# Patient Record
Sex: MALE | Race: WHITE | NOT HISPANIC OR LATINO | URBAN - METROPOLITAN AREA
[De-identification: names, ages, dates, MRNs, and addresses within clinical notes are randomized per-mention and may not be internally consistent; named-entity substitution may affect disease eponyms.]

---

## 2017-03-13 ENCOUNTER — INPATIENT (INPATIENT)
Facility: HOSPITAL | Age: 57
LOS: 1 days | Discharge: HOME | DRG: 92 | End: 2017-03-15
Attending: HOSPITALIST | Admitting: HOSPITALIST
Payer: MEDICAID

## 2017-03-13 VITALS — HEIGHT: 67 IN | WEIGHT: 199.96 LBS

## 2017-03-13 DIAGNOSIS — I10 ESSENTIAL (PRIMARY) HYPERTENSION: ICD-10-CM

## 2017-03-13 DIAGNOSIS — R42 DIZZINESS AND GIDDINESS: ICD-10-CM

## 2017-03-13 DIAGNOSIS — F32.9 MAJOR DEPRESSIVE DISORDER, SINGLE EPISODE, UNSPECIFIED: ICD-10-CM

## 2017-03-13 DIAGNOSIS — I63.9 CEREBRAL INFARCTION, UNSPECIFIED: ICD-10-CM

## 2017-03-13 DIAGNOSIS — M19.90 UNSPECIFIED OSTEOARTHRITIS, UNSPECIFIED SITE: ICD-10-CM

## 2017-03-13 DIAGNOSIS — F41.9 ANXIETY DISORDER, UNSPECIFIED: ICD-10-CM

## 2017-03-13 DIAGNOSIS — J45.909 UNSPECIFIED ASTHMA, UNCOMPLICATED: ICD-10-CM

## 2017-03-13 DIAGNOSIS — Z41.8 ENCOUNTER FOR OTHER PROCEDURES FOR PURPOSES OTHER THAN REMEDYING HEALTH STATE: ICD-10-CM

## 2017-03-13 LAB
ALBUMIN SERPL ELPH-MCNC: 3.4 G/DL — LOW (ref 3.5–5)
ALP SERPL-CCNC: 73 U/L — SIGNIFICANT CHANGE UP (ref 40–120)
ALT FLD-CCNC: 39 U/L DA — SIGNIFICANT CHANGE UP (ref 10–60)
ANION GAP SERPL CALC-SCNC: 12 MMOL/L — SIGNIFICANT CHANGE UP (ref 5–17)
AST SERPL-CCNC: 23 U/L — SIGNIFICANT CHANGE UP (ref 10–40)
BASOPHILS # BLD AUTO: 0.1 K/UL — SIGNIFICANT CHANGE UP (ref 0–0.2)
BASOPHILS NFR BLD AUTO: 1.1 % — SIGNIFICANT CHANGE UP (ref 0–2)
BILIRUB SERPL-MCNC: 0.2 MG/DL — SIGNIFICANT CHANGE UP (ref 0.2–1.2)
BUN SERPL-MCNC: 17 MG/DL — SIGNIFICANT CHANGE UP (ref 7–18)
CALCIUM SERPL-MCNC: 8.6 MG/DL — SIGNIFICANT CHANGE UP (ref 8.4–10.5)
CHLORIDE SERPL-SCNC: 106 MMOL/L — SIGNIFICANT CHANGE UP (ref 96–108)
CK MB BLD-MCNC: 1.4 % — SIGNIFICANT CHANGE UP (ref 0–3.5)
CK MB BLD-MCNC: <1.2 % — SIGNIFICANT CHANGE UP (ref 0–3.5)
CK MB CFR SERPL CALC: 1.2 NG/ML — SIGNIFICANT CHANGE UP (ref 0–3.6)
CK MB CFR SERPL CALC: <1 NG/ML — SIGNIFICANT CHANGE UP (ref 0–3.6)
CK SERPL-CCNC: 81 U/L — SIGNIFICANT CHANGE UP (ref 35–232)
CK SERPL-CCNC: 86 U/L — SIGNIFICANT CHANGE UP (ref 35–232)
CO2 SERPL-SCNC: 21 MMOL/L — LOW (ref 22–31)
CREAT SERPL-MCNC: 1.28 MG/DL — SIGNIFICANT CHANGE UP (ref 0.5–1.3)
EOSINOPHIL # BLD AUTO: 0.4 K/UL — SIGNIFICANT CHANGE UP (ref 0–0.5)
EOSINOPHIL NFR BLD AUTO: 3.9 % — SIGNIFICANT CHANGE UP (ref 0–6)
GLUCOSE SERPL-MCNC: 124 MG/DL — HIGH (ref 70–99)
HCT VFR BLD CALC: 40.6 % — SIGNIFICANT CHANGE UP (ref 39–50)
HGB BLD-MCNC: 13.6 G/DL — SIGNIFICANT CHANGE UP (ref 13–17)
LACTATE SERPL-SCNC: 1.6 MMOL/L — SIGNIFICANT CHANGE UP (ref 0.7–2)
LYMPHOCYTES # BLD AUTO: 27.8 % — SIGNIFICANT CHANGE UP (ref 13–44)
LYMPHOCYTES # BLD AUTO: 3 K/UL — SIGNIFICANT CHANGE UP (ref 1–3.3)
MCHC RBC-ENTMCNC: 28.7 PG — SIGNIFICANT CHANGE UP (ref 27–34)
MCHC RBC-ENTMCNC: 33.5 GM/DL — SIGNIFICANT CHANGE UP (ref 32–36)
MCV RBC AUTO: 85.6 FL — SIGNIFICANT CHANGE UP (ref 80–100)
MONOCYTES # BLD AUTO: 0.7 K/UL — SIGNIFICANT CHANGE UP (ref 0–0.9)
MONOCYTES NFR BLD AUTO: 6.1 % — SIGNIFICANT CHANGE UP (ref 2–14)
NEUTROPHILS # BLD AUTO: 6.5 K/UL — SIGNIFICANT CHANGE UP (ref 1.8–7.4)
NEUTROPHILS NFR BLD AUTO: 61.1 % — SIGNIFICANT CHANGE UP (ref 43–77)
PLATELET # BLD AUTO: 192 K/UL — SIGNIFICANT CHANGE UP (ref 150–400)
POTASSIUM SERPL-MCNC: 4 MMOL/L — SIGNIFICANT CHANGE UP (ref 3.5–5.3)
POTASSIUM SERPL-SCNC: 4 MMOL/L — SIGNIFICANT CHANGE UP (ref 3.5–5.3)
PROT SERPL-MCNC: 6.7 G/DL — SIGNIFICANT CHANGE UP (ref 6–8.3)
RBC # BLD: 4.73 M/UL — SIGNIFICANT CHANGE UP (ref 4.2–5.8)
RBC # FLD: 13.1 % — SIGNIFICANT CHANGE UP (ref 10.3–14.5)
SODIUM SERPL-SCNC: 139 MMOL/L — SIGNIFICANT CHANGE UP (ref 135–145)
TROPONIN I SERPL-MCNC: <0.015 NG/ML — SIGNIFICANT CHANGE UP (ref 0–0.04)
TROPONIN I SERPL-MCNC: <0.015 NG/ML — SIGNIFICANT CHANGE UP (ref 0–0.04)
WBC # BLD: 10.6 K/UL — HIGH (ref 3.8–10.5)
WBC # FLD AUTO: 10.6 K/UL — HIGH (ref 3.8–10.5)

## 2017-03-13 PROCEDURE — 99223 1ST HOSP IP/OBS HIGH 75: CPT

## 2017-03-13 PROCEDURE — 99223 1ST HOSP IP/OBS HIGH 75: CPT | Mod: GC

## 2017-03-13 PROCEDURE — 70450 CT HEAD/BRAIN W/O DYE: CPT | Mod: 26

## 2017-03-13 PROCEDURE — 71010: CPT | Mod: 26

## 2017-03-13 PROCEDURE — 99285 EMERGENCY DEPT VISIT HI MDM: CPT

## 2017-03-13 RX ORDER — FLUPHENAZINE HYDROCHLORIDE 1 MG/1
10 TABLET, FILM COATED ORAL EVERY 12 HOURS
Qty: 0 | Refills: 0 | Status: DISCONTINUED | OUTPATIENT
Start: 2017-03-13 | End: 2017-03-15

## 2017-03-13 RX ORDER — ACETAMINOPHEN 500 MG
650 TABLET ORAL EVERY 8 HOURS
Qty: 0 | Refills: 0 | Status: DISCONTINUED | OUTPATIENT
Start: 2017-03-13 | End: 2017-03-15

## 2017-03-13 RX ORDER — VENLAFAXINE HCL 75 MG
150 CAPSULE, EXT RELEASE 24 HR ORAL DAILY
Qty: 0 | Refills: 0 | Status: DISCONTINUED | OUTPATIENT
Start: 2017-03-13 | End: 2017-03-15

## 2017-03-13 RX ORDER — ASPIRIN/CALCIUM CARB/MAGNESIUM 324 MG
325 TABLET ORAL ONCE
Qty: 0 | Refills: 0 | Status: COMPLETED | OUTPATIENT
Start: 2017-03-13 | End: 2017-03-13

## 2017-03-13 RX ORDER — IPRATROPIUM/ALBUTEROL SULFATE 18-103MCG
3 AEROSOL WITH ADAPTER (GRAM) INHALATION EVERY 6 HOURS
Qty: 0 | Refills: 0 | Status: DISCONTINUED | OUTPATIENT
Start: 2017-03-13 | End: 2017-03-15

## 2017-03-13 RX ORDER — CELECOXIB 200 MG/1
200 CAPSULE ORAL DAILY
Qty: 0 | Refills: 0 | Status: DISCONTINUED | OUTPATIENT
Start: 2017-03-13 | End: 2017-03-13

## 2017-03-13 RX ORDER — QUETIAPINE FUMARATE 200 MG/1
200 TABLET, FILM COATED ORAL AT BEDTIME
Qty: 0 | Refills: 0 | Status: DISCONTINUED | OUTPATIENT
Start: 2017-03-13 | End: 2017-03-15

## 2017-03-13 RX ORDER — MECLIZINE HCL 12.5 MG
12.5 TABLET ORAL EVERY 8 HOURS
Qty: 0 | Refills: 0 | Status: DISCONTINUED | OUTPATIENT
Start: 2017-03-13 | End: 2017-03-15

## 2017-03-13 RX ORDER — QUETIAPINE FUMARATE 200 MG/1
100 TABLET, FILM COATED ORAL AT BEDTIME
Qty: 0 | Refills: 0 | Status: DISCONTINUED | OUTPATIENT
Start: 2017-03-13 | End: 2017-03-13

## 2017-03-13 RX ORDER — HEPARIN SODIUM 5000 [USP'U]/ML
5000 INJECTION INTRAVENOUS; SUBCUTANEOUS EVERY 8 HOURS
Qty: 0 | Refills: 0 | Status: DISCONTINUED | OUTPATIENT
Start: 2017-03-13 | End: 2017-03-15

## 2017-03-13 RX ORDER — MELOXICAM 15 MG/1
1 TABLET ORAL
Qty: 0 | Refills: 0 | COMMUNITY

## 2017-03-13 RX ORDER — ESOMEPRAZOLE MAGNESIUM 40 MG/1
1 CAPSULE, DELAYED RELEASE ORAL
Qty: 0 | Refills: 0 | COMMUNITY

## 2017-03-13 RX ORDER — ATORVASTATIN CALCIUM 80 MG/1
20 TABLET, FILM COATED ORAL AT BEDTIME
Qty: 0 | Refills: 0 | Status: DISCONTINUED | OUTPATIENT
Start: 2017-03-13 | End: 2017-03-15

## 2017-03-13 RX ORDER — PANTOPRAZOLE SODIUM 20 MG/1
40 TABLET, DELAYED RELEASE ORAL
Qty: 0 | Refills: 0 | Status: DISCONTINUED | OUTPATIENT
Start: 2017-03-13 | End: 2017-03-15

## 2017-03-13 RX ORDER — CLONAZEPAM 1 MG
1 TABLET ORAL AT BEDTIME
Qty: 0 | Refills: 0 | Status: DISCONTINUED | OUTPATIENT
Start: 2017-03-13 | End: 2017-03-15

## 2017-03-13 RX ORDER — ASPIRIN/CALCIUM CARB/MAGNESIUM 324 MG
81 TABLET ORAL DAILY
Qty: 0 | Refills: 0 | Status: DISCONTINUED | OUTPATIENT
Start: 2017-03-13 | End: 2017-03-15

## 2017-03-13 RX ORDER — BENZTROPINE MESYLATE 1 MG
1 TABLET ORAL
Qty: 0 | Refills: 0 | Status: DISCONTINUED | OUTPATIENT
Start: 2017-03-13 | End: 2017-03-15

## 2017-03-13 RX ORDER — INSULIN LISPRO 100/ML
VIAL (ML) SUBCUTANEOUS
Qty: 0 | Refills: 0 | Status: DISCONTINUED | OUTPATIENT
Start: 2017-03-13 | End: 2017-03-15

## 2017-03-13 RX ADMIN — FLUPHENAZINE HYDROCHLORIDE 10 MILLIGRAM(S): 1 TABLET, FILM COATED ORAL at 18:12

## 2017-03-13 RX ADMIN — Medication 325 MILLIGRAM(S): at 13:48

## 2017-03-13 RX ADMIN — HEPARIN SODIUM 5000 UNIT(S): 5000 INJECTION INTRAVENOUS; SUBCUTANEOUS at 22:12

## 2017-03-13 RX ADMIN — Medication 3 MILLILITER(S): at 21:05

## 2017-03-13 RX ADMIN — ATORVASTATIN CALCIUM 20 MILLIGRAM(S): 80 TABLET, FILM COATED ORAL at 22:11

## 2017-03-13 RX ADMIN — Medication 1 MILLIGRAM(S): at 22:11

## 2017-03-13 RX ADMIN — Medication 1 MILLIGRAM(S): at 18:12

## 2017-03-13 RX ADMIN — QUETIAPINE FUMARATE 200 MILLIGRAM(S): 200 TABLET, FILM COATED ORAL at 22:13

## 2017-03-13 NOTE — H&P ADULT. - GASTROINTESTINAL DETAILS
no bruit/nontender/bowel sounds normal/normal/soft/no organomegaly/no rigidity/no distention/no guarding/no masses palpable/no rebound tenderness

## 2017-03-13 NOTE — ED ADULT NURSE NOTE - OBJECTIVE STATEMENT
BIBA  by notification with c/o slurred speech  on arrival alert breathing unlabored Rt arm weakness slurred speech  noted stroke code uninitiated CT head completed

## 2017-03-13 NOTE — H&P ADULT. - ATTENDING COMMENTS
Patient was seen and examined by myself with team on day of admission 3/13/17 at about 4:35 pm . Case was discussed with house staff in details.  Plan discussed with patient and family in details at bedside and all their questions / concerns were addressed.

## 2017-03-13 NOTE — H&P ADULT. - HISTORY OF PRESENT ILLNESS
56 M from home lives with brother, PMHx of depression, DM, HTN, HLD, psychosis came in with dizziness for 1 days. Patient explains his dizziness as unsteady gait and spinning of the room. Dizziness is associated with weakness and multiple falls. Dizziness is constant and worsens with movements. He also complaints of dysarthria for last 2-3 months, he has problem in finding and pronouncing words. Patient is a poor historian. He has difficulty in ambulation for last few months. He also complaints of dyspnea on exertion for last 3 months. Patient is on multiple medications for depression, anxiety and mood stabilization. He is current everyday smoker, he never had colonoscopy.    Patient denies fever, chest pain, nausea, vomiting, trauma, rash, orthopnea, PND, LE edema, alcohol abuse, illicit drug use.    Ivorian  # [id number] 56 M from home lives with brother, PMHx of depression, DM, HTN, HLD, psychosis came in with dizziness for 1 days. Patient explains his dizziness as unsteady gait and spinning of the room. Dizziness is associated with weakness and multiple falls. Dizziness is constant and worsens with movements. He also complaints of dysarthria, he has problem in finding and pronouncing words. As per nephew dysarthria started in morning. Patient is a poor historian. He has difficulty in ambulation for last few months. He also complaints of dyspnea on exertion for last 3 months. Patient is on multiple medications for depression, anxiety and mood stabilization. He is current everyday smoker, he never had colonoscopy.    Patient denies fever, chest pain, nausea, vomiting, trauma, rash, orthopnea, PND, LE edema, alcohol abuse, illicit drug use.    [place]  # [id number]

## 2017-03-13 NOTE — H&P ADULT. - NEUROLOGICAL DETAILS
sensation intact/no spontaneous movement/superficial reflexes intact/alert and oriented x 3/deep reflexes intact/cranial nerves intact/normal strength

## 2017-03-13 NOTE — ED PROVIDER NOTE - OBJECTIVE STATEMENT
patient was brought in by EMS after was found to have dysarthria at center where he goes pt with significant psych history   hands are clumsy / weak   but no pronator   time on onset unknown  woke up   Nih 4

## 2017-03-13 NOTE — H&P ADULT. - PROBLEM SELECTOR PLAN 1
Likely due to polypharmacy  Vestibulitis could be contributing factor  CT head negative for infarct or hemorrhage but shows bilateral mucosal sinus thickening  Afebrile, leukocytosis 10.6  F/u BCx  C/w meclizine prn  Admitted to tele to r/o stroke  Given  MG  C/w ASA and statin  Holding BDZ  f/u PT consult  fall precaution Presumed due to vestibulitis  Other contributing factor could be due to psychiatric meds  CT head negative for infarct or hemorrhage but shows bilateral mucosal sinus thickening  Afebrile, leukocytosis 10.6  F/u BCx  C/w meclizine prn  Admitted to tele to r/o stroke  Given  MG  C/w ASA and statin  Holding BDZ  f/u PT consult  fall precaution Dizziness with dysarthria   Presumed due to vestibulitis  Concerns for stroke  Other contributing factor could be due to psychiatric meds  CT head negative for infarct or hemorrhage but shows bilateral mucosal sinus thickening  Afebrile, leukocytosis 10.6  C/w meclizine prn  Admitted to tele to r/o stroke  Given  MG  C/w ASA and statin  Holding temazepam and decreasing doze od seroquel  f/u PT consult  fall precaution  f/u echo and MRI head  Neuro consult Dr. Mckee

## 2017-03-13 NOTE — H&P ADULT. - ASSESSMENT
56 M from home lives with brother, PMHx of depression, arthritis, DM, HTN, HLD, psychosis came in with dizziness for 1 days. 56 M from home lives with brother, PMHx of depression, arthritis, DM, HTN, HLD, psychosis came in with dizziness with dysarthria for 1 days.

## 2017-03-14 LAB
24R-OH-CALCIDIOL SERPL-MCNC: 41.6 NG/ML — SIGNIFICANT CHANGE UP (ref 30–100)
ALBUMIN SERPL ELPH-MCNC: 3.6 G/DL — SIGNIFICANT CHANGE UP (ref 3.5–5)
ALP SERPL-CCNC: 88 U/L — SIGNIFICANT CHANGE UP (ref 40–120)
ALT FLD-CCNC: 35 U/L DA — SIGNIFICANT CHANGE UP (ref 10–60)
AMPHET UR-MCNC: NEGATIVE — SIGNIFICANT CHANGE UP
ANION GAP SERPL CALC-SCNC: 10 MMOL/L — SIGNIFICANT CHANGE UP (ref 5–17)
APPEARANCE UR: CLEAR — SIGNIFICANT CHANGE UP
AST SERPL-CCNC: 17 U/L — SIGNIFICANT CHANGE UP (ref 10–40)
BARBITURATES UR SCN-MCNC: NEGATIVE — SIGNIFICANT CHANGE UP
BENZODIAZ UR-MCNC: NEGATIVE — SIGNIFICANT CHANGE UP
BILIRUB DIRECT SERPL-MCNC: 0.1 MG/DL — SIGNIFICANT CHANGE UP (ref 0–0.2)
BILIRUB INDIRECT FLD-MCNC: 0.2 MG/DL — SIGNIFICANT CHANGE UP (ref 0.2–1)
BILIRUB SERPL-MCNC: 0.3 MG/DL — SIGNIFICANT CHANGE UP (ref 0.2–1.2)
BILIRUB UR-MCNC: NEGATIVE — SIGNIFICANT CHANGE UP
BUN SERPL-MCNC: 22 MG/DL — HIGH (ref 7–18)
CALCIUM SERPL-MCNC: 9.3 MG/DL — SIGNIFICANT CHANGE UP (ref 8.4–10.5)
CHLORIDE SERPL-SCNC: 106 MMOL/L — SIGNIFICANT CHANGE UP (ref 96–108)
CHOLEST SERPL-MCNC: 123 MG/DL — SIGNIFICANT CHANGE UP (ref 10–199)
CK MB BLD-MCNC: <1.5 % — SIGNIFICANT CHANGE UP (ref 0–3.5)
CK MB CFR SERPL CALC: <1 NG/ML — SIGNIFICANT CHANGE UP (ref 0–3.6)
CK SERPL-CCNC: 66 U/L — SIGNIFICANT CHANGE UP (ref 35–232)
CO2 SERPL-SCNC: 24 MMOL/L — SIGNIFICANT CHANGE UP (ref 22–31)
COCAINE METAB.OTHER UR-MCNC: NEGATIVE — SIGNIFICANT CHANGE UP
COLOR SPEC: YELLOW — SIGNIFICANT CHANGE UP
CREAT SERPL-MCNC: 1.69 MG/DL — HIGH (ref 0.5–1.3)
DIFF PNL FLD: NEGATIVE — SIGNIFICANT CHANGE UP
GLUCOSE SERPL-MCNC: 147 MG/DL — HIGH (ref 70–99)
GLUCOSE UR QL: NEGATIVE — SIGNIFICANT CHANGE UP
HBA1C BLD-MCNC: 6.5 % — HIGH (ref 4–5.6)
HCT VFR BLD CALC: 41.1 % — SIGNIFICANT CHANGE UP (ref 39–50)
HCV AB S/CO SERPL IA: 0.11 S/CO — SIGNIFICANT CHANGE UP
HCV AB SERPL-IMP: SIGNIFICANT CHANGE UP
HDLC SERPL-MCNC: 34 MG/DL — LOW (ref 40–125)
HGB BLD-MCNC: 13.5 G/DL — SIGNIFICANT CHANGE UP (ref 13–17)
KETONES UR-MCNC: NEGATIVE — SIGNIFICANT CHANGE UP
LEUKOCYTE ESTERASE UR-ACNC: NEGATIVE — SIGNIFICANT CHANGE UP
LIPID PNL WITH DIRECT LDL SERPL: 49 MG/DL — SIGNIFICANT CHANGE UP
MAGNESIUM SERPL-MCNC: 2.4 MG/DL — SIGNIFICANT CHANGE UP (ref 1.8–2.4)
MCHC RBC-ENTMCNC: 27.9 PG — SIGNIFICANT CHANGE UP (ref 27–34)
MCHC RBC-ENTMCNC: 32.8 GM/DL — SIGNIFICANT CHANGE UP (ref 32–36)
MCV RBC AUTO: 85.2 FL — SIGNIFICANT CHANGE UP (ref 80–100)
METHADONE UR-MCNC: NEGATIVE — SIGNIFICANT CHANGE UP
NITRITE UR-MCNC: NEGATIVE — SIGNIFICANT CHANGE UP
OB PNL STL: NEGATIVE — SIGNIFICANT CHANGE UP
OPIATES UR-MCNC: NEGATIVE — SIGNIFICANT CHANGE UP
PCP SPEC-MCNC: SIGNIFICANT CHANGE UP
PCP UR-MCNC: NEGATIVE — SIGNIFICANT CHANGE UP
PH UR: 6 — SIGNIFICANT CHANGE UP (ref 4.8–8)
PHOSPHATE SERPL-MCNC: 4.7 MG/DL — HIGH (ref 2.5–4.5)
PLATELET # BLD AUTO: 195 K/UL — SIGNIFICANT CHANGE UP (ref 150–400)
POTASSIUM SERPL-MCNC: 3.7 MMOL/L — SIGNIFICANT CHANGE UP (ref 3.5–5.3)
POTASSIUM SERPL-SCNC: 3.7 MMOL/L — SIGNIFICANT CHANGE UP (ref 3.5–5.3)
PROT SERPL-MCNC: 6.9 G/DL — SIGNIFICANT CHANGE UP (ref 6–8.3)
PROT UR-MCNC: NEGATIVE — SIGNIFICANT CHANGE UP
RBC # BLD: 4.83 M/UL — SIGNIFICANT CHANGE UP (ref 4.2–5.8)
RBC # FLD: 12.7 % — SIGNIFICANT CHANGE UP (ref 10.3–14.5)
SODIUM SERPL-SCNC: 140 MMOL/L — SIGNIFICANT CHANGE UP (ref 135–145)
SP GR SPEC: 1.01 — SIGNIFICANT CHANGE UP (ref 1.01–1.02)
THC UR QL: NEGATIVE — SIGNIFICANT CHANGE UP
TOTAL CHOLESTEROL/HDL RATIO MEASUREMENT: 3.6 RATIO — SIGNIFICANT CHANGE UP (ref 3.4–9.6)
TRIGL SERPL-MCNC: 199 MG/DL — HIGH (ref 10–149)
TROPONIN I SERPL-MCNC: <0.015 NG/ML — SIGNIFICANT CHANGE UP (ref 0–0.04)
TSH SERPL-MCNC: 0.71 UU/ML — SIGNIFICANT CHANGE UP (ref 0.34–4.82)
UROBILINOGEN FLD QL: NEGATIVE — SIGNIFICANT CHANGE UP
VIT B12 SERPL-MCNC: 490 PG/ML — SIGNIFICANT CHANGE UP (ref 243–894)
WBC # BLD: 9.8 K/UL — SIGNIFICANT CHANGE UP (ref 3.8–10.5)
WBC # FLD AUTO: 9.8 K/UL — SIGNIFICANT CHANGE UP (ref 3.8–10.5)

## 2017-03-14 PROCEDURE — 70551 MRI BRAIN STEM W/O DYE: CPT | Mod: 26

## 2017-03-14 PROCEDURE — 99232 SBSQ HOSP IP/OBS MODERATE 35: CPT | Mod: GC

## 2017-03-14 RX ORDER — CLONAZEPAM 1 MG
1 TABLET ORAL
Qty: 0 | Refills: 0 | COMMUNITY
Start: 2017-03-14

## 2017-03-14 RX ORDER — MECLIZINE HCL 12.5 MG
1 TABLET ORAL
Qty: 0 | Refills: 0 | COMMUNITY
Start: 2017-03-14

## 2017-03-14 RX ORDER — LANOLIN ALCOHOL/MO/W.PET/CERES
3 CREAM (GRAM) TOPICAL AT BEDTIME
Qty: 0 | Refills: 0 | Status: DISCONTINUED | OUTPATIENT
Start: 2017-03-14 | End: 2017-03-15

## 2017-03-14 RX ORDER — SENNA PLUS 8.6 MG/1
1 TABLET ORAL AT BEDTIME
Qty: 0 | Refills: 0 | Status: DISCONTINUED | OUTPATIENT
Start: 2017-03-14 | End: 2017-03-15

## 2017-03-14 RX ORDER — BENZTROPINE MESYLATE 1 MG
1 TABLET ORAL
Qty: 0 | Refills: 0 | COMMUNITY

## 2017-03-14 RX ORDER — QUETIAPINE FUMARATE 200 MG/1
1 TABLET, FILM COATED ORAL
Qty: 0 | Refills: 0 | COMMUNITY

## 2017-03-14 RX ORDER — ATORVASTATIN CALCIUM 80 MG/1
1 TABLET, FILM COATED ORAL
Qty: 0 | Refills: 0 | COMMUNITY
Start: 2017-03-14

## 2017-03-14 RX ORDER — CLONAZEPAM 1 MG
1 TABLET ORAL
Qty: 0 | Refills: 0 | COMMUNITY

## 2017-03-14 RX ORDER — SODIUM CHLORIDE 9 MG/ML
250 INJECTION INTRAMUSCULAR; INTRAVENOUS; SUBCUTANEOUS ONCE
Qty: 0 | Refills: 0 | Status: COMPLETED | OUTPATIENT
Start: 2017-03-14 | End: 2017-03-14

## 2017-03-14 RX ORDER — FLUPHENAZINE HYDROCHLORIDE 1 MG/1
1 TABLET, FILM COATED ORAL
Qty: 0 | Refills: 0 | COMMUNITY
Start: 2017-03-14

## 2017-03-14 RX ORDER — VENLAFAXINE HCL 75 MG
1 CAPSULE, EXT RELEASE 24 HR ORAL
Qty: 0 | Refills: 0 | COMMUNITY

## 2017-03-14 RX ORDER — VENLAFAXINE HCL 75 MG
1 CAPSULE, EXT RELEASE 24 HR ORAL
Qty: 0 | Refills: 0 | COMMUNITY
Start: 2017-03-14

## 2017-03-14 RX ORDER — BENZTROPINE MESYLATE 1 MG
1 TABLET ORAL
Qty: 0 | Refills: 0 | COMMUNITY
Start: 2017-03-14

## 2017-03-14 RX ORDER — QUETIAPINE FUMARATE 200 MG/1
1 TABLET, FILM COATED ORAL
Qty: 0 | Refills: 0 | COMMUNITY
Start: 2017-03-14

## 2017-03-14 RX ORDER — ASPIRIN/CALCIUM CARB/MAGNESIUM 324 MG
1 TABLET ORAL
Qty: 0 | Refills: 0 | COMMUNITY
Start: 2017-03-14

## 2017-03-14 RX ADMIN — Medication 3 MILLILITER(S): at 02:17

## 2017-03-14 RX ADMIN — SENNA PLUS 1 TABLET(S): 8.6 TABLET ORAL at 21:51

## 2017-03-14 RX ADMIN — HEPARIN SODIUM 5000 UNIT(S): 5000 INJECTION INTRAVENOUS; SUBCUTANEOUS at 05:55

## 2017-03-14 RX ADMIN — QUETIAPINE FUMARATE 200 MILLIGRAM(S): 200 TABLET, FILM COATED ORAL at 21:52

## 2017-03-14 RX ADMIN — ATORVASTATIN CALCIUM 20 MILLIGRAM(S): 80 TABLET, FILM COATED ORAL at 21:52

## 2017-03-14 RX ADMIN — Medication 3 MILLILITER(S): at 21:16

## 2017-03-14 RX ADMIN — Medication 1 MILLIGRAM(S): at 17:34

## 2017-03-14 RX ADMIN — Medication 1 MILLIGRAM(S): at 21:51

## 2017-03-14 RX ADMIN — HEPARIN SODIUM 5000 UNIT(S): 5000 INJECTION INTRAVENOUS; SUBCUTANEOUS at 13:12

## 2017-03-14 RX ADMIN — Medication 1 TABLET(S): at 17:32

## 2017-03-14 RX ADMIN — Medication 3 MILLILITER(S): at 10:08

## 2017-03-14 RX ADMIN — Medication 1: at 12:19

## 2017-03-14 RX ADMIN — Medication 1 MILLIGRAM(S): at 05:55

## 2017-03-14 RX ADMIN — FLUPHENAZINE HYDROCHLORIDE 10 MILLIGRAM(S): 1 TABLET, FILM COATED ORAL at 05:55

## 2017-03-14 RX ADMIN — FLUPHENAZINE HYDROCHLORIDE 10 MILLIGRAM(S): 1 TABLET, FILM COATED ORAL at 17:33

## 2017-03-14 RX ADMIN — HEPARIN SODIUM 5000 UNIT(S): 5000 INJECTION INTRAVENOUS; SUBCUTANEOUS at 21:52

## 2017-03-14 RX ADMIN — Medication 650 MILLIGRAM(S): at 12:19

## 2017-03-14 RX ADMIN — Medication 3 MILLILITER(S): at 14:33

## 2017-03-14 RX ADMIN — Medication 150 MILLIGRAM(S): at 12:19

## 2017-03-14 RX ADMIN — Medication 3 MILLIGRAM(S): at 02:57

## 2017-03-14 RX ADMIN — Medication 81 MILLIGRAM(S): at 12:19

## 2017-03-14 RX ADMIN — PANTOPRAZOLE SODIUM 40 MILLIGRAM(S): 20 TABLET, DELAYED RELEASE ORAL at 05:55

## 2017-03-14 RX ADMIN — Medication 650 MILLIGRAM(S): at 13:30

## 2017-03-14 RX ADMIN — SODIUM CHLORIDE 83.33 MILLILITER(S): 9 INJECTION INTRAMUSCULAR; INTRAVENOUS; SUBCUTANEOUS at 12:20

## 2017-03-14 NOTE — DISCHARGE NOTE ADULT - PATIENT PORTAL LINK FT
“You can access the [facility] Patient Portal, offered by [facility], by registering with the following website: [email or web address]”

## 2017-03-14 NOTE — PHYSICAL THERAPY INITIAL EVALUATION ADULT - GENERAL OBSERVATIONS, REHAB EVAL
Consult received, chart reviewed. Patient received supine in bed, NAD, states feels better , [place] speaking as a PT.  Patient agreed to EVALUATION from Physical Therapist.

## 2017-03-14 NOTE — DISCHARGE NOTE ADULT - MEDICATION SUMMARY - MEDICATIONS TO TAKE
I will START or STAY ON the medications listed below when I get home from the hospital:    Tylenol 500 mg oral tablet  -- 2 tab(s) by mouth 2 times a day, As Needed  -- Indication: For pain    aspirin 81 mg oral tablet, chewable  -- 1 tab(s) by mouth once a day  -- Indication: For Need for prophylactic measure    clonazePAM 1 mg oral tablet  -- 1 tab(s) by mouth once a day (at bedtime)  -- Indication: For Anxiety    venlafaxine 150 mg oral capsule, extended release  -- 1 cap(s) by mouth once a day  -- Indication: For Depression    metFORMIN 500 mg oral tablet, extended release  -- 1 tab(s) by mouth once a day  -- Indication: For DM    meclizine 12.5 mg oral tablet  -- 1 tab(s) by mouth every 8 hours, As needed, Dizziness  -- Indication: For Dizziness    atorvastatin 20 mg oral tablet  -- 1 tab(s) by mouth once a day (at bedtime)  -- Indication: For HLD (hyperlipidemia)    benztropine 1 mg oral tablet  -- 1 tab(s) by mouth 2 times a day  -- Indication: For Antiparkinson    fluPHENAZine 10 mg oral tablet  -- 1 tab(s) by mouth every 12 hours  -- Indication: For Antipsychotic    QUEtiapine 200 mg oral tablet, extended release  -- 1 tab(s) by mouth once a day (at bedtime)  -- Indication: For Antipsychotic    senna 8.6 mg oral tablet  -- 1 tab(s) by mouth once a day (at bedtime)  -- Indication: For Constipation    amoxicillin-clavulanate 875 mg-125 mg oral tablet  -- 1 tab(s) by mouth 2 times a day  -- Indication: For Sinusitis    omeprazole 40 mg oral delayed release capsule  -- 1 cap(s) by mouth once a day  -- Indication: For GI ppx I will START or STAY ON the medications listed below when I get home from the hospital:    Tylenol 500 mg oral tablet  -- 2 tab(s) by mouth 2 times a day, As Needed  -- Indication: For pain    aspirin 81 mg oral tablet, chewable  -- 1 tab(s) by mouth once a day  -- Indication: For Need for prophylactic measure    clonazePAM 1 mg oral tablet  -- 1 tab(s) by mouth once a day (at bedtime)  -- Indication: For Anxiety    venlafaxine 150 mg oral capsule, extended release  -- 1 cap(s) by mouth once a day  -- Indication: For Depression    metFORMIN 500 mg oral tablet, extended release  -- 1 tab(s) by mouth once a day  -- Indication: For DM    meclizine 12.5 mg oral tablet  -- 1 tab(s) by mouth every 8 hours, As needed, Dizziness  -- Indication: For Dizziness    atorvastatin 20 mg oral tablet  -- 1 tab(s) by mouth once a day (at bedtime)  -- Indication: For HLD (hyperlipidemia)    benztropine 1 mg oral tablet  -- 1 tab(s) by mouth 2 times a day  -- Indication: For Antiparkinson    fluPHENAZine 10 mg oral tablet  -- 1 tab(s) by mouth every 12 hours  -- Indication: For Antipsychotic    QUEtiapine 200 mg oral tablet, extended release  -- 1 tab(s) by mouth once a day (at bedtime)  -- Indication: For Depression    senna 8.6 mg oral tablet  -- 1 tab(s) by mouth once a day (at bedtime)  -- Indication: For Constipation    Nasal Mist 0.05% nasal spray  -- 1 spray(s) into nose 3 times a day, As Needed  -- For the nose.    -- Indication: For Nasal congestion    amoxicillin-clavulanate 875 mg-125 mg oral tablet  -- 1 tab(s) by mouth 2 times a day  -- Indication: For Sinusitis    omeprazole 40 mg oral delayed release capsule  -- 1 cap(s) by mouth once a day  -- Indication: For GI ppx    ergocalciferol 50,000 intl units (1.25 mg) oral capsule  -- 1 cap(s) by mouth once a week  -- Indication: For Vit D defiency I will START or STAY ON the medications listed below when I get home from the hospital:    Tylenol 500 mg oral tablet  -- 2 tab(s) by mouth 2 times a day, As Needed  -- Indication: For pain    aspirin 81 mg oral tablet, chewable  -- 1 tab(s) by mouth once a day  -- Indication: For Cardioprotective    clonazePAM 1 mg oral tablet  -- 1 tab(s) by mouth once a day (at bedtime)  -- Indication: For Anxiety    venlafaxine 150 mg oral capsule, extended release  -- 1 cap(s) by mouth once a day  -- Indication: For Anxiety    metFORMIN 500 mg oral tablet, extended release  -- 1 tab(s) by mouth once a day  -- Indication: For DM    meclizine 12.5 mg oral tablet  -- 1 tab(s) by mouth every 8 hours, As needed, Dizziness  -- Indication: For vertigo/ dizziness    atorvastatin 20 mg oral tablet  -- 1 tab(s) by mouth once a day (at bedtime)  -- Indication: For HLD (hyperlipidemia)    benztropine 1 mg oral tablet  -- 1 tab(s) by mouth 2 times a day  -- Indication: For Antiparkinson    fluPHENAZine 10 mg oral tablet  -- 1 tab(s) by mouth every 12 hours  -- Indication: For Antipsychotic    QUEtiapine 200 mg oral tablet, extended release  -- 1 tab(s) by mouth once a day (at bedtime)  -- Indication: For Anxiety    senna 8.6 mg oral tablet  -- 1 tab(s) by mouth once a day (at bedtime)  -- Indication: For Constipation    Nasal Mist 0.05% nasal spray  -- 1 spray(s) into nose 3 times a day, As Needed  -- For the nose.    -- Indication: For Nasal spray    amoxicillin-clavulanate 875 mg-125 mg oral tablet  -- 1 tab(s) by mouth 2 times a day  -- Indication: For infection    omeprazole 40 mg oral delayed release capsule  -- 1 cap(s) by mouth once a day  -- Indication: For GI ppx    ergocalciferol 50,000 intl units (1.25 mg) oral capsule  -- 1 cap(s) by mouth once a week  -- Indication: For Vit D defiency

## 2017-03-14 NOTE — DISCHARGE NOTE ADULT - MEDICATION SUMMARY - MEDICATIONS TO CHANGE
I will SWITCH the dose or number of times a day I take the medications listed below when I get home from the hospital:    venlafaxine 75 mg oral capsule, extended release  -- 1 cap(s) by mouth once a day    QUEtiapine 100 mg oral tablet  -- 1 tab(s) by mouth once a day (at bedtime)    clonazePAM 0.5 mg oral tablet  -- 1 tab(s) by mouth once a day (at bedtime) MDD:0.5mg    SEROquel  mg oral tablet, extended release  -- 1 tab(s) by mouth once a day (in the evening)

## 2017-03-14 NOTE — DISCHARGE NOTE ADULT - CARE PLAN
Principal Discharge DX:	Cerebrovascular accident (CVA), unspecified mechanism  Secondary Diagnosis:	Dizziness  Secondary Diagnosis:	Reactive airway disease  Secondary Diagnosis:	HTN (hypertension)  Secondary Diagnosis:	HLD (hyperlipidemia)  Secondary Diagnosis:	Anxiety  Secondary Diagnosis:	Depression Principal Discharge DX:	Cerebrovascular accident (CVA), unspecified mechanism  Goal:	to be free of symptoms of CVA such as dysarthria and gait instability; likely 2/2 psychiatric medications  Instructions for follow-up, activity and diet:	Patient did not have diagnosed CVA; recommended to continue aspirin and statin; dysarthria improved, no weakness on exam; As per physical therapy should get home PT; f/u with PCP within 1 week  Secondary Diagnosis:	Dizziness  Goal:	to be free of dizziness  Instructions for follow-up, activity and diet:	c/w meclizine as directed as needed; changes made to home psychiatric medications; take as directed  Secondary Diagnosis:	Reactive airway disease  Goal:	to be free of shortness of breath  Instructions for follow-up, activity and diet:	PFTs as outpatient; was given bronchodilators as inpatient; improved shortness of breath  Secondary Diagnosis:	HTN (hypertension)  Goal:	BP <140/90  Instructions for follow-up, activity and diet:	as inpatient, patient did not have hypertension; had borderline BP; advised to stop BP meds and have re-evaluation by PCP within 3 days to monitor and restart as indicated  Secondary Diagnosis:	HLD (hyperlipidemia)  Goal:	total cholesterol <200  Instructions for follow-up, activity and diet:	c/w statin as directed  Secondary Diagnosis:	Anxiety  Goal:	to be free of symptoms of anxiety  Instructions for follow-up, activity and diet:	c/w psychiatric medications as directed; f/u with PCP within 1 week  Secondary Diagnosis:	Depression  Goal:	to be free of symptoms of depression  Instructions for follow-up, activity and diet:	c/w psychiatric medications as directed, f/u with PCP within 1 week Principal Discharge DX:	Cerebrovascular accident (CVA), unspecified mechanism  Goal:	to be free of symptoms of CVA such as dysarthria and gait instability; likely 2/2 psychiatric medications  Instructions for follow-up, activity and diet:	Patient did not have diagnosed CVA; recommended to continue aspirin and statin; dysarthria improved, no weakness on exam; As per physical therapy should get home PT; f/u with PCP within 1 week; f/u with psychiatrist within 1 week to monitor medications and adjust if needed  Secondary Diagnosis:	Dizziness  Goal:	to be free of dizziness  Instructions for follow-up, activity and diet:	c/w meclizine as directed as needed; changes made to home psychiatric medications; take as directed  Secondary Diagnosis:	Reactive airway disease  Goal:	to be free of shortness of breath  Instructions for follow-up, activity and diet:	PFTs as outpatient; was given bronchodilators as inpatient; improved shortness of breath  Secondary Diagnosis:	HTN (hypertension)  Goal:	BP <140/90  Instructions for follow-up, activity and diet:	as inpatient, patient did not have hypertension; had borderline BP; advised to stop BP meds and have re-evaluation by PCP within 3 days to monitor and restart as indicated  Secondary Diagnosis:	HLD (hyperlipidemia)  Goal:	total cholesterol <200  Instructions for follow-up, activity and diet:	c/w statin as directed  Secondary Diagnosis:	Anxiety  Goal:	to be free of symptoms of anxiety  Instructions for follow-up, activity and diet:	c/w psychiatric medications as directed; f/u with PCP within 1 week  Secondary Diagnosis:	Depression  Goal:	to be free of symptoms of depression  Instructions for follow-up, activity and diet:	c/w psychiatric medications as directed, f/u with PCP within 1 week Principal Discharge DX:	Dysarthria  Goal:	to be free of symptoms such as dysarthria and gait instability; likely 2/2 psychiatric medications; CVA ruled out  Instructions for follow-up, activity and diet:	Patient did not have diagnosed CVA; recommended to continue aspirin and statin; dysarthria improved, no weakness on exam; As per physical therapy should get home PT; f/u with PCP within 1 week; f/u with psychiatrist within 1 week to monitor medications and adjust if needed  Secondary Diagnosis:	Dizziness  Goal:	to be free of dizziness  Instructions for follow-up, activity and diet:	c/w meclizine as directed as needed; changes made to home psychiatric medications; take as directed  Secondary Diagnosis:	Reactive airway disease  Goal:	to be free of shortness of breath  Instructions for follow-up, activity and diet:	PFTs as outpatient; was given bronchodilators as inpatient; improved shortness of breath  Secondary Diagnosis:	HTN (hypertension)  Goal:	BP <140/90  Instructions for follow-up, activity and diet:	as inpatient, patient did not have hypertension; had borderline BP; advised to stop BP meds and have re-evaluation by PCP within 3 days to monitor and restart as indicated  Secondary Diagnosis:	HLD (hyperlipidemia)  Goal:	total cholesterol <200  Instructions for follow-up, activity and diet:	c/w statin as directed  Secondary Diagnosis:	Anxiety  Goal:	to be free of symptoms of anxiety  Instructions for follow-up, activity and diet:	c/w psychiatric medications as directed; f/u with PCP within 1 week  Secondary Diagnosis:	Depression  Goal:	to be free of symptoms of depression  Instructions for follow-up, activity and diet:	c/w psychiatric medications as directed, f/u with PCP within 1 week

## 2017-03-14 NOTE — DISCHARGE NOTE ADULT - HOSPITAL COURSE
56 M from home lives with brother, PMHx of depression, DM, HTN, HLD, psychosis came in with dizziness for 1 days. Patient explains his dizziness as unsteady gait and spinning of the room. Dizziness is associated with weakness and multiple falls. Dizziness is constant and worsens with movements. He also complaints of dysarthria, he has problem in finding and pronouncing words. As per nephew dysarthria started in morning. Patient is a poor historian. He has difficulty in ambulation for last few months. He also complaints of dyspnea on exertion for last 3 months. Patient is on multiple medications for depression, anxiety and mood stabilization. He is current everyday smoker, he never had colonoscopy.    Patient denies fever, chest pain, nausea, vomiting, trauma, rash, orthopnea, PND, LE edema, alcohol abuse, illicit drug use.    Admitted for:    1) r/o CVA  -NIHSS 3 on admission  -patient had dizziness, dysarthria and reports gait disturbance  -positive orthostatics 2/2 psych meds  -head CT neg for acute findings except B/L mucosal thickening  -continue with meclizine for dizziness  -Dr. Mckee was consulted for neurology  -f/u MRI head  -as per PT eval: recommends home with home PT  -echo was ordered, pending    2) reactive airway disease  -gave duoneb as inpatient  -advised to get PFTs as outpatient  -CXR negative for acute pathology    3) anxiety and depression  -held temazepam due to dizziness  -continue with home psych meds, cogentin, klonipin, prolixin, seroquel and effexor    4) HTN  -holding BP meds until 3/15 to allow for permissive HTN upto 200/110    5)arthritis  -tylenol for pain management 56 M from home lives with brother, PMHx of depression, DM, HTN, HLD, psychosis came in with dizziness for 1 days. Patient explains his dizziness as unsteady gait and spinning of the room. Dizziness is associated with weakness and multiple falls. Dizziness is constant and worsens with movements. He also complaints of dysarthria, he has problem in finding and pronouncing words. As per nephew dysarthria started in morning. Patient is a poor historian. He has difficulty in ambulation for last few months. He also complaints of dyspnea on exertion for last 3 months. Patient is on multiple medications for depression, anxiety and mood stabilization. He is current everyday smoker, he never had colonoscopy.    Patient denies fever, chest pain, nausea, vomiting, trauma, rash, orthopnea, PND, LE edema, alcohol abuse, illicit drug use.    Admitted for:    1) r/o CVA  -NIHSS 3 on admission  -patient had dizziness, dysarthria and reports gait disturbance  -positive orthostatics 2/2 psych meds  -head CT neg for acute findings except B/L mucosal thickening  -continue with meclizine for dizziness  -Dr. Mckee was consulted for neurology  -MRI head neg   -as per PT eval: recommends home with home PT  -echo was ordered, normal Ef, mild concentric LVH    2) reactive airway disease   -gave duoneb as inpatient  -advised to get PFTs as outpatient  -CXR negative for acute pathology    3) anxiety and depression  -held temazepam due to dizziness  -continue with home psych meds, cogentin, klonipin, prolixin, seroquel and effexor    4) HTN  -holding BP meds until 3/15 to allow for permissive HTN upto 200/110    5)arthritis  -tylenol for pain management    6) sinusitis- c/w meds as directed  -take nasal saline spray as directed for relief of congestion    Patient medically stable for discharge. 56 M from home lives with brother, PMHx of depression, DM, HTN, HLD, psychosis came in with dizziness for 1 days. Patient explains his dizziness as unsteady gait and spinning of the room. Dizziness is associated with weakness and multiple falls. Dizziness is constant and worsens with movements. He also complaints of dysarthria, he has problem in finding and pronouncing words. As per nephew dysarthria started in morning. Patient is a poor historian. He has difficulty in ambulation for last few months. He also complaints of dyspnea on exertion for last 3 months. Patient is on multiple medications for depression, anxiety and mood stabilization. He is current everyday smoker, he never had colonoscopy.    Patient denies fever, chest pain, nausea, vomiting, trauma, rash, orthopnea, PND, LE edema, alcohol abuse, illicit drug use.    Admitted for:    1) Dysarthria - No evidence of acute CVA although presumed NIHSS 3 on admission  -patient had dizziness, dysarthria and reports gait disturbance- symptoms likely related to recently started new antipsychotic medication   -positive orthostatics 2/2 psych meds  -head CT neg for acute findings except B/L mucosal thickening  -continue with meclizine for dizziness  -Dr. Mckee was consulted for neurology  -MRI head neg   -as per PT eval: recommends home with home PT  -echo was ordered, normal Ef, mild concentric LVH    2) reactive airway disease   -gave duoneb as inpatient  -advised to get PFTs as outpatient  -CXR negative for acute pathology    3) anxiety and depression  -held temazepam due to dizziness  -continue with home psych meds, cogentin, klonipin, prolixin, seroquel and effexor    4) HTN  -holding BP meds until 3/15 to allow for permissive HTN upto 200/110    5)arthritis  -tylenol for pain management    6) sinusitis- c/w meds as directed  -take nasal saline spray as directed for relief of congestion    Patient medically stable for discharge.

## 2017-03-14 NOTE — DISCHARGE NOTE ADULT - PLAN OF CARE
to be free of symptoms of CVA such as dysarthria and gait instability; likely 2/2 psychiatric medications Patient did not have diagnosed CVA; recommended to continue aspirin and statin; dysarthria improved, no weakness on exam; As per physical therapy should get home PT; f/u with PCP within 1 week to be free of dizziness c/w meclizine as directed as needed; changes made to home psychiatric medications; take as directed to be free of shortness of breath PFTs as outpatient; was given bronchodilators as inpatient; improved shortness of breath as inpatient, patient did not have hypertension; had borderline BP; advised to stop BP meds and have re-evaluation by PCP within 3 days to monitor and restart as indicated c/w statin as directed c/w psychiatric medications as directed; f/u with PCP within 1 week c/w psychiatric medications as directed, f/u with PCP within 1 week BP <140/90 total cholesterol <200 to be free of symptoms of anxiety to be free of symptoms of depression Patient did not have diagnosed CVA; recommended to continue aspirin and statin; dysarthria improved, no weakness on exam; As per physical therapy should get home PT; f/u with PCP within 1 week; f/u with psychiatrist within 1 week to monitor medications and adjust if needed to be free of symptoms such as dysarthria and gait instability; likely 2/2 psychiatric medications; CVA ruled out

## 2017-03-14 NOTE — DISCHARGE NOTE ADULT - NS AS DC STROKE ED MATERIALS
Stroke Warning Signs and Symptoms/Need for Followup After Discharge/Stroke Education Booklet/Call 911 for Stroke/Risk Factors for Stroke/Prescribed Medications

## 2017-03-14 NOTE — PHYSICAL THERAPY INITIAL EVALUATION ADULT - ACTIVE RANGE OF MOTION EXAMINATION, REHAB EVAL
bilateral upper extremity Active ROM was WFL (within functional limits)/bilateral  lower extremity Active ROM was WFL (within functional limits)/grossly assessed WFL AROM

## 2017-03-14 NOTE — DISCHARGE NOTE ADULT - MEDICATION SUMMARY - MEDICATIONS TO STOP TAKING
I will STOP taking the medications listed below when I get home from the hospital:    losartan 50 mg oral tablet  -- 1 tab(s) by mouth once a day    losartan 100 mg oral tablet  -- 1 tab(s) by mouth once a day    Toprol- mg oral tablet, extended release  -- 1 tab(s) by mouth once a day    temazepam 30 mg oral capsule  -- 1 cap(s) by mouth once a day (at bedtime)    iloperidone 10 mg oral tablet  -- 1 tab(s) by mouth 2 times a day

## 2017-03-15 VITALS — DIASTOLIC BLOOD PRESSURE: 92 MMHG | SYSTOLIC BLOOD PRESSURE: 165 MMHG

## 2017-03-15 LAB
ANION GAP SERPL CALC-SCNC: 10 MMOL/L — SIGNIFICANT CHANGE UP (ref 5–17)
BUN SERPL-MCNC: 19 MG/DL — HIGH (ref 7–18)
CALCIUM SERPL-MCNC: 9.4 MG/DL — SIGNIFICANT CHANGE UP (ref 8.4–10.5)
CHLORIDE SERPL-SCNC: 106 MMOL/L — SIGNIFICANT CHANGE UP (ref 96–108)
CO2 SERPL-SCNC: 27 MMOL/L — SIGNIFICANT CHANGE UP (ref 22–31)
CREAT SERPL-MCNC: 1.4 MG/DL — HIGH (ref 0.5–1.3)
GLUCOSE SERPL-MCNC: 190 MG/DL — HIGH (ref 70–99)
HCT VFR BLD CALC: 39.6 % — SIGNIFICANT CHANGE UP (ref 39–50)
HGB BLD-MCNC: 12.6 G/DL — LOW (ref 13–17)
MAGNESIUM SERPL-MCNC: 2.1 MG/DL — SIGNIFICANT CHANGE UP (ref 1.8–2.4)
MCHC RBC-ENTMCNC: 28.3 PG — SIGNIFICANT CHANGE UP (ref 27–34)
MCHC RBC-ENTMCNC: 31.7 GM/DL — LOW (ref 32–36)
MCV RBC AUTO: 89.2 FL — SIGNIFICANT CHANGE UP (ref 80–100)
PHOSPHATE SERPL-MCNC: 4.1 MG/DL — SIGNIFICANT CHANGE UP (ref 2.5–4.5)
PLATELET # BLD AUTO: 193 K/UL — SIGNIFICANT CHANGE UP (ref 150–400)
POTASSIUM SERPL-MCNC: 3.5 MMOL/L — SIGNIFICANT CHANGE UP (ref 3.5–5.3)
POTASSIUM SERPL-SCNC: 3.5 MMOL/L — SIGNIFICANT CHANGE UP (ref 3.5–5.3)
RBC # BLD: 4.44 M/UL — SIGNIFICANT CHANGE UP (ref 4.2–5.8)
RBC # FLD: 13.2 % — SIGNIFICANT CHANGE UP (ref 10.3–14.5)
SODIUM SERPL-SCNC: 143 MMOL/L — SIGNIFICANT CHANGE UP (ref 135–145)
WBC # BLD: 8.8 K/UL — SIGNIFICANT CHANGE UP (ref 3.8–10.5)
WBC # FLD AUTO: 8.8 K/UL — SIGNIFICANT CHANGE UP (ref 3.8–10.5)

## 2017-03-15 PROCEDURE — 99232 SBSQ HOSP IP/OBS MODERATE 35: CPT

## 2017-03-15 PROCEDURE — 99239 HOSP IP/OBS DSCHRG MGMT >30: CPT

## 2017-03-15 RX ORDER — MECLIZINE HCL 12.5 MG
1 TABLET ORAL
Qty: 21 | Refills: 0 | OUTPATIENT
Start: 2017-03-15 | End: 2017-03-22

## 2017-03-15 RX ORDER — ERGOCALCIFEROL 1.25 MG/1
1 CAPSULE ORAL
Qty: 0 | Refills: 0 | COMMUNITY

## 2017-03-15 RX ORDER — FLUPHENAZINE HYDROCHLORIDE 1 MG/1
1 TABLET, FILM COATED ORAL
Qty: 0 | Refills: 0 | COMMUNITY
Start: 2017-03-15

## 2017-03-15 RX ORDER — QUETIAPINE FUMARATE 200 MG/1
1 TABLET, FILM COATED ORAL
Qty: 30 | Refills: 0 | OUTPATIENT
Start: 2017-03-15 | End: 2017-04-14

## 2017-03-15 RX ORDER — ASPIRIN/CALCIUM CARB/MAGNESIUM 324 MG
1 TABLET ORAL
Qty: 30 | Refills: 0 | OUTPATIENT
Start: 2017-03-15 | End: 2017-04-14

## 2017-03-15 RX ORDER — LOSARTAN POTASSIUM 100 MG/1
1 TABLET, FILM COATED ORAL
Qty: 0 | Refills: 0 | COMMUNITY

## 2017-03-15 RX ORDER — OXYMETAZOLINE HYDROCHLORIDE 0.5 MG/ML
1 SPRAY NASAL
Qty: 1 | Refills: 0 | OUTPATIENT
Start: 2017-03-15

## 2017-03-15 RX ORDER — QUETIAPINE FUMARATE 200 MG/1
1 TABLET, FILM COATED ORAL
Qty: 0 | Refills: 0 | COMMUNITY
Start: 2017-03-15

## 2017-03-15 RX ORDER — ERGOCALCIFEROL 1.25 MG/1
1 CAPSULE ORAL
Qty: 2 | Refills: 0
Start: 2017-03-15

## 2017-03-15 RX ORDER — METOPROLOL TARTRATE 50 MG
1 TABLET ORAL
Qty: 0 | Refills: 0 | COMMUNITY

## 2017-03-15 RX ORDER — CLONAZEPAM 1 MG
1 TABLET ORAL
Qty: 0 | Refills: 0 | COMMUNITY
Start: 2017-03-15

## 2017-03-15 RX ORDER — ILOPERIDONE 12 MG/1
1 TABLET ORAL
Qty: 0 | Refills: 0 | COMMUNITY

## 2017-03-15 RX ORDER — TEMAZEPAM 15 MG/1
1 CAPSULE ORAL
Qty: 0 | Refills: 0 | COMMUNITY

## 2017-03-15 RX ORDER — VENLAFAXINE HCL 75 MG
1 CAPSULE, EXT RELEASE 24 HR ORAL
Qty: 30 | Refills: 0 | OUTPATIENT
Start: 2017-03-15 | End: 2017-04-14

## 2017-03-15 RX ORDER — VENLAFAXINE HCL 75 MG
1 CAPSULE, EXT RELEASE 24 HR ORAL
Qty: 0 | Refills: 0 | COMMUNITY
Start: 2017-03-15

## 2017-03-15 RX ADMIN — Medication 150 MILLIGRAM(S): at 12:42

## 2017-03-15 RX ADMIN — Medication 3 MILLILITER(S): at 09:32

## 2017-03-15 RX ADMIN — HEPARIN SODIUM 5000 UNIT(S): 5000 INJECTION INTRAVENOUS; SUBCUTANEOUS at 13:26

## 2017-03-15 RX ADMIN — Medication 1 TABLET(S): at 05:08

## 2017-03-15 RX ADMIN — FLUPHENAZINE HYDROCHLORIDE 10 MILLIGRAM(S): 1 TABLET, FILM COATED ORAL at 05:08

## 2017-03-15 RX ADMIN — Medication 1: at 08:13

## 2017-03-15 RX ADMIN — Medication 1: at 12:42

## 2017-03-15 RX ADMIN — Medication 81 MILLIGRAM(S): at 12:42

## 2017-03-15 RX ADMIN — Medication 3 MILLILITER(S): at 14:57

## 2017-03-15 RX ADMIN — Medication 1 MILLIGRAM(S): at 05:08

## 2017-03-15 RX ADMIN — PANTOPRAZOLE SODIUM 40 MILLIGRAM(S): 20 TABLET, DELAYED RELEASE ORAL at 05:08

## 2017-03-19 LAB
CULTURE RESULTS: SIGNIFICANT CHANGE UP
CULTURE RESULTS: SIGNIFICANT CHANGE UP
SPECIMEN SOURCE: SIGNIFICANT CHANGE UP
SPECIMEN SOURCE: SIGNIFICANT CHANGE UP

## 2017-03-20 DIAGNOSIS — E78.5 HYPERLIPIDEMIA, UNSPECIFIED: ICD-10-CM

## 2017-03-20 DIAGNOSIS — J32.0 CHRONIC MAXILLARY SINUSITIS: ICD-10-CM
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
## 2017-03-20 DIAGNOSIS — G47.00 INSOMNIA, UNSPECIFIED: ICD-10-CM

## 2017-03-20 DIAGNOSIS — I10 ESSENTIAL (PRIMARY) HYPERTENSION: ICD-10-CM

## 2017-03-20 DIAGNOSIS — F33.9 MAJOR DEPRESSIVE DISORDER, RECURRENT, UNSPECIFIED: ICD-10-CM

## 2017-03-20 DIAGNOSIS — T43.505A ADVERSE EFFECT OF UNSPECIFIED ANTIPSYCHOTICS AND NEUROLEPTICS, INITIAL ENCOUNTER: ICD-10-CM

## 2017-03-20 DIAGNOSIS — F29 UNSPECIFIED PSYCHOSIS NOT DUE TO A SUBSTANCE OR KNOWN PHYSIOLOGICAL CONDITION: ICD-10-CM

## 2017-03-20 DIAGNOSIS — J45.909 UNSPECIFIED ASTHMA, UNCOMPLICATED: ICD-10-CM

## 2017-03-20 DIAGNOSIS — F41.9 ANXIETY DISORDER, UNSPECIFIED: ICD-10-CM

## 2017-03-20 DIAGNOSIS — E11.9 TYPE 2 DIABETES MELLITUS WITHOUT COMPLICATIONS: ICD-10-CM

## 2017-03-20 DIAGNOSIS — R47.1 DYSARTHRIA AND ANARTHRIA: ICD-10-CM

## 2017-03-20 DIAGNOSIS — Z79.84 LONG TERM (CURRENT) USE OF ORAL HYPOGLYCEMIC DRUGS: ICD-10-CM

## 2017-03-20 DIAGNOSIS — M19.90 UNSPECIFIED OSTEOARTHRITIS, UNSPECIFIED SITE: ICD-10-CM

## 2017-03-20 DIAGNOSIS — F17.210 NICOTINE DEPENDENCE, CIGARETTES, UNCOMPLICATED: ICD-10-CM

## 2017-03-22 DIAGNOSIS — F41.8 OTHER SPECIFIED ANXIETY DISORDERS: ICD-10-CM

## 2017-03-22 DIAGNOSIS — J32.9 CHRONIC SINUSITIS, UNSPECIFIED: ICD-10-CM

## 2017-04-22 ENCOUNTER — APPOINTMENT (OUTPATIENT)
Dept: OPHTHALMOLOGY | Facility: CLINIC | Age: 57
End: 2017-04-22

## 2017-08-09 ENCOUNTER — EMERGENCY (EMERGENCY)
Facility: HOSPITAL | Age: 57
LOS: 1 days | Discharge: HOME | End: 2017-08-09
Attending: EMERGENCY MEDICINE | Admitting: EMERGENCY MEDICINE
Payer: MEDICAID

## 2017-08-09 VITALS
TEMPERATURE: 98 F | DIASTOLIC BLOOD PRESSURE: 99 MMHG | RESPIRATION RATE: 18 BRPM | HEART RATE: 73 BPM | SYSTOLIC BLOOD PRESSURE: 173 MMHG | OXYGEN SATURATION: 99 %

## 2017-08-09 VITALS
HEART RATE: 65 BPM | OXYGEN SATURATION: 96 % | RESPIRATION RATE: 16 BRPM | SYSTOLIC BLOOD PRESSURE: 146 MMHG | DIASTOLIC BLOOD PRESSURE: 79 MMHG | TEMPERATURE: 98 F

## 2017-08-09 PROCEDURE — 99285 EMERGENCY DEPT VISIT HI MDM: CPT

## 2017-08-09 PROCEDURE — 70450 CT HEAD/BRAIN W/O DYE: CPT | Mod: 26

## 2017-08-09 NOTE — ED PROVIDER NOTE - ENMT, MLM
Airway patent, Nose No congestion, throat- membranes moist No swelling or exudate. Neck supple. No JVD, No lymphadenopathy  Tenderness of right maxillary and ethmoidal sinus to percussion

## 2017-08-09 NOTE — ED PROVIDER NOTE - ATTENDING CONTRIBUTION TO CARE
CHARBEL Attending Note - Dr. Curiel  55 y/o male with hx of migraine and now c/c of gradually worsening headache for 1 week, without diplopia, tinnitus, vertigo, paresthesia, nausea or vomiting photophobia or sonophobia  PE: pt is alert and oriented, perrl, ent pain left maxillary sinus to percussion and ethmoidal sinus. membranes are moist, neck supple. no lymphadenopathy or thyroid enlargement, No JVD.  Chest clear to P&A, Heart- reg rhythm without murmur, rubs or gallops, radial pulses equal bilaterally.  Abd is soft, non-tender, Bowel sounds are active. no mass or organomegaly. : No CVA tenderness. Neuro:  Pt alert and oriented x 3. EOM normal, Cranial nerves intact. Cognitive function normal.  Distal neurosensory is intact. Motor function is 5/5 strength bilaterally.  Negative pronator drift.  Gait is normal.   No focal deficits. Extremities:  No edema.  Skin: warm and dry.  Imp: Sinus headache.

## 2017-08-09 NOTE — ED PROVIDER NOTE - CONSTITUTIONAL, MLM
normal... Well appearing, well nourished, awake, alert, oriented to person, place, time/situation and in no apparent distress with out fever.

## 2017-08-09 NOTE — ED PROVIDER NOTE - PROGRESS NOTE DETAILS
Morad; improvement in headache with meds but still slight HA. CT head neg, CT sinus with bilat ethmoid opacificaton. Will tx for sinusitis and neuro F/u

## 2017-08-09 NOTE — ED PROVIDER NOTE - CHPI ED SYMPTOMS NEG
no vomiting/no loss of consciousness/no dizziness/no numbness/no blurred vision/no change in level of consciousness/no nausea/No diplopia, tinnitus, vertigo paresthesia/no fever/no confusion/no weakness

## 2017-08-10 NOTE — ED ADULT NURSE REASSESSMENT NOTE - NS ED NURSE REASSESS COMMENT FT1
Pt reassessed by evening RN shift - VS as noted, BP improved. HA still present 7/10, denies nausea/aura or vision changes at this time. Pt family @ bedside to translate per pt preference.  Appearing calm and in no distress, pleasant affect. IVF NS Bolus still infusing via L AC 20g IV. Resps even/unlabored on RA. Awaiting CTH and results. Pt & family updated to plan of care. Radiology contacted to confirm pt is in cue for CT. Will CTM.
Pt in no distress, IV access dc'd by MD Monzon, results and dc instructions reviewed by MD w/ pt & family.  Pt in no distress and states readiness for DC to home at this time. Will CTM

## 2017-09-05 ENCOUNTER — APPOINTMENT (OUTPATIENT)
Dept: NEUROLOGY | Facility: CLINIC | Age: 57
End: 2017-09-05

## 2017-09-09 ENCOUNTER — EMERGENCY (EMERGENCY)
Facility: HOSPITAL | Age: 57
LOS: 1 days | Discharge: HOME | End: 2017-09-09
Attending: EMERGENCY MEDICINE | Admitting: EMERGENCY MEDICINE
Payer: MEDICAID

## 2017-09-09 VITALS
RESPIRATION RATE: 17 BRPM | OXYGEN SATURATION: 96 % | TEMPERATURE: 98 F | SYSTOLIC BLOOD PRESSURE: 158 MMHG | DIASTOLIC BLOOD PRESSURE: 90 MMHG | HEART RATE: 83 BPM

## 2017-09-09 VITALS
SYSTOLIC BLOOD PRESSURE: 171 MMHG | HEART RATE: 86 BPM | DIASTOLIC BLOOD PRESSURE: 90 MMHG | RESPIRATION RATE: 16 BRPM | OXYGEN SATURATION: 98 % | TEMPERATURE: 99 F

## 2017-09-09 LAB
BASOPHILS # BLD AUTO: 0.07 K/UL — SIGNIFICANT CHANGE UP (ref 0–0.2)
BASOPHILS NFR BLD AUTO: 0.6 % — SIGNIFICANT CHANGE UP (ref 0–2)
EOSINOPHIL # BLD AUTO: 0.37 K/UL — SIGNIFICANT CHANGE UP (ref 0–0.5)
EOSINOPHIL NFR BLD AUTO: 2.9 % — SIGNIFICANT CHANGE UP (ref 0–6)
HCT VFR BLD CALC: 44.7 % — SIGNIFICANT CHANGE UP (ref 39–50)
HGB BLD-MCNC: 14.5 G/DL — SIGNIFICANT CHANGE UP (ref 13–17)
IMM GRANULOCYTES # BLD AUTO: 0.04 # — SIGNIFICANT CHANGE UP
IMM GRANULOCYTES NFR BLD AUTO: 0.3 % — SIGNIFICANT CHANGE UP (ref 0–1.5)
LYMPHOCYTES # BLD AUTO: 3.89 K/UL — HIGH (ref 1–3.3)
LYMPHOCYTES # BLD AUTO: 30.6 % — SIGNIFICANT CHANGE UP (ref 13–44)
MCHC RBC-ENTMCNC: 28.2 PG — SIGNIFICANT CHANGE UP (ref 27–34)
MCHC RBC-ENTMCNC: 32.4 % — SIGNIFICANT CHANGE UP (ref 32–36)
MCV RBC AUTO: 87 FL — SIGNIFICANT CHANGE UP (ref 80–100)
MONOCYTES # BLD AUTO: 0.91 K/UL — HIGH (ref 0–0.9)
MONOCYTES NFR BLD AUTO: 7.2 % — SIGNIFICANT CHANGE UP (ref 2–14)
NEUTROPHILS # BLD AUTO: 7.42 K/UL — HIGH (ref 1.8–7.4)
NEUTROPHILS NFR BLD AUTO: 58.4 % — SIGNIFICANT CHANGE UP (ref 43–77)
NRBC # FLD: 0 — SIGNIFICANT CHANGE UP
PLATELET # BLD AUTO: 219 K/UL — SIGNIFICANT CHANGE UP (ref 150–400)
PMV BLD: 11.3 FL — SIGNIFICANT CHANGE UP (ref 7–13)
RBC # BLD: 5.14 M/UL — SIGNIFICANT CHANGE UP (ref 4.2–5.8)
RBC # FLD: 13.2 % — SIGNIFICANT CHANGE UP (ref 10.3–14.5)
WBC # BLD: 12.7 K/UL — HIGH (ref 3.8–10.5)
WBC # FLD AUTO: 12.7 K/UL — HIGH (ref 3.8–10.5)

## 2017-09-09 PROCEDURE — 99284 EMERGENCY DEPT VISIT MOD MDM: CPT

## 2017-09-09 NOTE — ED PROVIDER NOTE - MEDICAL DECISION MAKING DETAILS
+family concerned that patient holds hands in abnormal positions while walking.  In ED patient walking with no symptoms.  CT, labs pending

## 2017-09-09 NOTE — ED ADULT NURSE NOTE - OBJECTIVE STATEMENT
Pt received A&Ox3 to ED Rm 1 with c/o chronic back & hip pain worsened in past month. Also c/o neck pain, intermit SOB, & unsteady gait/balance, weakness x 1 wk. States last night he "almost fell, but braced himself onto bed." Denies hitting head, LOC. Pt family thinks he is non-compliant with RX - pt disagrees. RR equal & unlabored. Labs sent per MD orders. Family at bedside. Will monitor.

## 2017-09-09 NOTE — ED PROVIDER NOTE - ATTENDING CONTRIBUTION TO CARE
Attending note:   After face to face evaluation of this patient, I concur with above noted hx, pe, and care plan for this patient. +58 y/o M with long hx of schizophrenia was on major tranquilizers and antidepressants, off for 10 days and patient having abnormal movement according to family.  Patient walks and talks without difficulty.    Evaluation in progress

## 2017-09-09 NOTE — ED PROVIDER NOTE - PROGRESS NOTE DETAILS
Klepfish: Received s/o. WBC 12, other labs grossly wnl. Awaiting CT read, reassessment. Klepfish: Steady unassisted gait. ct grossly wnl. per family member pt also had outpt mri a few days ago. has f/u. Comfortable for dc, outpt pmd/neuro f/u.

## 2017-09-09 NOTE — ED ADULT TRIAGE NOTE - CHIEF COMPLAINT QUOTE
Pt walk in with relatives, reports  Weakness, Difficulty of Walking, trouble Balance started a week ago,Headache/Neck pain Low back pain shortness of breath. PMHx of Depression and Schizophrenia on Klonopin, Quetiapine Cogentin  Fanapt DM  Metformin   HTN on Losartan  claimed not compliant with his medication

## 2017-09-10 LAB
ALBUMIN SERPL ELPH-MCNC: 4.5 G/DL — SIGNIFICANT CHANGE UP (ref 3.3–5)
ALP SERPL-CCNC: 86 U/L — SIGNIFICANT CHANGE UP (ref 40–120)
ALT FLD-CCNC: 32 U/L — SIGNIFICANT CHANGE UP (ref 4–41)
AMPHET UR-MCNC: NEGATIVE — SIGNIFICANT CHANGE UP
APAP SERPL-MCNC: < 15 UG/ML — LOW (ref 15–25)
AST SERPL-CCNC: 19 U/L — SIGNIFICANT CHANGE UP (ref 4–40)
BARBITURATES MEASUREMENT: NEGATIVE — SIGNIFICANT CHANGE UP
BARBITURATES UR SCN-MCNC: NEGATIVE — SIGNIFICANT CHANGE UP
BENZODIAZ SERPL-MCNC: NEGATIVE — SIGNIFICANT CHANGE UP
BENZODIAZ UR-MCNC: NEGATIVE — SIGNIFICANT CHANGE UP
BILIRUB SERPL-MCNC: 0.3 MG/DL — SIGNIFICANT CHANGE UP (ref 0.2–1.2)
BUN SERPL-MCNC: 19 MG/DL — SIGNIFICANT CHANGE UP (ref 7–23)
CALCIUM SERPL-MCNC: 9.8 MG/DL — SIGNIFICANT CHANGE UP (ref 8.4–10.5)
CANNABINOIDS UR-MCNC: NEGATIVE — SIGNIFICANT CHANGE UP
CHLORIDE SERPL-SCNC: 103 MMOL/L — SIGNIFICANT CHANGE UP (ref 98–107)
CO2 SERPL-SCNC: 29 MMOL/L — SIGNIFICANT CHANGE UP (ref 22–31)
COCAINE METAB.OTHER UR-MCNC: NEGATIVE — SIGNIFICANT CHANGE UP
CREAT SERPL-MCNC: 1.15 MG/DL — SIGNIFICANT CHANGE UP (ref 0.5–1.3)
ETHANOL BLD-MCNC: < 10 MG/DL — SIGNIFICANT CHANGE UP
GLUCOSE SERPL-MCNC: 104 MG/DL — HIGH (ref 70–99)
METHADONE UR-MCNC: NEGATIVE — SIGNIFICANT CHANGE UP
OPIATES UR-MCNC: NEGATIVE — SIGNIFICANT CHANGE UP
OXYCODONE UR-MCNC: NEGATIVE — SIGNIFICANT CHANGE UP
PCP UR-MCNC: NEGATIVE — SIGNIFICANT CHANGE UP
POTASSIUM SERPL-MCNC: 4.3 MMOL/L — SIGNIFICANT CHANGE UP (ref 3.5–5.3)
POTASSIUM SERPL-SCNC: 4.3 MMOL/L — SIGNIFICANT CHANGE UP (ref 3.5–5.3)
PROT SERPL-MCNC: 7.7 G/DL — SIGNIFICANT CHANGE UP (ref 6–8.3)
SALICYLATES SERPL-MCNC: < 5 MG/DL — LOW (ref 15–30)
SODIUM SERPL-SCNC: 143 MMOL/L — SIGNIFICANT CHANGE UP (ref 135–145)

## 2017-09-10 PROCEDURE — 70450 CT HEAD/BRAIN W/O DYE: CPT | Mod: 26

## 2017-09-27 ENCOUNTER — INPATIENT (INPATIENT)
Facility: HOSPITAL | Age: 57
LOS: 21 days | Discharge: HOME | End: 2017-10-19
Attending: PSYCHIATRY & NEUROLOGY | Admitting: PSYCHIATRY & NEUROLOGY
Payer: MEDICAID

## 2017-09-27 VITALS
TEMPERATURE: 99 F | RESPIRATION RATE: 20 BRPM | SYSTOLIC BLOOD PRESSURE: 125 MMHG | OXYGEN SATURATION: 96 % | HEART RATE: 82 BPM | DIASTOLIC BLOOD PRESSURE: 95 MMHG

## 2017-09-27 DIAGNOSIS — F20.9 SCHIZOPHRENIA, UNSPECIFIED: ICD-10-CM

## 2017-09-27 DIAGNOSIS — R69 ILLNESS, UNSPECIFIED: ICD-10-CM

## 2017-09-27 LAB
ALBUMIN SERPL ELPH-MCNC: 4.4 G/DL — SIGNIFICANT CHANGE UP (ref 3.3–5)
ALP SERPL-CCNC: 90 U/L — SIGNIFICANT CHANGE UP (ref 40–120)
ALT FLD-CCNC: 27 U/L — SIGNIFICANT CHANGE UP (ref 4–41)
AMPHET UR-MCNC: NEGATIVE — SIGNIFICANT CHANGE UP
APAP SERPL-MCNC: < 15 UG/ML — LOW (ref 15–25)
APPEARANCE UR: CLEAR — SIGNIFICANT CHANGE UP
AST SERPL-CCNC: 17 U/L — SIGNIFICANT CHANGE UP (ref 4–40)
BACTERIA # UR AUTO: SIGNIFICANT CHANGE UP
BARBITURATES MEASUREMENT: NEGATIVE — SIGNIFICANT CHANGE UP
BARBITURATES UR SCN-MCNC: NEGATIVE — SIGNIFICANT CHANGE UP
BASOPHILS # BLD AUTO: 0.05 K/UL — SIGNIFICANT CHANGE UP (ref 0–0.2)
BASOPHILS NFR BLD AUTO: 0.5 % — SIGNIFICANT CHANGE UP (ref 0–2)
BENZODIAZ SERPL-MCNC: NEGATIVE — SIGNIFICANT CHANGE UP
BENZODIAZ UR-MCNC: NEGATIVE — SIGNIFICANT CHANGE UP
BILIRUB SERPL-MCNC: 0.4 MG/DL — SIGNIFICANT CHANGE UP (ref 0.2–1.2)
BILIRUB UR-MCNC: NEGATIVE — SIGNIFICANT CHANGE UP
BLOOD UR QL VISUAL: NEGATIVE — SIGNIFICANT CHANGE UP
BUN SERPL-MCNC: 16 MG/DL — SIGNIFICANT CHANGE UP (ref 7–23)
CALCIUM SERPL-MCNC: 9.6 MG/DL — SIGNIFICANT CHANGE UP (ref 8.4–10.5)
CANNABINOIDS UR-MCNC: NEGATIVE — SIGNIFICANT CHANGE UP
CHLORIDE SERPL-SCNC: 103 MMOL/L — SIGNIFICANT CHANGE UP (ref 98–107)
CO2 SERPL-SCNC: 21 MMOL/L — LOW (ref 22–31)
COCAINE METAB.OTHER UR-MCNC: NEGATIVE — SIGNIFICANT CHANGE UP
COLOR SPEC: YELLOW — SIGNIFICANT CHANGE UP
CREAT SERPL-MCNC: 1.17 MG/DL — SIGNIFICANT CHANGE UP (ref 0.5–1.3)
EOSINOPHIL # BLD AUTO: 0.35 K/UL — SIGNIFICANT CHANGE UP (ref 0–0.5)
EOSINOPHIL NFR BLD AUTO: 3.8 % — SIGNIFICANT CHANGE UP (ref 0–6)
ETHANOL BLD-MCNC: < 10 MG/DL — SIGNIFICANT CHANGE UP
GLUCOSE SERPL-MCNC: 104 MG/DL — HIGH (ref 70–99)
GLUCOSE UR-MCNC: NEGATIVE — SIGNIFICANT CHANGE UP
HCT VFR BLD CALC: 44 % — SIGNIFICANT CHANGE UP (ref 39–50)
HGB BLD-MCNC: 14.1 G/DL — SIGNIFICANT CHANGE UP (ref 13–17)
HYALINE CASTS # UR AUTO: SIGNIFICANT CHANGE UP (ref 0–?)
IMM GRANULOCYTES # BLD AUTO: 0.06 # — SIGNIFICANT CHANGE UP
IMM GRANULOCYTES NFR BLD AUTO: 0.7 % — SIGNIFICANT CHANGE UP (ref 0–1.5)
KETONES UR-MCNC: NEGATIVE — SIGNIFICANT CHANGE UP
LEUKOCYTE ESTERASE UR-ACNC: NEGATIVE — SIGNIFICANT CHANGE UP
LYMPHOCYTES # BLD AUTO: 3.49 K/UL — HIGH (ref 1–3.3)
LYMPHOCYTES # BLD AUTO: 37.9 % — SIGNIFICANT CHANGE UP (ref 13–44)
MCHC RBC-ENTMCNC: 28.1 PG — SIGNIFICANT CHANGE UP (ref 27–34)
MCHC RBC-ENTMCNC: 32 % — SIGNIFICANT CHANGE UP (ref 32–36)
MCV RBC AUTO: 87.6 FL — SIGNIFICANT CHANGE UP (ref 80–100)
METHADONE UR-MCNC: NEGATIVE — SIGNIFICANT CHANGE UP
MONOCYTES # BLD AUTO: 0.6 K/UL — SIGNIFICANT CHANGE UP (ref 0–0.9)
MONOCYTES NFR BLD AUTO: 6.5 % — SIGNIFICANT CHANGE UP (ref 2–14)
MUCOUS THREADS # UR AUTO: SIGNIFICANT CHANGE UP
NEUTROPHILS # BLD AUTO: 4.67 K/UL — SIGNIFICANT CHANGE UP (ref 1.8–7.4)
NEUTROPHILS NFR BLD AUTO: 50.6 % — SIGNIFICANT CHANGE UP (ref 43–77)
NITRITE UR-MCNC: NEGATIVE — SIGNIFICANT CHANGE UP
NON-SQ EPI CELLS # UR AUTO: <1 — SIGNIFICANT CHANGE UP
NRBC # FLD: 0 — SIGNIFICANT CHANGE UP
OPIATES UR-MCNC: NEGATIVE — SIGNIFICANT CHANGE UP
OXYCODONE UR-MCNC: NEGATIVE — SIGNIFICANT CHANGE UP
PCP UR-MCNC: NEGATIVE — SIGNIFICANT CHANGE UP
PH UR: 6 — SIGNIFICANT CHANGE UP (ref 4.6–8)
PLATELET # BLD AUTO: 194 K/UL — SIGNIFICANT CHANGE UP (ref 150–400)
PMV BLD: 11.7 FL — SIGNIFICANT CHANGE UP (ref 7–13)
POTASSIUM SERPL-MCNC: 3.9 MMOL/L — SIGNIFICANT CHANGE UP (ref 3.5–5.3)
POTASSIUM SERPL-SCNC: 3.9 MMOL/L — SIGNIFICANT CHANGE UP (ref 3.5–5.3)
PROT SERPL-MCNC: 7.5 G/DL — SIGNIFICANT CHANGE UP (ref 6–8.3)
PROT UR-MCNC: NEGATIVE — SIGNIFICANT CHANGE UP
RBC # BLD: 5.02 M/UL — SIGNIFICANT CHANGE UP (ref 4.2–5.8)
RBC # FLD: 13.5 % — SIGNIFICANT CHANGE UP (ref 10.3–14.5)
SALICYLATES SERPL-MCNC: < 5 MG/DL — LOW (ref 15–30)
SODIUM SERPL-SCNC: 140 MMOL/L — SIGNIFICANT CHANGE UP (ref 135–145)
SP GR SPEC: 1.01 — SIGNIFICANT CHANGE UP (ref 1–1.03)
SQUAMOUS # UR AUTO: SIGNIFICANT CHANGE UP
TSH SERPL-MCNC: 0.89 UIU/ML — SIGNIFICANT CHANGE UP (ref 0.27–4.2)
UROBILINOGEN FLD QL: NORMAL E.U. — SIGNIFICANT CHANGE UP (ref 0.1–0.2)
WBC # BLD: 9.22 K/UL — SIGNIFICANT CHANGE UP (ref 3.8–10.5)
WBC # FLD AUTO: 9.22 K/UL — SIGNIFICANT CHANGE UP (ref 3.8–10.5)
WBC UR QL: SIGNIFICANT CHANGE UP (ref 0–?)

## 2017-09-27 PROCEDURE — 99285 EMERGENCY DEPT VISIT HI MDM: CPT

## 2017-09-27 RX ORDER — SENNA PLUS 8.6 MG/1
2 TABLET ORAL AT BEDTIME
Qty: 0 | Refills: 0 | Status: DISCONTINUED | OUTPATIENT
Start: 2017-09-28 | End: 2017-10-19

## 2017-09-27 RX ORDER — CLONAZEPAM 1 MG
1 TABLET ORAL EVERY 12 HOURS
Qty: 0 | Refills: 0 | Status: DISCONTINUED | OUTPATIENT
Start: 2017-09-28 | End: 2017-09-28

## 2017-09-27 RX ORDER — OMEPRAZOLE 10 MG/1
1 CAPSULE, DELAYED RELEASE ORAL
Qty: 0 | Refills: 0 | COMMUNITY

## 2017-09-27 RX ORDER — ATORVASTATIN CALCIUM 80 MG/1
20 TABLET, FILM COATED ORAL AT BEDTIME
Qty: 0 | Refills: 0 | Status: DISCONTINUED | OUTPATIENT
Start: 2017-09-28 | End: 2017-10-19

## 2017-09-27 RX ORDER — ASPIRIN/CALCIUM CARB/MAGNESIUM 324 MG
81 TABLET ORAL DAILY
Qty: 0 | Refills: 0 | Status: DISCONTINUED | OUTPATIENT
Start: 2017-09-27 | End: 2017-09-27

## 2017-09-27 RX ORDER — METFORMIN HYDROCHLORIDE 850 MG/1
500 TABLET ORAL DAILY
Qty: 0 | Refills: 0 | Status: COMPLETED | OUTPATIENT
Start: 2017-09-28 | End: 2017-09-28

## 2017-09-27 RX ORDER — ACETAMINOPHEN 500 MG
2 TABLET ORAL
Qty: 0 | Refills: 0 | COMMUNITY

## 2017-09-27 RX ORDER — METOPROLOL TARTRATE 50 MG
100 TABLET ORAL DAILY
Qty: 0 | Refills: 0 | Status: DISCONTINUED | OUTPATIENT
Start: 2017-09-28 | End: 2017-09-28

## 2017-09-27 RX ADMIN — Medication 81 MILLIGRAM(S): at 22:30

## 2017-09-27 NOTE — ED PROVIDER NOTE - CHPI ED SYMPTOMS NEG
no hallucinations/no homicidal/no suicidal/no agitation/no paranoia/no confusion/no change in level of consciousness/no disorientation/no weight loss

## 2017-09-27 NOTE — ED BEHAVIORAL HEALTH ASSESSMENT NOTE - DESCRIPTION
calm but slightly confused  ICU Vital Signs Last 24 Hrs  T(C): 37.2 (27 Sep 2017 16:32), Max: 37.2 (27 Sep 2017 16:32)  T(F): 99 (27 Sep 2017 16:32), Max: 99 (27 Sep 2017 16:32)  HR: 82 (27 Sep 2017 16:32) (82 - 82)  BP: 125/95 (27 Sep 2017 16:32) (125/95 - 125/95)  BP(mean): --  ABP: --  ABP(mean): --  RR: 20 (27 Sep 2017 16:32) (20 - 20)  SpO2: 96% (27 Sep 2017 16:32) (96% - 96%) HLD, constipation, HTN per above

## 2017-09-27 NOTE — ED ADULT TRIAGE NOTE - CHIEF COMPLAINT QUOTE
From pt's Therapist Caitlin MARINELLIHNP, for Visual Hallucination, not compliant with med. as per EMS pt's brother pt is SI with a plan.  pt c/o HA.

## 2017-09-27 NOTE — ED ADULT TRIAGE NOTE - ESI TRIAGE ACUITY LEVEL, MLM
INFECTION - PEDIATRIC    • Absence of infection during hospitalization Progressing    • Absence of fever/infection during anticipated neutropenic period Progressing        PAIN - PEDIATRIC    • Verbalizes/displays adequate comfort level or patient's stated
INFECTION - PEDIATRIC    • Absence of infection during hospitalization Progressing    • Absence of fever/infection during anticipated neutropenic period Progressing        PAIN - PEDIATRIC    • Verbalizes/displays adequate comfort level or patient's stated
Pt able to get oob to shower. Eating and drinking well. Pain well controlled. Ready for d/c. Vss. Pt and mother v/u of d/c instructions.  Taken per w/c to mothers car in the [employer]
2

## 2017-09-27 NOTE — ED BEHAVIORAL HEALTH ASSESSMENT NOTE - OTHER PAST PSYCHIATRIC HISTORY (INCLUDE DETAILS REGARDING ONSET, COURSE OF ILLNESS, INPATIENT/OUTPATIENT TREATMENT)
multiple past medication trials, brother does not remember, from numerous psychiatrists and [facility]

## 2017-09-27 NOTE — ED BEHAVIORAL HEALTH ASSESSMENT NOTE - SUMMARY
58 yo M with chronic Schizophrenia, presenting with increased confusion and falls. Unable to care for self at home. Potential for iatrogenic cause given recent medication changes.  Patient has no capacity and cannot consent for admission so will admit 9.39.  Medical NP aware of this notewriter's concern for delirium but does not believe this is a medical issue and has cleared patient medically.

## 2017-09-27 NOTE — ED BEHAVIORAL HEALTH ASSESSMENT NOTE - HPI (INCLUDE ILLNESS QUALITY, SEVERITY, DURATION, TIMING, CONTEXT, MODIFYING FACTORS, ASSOCIATED SIGNS AND SYMPTOMS)
Patient is a 56 yo M domiciled with brother Deo, unemployed, single, past psychiatric history of Schizophrenia, numerous past hospitalizations throughout [place], presenting in an acutely disorganized state in the setting of recent medication changes.      Brother reports that patient has longstanding hx of schizophrenia but had been doing well and keeping to himself.  Over recent months, he was becoming a bit more disorganized and paranoid on: Quetiapine 400mg, Clonazepam 1mg qd, Fanapt 12mg, and Venlafaxine 150mg, Senna 17.2mg and Cogentin 1mg.  Brother notes that he stopped Cogentin 3 days ago and other meds (Meclizine, Atorvastatin, Metoprolol, Metformin) and has become more disorganized.  Brother feels that he cannot care for self.    Patient himself interviewed with a [place] .  He states that he is not suicidal or homicidal.  Notes that he is seeing things that don't exist but cannot elaborate.  Things people on the street are talking to him.  Has been falling down a lot.

## 2017-09-27 NOTE — ED PROVIDER NOTE - MEDICAL DECISION MAKING DETAILS
56y/o Male Northern Irish speaker w/ his brother Nis  at bedside hx of schizophrenia, chronic HA followed by neurologist s/p neg head CT and MRI, type II DM noncompliant w/ meds sent to ed for psych eval.  Pt is well appearing w/o visible signs of physical injuries on exam, alert and oriented, articulate speech, head NCAT, CN II- XII intact, strength 5/5 x 4 limbs, ambulating w/ steady gait, lungs are clear bilat, cor rrr, abd sft, nt, nd, nr, no guarding, ext no edema.  No narrative or findings on exam suggestive of infectious process or ICH or cholinergic condition, labs unrevealing.  Will clear pt medically for psych dispo-

## 2017-09-27 NOTE — ED PROVIDER NOTE - OBJECTIVE STATEMENT
The patient is a 57y Male Bulgarian speaker w/ his brother [facility]  at bedside hx of schizophrenia, chronic HA followed by neurologist s/p neg head CT and MRI, type II DM noncompliant w/ meds sent to ed for psych eval.  As per his brother, pt has had increasing weakness for past wks but mostly at night time after he takes his psychiatric meds.  Pt denies recent injuries, trauma or falls, no headache, back or neck pain, no abd/flank pain, no uti/urinary symptoms, nausea or vomiting, no fever or chills, no cp or sob, no palpitations or diaphoresis.  Denies etoh or illicit drugs, no SI/HI, no auditory hallucination, + VH.

## 2017-09-27 NOTE — ED BEHAVIORAL HEALTH ASSESSMENT NOTE - CURRENT MEDICATION
Quetiapine 400mg, Clonazepam 1mg qd, Fanapt 12mg, and Venlafaxine 150mg, Senna 17.2mg and Cogentin 1mg, Meclizine 25mg, Metoprolol 100mg, Atorvastatin 20mg, Metformin 500mg.

## 2017-09-28 PROCEDURE — 99222 1ST HOSP IP/OBS MODERATE 55: CPT

## 2017-09-28 PROCEDURE — 99223 1ST HOSP IP/OBS HIGH 75: CPT

## 2017-09-28 RX ORDER — LOSARTAN POTASSIUM 100 MG/1
50 TABLET, FILM COATED ORAL DAILY
Qty: 0 | Refills: 0 | Status: DISCONTINUED | OUTPATIENT
Start: 2017-09-28 | End: 2017-10-19

## 2017-09-28 RX ORDER — INSULIN LISPRO 100/ML
VIAL (ML) SUBCUTANEOUS
Qty: 0 | Refills: 0 | Status: DISCONTINUED | OUTPATIENT
Start: 2017-09-28 | End: 2017-10-10

## 2017-09-28 RX ORDER — GLUCAGON INJECTION, SOLUTION 0.5 MG/.1ML
1 INJECTION, SOLUTION SUBCUTANEOUS ONCE
Qty: 0 | Refills: 0 | Status: DISCONTINUED | OUTPATIENT
Start: 2017-09-28 | End: 2017-10-19

## 2017-09-28 RX ORDER — INSULIN LISPRO 100/ML
VIAL (ML) SUBCUTANEOUS AT BEDTIME
Qty: 0 | Refills: 0 | Status: DISCONTINUED | OUTPATIENT
Start: 2017-09-28 | End: 2017-10-19

## 2017-09-28 RX ORDER — NICOTINE POLACRILEX 2 MG
1 GUM BUCCAL EVERY 6 HOURS
Qty: 0 | Refills: 0 | Status: DISCONTINUED | OUTPATIENT
Start: 2017-09-28 | End: 2017-10-19

## 2017-09-28 RX ORDER — VENLAFAXINE HCL 75 MG
75 CAPSULE, EXT RELEASE 24 HR ORAL DAILY
Qty: 0 | Refills: 0 | Status: DISCONTINUED | OUTPATIENT
Start: 2017-09-28 | End: 2017-10-19

## 2017-09-28 RX ORDER — ACETAMINOPHEN 500 MG
650 TABLET ORAL EVERY 6 HOURS
Qty: 0 | Refills: 0 | Status: DISCONTINUED | OUTPATIENT
Start: 2017-09-28 | End: 2017-10-19

## 2017-09-28 RX ORDER — ASPIRIN/CALCIUM CARB/MAGNESIUM 324 MG
81 TABLET ORAL DAILY
Qty: 0 | Refills: 0 | Status: DISCONTINUED | OUTPATIENT
Start: 2017-09-28 | End: 2017-10-19

## 2017-09-28 RX ORDER — CLONAZEPAM 1 MG
1 TABLET ORAL
Qty: 0 | Refills: 0 | Status: DISCONTINUED | OUTPATIENT
Start: 2017-09-28 | End: 2017-10-05

## 2017-09-28 RX ORDER — METOPROLOL TARTRATE 50 MG
100 TABLET ORAL DAILY
Qty: 0 | Refills: 0 | Status: DISCONTINUED | OUTPATIENT
Start: 2017-09-29 | End: 2017-10-19

## 2017-09-28 RX ORDER — FLUPHENAZINE HYDROCHLORIDE 1 MG/1
5 TABLET, FILM COATED ORAL
Qty: 0 | Refills: 0 | Status: DISCONTINUED | OUTPATIENT
Start: 2017-09-28 | End: 2017-09-29

## 2017-09-28 RX ORDER — NICOTINE POLACRILEX 2 MG
2 GUM BUCCAL
Qty: 0 | Refills: 0 | Status: DISCONTINUED | OUTPATIENT
Start: 2017-09-28 | End: 2017-10-19

## 2017-09-28 RX ORDER — METFORMIN HYDROCHLORIDE 850 MG/1
500 TABLET ORAL DAILY
Qty: 0 | Refills: 0 | Status: DISCONTINUED | OUTPATIENT
Start: 2017-09-28 | End: 2017-10-19

## 2017-09-28 RX ADMIN — Medication 1 EACH: at 16:08

## 2017-09-28 RX ADMIN — FLUPHENAZINE HYDROCHLORIDE 5 MILLIGRAM(S): 1 TABLET, FILM COATED ORAL at 21:09

## 2017-09-28 RX ADMIN — Medication 650 MILLIGRAM(S): at 20:29

## 2017-09-28 RX ADMIN — Medication 1 MILLIGRAM(S): at 21:08

## 2017-09-28 RX ADMIN — Medication 100 MILLIGRAM(S): at 08:42

## 2017-09-28 RX ADMIN — Medication 1 MILLIGRAM(S): at 02:04

## 2017-09-28 RX ADMIN — Medication 650 MILLIGRAM(S): at 14:28

## 2017-09-28 RX ADMIN — Medication 650 MILLIGRAM(S): at 21:09

## 2017-09-28 RX ADMIN — ATORVASTATIN CALCIUM 20 MILLIGRAM(S): 80 TABLET, FILM COATED ORAL at 21:08

## 2017-09-28 RX ADMIN — Medication 650 MILLIGRAM(S): at 13:28

## 2017-09-28 RX ADMIN — METFORMIN HYDROCHLORIDE 500 MILLIGRAM(S): 850 TABLET ORAL at 08:43

## 2017-09-28 NOTE — CONSULT NOTE ADULT - ASSESSMENT
57M admitted for management of schizophrenia, with DM2, HTN    Plan:  DM2: resume metformin  daily, check HbA1c, lipids. Continue lipitor and ASA for ASCVD prevention.    HTN: Continue toprol 100, losartan 50 daily.    Schizophrenia: management per primary team.

## 2017-09-28 NOTE — CONSULT NOTE ADULT - SUBJECTIVE AND OBJECTIVE BOX
HPI: 57M admitted to [facility] for management of schizophrenia. Patient says he is currently taking medications for hypertension and diabetes and is able to name the medications except for the pill for diabetes. He denies physical complaints.    PAST MEDICAL & SURGICAL HISTORY:  HLD (hyperlipidemia)  HTN (hypertension)  Depression  No significant past surgical history      Review of Systems:   CONSTITUTIONAL: No fever, weight loss, or fatigue  EYES: No eye pain, visual disturbances, or discharge  ENMT:  No difficulty hearing, tinnitus, vertigo; No sinus or throat pain  NECK: No pain or stiffness  RESPIRATORY: No cough, wheezing, chills or hemoptysis; No shortness of breath  CARDIOVASCULAR: No chest pain, palpitations, dizziness, or leg swelling  GASTROINTESTINAL: No abdominal or epigastric pain. No nausea, vomiting, or hematemesis; No diarrhea or constipation. No melena or hematochezia.  SKIN: No itching, burning, rashes, or lesions   MUSCULOSKELETAL: No joint pain or swelling; No muscle, back, or extremity pain  HEME/LYMPH: No easy bruising, or bleeding gums  ALLERY AND IMMUNOLOGIC: No hives or eczema    Allergies    NKDA    Social History: no etoh/idu/tob    FAMILY HISTORY:  No pertinent family history in first degree relatives      MEDICATIONS  (STANDING):  clonazePAM Tablet 1 milliGRAM(s) Oral every 12 hours  atorvastatin 20 milliGRAM(s) Oral at bedtime  senna 2 Tablet(s) Oral at bedtime  insulin lispro (HumaLOG) corrective regimen sliding scale   SubCutaneous three times a day before meals  insulin lispro (HumaLOG) corrective regimen sliding scale   SubCutaneous at bedtime  metFORMIN  milliGRAM(s) Oral daily  aspirin  chewable 81 milliGRAM(s) Oral daily  losartan 50 milliGRAM(s) Oral daily    MEDICATIONS  (PRN):  nicotine  Polacrilex Gum 2 milliGRAM(s) Oral every 2 hours PRN tobacco cravings  glucagon  Injectable 1 milliGRAM(s) IntraMuscular once PRN Glucose LESS THAN 70 milligrams/deciliter  acetaminophen   Tablet. 650 milliGRAM(s) Oral every 6 hours PRN Mild Pain (1 - 3)  nicotine - Inhaler 1 Each Inhalation every 6 hours PRN craving      Vital Signs Last 24 Hrs  T(C): 35.9 (28 Sep 2017 01:00), Max: 37.2 (27 Sep 2017 16:32)  T(F): 96.7 (28 Sep 2017 01:00), Max: 99 (27 Sep 2017 16:32)  HR: 76 (27 Sep 2017 22:56) (76 - 82)  BP: 146/81 (27 Sep 2017 22:56) (125/95 - 146/81)  BP(mean): --  RR: 20 (28 Sep 2017 01:00) (16 - 20)  SpO2: 100% (27 Sep 2017 22:56) (96% - 100%)  CAPILLARY BLOOD GLUCOSE  92 (28 Sep 2017 11:53)            PHYSICAL EXAM:  GENERAL: NAD, well-developed  HEAD:  Atraumatic, Normocephalic  EYES: EOMI, PERRLA, conjunctiva and sclera clear  NECK: Supple, No JVD  CHEST/LUNG: Clear to auscultation bilaterally; No wheeze  HEART: Regular rate and rhythm; No murmurs, rubs, or gallops  ABDOMEN: Soft, Nontender, Nondistended; Bowel sounds present  EXTREMITIES:  2+ Peripheral Pulses, No clubbing, cyanosis, or edema  PSYCH: AAOx3  NEUROLOGY: non-focal  SKIN: No rashes or lesions    LABS:                        14.1   9.22  )-----------( 194      ( 27 Sep 2017 17:56 )             44.0     09-27    140  |  103  |  16  ----------------------------<  104<H>  3.9   |  21<L>  |  1.17    Ca    9.6      27 Sep 2017 17:56    TPro  7.5  /  Alb  4.4  /  TBili  0.4  /  DBili  x   /  AST  17  /  ALT  27  /  AlkPhos  90  09-          Urinalysis Basic - ( 27 Sep 2017 21:34 )    Color: YELLOW / Appearance: CLEAR / S.009 / pH: 6.0  Gluc: NEGATIVE / Ketone: NEGATIVE  / Bili: NEGATIVE / Urobili: NORMAL E.U.   Blood: NEGATIVE / Protein: NEGATIVE / Nitrite: NEGATIVE   Leuk Esterase: NEGATIVE / RBC: x / WBC 0-2   Sq Epi: OCC / Non Sq Epi: x / Bacteria: FEW        EKG(personally reviewed): NSR 79 QTc 451, normal EKG    Care Discussed with Consultants/Other Providers: Dr Marie

## 2017-09-29 LAB
CHOLEST SERPL-MCNC: 160 MG/DL — SIGNIFICANT CHANGE UP (ref 120–199)
HBA1C BLD-MCNC: 6.1 % — HIGH (ref 4–5.6)
HDLC SERPL-MCNC: 34 MG/DL — LOW (ref 35–55)
LIPID PNL WITH DIRECT LDL SERPL: 106 MG/DL — SIGNIFICANT CHANGE UP
TRIGL SERPL-MCNC: 152 MG/DL — HIGH (ref 10–149)

## 2017-09-29 PROCEDURE — 99232 SBSQ HOSP IP/OBS MODERATE 35: CPT

## 2017-09-29 RX ORDER — RISPERIDONE 4 MG/1
1 TABLET ORAL
Qty: 0 | Refills: 0 | Status: DISCONTINUED | OUTPATIENT
Start: 2017-09-29 | End: 2017-09-30

## 2017-09-29 RX ORDER — POLYETHYLENE GLYCOL 3350 17 G/17G
17 POWDER, FOR SOLUTION ORAL EVERY 24 HOURS
Qty: 0 | Refills: 0 | Status: DISCONTINUED | OUTPATIENT
Start: 2017-09-29 | End: 2017-10-19

## 2017-09-29 RX ORDER — IBUPROFEN 200 MG
600 TABLET ORAL EVERY 8 HOURS
Qty: 0 | Refills: 0 | Status: DISCONTINUED | OUTPATIENT
Start: 2017-09-29 | End: 2017-10-02

## 2017-09-29 RX ORDER — BENZTROPINE MESYLATE 1 MG
1 TABLET ORAL
Qty: 0 | Refills: 0 | Status: DISCONTINUED | OUTPATIENT
Start: 2017-09-29 | End: 2017-10-05

## 2017-09-29 RX ORDER — DOCUSATE SODIUM 100 MG
100 CAPSULE ORAL THREE TIMES A DAY
Qty: 0 | Refills: 0 | Status: DISCONTINUED | OUTPATIENT
Start: 2017-09-29 | End: 2017-10-19

## 2017-09-29 RX ADMIN — Medication 1 MILLIGRAM(S): at 22:27

## 2017-09-29 RX ADMIN — Medication 75 MILLIGRAM(S): at 08:56

## 2017-09-29 RX ADMIN — Medication 650 MILLIGRAM(S): at 12:31

## 2017-09-29 RX ADMIN — Medication 1 MILLIGRAM(S): at 08:56

## 2017-09-29 RX ADMIN — Medication 100 MILLIGRAM(S): at 08:56

## 2017-09-29 RX ADMIN — METFORMIN HYDROCHLORIDE 500 MILLIGRAM(S): 850 TABLET ORAL at 08:56

## 2017-09-29 RX ADMIN — LOSARTAN POTASSIUM 50 MILLIGRAM(S): 100 TABLET, FILM COATED ORAL at 08:56

## 2017-09-29 RX ADMIN — SENNA PLUS 2 TABLET(S): 8.6 TABLET ORAL at 23:12

## 2017-09-29 RX ADMIN — FLUPHENAZINE HYDROCHLORIDE 5 MILLIGRAM(S): 1 TABLET, FILM COATED ORAL at 08:56

## 2017-09-29 RX ADMIN — Medication 600 MILLIGRAM(S): at 14:41

## 2017-09-29 RX ADMIN — Medication 650 MILLIGRAM(S): at 11:29

## 2017-09-29 RX ADMIN — Medication 650 MILLIGRAM(S): at 17:31

## 2017-09-29 RX ADMIN — Medication 100 MILLIGRAM(S): at 22:27

## 2017-09-29 RX ADMIN — Medication 81 MILLIGRAM(S): at 08:56

## 2017-09-29 RX ADMIN — ATORVASTATIN CALCIUM 20 MILLIGRAM(S): 80 TABLET, FILM COATED ORAL at 22:26

## 2017-09-29 RX ADMIN — Medication 600 MILLIGRAM(S): at 20:19

## 2017-09-29 RX ADMIN — Medication 600 MILLIGRAM(S): at 13:33

## 2017-09-29 RX ADMIN — RISPERIDONE 1 MILLIGRAM(S): 4 TABLET ORAL at 23:12

## 2017-09-30 PROCEDURE — 99232 SBSQ HOSP IP/OBS MODERATE 35: CPT

## 2017-09-30 RX ORDER — RISPERIDONE 4 MG/1
2 TABLET ORAL
Qty: 0 | Refills: 0 | Status: DISCONTINUED | OUTPATIENT
Start: 2017-09-30 | End: 2017-10-02

## 2017-09-30 RX ORDER — LANOLIN ALCOHOL/MO/W.PET/CERES
3 CREAM (GRAM) TOPICAL AT BEDTIME
Qty: 0 | Refills: 0 | Status: DISCONTINUED | OUTPATIENT
Start: 2017-09-30 | End: 2017-10-01

## 2017-09-30 RX ORDER — IBUPROFEN 200 MG
600 TABLET ORAL ONCE
Qty: 0 | Refills: 0 | Status: COMPLETED | OUTPATIENT
Start: 2017-09-30 | End: 2017-09-30

## 2017-09-30 RX ADMIN — Medication 75 MILLIGRAM(S): at 09:40

## 2017-09-30 RX ADMIN — Medication 100 MILLIGRAM(S): at 13:14

## 2017-09-30 RX ADMIN — RISPERIDONE 1 MILLIGRAM(S): 4 TABLET ORAL at 09:40

## 2017-09-30 RX ADMIN — Medication 1 MILLIGRAM(S): at 09:40

## 2017-09-30 RX ADMIN — LOSARTAN POTASSIUM 50 MILLIGRAM(S): 100 TABLET, FILM COATED ORAL at 09:40

## 2017-09-30 RX ADMIN — Medication 1 MILLIGRAM(S): at 21:18

## 2017-09-30 RX ADMIN — Medication 600 MILLIGRAM(S): at 09:40

## 2017-09-30 RX ADMIN — Medication 100 MILLIGRAM(S): at 09:40

## 2017-09-30 RX ADMIN — ATORVASTATIN CALCIUM 20 MILLIGRAM(S): 80 TABLET, FILM COATED ORAL at 21:18

## 2017-09-30 RX ADMIN — Medication 3 MILLIGRAM(S): at 23:06

## 2017-09-30 RX ADMIN — METFORMIN HYDROCHLORIDE 500 MILLIGRAM(S): 850 TABLET ORAL at 09:40

## 2017-09-30 RX ADMIN — Medication 81 MILLIGRAM(S): at 09:40

## 2017-09-30 RX ADMIN — Medication 600 MILLIGRAM(S): at 16:20

## 2017-09-30 RX ADMIN — RISPERIDONE 2 MILLIGRAM(S): 4 TABLET ORAL at 21:19

## 2017-09-30 RX ADMIN — Medication 600 MILLIGRAM(S): at 21:19

## 2017-09-30 RX ADMIN — Medication 100 MILLIGRAM(S): at 21:18

## 2017-09-30 RX ADMIN — SENNA PLUS 2 TABLET(S): 8.6 TABLET ORAL at 21:18

## 2017-10-01 PROCEDURE — 99232 SBSQ HOSP IP/OBS MODERATE 35: CPT

## 2017-10-01 RX ORDER — TRAZODONE HCL 50 MG
50 TABLET ORAL AT BEDTIME
Qty: 0 | Refills: 0 | Status: DISCONTINUED | OUTPATIENT
Start: 2017-10-01 | End: 2017-10-18

## 2017-10-01 RX ADMIN — Medication 600 MILLIGRAM(S): at 10:18

## 2017-10-01 RX ADMIN — Medication 100 MILLIGRAM(S): at 09:17

## 2017-10-01 RX ADMIN — Medication 81 MILLIGRAM(S): at 09:17

## 2017-10-01 RX ADMIN — Medication 650 MILLIGRAM(S): at 17:56

## 2017-10-01 RX ADMIN — RISPERIDONE 2 MILLIGRAM(S): 4 TABLET ORAL at 09:17

## 2017-10-01 RX ADMIN — LOSARTAN POTASSIUM 50 MILLIGRAM(S): 100 TABLET, FILM COATED ORAL at 09:17

## 2017-10-01 RX ADMIN — Medication 650 MILLIGRAM(S): at 11:57

## 2017-10-01 RX ADMIN — Medication 50 MILLIGRAM(S): at 20:42

## 2017-10-01 RX ADMIN — Medication 650 MILLIGRAM(S): at 16:56

## 2017-10-01 RX ADMIN — Medication 100 MILLIGRAM(S): at 20:42

## 2017-10-01 RX ADMIN — ATORVASTATIN CALCIUM 20 MILLIGRAM(S): 80 TABLET, FILM COATED ORAL at 20:42

## 2017-10-01 RX ADMIN — METFORMIN HYDROCHLORIDE 500 MILLIGRAM(S): 850 TABLET ORAL at 09:17

## 2017-10-01 RX ADMIN — RISPERIDONE 2 MILLIGRAM(S): 4 TABLET ORAL at 20:42

## 2017-10-01 RX ADMIN — Medication 650 MILLIGRAM(S): at 10:56

## 2017-10-01 RX ADMIN — Medication 600 MILLIGRAM(S): at 09:18

## 2017-10-01 RX ADMIN — Medication 1 MILLIGRAM(S): at 20:42

## 2017-10-01 RX ADMIN — Medication 600 MILLIGRAM(S): at 17:47

## 2017-10-01 RX ADMIN — Medication 100 MILLIGRAM(S): at 12:44

## 2017-10-01 RX ADMIN — Medication 1 MILLIGRAM(S): at 09:17

## 2017-10-01 RX ADMIN — Medication 75 MILLIGRAM(S): at 09:17

## 2017-10-01 RX ADMIN — SENNA PLUS 2 TABLET(S): 8.6 TABLET ORAL at 20:42

## 2017-10-01 RX ADMIN — Medication 600 MILLIGRAM(S): at 18:49

## 2017-10-02 PROCEDURE — 99232 SBSQ HOSP IP/OBS MODERATE 35: CPT

## 2017-10-02 RX ORDER — RISPERIDONE 4 MG/1
3 TABLET ORAL
Qty: 0 | Refills: 0 | Status: DISCONTINUED | OUTPATIENT
Start: 2017-10-02 | End: 2017-10-06

## 2017-10-02 RX ORDER — TRAZODONE HCL 50 MG
50 TABLET ORAL ONCE
Qty: 0 | Refills: 0 | Status: COMPLETED | OUTPATIENT
Start: 2017-10-02 | End: 2017-10-02

## 2017-10-02 RX ORDER — IBUPROFEN 200 MG
400 TABLET ORAL EVERY 4 HOURS
Qty: 0 | Refills: 0 | Status: DISCONTINUED | OUTPATIENT
Start: 2017-10-02 | End: 2017-10-19

## 2017-10-02 RX ADMIN — Medication 1 MILLIGRAM(S): at 08:16

## 2017-10-02 RX ADMIN — Medication 100 MILLIGRAM(S): at 20:44

## 2017-10-02 RX ADMIN — POLYETHYLENE GLYCOL 3350 17 GRAM(S): 17 POWDER, FOR SOLUTION ORAL at 11:14

## 2017-10-02 RX ADMIN — Medication 650 MILLIGRAM(S): at 06:15

## 2017-10-02 RX ADMIN — RISPERIDONE 2 MILLIGRAM(S): 4 TABLET ORAL at 08:16

## 2017-10-02 RX ADMIN — Medication 650 MILLIGRAM(S): at 05:00

## 2017-10-02 RX ADMIN — Medication 400 MILLIGRAM(S): at 17:11

## 2017-10-02 RX ADMIN — Medication 400 MILLIGRAM(S): at 00:00

## 2017-10-02 RX ADMIN — Medication 400 MILLIGRAM(S): at 13:33

## 2017-10-02 RX ADMIN — Medication 600 MILLIGRAM(S): at 06:19

## 2017-10-02 RX ADMIN — Medication 100 MILLIGRAM(S): at 08:16

## 2017-10-02 RX ADMIN — Medication 400 MILLIGRAM(S): at 14:33

## 2017-10-02 RX ADMIN — Medication 50 MILLIGRAM(S): at 20:47

## 2017-10-02 RX ADMIN — Medication 81 MILLIGRAM(S): at 08:16

## 2017-10-02 RX ADMIN — RISPERIDONE 3 MILLIGRAM(S): 4 TABLET ORAL at 20:47

## 2017-10-02 RX ADMIN — Medication 1 MILLIGRAM(S): at 20:44

## 2017-10-02 RX ADMIN — Medication 75 MILLIGRAM(S): at 08:16

## 2017-10-02 RX ADMIN — ATORVASTATIN CALCIUM 20 MILLIGRAM(S): 80 TABLET, FILM COATED ORAL at 20:44

## 2017-10-02 RX ADMIN — SENNA PLUS 2 TABLET(S): 8.6 TABLET ORAL at 20:47

## 2017-10-02 RX ADMIN — LOSARTAN POTASSIUM 50 MILLIGRAM(S): 100 TABLET, FILM COATED ORAL at 08:16

## 2017-10-02 RX ADMIN — Medication 50 MILLIGRAM(S): at 02:44

## 2017-10-02 RX ADMIN — METFORMIN HYDROCHLORIDE 500 MILLIGRAM(S): 850 TABLET ORAL at 08:16

## 2017-10-02 RX ADMIN — Medication 600 MILLIGRAM(S): at 07:19

## 2017-10-02 RX ADMIN — Medication 650 MILLIGRAM(S): at 12:35

## 2017-10-02 RX ADMIN — Medication 650 MILLIGRAM(S): at 11:35

## 2017-10-02 NOTE — ED ADULT NURSE NOTE - CHIEF COMPLAINT QUOTE
Patient brought to ER from [facility] for headache for 1 week.
I will SWITCH the dose or number of times a day I take the medications listed below when I get home from the hospital:  None

## 2017-10-03 PROCEDURE — 99232 SBSQ HOSP IP/OBS MODERATE 35: CPT

## 2017-10-03 RX ORDER — CLONAZEPAM 1 MG
1 TABLET ORAL
Qty: 0 | Refills: 0 | Status: DISCONTINUED | OUTPATIENT
Start: 2017-10-06 | End: 2017-10-12

## 2017-10-03 RX ADMIN — Medication 50 MILLIGRAM(S): at 21:13

## 2017-10-03 RX ADMIN — Medication 400 MILLIGRAM(S): at 22:59

## 2017-10-03 RX ADMIN — Medication 650 MILLIGRAM(S): at 11:00

## 2017-10-03 RX ADMIN — Medication 75 MILLIGRAM(S): at 08:00

## 2017-10-03 RX ADMIN — Medication 400 MILLIGRAM(S): at 14:15

## 2017-10-03 RX ADMIN — Medication 650 MILLIGRAM(S): at 10:59

## 2017-10-03 RX ADMIN — POLYETHYLENE GLYCOL 3350 17 GRAM(S): 17 POWDER, FOR SOLUTION ORAL at 11:21

## 2017-10-03 RX ADMIN — ATORVASTATIN CALCIUM 20 MILLIGRAM(S): 80 TABLET, FILM COATED ORAL at 21:13

## 2017-10-03 RX ADMIN — RISPERIDONE 3 MILLIGRAM(S): 4 TABLET ORAL at 08:00

## 2017-10-03 RX ADMIN — Medication 1 MILLIGRAM(S): at 08:00

## 2017-10-03 RX ADMIN — LOSARTAN POTASSIUM 50 MILLIGRAM(S): 100 TABLET, FILM COATED ORAL at 08:00

## 2017-10-03 RX ADMIN — Medication 400 MILLIGRAM(S): at 15:15

## 2017-10-03 RX ADMIN — Medication 100 MILLIGRAM(S): at 08:00

## 2017-10-03 RX ADMIN — Medication 400 MILLIGRAM(S): at 18:30

## 2017-10-03 RX ADMIN — Medication 400 MILLIGRAM(S): at 08:59

## 2017-10-03 RX ADMIN — Medication 81 MILLIGRAM(S): at 08:00

## 2017-10-03 RX ADMIN — Medication 1 MILLIGRAM(S): at 21:13

## 2017-10-03 RX ADMIN — RISPERIDONE 3 MILLIGRAM(S): 4 TABLET ORAL at 21:13

## 2017-10-03 RX ADMIN — SENNA PLUS 2 TABLET(S): 8.6 TABLET ORAL at 21:13

## 2017-10-03 RX ADMIN — Medication 1 EACH: at 11:50

## 2017-10-03 RX ADMIN — Medication 100 MILLIGRAM(S): at 12:04

## 2017-10-03 RX ADMIN — METFORMIN HYDROCHLORIDE 500 MILLIGRAM(S): 850 TABLET ORAL at 08:00

## 2017-10-03 RX ADMIN — Medication 100 MILLIGRAM(S): at 21:13

## 2017-10-03 RX ADMIN — Medication 400 MILLIGRAM(S): at 07:59

## 2017-10-03 RX ADMIN — Medication 400 MILLIGRAM(S): at 19:30

## 2017-10-04 PROCEDURE — 99232 SBSQ HOSP IP/OBS MODERATE 35: CPT

## 2017-10-04 RX ORDER — MAGNESIUM OXIDE 400 MG ORAL TABLET 241.3 MG
400 TABLET ORAL
Qty: 0 | Refills: 0 | Status: DISCONTINUED | OUTPATIENT
Start: 2017-10-04 | End: 2017-10-19

## 2017-10-04 RX ORDER — SODIUM CHLORIDE 0.65 %
1 AEROSOL, SPRAY (ML) NASAL THREE TIMES A DAY
Qty: 0 | Refills: 0 | Status: DISCONTINUED | OUTPATIENT
Start: 2017-10-04 | End: 2017-10-19

## 2017-10-04 RX ADMIN — RISPERIDONE 3 MILLIGRAM(S): 4 TABLET ORAL at 20:59

## 2017-10-04 RX ADMIN — Medication 1 SPRAY(S): at 22:01

## 2017-10-04 RX ADMIN — Medication 50 MILLIGRAM(S): at 20:59

## 2017-10-04 RX ADMIN — Medication 1 MILLIGRAM(S): at 20:58

## 2017-10-04 RX ADMIN — Medication 100 MILLIGRAM(S): at 13:21

## 2017-10-04 RX ADMIN — ATORVASTATIN CALCIUM 20 MILLIGRAM(S): 80 TABLET, FILM COATED ORAL at 20:58

## 2017-10-04 RX ADMIN — RISPERIDONE 3 MILLIGRAM(S): 4 TABLET ORAL at 08:45

## 2017-10-04 RX ADMIN — Medication 1 MILLIGRAM(S): at 08:45

## 2017-10-04 RX ADMIN — LOSARTAN POTASSIUM 50 MILLIGRAM(S): 100 TABLET, FILM COATED ORAL at 08:45

## 2017-10-04 RX ADMIN — Medication 75 MILLIGRAM(S): at 08:45

## 2017-10-04 RX ADMIN — Medication 400 MILLIGRAM(S): at 16:15

## 2017-10-04 RX ADMIN — Medication 100 MILLIGRAM(S): at 08:45

## 2017-10-04 RX ADMIN — METFORMIN HYDROCHLORIDE 500 MILLIGRAM(S): 850 TABLET ORAL at 08:45

## 2017-10-04 RX ADMIN — Medication 400 MILLIGRAM(S): at 22:01

## 2017-10-04 RX ADMIN — Medication 400 MILLIGRAM(S): at 11:58

## 2017-10-04 RX ADMIN — MAGNESIUM OXIDE 400 MG ORAL TABLET 400 MILLIGRAM(S): 241.3 TABLET ORAL at 17:06

## 2017-10-04 RX ADMIN — Medication 100 MILLIGRAM(S): at 20:59

## 2017-10-04 RX ADMIN — Medication 81 MILLIGRAM(S): at 08:45

## 2017-10-04 RX ADMIN — Medication 400 MILLIGRAM(S): at 07:48

## 2017-10-04 RX ADMIN — Medication 400 MILLIGRAM(S): at 17:15

## 2017-10-04 RX ADMIN — SENNA PLUS 2 TABLET(S): 8.6 TABLET ORAL at 20:59

## 2017-10-05 PROCEDURE — 99232 SBSQ HOSP IP/OBS MODERATE 35: CPT

## 2017-10-05 RX ORDER — BENZTROPINE MESYLATE 1 MG
2 TABLET ORAL
Qty: 0 | Refills: 0 | Status: DISCONTINUED | OUTPATIENT
Start: 2017-10-05 | End: 2017-10-10

## 2017-10-05 RX ADMIN — Medication 100 MILLIGRAM(S): at 09:57

## 2017-10-05 RX ADMIN — RISPERIDONE 3 MILLIGRAM(S): 4 TABLET ORAL at 21:30

## 2017-10-05 RX ADMIN — Medication 100 MILLIGRAM(S): at 12:57

## 2017-10-05 RX ADMIN — Medication 75 MILLIGRAM(S): at 09:57

## 2017-10-05 RX ADMIN — Medication 1 SPRAY(S): at 18:00

## 2017-10-05 RX ADMIN — Medication 1 MILLIGRAM(S): at 09:57

## 2017-10-05 RX ADMIN — Medication 400 MILLIGRAM(S): at 17:13

## 2017-10-05 RX ADMIN — POLYETHYLENE GLYCOL 3350 17 GRAM(S): 17 POWDER, FOR SOLUTION ORAL at 11:09

## 2017-10-05 RX ADMIN — METFORMIN HYDROCHLORIDE 500 MILLIGRAM(S): 850 TABLET ORAL at 09:57

## 2017-10-05 RX ADMIN — Medication 400 MILLIGRAM(S): at 21:30

## 2017-10-05 RX ADMIN — Medication 650 MILLIGRAM(S): at 12:57

## 2017-10-05 RX ADMIN — LOSARTAN POTASSIUM 50 MILLIGRAM(S): 100 TABLET, FILM COATED ORAL at 09:57

## 2017-10-05 RX ADMIN — SENNA PLUS 2 TABLET(S): 8.6 TABLET ORAL at 21:30

## 2017-10-05 RX ADMIN — Medication 2 MILLIGRAM(S): at 21:30

## 2017-10-05 RX ADMIN — Medication 400 MILLIGRAM(S): at 09:53

## 2017-10-05 RX ADMIN — RISPERIDONE 3 MILLIGRAM(S): 4 TABLET ORAL at 09:57

## 2017-10-05 RX ADMIN — Medication 100 MILLIGRAM(S): at 21:30

## 2017-10-05 RX ADMIN — Medication 1 MILLIGRAM(S): at 21:30

## 2017-10-05 RX ADMIN — ATORVASTATIN CALCIUM 20 MILLIGRAM(S): 80 TABLET, FILM COATED ORAL at 21:30

## 2017-10-05 RX ADMIN — Medication 400 MILLIGRAM(S): at 16:13

## 2017-10-05 RX ADMIN — Medication 81 MILLIGRAM(S): at 09:57

## 2017-10-05 RX ADMIN — Medication 50 MILLIGRAM(S): at 21:30

## 2017-10-05 RX ADMIN — MAGNESIUM OXIDE 400 MG ORAL TABLET 400 MILLIGRAM(S): 241.3 TABLET ORAL at 16:12

## 2017-10-05 RX ADMIN — Medication 400 MILLIGRAM(S): at 10:53

## 2017-10-05 RX ADMIN — Medication 650 MILLIGRAM(S): at 01:57

## 2017-10-06 PROCEDURE — 99232 SBSQ HOSP IP/OBS MODERATE 35: CPT

## 2017-10-06 RX ORDER — RISPERIDONE 4 MG/1
4 TABLET ORAL
Qty: 0 | Refills: 0 | Status: DISCONTINUED | OUTPATIENT
Start: 2017-10-06 | End: 2017-10-16

## 2017-10-06 RX ADMIN — POLYETHYLENE GLYCOL 3350 17 GRAM(S): 17 POWDER, FOR SOLUTION ORAL at 12:12

## 2017-10-06 RX ADMIN — Medication 2 MILLIGRAM(S): at 21:44

## 2017-10-06 RX ADMIN — Medication 400 MILLIGRAM(S): at 21:45

## 2017-10-06 RX ADMIN — RISPERIDONE 3 MILLIGRAM(S): 4 TABLET ORAL at 09:13

## 2017-10-06 RX ADMIN — Medication 100 MILLIGRAM(S): at 09:11

## 2017-10-06 RX ADMIN — RISPERIDONE 4 MILLIGRAM(S): 4 TABLET ORAL at 21:45

## 2017-10-06 RX ADMIN — Medication 1 MILLIGRAM(S): at 21:44

## 2017-10-06 RX ADMIN — Medication 400 MILLIGRAM(S): at 13:48

## 2017-10-06 RX ADMIN — LOSARTAN POTASSIUM 50 MILLIGRAM(S): 100 TABLET, FILM COATED ORAL at 09:12

## 2017-10-06 RX ADMIN — Medication 400 MILLIGRAM(S): at 17:30

## 2017-10-06 RX ADMIN — ATORVASTATIN CALCIUM 20 MILLIGRAM(S): 80 TABLET, FILM COATED ORAL at 21:44

## 2017-10-06 RX ADMIN — SENNA PLUS 2 TABLET(S): 8.6 TABLET ORAL at 21:45

## 2017-10-06 RX ADMIN — Medication 1 SPRAY(S): at 09:57

## 2017-10-06 RX ADMIN — Medication 100 MILLIGRAM(S): at 21:44

## 2017-10-06 RX ADMIN — Medication 2 MILLIGRAM(S): at 09:11

## 2017-10-06 RX ADMIN — Medication 400 MILLIGRAM(S): at 09:00

## 2017-10-06 RX ADMIN — Medication 1 MILLIGRAM(S): at 09:11

## 2017-10-06 RX ADMIN — Medication 1 SPRAY(S): at 00:07

## 2017-10-06 RX ADMIN — Medication 400 MILLIGRAM(S): at 08:00

## 2017-10-06 RX ADMIN — METFORMIN HYDROCHLORIDE 500 MILLIGRAM(S): 850 TABLET ORAL at 09:12

## 2017-10-06 RX ADMIN — Medication 100 MILLIGRAM(S): at 09:12

## 2017-10-06 RX ADMIN — Medication 50 MILLIGRAM(S): at 21:45

## 2017-10-06 RX ADMIN — MAGNESIUM OXIDE 400 MG ORAL TABLET 400 MILLIGRAM(S): 241.3 TABLET ORAL at 16:12

## 2017-10-06 RX ADMIN — Medication 400 MILLIGRAM(S): at 12:14

## 2017-10-06 RX ADMIN — Medication 75 MILLIGRAM(S): at 09:13

## 2017-10-06 RX ADMIN — Medication 400 MILLIGRAM(S): at 16:30

## 2017-10-06 RX ADMIN — Medication 81 MILLIGRAM(S): at 09:11

## 2017-10-06 RX ADMIN — Medication 100 MILLIGRAM(S): at 12:14

## 2017-10-07 RX ORDER — HYDROXYZINE HCL 10 MG
50 TABLET ORAL ONCE
Qty: 0 | Refills: 0 | Status: COMPLETED | OUTPATIENT
Start: 2017-10-07 | End: 2017-10-08

## 2017-10-07 RX ADMIN — RISPERIDONE 4 MILLIGRAM(S): 4 TABLET ORAL at 20:11

## 2017-10-07 RX ADMIN — Medication 400 MILLIGRAM(S): at 16:34

## 2017-10-07 RX ADMIN — Medication 400 MILLIGRAM(S): at 15:33

## 2017-10-07 RX ADMIN — Medication 81 MILLIGRAM(S): at 08:10

## 2017-10-07 RX ADMIN — Medication 100 MILLIGRAM(S): at 08:10

## 2017-10-07 RX ADMIN — Medication 100 MILLIGRAM(S): at 20:12

## 2017-10-07 RX ADMIN — SENNA PLUS 2 TABLET(S): 8.6 TABLET ORAL at 20:11

## 2017-10-07 RX ADMIN — Medication 75 MILLIGRAM(S): at 08:10

## 2017-10-07 RX ADMIN — Medication 1 EACH: at 09:00

## 2017-10-07 RX ADMIN — Medication 650 MILLIGRAM(S): at 10:53

## 2017-10-07 RX ADMIN — LOSARTAN POTASSIUM 50 MILLIGRAM(S): 100 TABLET, FILM COATED ORAL at 08:10

## 2017-10-07 RX ADMIN — Medication 1 MILLIGRAM(S): at 20:12

## 2017-10-07 RX ADMIN — MAGNESIUM OXIDE 400 MG ORAL TABLET 400 MILLIGRAM(S): 241.3 TABLET ORAL at 16:28

## 2017-10-07 RX ADMIN — Medication 50 MILLIGRAM(S): at 20:11

## 2017-10-07 RX ADMIN — ATORVASTATIN CALCIUM 20 MILLIGRAM(S): 80 TABLET, FILM COATED ORAL at 20:12

## 2017-10-07 RX ADMIN — Medication 1 MILLIGRAM(S): at 08:10

## 2017-10-07 RX ADMIN — METFORMIN HYDROCHLORIDE 500 MILLIGRAM(S): 850 TABLET ORAL at 08:10

## 2017-10-07 RX ADMIN — Medication 400 MILLIGRAM(S): at 08:10

## 2017-10-07 RX ADMIN — Medication 2 MILLIGRAM(S): at 20:12

## 2017-10-07 RX ADMIN — Medication 650 MILLIGRAM(S): at 09:55

## 2017-10-07 RX ADMIN — Medication 400 MILLIGRAM(S): at 22:14

## 2017-10-07 RX ADMIN — POLYETHYLENE GLYCOL 3350 17 GRAM(S): 17 POWDER, FOR SOLUTION ORAL at 12:12

## 2017-10-07 RX ADMIN — RISPERIDONE 4 MILLIGRAM(S): 4 TABLET ORAL at 08:10

## 2017-10-07 RX ADMIN — Medication 100 MILLIGRAM(S): at 12:06

## 2017-10-07 RX ADMIN — Medication 400 MILLIGRAM(S): at 20:11

## 2017-10-07 RX ADMIN — Medication 400 MILLIGRAM(S): at 09:10

## 2017-10-07 RX ADMIN — Medication 2 MILLIGRAM(S): at 08:10

## 2017-10-08 RX ADMIN — Medication 81 MILLIGRAM(S): at 08:02

## 2017-10-08 RX ADMIN — POLYETHYLENE GLYCOL 3350 17 GRAM(S): 17 POWDER, FOR SOLUTION ORAL at 12:12

## 2017-10-08 RX ADMIN — Medication 1 MILLIGRAM(S): at 20:23

## 2017-10-08 RX ADMIN — Medication 100 MILLIGRAM(S): at 08:02

## 2017-10-08 RX ADMIN — Medication 2 MILLIGRAM(S): at 08:02

## 2017-10-08 RX ADMIN — METFORMIN HYDROCHLORIDE 500 MILLIGRAM(S): 850 TABLET ORAL at 08:02

## 2017-10-08 RX ADMIN — Medication 2 MILLIGRAM(S): at 20:23

## 2017-10-08 RX ADMIN — MAGNESIUM OXIDE 400 MG ORAL TABLET 400 MILLIGRAM(S): 241.3 TABLET ORAL at 17:05

## 2017-10-08 RX ADMIN — Medication 1 MILLIGRAM(S): at 08:02

## 2017-10-08 RX ADMIN — SENNA PLUS 2 TABLET(S): 8.6 TABLET ORAL at 20:24

## 2017-10-08 RX ADMIN — Medication 400 MILLIGRAM(S): at 18:42

## 2017-10-08 RX ADMIN — Medication 50 MILLIGRAM(S): at 21:26

## 2017-10-08 RX ADMIN — Medication 400 MILLIGRAM(S): at 21:23

## 2017-10-08 RX ADMIN — Medication 400 MILLIGRAM(S): at 08:50

## 2017-10-08 RX ADMIN — Medication 400 MILLIGRAM(S): at 07:50

## 2017-10-08 RX ADMIN — Medication 400 MILLIGRAM(S): at 17:42

## 2017-10-08 RX ADMIN — RISPERIDONE 4 MILLIGRAM(S): 4 TABLET ORAL at 08:02

## 2017-10-08 RX ADMIN — ATORVASTATIN CALCIUM 20 MILLIGRAM(S): 80 TABLET, FILM COATED ORAL at 20:23

## 2017-10-08 RX ADMIN — Medication 400 MILLIGRAM(S): at 22:28

## 2017-10-08 RX ADMIN — Medication 75 MILLIGRAM(S): at 08:02

## 2017-10-08 RX ADMIN — Medication 50 MILLIGRAM(S): at 20:23

## 2017-10-08 RX ADMIN — Medication 400 MILLIGRAM(S): at 14:25

## 2017-10-08 RX ADMIN — SENNA PLUS 2 TABLET(S): 8.6 TABLET ORAL at 20:23

## 2017-10-08 RX ADMIN — Medication 100 MILLIGRAM(S): at 20:23

## 2017-10-08 RX ADMIN — Medication 1 EACH: at 21:23

## 2017-10-08 RX ADMIN — Medication 400 MILLIGRAM(S): at 13:27

## 2017-10-08 RX ADMIN — RISPERIDONE 4 MILLIGRAM(S): 4 TABLET ORAL at 20:23

## 2017-10-08 RX ADMIN — LOSARTAN POTASSIUM 50 MILLIGRAM(S): 100 TABLET, FILM COATED ORAL at 08:02

## 2017-10-09 PROCEDURE — 99232 SBSQ HOSP IP/OBS MODERATE 35: CPT

## 2017-10-09 RX ADMIN — Medication 400 MILLIGRAM(S): at 17:11

## 2017-10-09 RX ADMIN — Medication 100 MILLIGRAM(S): at 20:51

## 2017-10-09 RX ADMIN — Medication 75 MILLIGRAM(S): at 08:34

## 2017-10-09 RX ADMIN — RISPERIDONE 4 MILLIGRAM(S): 4 TABLET ORAL at 20:51

## 2017-10-09 RX ADMIN — LOSARTAN POTASSIUM 50 MILLIGRAM(S): 100 TABLET, FILM COATED ORAL at 08:33

## 2017-10-09 RX ADMIN — Medication 400 MILLIGRAM(S): at 18:11

## 2017-10-09 RX ADMIN — Medication 400 MILLIGRAM(S): at 13:44

## 2017-10-09 RX ADMIN — ATORVASTATIN CALCIUM 20 MILLIGRAM(S): 80 TABLET, FILM COATED ORAL at 20:51

## 2017-10-09 RX ADMIN — Medication 1 MILLIGRAM(S): at 20:51

## 2017-10-09 RX ADMIN — Medication 1 MILLIGRAM(S): at 08:33

## 2017-10-09 RX ADMIN — Medication 81 MILLIGRAM(S): at 08:33

## 2017-10-09 RX ADMIN — Medication 1 EACH: at 18:45

## 2017-10-09 RX ADMIN — Medication 100 MILLIGRAM(S): at 08:33

## 2017-10-09 RX ADMIN — SENNA PLUS 2 TABLET(S): 8.6 TABLET ORAL at 20:51

## 2017-10-09 RX ADMIN — Medication 50 MILLIGRAM(S): at 20:51

## 2017-10-09 RX ADMIN — METFORMIN HYDROCHLORIDE 500 MILLIGRAM(S): 850 TABLET ORAL at 08:33

## 2017-10-09 RX ADMIN — Medication 400 MILLIGRAM(S): at 09:34

## 2017-10-09 RX ADMIN — Medication 400 MILLIGRAM(S): at 12:44

## 2017-10-09 RX ADMIN — Medication 100 MILLIGRAM(S): at 12:44

## 2017-10-09 RX ADMIN — MAGNESIUM OXIDE 400 MG ORAL TABLET 400 MILLIGRAM(S): 241.3 TABLET ORAL at 16:07

## 2017-10-09 RX ADMIN — Medication 400 MILLIGRAM(S): at 08:34

## 2017-10-09 RX ADMIN — Medication 1 EACH: at 10:45

## 2017-10-09 RX ADMIN — Medication 2 MILLIGRAM(S): at 08:33

## 2017-10-09 RX ADMIN — RISPERIDONE 4 MILLIGRAM(S): 4 TABLET ORAL at 08:34

## 2017-10-09 RX ADMIN — Medication 2 MILLIGRAM(S): at 20:51

## 2017-10-10 PROCEDURE — 99232 SBSQ HOSP IP/OBS MODERATE 35: CPT

## 2017-10-10 RX ORDER — INSULIN LISPRO 100/ML
VIAL (ML) SUBCUTANEOUS
Qty: 0 | Refills: 0 | Status: DISCONTINUED | OUTPATIENT
Start: 2017-10-10 | End: 2017-10-19

## 2017-10-10 RX ORDER — BENZTROPINE MESYLATE 1 MG
1 TABLET ORAL
Qty: 0 | Refills: 0 | Status: DISCONTINUED | OUTPATIENT
Start: 2017-10-10 | End: 2017-10-19

## 2017-10-10 RX ADMIN — Medication 100 MILLIGRAM(S): at 21:15

## 2017-10-10 RX ADMIN — ATORVASTATIN CALCIUM 20 MILLIGRAM(S): 80 TABLET, FILM COATED ORAL at 21:15

## 2017-10-10 RX ADMIN — Medication 50 MILLIGRAM(S): at 21:16

## 2017-10-10 RX ADMIN — LOSARTAN POTASSIUM 50 MILLIGRAM(S): 100 TABLET, FILM COATED ORAL at 08:19

## 2017-10-10 RX ADMIN — Medication 1 MILLIGRAM(S): at 08:19

## 2017-10-10 RX ADMIN — Medication 400 MILLIGRAM(S): at 21:16

## 2017-10-10 RX ADMIN — MAGNESIUM OXIDE 400 MG ORAL TABLET 400 MILLIGRAM(S): 241.3 TABLET ORAL at 17:05

## 2017-10-10 RX ADMIN — Medication 400 MILLIGRAM(S): at 08:19

## 2017-10-10 RX ADMIN — Medication 1 MILLIGRAM(S): at 21:15

## 2017-10-10 RX ADMIN — Medication 100 MILLIGRAM(S): at 08:19

## 2017-10-10 RX ADMIN — Medication 400 MILLIGRAM(S): at 09:19

## 2017-10-10 RX ADMIN — Medication 400 MILLIGRAM(S): at 17:06

## 2017-10-10 RX ADMIN — Medication 400 MILLIGRAM(S): at 18:06

## 2017-10-10 RX ADMIN — SENNA PLUS 2 TABLET(S): 8.6 TABLET ORAL at 21:16

## 2017-10-10 RX ADMIN — RISPERIDONE 4 MILLIGRAM(S): 4 TABLET ORAL at 21:16

## 2017-10-10 RX ADMIN — Medication 400 MILLIGRAM(S): at 13:20

## 2017-10-10 RX ADMIN — Medication 2 MILLIGRAM(S): at 08:19

## 2017-10-10 RX ADMIN — Medication 75 MILLIGRAM(S): at 08:19

## 2017-10-10 RX ADMIN — METFORMIN HYDROCHLORIDE 500 MILLIGRAM(S): 850 TABLET ORAL at 08:19

## 2017-10-10 RX ADMIN — Medication 81 MILLIGRAM(S): at 08:19

## 2017-10-10 RX ADMIN — Medication 100 MILLIGRAM(S): at 12:13

## 2017-10-10 RX ADMIN — Medication 400 MILLIGRAM(S): at 12:20

## 2017-10-10 RX ADMIN — RISPERIDONE 4 MILLIGRAM(S): 4 TABLET ORAL at 08:19

## 2017-10-11 PROCEDURE — 99232 SBSQ HOSP IP/OBS MODERATE 35: CPT

## 2017-10-11 RX ADMIN — Medication 81 MILLIGRAM(S): at 08:37

## 2017-10-11 RX ADMIN — Medication 1 MILLIGRAM(S): at 20:29

## 2017-10-11 RX ADMIN — ATORVASTATIN CALCIUM 20 MILLIGRAM(S): 80 TABLET, FILM COATED ORAL at 20:29

## 2017-10-11 RX ADMIN — Medication 1 MILLIGRAM(S): at 08:37

## 2017-10-11 RX ADMIN — Medication 400 MILLIGRAM(S): at 08:51

## 2017-10-11 RX ADMIN — SENNA PLUS 2 TABLET(S): 8.6 TABLET ORAL at 20:29

## 2017-10-11 RX ADMIN — Medication 100 MILLIGRAM(S): at 20:29

## 2017-10-11 RX ADMIN — Medication 400 MILLIGRAM(S): at 09:54

## 2017-10-11 RX ADMIN — METFORMIN HYDROCHLORIDE 500 MILLIGRAM(S): 850 TABLET ORAL at 08:37

## 2017-10-11 RX ADMIN — Medication 75 MILLIGRAM(S): at 08:38

## 2017-10-11 RX ADMIN — Medication 100 MILLIGRAM(S): at 08:37

## 2017-10-11 RX ADMIN — Medication 50 MILLIGRAM(S): at 20:29

## 2017-10-11 RX ADMIN — Medication 400 MILLIGRAM(S): at 16:59

## 2017-10-11 RX ADMIN — Medication 400 MILLIGRAM(S): at 20:30

## 2017-10-11 RX ADMIN — RISPERIDONE 4 MILLIGRAM(S): 4 TABLET ORAL at 08:37

## 2017-10-11 RX ADMIN — LOSARTAN POTASSIUM 50 MILLIGRAM(S): 100 TABLET, FILM COATED ORAL at 08:37

## 2017-10-11 RX ADMIN — Medication 400 MILLIGRAM(S): at 21:25

## 2017-10-11 RX ADMIN — Medication 400 MILLIGRAM(S): at 13:14

## 2017-10-11 RX ADMIN — RISPERIDONE 4 MILLIGRAM(S): 4 TABLET ORAL at 20:29

## 2017-10-11 RX ADMIN — Medication 100 MILLIGRAM(S): at 13:36

## 2017-10-12 PROCEDURE — 99232 SBSQ HOSP IP/OBS MODERATE 35: CPT

## 2017-10-12 RX ORDER — TRAZODONE HCL 50 MG
50 TABLET ORAL ONCE
Qty: 0 | Refills: 0 | Status: COMPLETED | OUTPATIENT
Start: 2017-10-12 | End: 2017-10-12

## 2017-10-12 RX ORDER — CLONAZEPAM 1 MG
0.5 TABLET ORAL
Qty: 0 | Refills: 0 | Status: COMPLETED | OUTPATIENT
Start: 2017-10-12 | End: 2017-10-19

## 2017-10-12 RX ADMIN — ATORVASTATIN CALCIUM 20 MILLIGRAM(S): 80 TABLET, FILM COATED ORAL at 21:06

## 2017-10-12 RX ADMIN — Medication 100 MILLIGRAM(S): at 13:31

## 2017-10-12 RX ADMIN — Medication 1 MILLIGRAM(S): at 21:06

## 2017-10-12 RX ADMIN — Medication 100 MILLIGRAM(S): at 08:02

## 2017-10-12 RX ADMIN — SENNA PLUS 2 TABLET(S): 8.6 TABLET ORAL at 21:06

## 2017-10-12 RX ADMIN — Medication 0.5 MILLIGRAM(S): at 21:06

## 2017-10-12 RX ADMIN — RISPERIDONE 4 MILLIGRAM(S): 4 TABLET ORAL at 08:02

## 2017-10-12 RX ADMIN — Medication 400 MILLIGRAM(S): at 05:32

## 2017-10-12 RX ADMIN — Medication 400 MILLIGRAM(S): at 09:00

## 2017-10-12 RX ADMIN — Medication 400 MILLIGRAM(S): at 22:25

## 2017-10-12 RX ADMIN — Medication 75 MILLIGRAM(S): at 08:02

## 2017-10-12 RX ADMIN — Medication 1 MILLIGRAM(S): at 08:02

## 2017-10-12 RX ADMIN — Medication 400 MILLIGRAM(S): at 17:26

## 2017-10-12 RX ADMIN — Medication 400 MILLIGRAM(S): at 17:56

## 2017-10-12 RX ADMIN — RISPERIDONE 4 MILLIGRAM(S): 4 TABLET ORAL at 21:06

## 2017-10-12 RX ADMIN — Medication 400 MILLIGRAM(S): at 14:58

## 2017-10-12 RX ADMIN — Medication 50 MILLIGRAM(S): at 21:06

## 2017-10-12 RX ADMIN — Medication 100 MILLIGRAM(S): at 21:06

## 2017-10-12 RX ADMIN — Medication 81 MILLIGRAM(S): at 08:02

## 2017-10-12 RX ADMIN — MAGNESIUM OXIDE 400 MG ORAL TABLET 400 MILLIGRAM(S): 241.3 TABLET ORAL at 17:25

## 2017-10-12 RX ADMIN — LOSARTAN POTASSIUM 50 MILLIGRAM(S): 100 TABLET, FILM COATED ORAL at 08:02

## 2017-10-12 RX ADMIN — Medication 400 MILLIGRAM(S): at 05:01

## 2017-10-12 RX ADMIN — Medication 50 MILLIGRAM(S): at 23:44

## 2017-10-12 RX ADMIN — Medication 400 MILLIGRAM(S): at 23:26

## 2017-10-12 RX ADMIN — Medication 400 MILLIGRAM(S): at 09:30

## 2017-10-12 RX ADMIN — METFORMIN HYDROCHLORIDE 500 MILLIGRAM(S): 850 TABLET ORAL at 08:02

## 2017-10-12 RX ADMIN — Medication 400 MILLIGRAM(S): at 13:31

## 2017-10-13 PROCEDURE — 99232 SBSQ HOSP IP/OBS MODERATE 35: CPT

## 2017-10-13 RX ADMIN — Medication 75 MILLIGRAM(S): at 08:05

## 2017-10-13 RX ADMIN — Medication 400 MILLIGRAM(S): at 12:07

## 2017-10-13 RX ADMIN — Medication 400 MILLIGRAM(S): at 07:52

## 2017-10-13 RX ADMIN — RISPERIDONE 4 MILLIGRAM(S): 4 TABLET ORAL at 22:04

## 2017-10-13 RX ADMIN — Medication 0.5 MILLIGRAM(S): at 08:04

## 2017-10-13 RX ADMIN — Medication 400 MILLIGRAM(S): at 13:00

## 2017-10-13 RX ADMIN — Medication 1 MILLIGRAM(S): at 22:03

## 2017-10-13 RX ADMIN — Medication 400 MILLIGRAM(S): at 16:55

## 2017-10-13 RX ADMIN — MAGNESIUM OXIDE 400 MG ORAL TABLET 400 MILLIGRAM(S): 241.3 TABLET ORAL at 16:52

## 2017-10-13 RX ADMIN — Medication 0.5 MILLIGRAM(S): at 22:03

## 2017-10-13 RX ADMIN — METFORMIN HYDROCHLORIDE 500 MILLIGRAM(S): 850 TABLET ORAL at 08:04

## 2017-10-13 RX ADMIN — SENNA PLUS 2 TABLET(S): 8.6 TABLET ORAL at 22:04

## 2017-10-13 RX ADMIN — ATORVASTATIN CALCIUM 20 MILLIGRAM(S): 80 TABLET, FILM COATED ORAL at 22:03

## 2017-10-13 RX ADMIN — RISPERIDONE 4 MILLIGRAM(S): 4 TABLET ORAL at 08:05

## 2017-10-13 RX ADMIN — Medication 1 MILLIGRAM(S): at 08:04

## 2017-10-13 RX ADMIN — Medication 400 MILLIGRAM(S): at 08:00

## 2017-10-13 RX ADMIN — LOSARTAN POTASSIUM 50 MILLIGRAM(S): 100 TABLET, FILM COATED ORAL at 08:04

## 2017-10-13 RX ADMIN — Medication 100 MILLIGRAM(S): at 22:03

## 2017-10-13 RX ADMIN — Medication 50 MILLIGRAM(S): at 22:04

## 2017-10-13 RX ADMIN — Medication 100 MILLIGRAM(S): at 08:05

## 2017-10-13 RX ADMIN — Medication 100 MILLIGRAM(S): at 08:04

## 2017-10-13 RX ADMIN — Medication 81 MILLIGRAM(S): at 08:04

## 2017-10-14 RX ORDER — TRAZODONE HCL 50 MG
50 TABLET ORAL ONCE
Qty: 0 | Refills: 0 | Status: COMPLETED | OUTPATIENT
Start: 2017-10-14 | End: 2017-10-14

## 2017-10-14 RX ADMIN — Medication 1 MILLIGRAM(S): at 08:28

## 2017-10-14 RX ADMIN — RISPERIDONE 4 MILLIGRAM(S): 4 TABLET ORAL at 21:01

## 2017-10-14 RX ADMIN — Medication 1 MILLIGRAM(S): at 21:00

## 2017-10-14 RX ADMIN — Medication 50 MILLIGRAM(S): at 21:01

## 2017-10-14 RX ADMIN — Medication 100 MILLIGRAM(S): at 08:29

## 2017-10-14 RX ADMIN — Medication 650 MILLIGRAM(S): at 21:43

## 2017-10-14 RX ADMIN — ATORVASTATIN CALCIUM 20 MILLIGRAM(S): 80 TABLET, FILM COATED ORAL at 21:00

## 2017-10-14 RX ADMIN — Medication 81 MILLIGRAM(S): at 08:28

## 2017-10-14 RX ADMIN — Medication 100 MILLIGRAM(S): at 12:43

## 2017-10-14 RX ADMIN — Medication 0.5 MILLIGRAM(S): at 08:28

## 2017-10-14 RX ADMIN — Medication 100 MILLIGRAM(S): at 21:00

## 2017-10-14 RX ADMIN — METFORMIN HYDROCHLORIDE 500 MILLIGRAM(S): 850 TABLET ORAL at 08:29

## 2017-10-14 RX ADMIN — Medication 400 MILLIGRAM(S): at 13:50

## 2017-10-14 RX ADMIN — POLYETHYLENE GLYCOL 3350 17 GRAM(S): 17 POWDER, FOR SOLUTION ORAL at 16:48

## 2017-10-14 RX ADMIN — MAGNESIUM OXIDE 400 MG ORAL TABLET 400 MILLIGRAM(S): 241.3 TABLET ORAL at 16:47

## 2017-10-14 RX ADMIN — Medication 100 MILLIGRAM(S): at 08:28

## 2017-10-14 RX ADMIN — Medication 650 MILLIGRAM(S): at 21:01

## 2017-10-14 RX ADMIN — Medication 400 MILLIGRAM(S): at 17:48

## 2017-10-14 RX ADMIN — Medication 1 EACH: at 08:30

## 2017-10-14 RX ADMIN — LOSARTAN POTASSIUM 50 MILLIGRAM(S): 100 TABLET, FILM COATED ORAL at 08:28

## 2017-10-14 RX ADMIN — Medication 50 MILLIGRAM(S): at 00:27

## 2017-10-14 RX ADMIN — Medication 75 MILLIGRAM(S): at 08:29

## 2017-10-14 RX ADMIN — Medication 400 MILLIGRAM(S): at 12:43

## 2017-10-14 RX ADMIN — Medication 400 MILLIGRAM(S): at 08:29

## 2017-10-14 RX ADMIN — RISPERIDONE 4 MILLIGRAM(S): 4 TABLET ORAL at 08:29

## 2017-10-14 RX ADMIN — SENNA PLUS 2 TABLET(S): 8.6 TABLET ORAL at 21:01

## 2017-10-14 RX ADMIN — Medication 400 MILLIGRAM(S): at 16:48

## 2017-10-14 RX ADMIN — Medication 0.5 MILLIGRAM(S): at 21:00

## 2017-10-14 RX ADMIN — Medication 400 MILLIGRAM(S): at 09:29

## 2017-10-15 RX ADMIN — SENNA PLUS 2 TABLET(S): 8.6 TABLET ORAL at 21:16

## 2017-10-15 RX ADMIN — Medication 1 EACH: at 09:06

## 2017-10-15 RX ADMIN — Medication 1 MILLIGRAM(S): at 21:16

## 2017-10-15 RX ADMIN — METFORMIN HYDROCHLORIDE 500 MILLIGRAM(S): 850 TABLET ORAL at 09:05

## 2017-10-15 RX ADMIN — Medication 400 MILLIGRAM(S): at 17:54

## 2017-10-15 RX ADMIN — Medication 400 MILLIGRAM(S): at 13:10

## 2017-10-15 RX ADMIN — RISPERIDONE 4 MILLIGRAM(S): 4 TABLET ORAL at 09:05

## 2017-10-15 RX ADMIN — Medication 81 MILLIGRAM(S): at 09:05

## 2017-10-15 RX ADMIN — ATORVASTATIN CALCIUM 20 MILLIGRAM(S): 80 TABLET, FILM COATED ORAL at 21:16

## 2017-10-15 RX ADMIN — Medication 400 MILLIGRAM(S): at 10:06

## 2017-10-15 RX ADMIN — RISPERIDONE 4 MILLIGRAM(S): 4 TABLET ORAL at 21:16

## 2017-10-15 RX ADMIN — Medication 75 MILLIGRAM(S): at 09:05

## 2017-10-15 RX ADMIN — Medication 400 MILLIGRAM(S): at 09:06

## 2017-10-15 RX ADMIN — Medication 100 MILLIGRAM(S): at 12:36

## 2017-10-15 RX ADMIN — Medication 0.5 MILLIGRAM(S): at 21:16

## 2017-10-15 RX ADMIN — LOSARTAN POTASSIUM 50 MILLIGRAM(S): 100 TABLET, FILM COATED ORAL at 09:05

## 2017-10-15 RX ADMIN — Medication 50 MILLIGRAM(S): at 21:16

## 2017-10-15 RX ADMIN — Medication 100 MILLIGRAM(S): at 09:05

## 2017-10-15 RX ADMIN — Medication 0.5 MILLIGRAM(S): at 09:05

## 2017-10-15 RX ADMIN — Medication 400 MILLIGRAM(S): at 22:43

## 2017-10-15 RX ADMIN — Medication 100 MILLIGRAM(S): at 21:16

## 2017-10-15 RX ADMIN — Medication 400 MILLIGRAM(S): at 22:00

## 2017-10-15 RX ADMIN — Medication 1 MILLIGRAM(S): at 09:05

## 2017-10-15 RX ADMIN — MAGNESIUM OXIDE 400 MG ORAL TABLET 400 MILLIGRAM(S): 241.3 TABLET ORAL at 16:20

## 2017-10-16 PROCEDURE — 99232 SBSQ HOSP IP/OBS MODERATE 35: CPT

## 2017-10-16 RX ORDER — PALIPERIDONE 1.5 MG/1
156 TABLET, EXTENDED RELEASE ORAL ONCE
Qty: 0 | Refills: 0 | Status: COMPLETED | OUTPATIENT
Start: 2017-10-16 | End: 2017-10-16

## 2017-10-16 RX ORDER — PALIPERIDONE 1.5 MG/1
234 TABLET, EXTENDED RELEASE ORAL ONCE
Qty: 0 | Refills: 0 | Status: DISCONTINUED | OUTPATIENT
Start: 2017-10-16 | End: 2017-10-16

## 2017-10-16 RX ADMIN — Medication 400 MILLIGRAM(S): at 11:31

## 2017-10-16 RX ADMIN — MAGNESIUM OXIDE 400 MG ORAL TABLET 400 MILLIGRAM(S): 241.3 TABLET ORAL at 16:45

## 2017-10-16 RX ADMIN — Medication 100 MILLIGRAM(S): at 08:07

## 2017-10-16 RX ADMIN — Medication 100 MILLIGRAM(S): at 13:04

## 2017-10-16 RX ADMIN — Medication 81 MILLIGRAM(S): at 08:07

## 2017-10-16 RX ADMIN — PALIPERIDONE 156 MILLIGRAM(S): 1.5 TABLET, EXTENDED RELEASE ORAL at 14:18

## 2017-10-16 RX ADMIN — RISPERIDONE 4 MILLIGRAM(S): 4 TABLET ORAL at 08:07

## 2017-10-16 RX ADMIN — Medication 650 MILLIGRAM(S): at 13:33

## 2017-10-16 RX ADMIN — Medication 1 MILLIGRAM(S): at 08:07

## 2017-10-16 RX ADMIN — Medication 0.5 MILLIGRAM(S): at 08:07

## 2017-10-16 RX ADMIN — Medication 0.5 MILLIGRAM(S): at 20:10

## 2017-10-16 RX ADMIN — LOSARTAN POTASSIUM 50 MILLIGRAM(S): 100 TABLET, FILM COATED ORAL at 08:07

## 2017-10-16 RX ADMIN — Medication 50 MILLIGRAM(S): at 20:11

## 2017-10-16 RX ADMIN — ATORVASTATIN CALCIUM 20 MILLIGRAM(S): 80 TABLET, FILM COATED ORAL at 20:10

## 2017-10-16 RX ADMIN — Medication 1 MILLIGRAM(S): at 20:10

## 2017-10-16 RX ADMIN — Medication 400 MILLIGRAM(S): at 10:31

## 2017-10-16 RX ADMIN — Medication 75 MILLIGRAM(S): at 08:07

## 2017-10-16 RX ADMIN — Medication 650 MILLIGRAM(S): at 12:33

## 2017-10-16 RX ADMIN — SENNA PLUS 2 TABLET(S): 8.6 TABLET ORAL at 20:11

## 2017-10-16 RX ADMIN — METFORMIN HYDROCHLORIDE 500 MILLIGRAM(S): 850 TABLET ORAL at 08:07

## 2017-10-16 RX ADMIN — Medication 100 MILLIGRAM(S): at 20:10

## 2017-10-17 PROCEDURE — 99232 SBSQ HOSP IP/OBS MODERATE 35: CPT

## 2017-10-17 RX ADMIN — Medication 50 MILLIGRAM(S): at 21:28

## 2017-10-17 RX ADMIN — Medication 100 MILLIGRAM(S): at 08:04

## 2017-10-17 RX ADMIN — Medication 1 EACH: at 23:16

## 2017-10-17 RX ADMIN — MAGNESIUM OXIDE 400 MG ORAL TABLET 400 MILLIGRAM(S): 241.3 TABLET ORAL at 16:45

## 2017-10-17 RX ADMIN — Medication 400 MILLIGRAM(S): at 13:38

## 2017-10-17 RX ADMIN — Medication 1 MILLIGRAM(S): at 21:28

## 2017-10-17 RX ADMIN — LOSARTAN POTASSIUM 50 MILLIGRAM(S): 100 TABLET, FILM COATED ORAL at 08:04

## 2017-10-17 RX ADMIN — Medication 400 MILLIGRAM(S): at 23:31

## 2017-10-17 RX ADMIN — SENNA PLUS 2 TABLET(S): 8.6 TABLET ORAL at 21:28

## 2017-10-17 RX ADMIN — Medication 650 MILLIGRAM(S): at 06:25

## 2017-10-17 RX ADMIN — Medication 81 MILLIGRAM(S): at 08:04

## 2017-10-17 RX ADMIN — ATORVASTATIN CALCIUM 20 MILLIGRAM(S): 80 TABLET, FILM COATED ORAL at 21:28

## 2017-10-17 RX ADMIN — Medication 400 MILLIGRAM(S): at 17:41

## 2017-10-17 RX ADMIN — Medication 1 TABLET(S): at 12:15

## 2017-10-17 RX ADMIN — Medication 1 MILLIGRAM(S): at 08:04

## 2017-10-17 RX ADMIN — Medication 75 MILLIGRAM(S): at 08:04

## 2017-10-17 RX ADMIN — Medication 100 MILLIGRAM(S): at 21:28

## 2017-10-17 RX ADMIN — METFORMIN HYDROCHLORIDE 500 MILLIGRAM(S): 850 TABLET ORAL at 08:04

## 2017-10-17 RX ADMIN — Medication 400 MILLIGRAM(S): at 16:44

## 2017-10-17 RX ADMIN — Medication 400 MILLIGRAM(S): at 22:20

## 2017-10-17 RX ADMIN — Medication 400 MILLIGRAM(S): at 12:38

## 2017-10-17 RX ADMIN — Medication 650 MILLIGRAM(S): at 06:00

## 2017-10-17 RX ADMIN — Medication 650 MILLIGRAM(S): at 23:31

## 2017-10-17 RX ADMIN — Medication 0.5 MILLIGRAM(S): at 21:28

## 2017-10-17 RX ADMIN — Medication 0.5 MILLIGRAM(S): at 08:04

## 2017-10-18 RX ORDER — TRAZODONE HCL 50 MG
100 TABLET ORAL AT BEDTIME
Qty: 0 | Refills: 0 | Status: DISCONTINUED | OUTPATIENT
Start: 2017-10-18 | End: 2017-10-19

## 2017-10-18 RX ADMIN — LOSARTAN POTASSIUM 50 MILLIGRAM(S): 100 TABLET, FILM COATED ORAL at 09:07

## 2017-10-18 RX ADMIN — Medication 100 MILLIGRAM(S): at 09:07

## 2017-10-18 RX ADMIN — Medication 100 MILLIGRAM(S): at 21:15

## 2017-10-18 RX ADMIN — ATORVASTATIN CALCIUM 20 MILLIGRAM(S): 80 TABLET, FILM COATED ORAL at 21:14

## 2017-10-18 RX ADMIN — Medication 1 MILLIGRAM(S): at 09:07

## 2017-10-18 RX ADMIN — Medication 0.5 MILLIGRAM(S): at 09:07

## 2017-10-18 RX ADMIN — Medication 1 EACH: at 06:17

## 2017-10-18 RX ADMIN — Medication 81 MILLIGRAM(S): at 09:07

## 2017-10-18 RX ADMIN — Medication 0.5 MILLIGRAM(S): at 21:15

## 2017-10-18 RX ADMIN — Medication 75 MILLIGRAM(S): at 09:07

## 2017-10-18 RX ADMIN — SENNA PLUS 2 TABLET(S): 8.6 TABLET ORAL at 21:15

## 2017-10-18 RX ADMIN — Medication 650 MILLIGRAM(S): at 00:22

## 2017-10-18 RX ADMIN — Medication 1 TABLET(S): at 09:07

## 2017-10-18 RX ADMIN — METFORMIN HYDROCHLORIDE 500 MILLIGRAM(S): 850 TABLET ORAL at 09:07

## 2017-10-18 RX ADMIN — Medication 1 MILLIGRAM(S): at 21:15

## 2017-10-19 VITALS — TEMPERATURE: 98 F

## 2017-10-19 PROCEDURE — 99238 HOSP IP/OBS DSCHRG MGMT 30/<: CPT

## 2017-10-19 RX ORDER — VENLAFAXINE HCL 75 MG
1 CAPSULE, EXT RELEASE 24 HR ORAL
Qty: 15 | Refills: 0
Start: 2017-10-19 | End: 2017-11-03

## 2017-10-19 RX ORDER — CLONAZEPAM 1 MG
1 TABLET ORAL
Qty: 30 | Refills: 0
Start: 2017-10-19 | End: 2017-11-03

## 2017-10-19 RX ORDER — ASPIRIN/CALCIUM CARB/MAGNESIUM 324 MG
1 TABLET ORAL
Qty: 15 | Refills: 0
Start: 2017-10-19 | End: 2017-11-03

## 2017-10-19 RX ORDER — METFORMIN HYDROCHLORIDE 850 MG/1
1 TABLET ORAL
Qty: 0 | Refills: 0 | COMMUNITY

## 2017-10-19 RX ORDER — LOSARTAN POTASSIUM 100 MG/1
1 TABLET, FILM COATED ORAL
Qty: 15 | Refills: 0
Start: 2017-10-19 | End: 2017-11-03

## 2017-10-19 RX ORDER — METFORMIN HYDROCHLORIDE 850 MG/1
1 TABLET ORAL
Qty: 15 | Refills: 0
Start: 2017-10-19 | End: 2017-11-03

## 2017-10-19 RX ORDER — BENZTROPINE MESYLATE 1 MG
1 TABLET ORAL
Qty: 30 | Refills: 0
Start: 2017-10-19 | End: 2017-11-03

## 2017-10-19 RX ORDER — PALIPERIDONE 1.5 MG/1
117 TABLET, EXTENDED RELEASE ORAL ONCE
Qty: 0 | Refills: 0 | Status: COMPLETED | OUTPATIENT
Start: 2017-10-19 | End: 2017-10-19

## 2017-10-19 RX ORDER — SENNA PLUS 8.6 MG/1
2 TABLET ORAL
Qty: 30 | Refills: 0
Start: 2017-10-19 | End: 2017-11-03

## 2017-10-19 RX ORDER — DOCUSATE SODIUM 100 MG
1 CAPSULE ORAL
Qty: 45 | Refills: 0
Start: 2017-10-19 | End: 2017-11-03

## 2017-10-19 RX ORDER — SENNA PLUS 8.6 MG/1
1 TABLET ORAL
Qty: 0 | Refills: 0 | COMMUNITY

## 2017-10-19 RX ORDER — METOPROLOL TARTRATE 50 MG
1 TABLET ORAL
Qty: 15 | Refills: 0
Start: 2017-10-19 | End: 2017-11-03

## 2017-10-19 RX ORDER — ATORVASTATIN CALCIUM 80 MG/1
1 TABLET, FILM COATED ORAL
Qty: 15 | Refills: 0
Start: 2017-10-19 | End: 2017-11-03

## 2017-10-19 RX ADMIN — LOSARTAN POTASSIUM 50 MILLIGRAM(S): 100 TABLET, FILM COATED ORAL at 08:17

## 2017-10-19 RX ADMIN — Medication 400 MILLIGRAM(S): at 10:27

## 2017-10-19 RX ADMIN — Medication 1 TABLET(S): at 08:17

## 2017-10-19 RX ADMIN — Medication 100 MILLIGRAM(S): at 08:17

## 2017-10-19 RX ADMIN — Medication 1 MILLIGRAM(S): at 08:17

## 2017-10-19 RX ADMIN — Medication 0.5 MILLIGRAM(S): at 08:17

## 2017-10-19 RX ADMIN — Medication 75 MILLIGRAM(S): at 08:17

## 2017-10-19 RX ADMIN — METFORMIN HYDROCHLORIDE 500 MILLIGRAM(S): 850 TABLET ORAL at 08:17

## 2017-10-19 RX ADMIN — PALIPERIDONE 117 MILLIGRAM(S): 1.5 TABLET, EXTENDED RELEASE ORAL at 10:12

## 2017-10-19 RX ADMIN — Medication 81 MILLIGRAM(S): at 08:17

## 2017-10-19 RX ADMIN — Medication 100 MILLIGRAM(S): at 08:22

## 2017-10-19 RX ADMIN — Medication 100 MILLIGRAM(S): at 12:34

## 2017-10-21 ENCOUNTER — APPOINTMENT (OUTPATIENT)
Dept: OPHTHALMOLOGY | Facility: CLINIC | Age: 57
End: 2017-10-21

## 2017-12-18 PROCEDURE — 83605 ASSAY OF LACTIC ACID: CPT

## 2017-12-18 PROCEDURE — 70450 CT HEAD/BRAIN W/O DYE: CPT

## 2017-12-18 PROCEDURE — 84484 ASSAY OF TROPONIN QUANT: CPT

## 2017-12-18 PROCEDURE — 82553 CREATINE MB FRACTION: CPT

## 2017-12-18 PROCEDURE — 84443 ASSAY THYROID STIM HORMONE: CPT

## 2017-12-18 PROCEDURE — 80061 LIPID PANEL: CPT

## 2017-12-18 PROCEDURE — 70551 MRI BRAIN STEM W/O DYE: CPT

## 2017-12-18 PROCEDURE — 93005 ELECTROCARDIOGRAM TRACING: CPT

## 2017-12-18 PROCEDURE — 82607 VITAMIN B-12: CPT

## 2017-12-18 PROCEDURE — 84100 ASSAY OF PHOSPHORUS: CPT

## 2017-12-18 PROCEDURE — 80307 DRUG TEST PRSMV CHEM ANLYZR: CPT

## 2017-12-18 PROCEDURE — 80053 COMPREHEN METABOLIC PANEL: CPT

## 2017-12-18 PROCEDURE — 87040 BLOOD CULTURE FOR BACTERIA: CPT

## 2017-12-18 PROCEDURE — 93306 TTE W/DOPPLER COMPLETE: CPT

## 2017-12-18 PROCEDURE — 83036 HEMOGLOBIN GLYCOSYLATED A1C: CPT

## 2017-12-18 PROCEDURE — 80076 HEPATIC FUNCTION PANEL: CPT

## 2017-12-18 PROCEDURE — 83735 ASSAY OF MAGNESIUM: CPT

## 2017-12-18 PROCEDURE — 94640 AIRWAY INHALATION TREATMENT: CPT

## 2017-12-18 PROCEDURE — 97162 PT EVAL MOD COMPLEX 30 MIN: CPT

## 2017-12-18 PROCEDURE — 80048 BASIC METABOLIC PNL TOTAL CA: CPT

## 2017-12-18 PROCEDURE — 99285 EMERGENCY DEPT VISIT HI MDM: CPT | Mod: 25

## 2017-12-18 PROCEDURE — 81003 URINALYSIS AUTO W/O SCOPE: CPT

## 2017-12-18 PROCEDURE — 82550 ASSAY OF CK (CPK): CPT

## 2017-12-18 PROCEDURE — 82306 VITAMIN D 25 HYDROXY: CPT

## 2017-12-18 PROCEDURE — 82272 OCCULT BLD FECES 1-3 TESTS: CPT

## 2017-12-18 PROCEDURE — 85027 COMPLETE CBC AUTOMATED: CPT

## 2017-12-18 PROCEDURE — 86803 HEPATITIS C AB TEST: CPT

## 2017-12-18 PROCEDURE — 71045 X-RAY EXAM CHEST 1 VIEW: CPT

## 2018-01-25 ENCOUNTER — APPOINTMENT (OUTPATIENT)
Dept: OPHTHALMOLOGY | Facility: CLINIC | Age: 58
End: 2018-01-25
Payer: MEDICAID

## 2018-01-25 PROCEDURE — 92025 CPTRIZED CORNEAL TOPOGRAPHY: CPT

## 2018-01-25 PROCEDURE — 92014 COMPRE OPH EXAM EST PT 1/>: CPT

## 2018-07-18 NOTE — ED ADULT NURSE REASSESSMENT NOTE - GENERAL PATIENT STATE
Mia Hayes is a 52 year old female presenting for   Chief Complaint   Patient presents with   • Follow-up     Denies Latex allergy or sensitivity.    Medication verified and med list updated  Refills needed today: No    Health Maintenance Summary     Topic Due On Due Status Completed On    MAMMOGRAM - BREAST CANCER SCREENING Sep 13, 2018 Not Due Sep 13, 2017    Pap Smear - Cervical Cancer Screening  Aug 21, 2022 Not Due Aug 21, 2017    Colorectal Cancer Screening - Cologuard  Sep 13, 2020 Not Due Sep 13, 2017    Immunization - TDAP Pregnancy  Hidden     IMMUNIZATION - DTaP/Tdap/Td Aug 5, 1994 Overdue Aug 4, 1994    Immunization-Influenza Sep 1, 2018 Not Due Oct 3, 2017          Patient is due for topics as listed above, she wishes to discuss with provider .                         cooperative/comfortable appearance

## 2019-04-15 ENCOUNTER — EMERGENCY (EMERGENCY)
Facility: HOSPITAL | Age: 59
LOS: 1 days | Discharge: HOME | End: 2019-04-15
Admitting: EMERGENCY MEDICINE
Payer: MEDICAID

## 2019-04-15 VITALS
TEMPERATURE: 99 F | RESPIRATION RATE: 16 BRPM | DIASTOLIC BLOOD PRESSURE: 111 MMHG | HEART RATE: 82 BPM | SYSTOLIC BLOOD PRESSURE: 168 MMHG

## 2019-04-15 VITALS
TEMPERATURE: 98 F | HEART RATE: 84 BPM | RESPIRATION RATE: 17 BRPM | SYSTOLIC BLOOD PRESSURE: 145 MMHG | OXYGEN SATURATION: 100 % | DIASTOLIC BLOOD PRESSURE: 97 MMHG

## 2019-04-15 LAB
ALBUMIN SERPL ELPH-MCNC: 4.3 G/DL — SIGNIFICANT CHANGE UP (ref 3.3–5)
ALP SERPL-CCNC: 82 U/L — SIGNIFICANT CHANGE UP (ref 40–120)
ALT FLD-CCNC: 52 U/L — HIGH (ref 4–41)
ANION GAP SERPL CALC-SCNC: 15 MMO/L — HIGH (ref 7–14)
AST SERPL-CCNC: 27 U/L — SIGNIFICANT CHANGE UP (ref 4–40)
BASOPHILS # BLD AUTO: 0.1 K/UL — SIGNIFICANT CHANGE UP (ref 0–0.2)
BASOPHILS NFR BLD AUTO: 0.9 % — SIGNIFICANT CHANGE UP (ref 0–2)
BILIRUB SERPL-MCNC: 0.3 MG/DL — SIGNIFICANT CHANGE UP (ref 0.2–1.2)
BUN SERPL-MCNC: 18 MG/DL — SIGNIFICANT CHANGE UP (ref 7–23)
CALCIUM SERPL-MCNC: 10.2 MG/DL — SIGNIFICANT CHANGE UP (ref 8.4–10.5)
CHLORIDE SERPL-SCNC: 99 MMOL/L — SIGNIFICANT CHANGE UP (ref 98–107)
CO2 SERPL-SCNC: 26 MMOL/L — SIGNIFICANT CHANGE UP (ref 22–31)
CREAT SERPL-MCNC: 1.18 MG/DL — SIGNIFICANT CHANGE UP (ref 0.5–1.3)
EOSINOPHIL # BLD AUTO: 0.48 K/UL — SIGNIFICANT CHANGE UP (ref 0–0.5)
EOSINOPHIL NFR BLD AUTO: 4.4 % — SIGNIFICANT CHANGE UP (ref 0–6)
GLUCOSE SERPL-MCNC: 130 MG/DL — HIGH (ref 70–99)
HCT VFR BLD CALC: 45.7 % — SIGNIFICANT CHANGE UP (ref 39–50)
HGB BLD-MCNC: 14.2 G/DL — SIGNIFICANT CHANGE UP (ref 13–17)
IMM GRANULOCYTES NFR BLD AUTO: 0.6 % — SIGNIFICANT CHANGE UP (ref 0–1.5)
LYMPHOCYTES # BLD AUTO: 2.87 K/UL — SIGNIFICANT CHANGE UP (ref 1–3.3)
LYMPHOCYTES # BLD AUTO: 26.4 % — SIGNIFICANT CHANGE UP (ref 13–44)
MAGNESIUM SERPL-MCNC: 2.2 MG/DL — SIGNIFICANT CHANGE UP (ref 1.6–2.6)
MCHC RBC-ENTMCNC: 28.3 PG — SIGNIFICANT CHANGE UP (ref 27–34)
MCHC RBC-ENTMCNC: 31.1 % — LOW (ref 32–36)
MCV RBC AUTO: 91.2 FL — SIGNIFICANT CHANGE UP (ref 80–100)
MONOCYTES # BLD AUTO: 0.79 K/UL — SIGNIFICANT CHANGE UP (ref 0–0.9)
MONOCYTES NFR BLD AUTO: 7.3 % — SIGNIFICANT CHANGE UP (ref 2–14)
NEUTROPHILS # BLD AUTO: 6.55 K/UL — SIGNIFICANT CHANGE UP (ref 1.8–7.4)
NEUTROPHILS NFR BLD AUTO: 60.4 % — SIGNIFICANT CHANGE UP (ref 43–77)
NRBC # FLD: 0 K/UL — SIGNIFICANT CHANGE UP (ref 0–0)
PHOSPHATE SERPL-MCNC: 4 MG/DL — SIGNIFICANT CHANGE UP (ref 2.5–4.5)
PLATELET # BLD AUTO: 270 K/UL — SIGNIFICANT CHANGE UP (ref 150–400)
PMV BLD: 11.8 FL — SIGNIFICANT CHANGE UP (ref 7–13)
POTASSIUM SERPL-MCNC: 4 MMOL/L — SIGNIFICANT CHANGE UP (ref 3.5–5.3)
POTASSIUM SERPL-SCNC: 4 MMOL/L — SIGNIFICANT CHANGE UP (ref 3.5–5.3)
PROT SERPL-MCNC: 7.4 G/DL — SIGNIFICANT CHANGE UP (ref 6–8.3)
RBC # BLD: 5.01 M/UL — SIGNIFICANT CHANGE UP (ref 4.2–5.8)
RBC # FLD: 13.2 % — SIGNIFICANT CHANGE UP (ref 10.3–14.5)
SODIUM SERPL-SCNC: 140 MMOL/L — SIGNIFICANT CHANGE UP (ref 135–145)
WBC # BLD: 10.86 K/UL — HIGH (ref 3.8–10.5)
WBC # FLD AUTO: 10.86 K/UL — HIGH (ref 3.8–10.5)

## 2019-04-15 PROCEDURE — 99283 EMERGENCY DEPT VISIT LOW MDM: CPT

## 2019-04-15 RX ORDER — DIPHENHYDRAMINE HCL 50 MG
50 CAPSULE ORAL ONCE
Qty: 0 | Refills: 0 | Status: COMPLETED | OUTPATIENT
Start: 2019-04-15 | End: 2019-04-15

## 2019-04-15 RX ORDER — ACETAMINOPHEN 500 MG
975 TABLET ORAL ONCE
Qty: 0 | Refills: 0 | Status: COMPLETED | OUTPATIENT
Start: 2019-04-15 | End: 2019-04-15

## 2019-04-15 RX ADMIN — Medication 975 MILLIGRAM(S): at 15:58

## 2019-04-15 RX ADMIN — Medication 50 MILLIGRAM(S): at 15:09

## 2019-04-15 NOTE — ED PROVIDER NOTE - NS ED ROS FT
ROS:  GENERAL: No fever, no chills  EYES: no change in vision  HEENT: no trouble swallowing, +voice changes, twitching/twisting of tongue  CARDIAC: no chest pain  PULMONARY: no cough, no shortness of breath  GI: no abdominal pain, no nausea, no vomiting, no diarrhea, no constipation  : No dysuria, no frequency, no change in appearance, or odor of urine  SKIN: no rashes  NEURO: no headache, no weakness  MSK: No joint pain, +muscle tremor

## 2019-04-15 NOTE — ED ADULT NURSE NOTE - OBJECTIVE STATEMENT
pt is in bed  A and OX  3 in NAD, pt reports " they gave me a shot at my psychiatrist and after that I started having pain in my neck and head and I felt hot now my entire body feels numb" PERRLA extremities are strong and equal. pt denies SI/HI AVH calm and cooperative.

## 2019-04-15 NOTE — ED ADULT NURSE NOTE - NSIMPLEMENTINTERV_GEN_ALL_ED
Implemented All Universal Safety Interventions:  [place] to call system. Call bell, personal items and telephone within reach. Instruct patient to call for assistance. Room bathroom lighting operational. Non-slip footwear when patient is off stretcher. Physically safe environment: no spills, clutter or unnecessary equipment. Stretcher in lowest position, wheels locked, appropriate side rails in place.

## 2019-04-15 NOTE — ED PROVIDER NOTE - CLINICAL SUMMARY MEDICAL DECISION MAKING FREE TEXT BOX
59 y/o M w/ tardive dyskinesia sp starting new Apiprazole IM for schizophrenia, has an appt w/ his psychiatrist in 2 days to change home meds. Will give Benadryl for symptomatic treatment, BASIC LABS to eval for electrolyte abnl, EKG.

## 2019-04-15 NOTE — ED PROVIDER NOTE - OBJECTIVE STATEMENT
57 y/o M hx HTN, DM, schizophrenia (Effexor 75mg, Cogentin 1mg BID, Remeron, Klonopin) here c/o pulling/tight sensations in his face, voice changes, repetitive lip movements, & burning sensations in his legs that are intermittent x 2 weeks, sp receiving a new IM medication for his schizophrenia (Aripiprazole) 2 weeks ago. States his doctor wanted to take him off his oral meds and give a single injection for his Schizophrenia, that was otherwise well controlled. Saw his psychiatrist 1 week ago for his sx, who recommended he "wait it out" and rx'd propranolol for symptomatic treatment. Was told to see his doc this week to change meds if sx continued. Pt has an appt to see his doctor this week. Denies fevers, chills, headache at present, SI/HI, or any other complaints. Accompanied to ER w/ family.

## 2019-04-15 NOTE — ED ADULT TRIAGE NOTE - CHIEF COMPLAINT QUOTE
Pt c/o headache , weakness, numbness/tingling to B/L LE  and dizziness x few days after getting a shot at his psychiatrist,.  Denies SI, HI, hallucinations, chest paIn, sob.  Psychiatrist number [phone]

## 2019-04-15 NOTE — ED ADULT NURSE NOTE - CHIEF COMPLAINT QUOTE
Pt c/o headache , weakness, numbness/tingling to B/L LE  and dizziness x few days after getting a shot at his psychiatrist,.  Denies SI, HI, hallucinations, chest paIn, sob.  Psychiatrist number [phone]

## 2019-04-15 NOTE — ED PROVIDER NOTE - PROGRESS NOTE DETAILS
KAYLA Arevalo: Home meds confirmed w/ Dr. Figueredo psychiatrist. KAYLA Arevalo: Pt reassessed, states he is feeling better sp Benadryl, does not feel the twitches as much. Tylenol given for headache with relief. Labs unremarkable, will dc w/ psych follow up in 2 days for further management.

## 2019-04-15 NOTE — ED PROVIDER NOTE - NSFOLLOWUPINSTRUCTIONS_ED_ALL_ED_FT
See your primary care doctor within 24-48 hours. See your psychiatrist this week for further evaluation, bring copies of all reports with you. Take Benadryl 25mg every 4-6 hours as needed for muscle tightness/twitches. Return to the ER for worsening symptoms or any other concerns.

## 2019-06-10 ENCOUNTER — INPATIENT (INPATIENT)
Facility: HOSPITAL | Age: 59
LOS: 14 days | Discharge: HOME | End: 2019-06-25
Attending: PSYCHIATRY & NEUROLOGY | Admitting: PSYCHIATRY & NEUROLOGY
Payer: MEDICAID

## 2019-06-10 VITALS
HEART RATE: 94 BPM | RESPIRATION RATE: 16 BRPM | OXYGEN SATURATION: 99 % | TEMPERATURE: 98 F | DIASTOLIC BLOOD PRESSURE: 100 MMHG | SYSTOLIC BLOOD PRESSURE: 148 MMHG

## 2019-06-10 DIAGNOSIS — F20.9 SCHIZOPHRENIA, UNSPECIFIED: ICD-10-CM

## 2019-06-10 DIAGNOSIS — R69 ILLNESS, UNSPECIFIED: ICD-10-CM

## 2019-06-10 LAB
ALBUMIN SERPL ELPH-MCNC: 4.5 G/DL — SIGNIFICANT CHANGE UP (ref 3.3–5)
ALP SERPL-CCNC: 100 U/L — SIGNIFICANT CHANGE UP (ref 40–120)
ALT FLD-CCNC: 71 U/L — HIGH (ref 4–41)
AMPHET UR-MCNC: NEGATIVE — SIGNIFICANT CHANGE UP
ANION GAP SERPL CALC-SCNC: 15 MMO/L — HIGH (ref 7–14)
APAP SERPL-MCNC: < 15 UG/ML — LOW (ref 15–25)
APPEARANCE UR: CLEAR — SIGNIFICANT CHANGE UP
AST SERPL-CCNC: 54 U/L — HIGH (ref 4–40)
BARBITURATES UR SCN-MCNC: NEGATIVE — SIGNIFICANT CHANGE UP
BASOPHILS # BLD AUTO: 0.08 K/UL — SIGNIFICANT CHANGE UP (ref 0–0.2)
BASOPHILS NFR BLD AUTO: 0.6 % — SIGNIFICANT CHANGE UP (ref 0–2)
BENZODIAZ UR-MCNC: NEGATIVE — SIGNIFICANT CHANGE UP
BILIRUB SERPL-MCNC: 1.2 MG/DL — SIGNIFICANT CHANGE UP (ref 0.2–1.2)
BILIRUB UR-MCNC: NEGATIVE — SIGNIFICANT CHANGE UP
BLOOD UR QL VISUAL: NEGATIVE — SIGNIFICANT CHANGE UP
BUN SERPL-MCNC: 12 MG/DL — SIGNIFICANT CHANGE UP (ref 7–23)
CALCIUM SERPL-MCNC: 10 MG/DL — SIGNIFICANT CHANGE UP (ref 8.4–10.5)
CANNABINOIDS UR-MCNC: NEGATIVE — SIGNIFICANT CHANGE UP
CHLORIDE SERPL-SCNC: 97 MMOL/L — LOW (ref 98–107)
CO2 SERPL-SCNC: 24 MMOL/L — SIGNIFICANT CHANGE UP (ref 22–31)
COCAINE METAB.OTHER UR-MCNC: NEGATIVE — SIGNIFICANT CHANGE UP
COLOR SPEC: SIGNIFICANT CHANGE UP
CREAT SERPL-MCNC: 1.1 MG/DL — SIGNIFICANT CHANGE UP (ref 0.5–1.3)
EOSINOPHIL # BLD AUTO: 0.26 K/UL — SIGNIFICANT CHANGE UP (ref 0–0.5)
EOSINOPHIL NFR BLD AUTO: 1.8 % — SIGNIFICANT CHANGE UP (ref 0–6)
ETHANOL BLD-MCNC: < 10 MG/DL — SIGNIFICANT CHANGE UP
GLUCOSE SERPL-MCNC: 109 MG/DL — HIGH (ref 70–99)
GLUCOSE UR-MCNC: NEGATIVE — SIGNIFICANT CHANGE UP
HCT VFR BLD CALC: 39.6 % — SIGNIFICANT CHANGE UP (ref 39–50)
HGB BLD-MCNC: 13.1 G/DL — SIGNIFICANT CHANGE UP (ref 13–17)
IMM GRANULOCYTES NFR BLD AUTO: 1.1 % — SIGNIFICANT CHANGE UP (ref 0–1.5)
KETONES UR-MCNC: NEGATIVE — SIGNIFICANT CHANGE UP
LEUKOCYTE ESTERASE UR-ACNC: NEGATIVE — SIGNIFICANT CHANGE UP
LYMPHOCYTES # BLD AUTO: 23.4 % — SIGNIFICANT CHANGE UP (ref 13–44)
LYMPHOCYTES # BLD AUTO: 3.36 K/UL — HIGH (ref 1–3.3)
MCHC RBC-ENTMCNC: 28.2 PG — SIGNIFICANT CHANGE UP (ref 27–34)
MCHC RBC-ENTMCNC: 33.1 % — SIGNIFICANT CHANGE UP (ref 32–36)
MCV RBC AUTO: 85.3 FL — SIGNIFICANT CHANGE UP (ref 80–100)
METHADONE UR-MCNC: NEGATIVE — SIGNIFICANT CHANGE UP
MONOCYTES # BLD AUTO: 0.99 K/UL — HIGH (ref 0–0.9)
MONOCYTES NFR BLD AUTO: 6.9 % — SIGNIFICANT CHANGE UP (ref 2–14)
NEUTROPHILS # BLD AUTO: 9.49 K/UL — HIGH (ref 1.8–7.4)
NEUTROPHILS NFR BLD AUTO: 66.2 % — SIGNIFICANT CHANGE UP (ref 43–77)
NITRITE UR-MCNC: NEGATIVE — SIGNIFICANT CHANGE UP
NRBC # FLD: 0 K/UL — SIGNIFICANT CHANGE UP (ref 0–0)
OPIATES UR-MCNC: NEGATIVE — SIGNIFICANT CHANGE UP
OXYCODONE UR-MCNC: NEGATIVE — SIGNIFICANT CHANGE UP
PCP UR-MCNC: NEGATIVE — SIGNIFICANT CHANGE UP
PH UR: 6.5 — SIGNIFICANT CHANGE UP (ref 5–8)
PLATELET # BLD AUTO: 314 K/UL — SIGNIFICANT CHANGE UP (ref 150–400)
PMV BLD: 10.8 FL — SIGNIFICANT CHANGE UP (ref 7–13)
POTASSIUM SERPL-MCNC: 3.5 MMOL/L — SIGNIFICANT CHANGE UP (ref 3.5–5.3)
POTASSIUM SERPL-SCNC: 3.5 MMOL/L — SIGNIFICANT CHANGE UP (ref 3.5–5.3)
PROT SERPL-MCNC: 7.8 G/DL — SIGNIFICANT CHANGE UP (ref 6–8.3)
PROT UR-MCNC: NEGATIVE — SIGNIFICANT CHANGE UP
RBC # BLD: 4.64 M/UL — SIGNIFICANT CHANGE UP (ref 4.2–5.8)
RBC # FLD: 13.6 % — SIGNIFICANT CHANGE UP (ref 10.3–14.5)
SALICYLATES SERPL-MCNC: < 5 MG/DL — LOW (ref 15–30)
SODIUM SERPL-SCNC: 136 MMOL/L — SIGNIFICANT CHANGE UP (ref 135–145)
SP GR SPEC: 1 — SIGNIFICANT CHANGE UP (ref 1–1.04)
TSH SERPL-MCNC: 3.29 UIU/ML — SIGNIFICANT CHANGE UP (ref 0.27–4.2)
UROBILINOGEN FLD QL: NORMAL — SIGNIFICANT CHANGE UP
WBC # BLD: 14.34 K/UL — HIGH (ref 3.8–10.5)
WBC # FLD AUTO: 14.34 K/UL — HIGH (ref 3.8–10.5)

## 2019-06-10 PROCEDURE — 99285 EMERGENCY DEPT VISIT HI MDM: CPT | Mod: GC

## 2019-06-10 RX ORDER — ATORVASTATIN CALCIUM 80 MG/1
20 TABLET, FILM COATED ORAL AT BEDTIME
Refills: 0 | Status: DISCONTINUED | OUTPATIENT
Start: 2019-06-11 | End: 2019-06-25

## 2019-06-10 RX ORDER — FLUPHENAZINE HYDROCHLORIDE 1 MG/1
5 TABLET, FILM COATED ORAL
Refills: 0 | Status: DISCONTINUED | OUTPATIENT
Start: 2019-06-11 | End: 2019-06-14

## 2019-06-10 RX ORDER — DIPHENHYDRAMINE HCL 50 MG
50 CAPSULE ORAL ONCE
Refills: 0 | Status: DISCONTINUED | OUTPATIENT
Start: 2019-06-11 | End: 2019-06-11

## 2019-06-10 RX ORDER — PREGABALIN 225 MG/1
1000 CAPSULE ORAL DAILY
Refills: 0 | Status: DISCONTINUED | OUTPATIENT
Start: 2019-06-11 | End: 2019-06-25

## 2019-06-10 RX ORDER — CHOLECALCIFEROL (VITAMIN D3) 125 MCG
1000 CAPSULE ORAL DAILY
Refills: 0 | Status: DISCONTINUED | OUTPATIENT
Start: 2019-06-11 | End: 2019-06-25

## 2019-06-10 RX ORDER — CLONAZEPAM 1 MG
1 TABLET ORAL
Refills: 0 | Status: DISCONTINUED | OUTPATIENT
Start: 2019-06-11 | End: 2019-06-12

## 2019-06-10 RX ORDER — HALOPERIDOL DECANOATE 100 MG/ML
5 INJECTION INTRAMUSCULAR ONCE
Refills: 0 | Status: DISCONTINUED | OUTPATIENT
Start: 2019-06-11 | End: 2019-06-13

## 2019-06-10 RX ORDER — SENNA PLUS 8.6 MG/1
2 TABLET ORAL AT BEDTIME
Refills: 0 | Status: DISCONTINUED | OUTPATIENT
Start: 2019-06-11 | End: 2019-06-25

## 2019-06-10 RX ORDER — METOPROLOL TARTRATE 50 MG
100 TABLET ORAL DAILY
Refills: 0 | Status: DISCONTINUED | OUTPATIENT
Start: 2019-06-11 | End: 2019-06-25

## 2019-06-10 RX ORDER — ASPIRIN/CALCIUM CARB/MAGNESIUM 324 MG
81 TABLET ORAL DAILY
Refills: 0 | Status: DISCONTINUED | OUTPATIENT
Start: 2019-06-11 | End: 2019-06-25

## 2019-06-10 RX ORDER — HALOPERIDOL DECANOATE 100 MG/ML
5 INJECTION INTRAMUSCULAR EVERY 6 HOURS
Refills: 0 | Status: DISCONTINUED | OUTPATIENT
Start: 2019-06-11 | End: 2019-06-13

## 2019-06-10 RX ORDER — ACETAMINOPHEN 500 MG
650 TABLET ORAL EVERY 6 HOURS
Refills: 0 | Status: DISCONTINUED | OUTPATIENT
Start: 2019-06-11 | End: 2019-06-25

## 2019-06-10 RX ORDER — LOSARTAN POTASSIUM 100 MG/1
100 TABLET, FILM COATED ORAL DAILY
Refills: 0 | Status: DISCONTINUED | OUTPATIENT
Start: 2019-06-11 | End: 2019-06-25

## 2019-06-10 NOTE — ED ADULT TRIAGE NOTE - CHIEF COMPLAINT QUOTE
pt Cymraes speaking, brought in by his brother who is translating, with a PMH of Depression. pt was started on Seroquel about a month ago and stopped taking his meds 3-4 days ago because "it wasn't making him feel right." pt has been increasingly agitated, anxious and confused per brother. pt appears disorganized and anxious in triage. pt also c/o knee pain and bruising s/p fall "a few days ago", ambulatory with steady gait. . spoke to ROLAND Chapman, pt taken to  for eval. pt

## 2019-06-10 NOTE — ED ADULT NURSE NOTE - CHIEF COMPLAINT QUOTE
pt Ugandan speaking, brought in by his brother who is translating, with a PMH of Depression. pt was started on Seroquel about a month ago and stopped taking his meds 3-4 days ago because "it wasn't making him feel right." pt has been increasingly agitated, anxious and confused per brother. pt appears disorganized and anxious in triage. pt also c/o knee pain and bruising s/p fall "a few days ago", ambulatory with steady gait. . spoke to ROLAND Chapman, pt taken to  for eval. pt

## 2019-06-10 NOTE — ED BEHAVIORAL HEALTH ASSESSMENT NOTE - CASE SUMMARY
59 y/o M domiciled with brother, unemployed, single, past psychiatric history of Schizophrenia, at least 3 past psychiatric hospitalizations (last at [facility] [address] from 9/27/17 to 10/19/17) presents to [facility] ED BIB brother in the context of increasingly depressed mood, anhedonia, and restlessness.    Patient brought in by family for worsening depression and speaking of wanting to die. Patient also reported suicidal ideation with no plan. He has had a decline in his baseline per family and patient is requesting voluntary admission at this time. He meets criteria, plan as above.

## 2019-06-10 NOTE — ED BEHAVIORAL HEALTH ASSESSMENT NOTE - RISK ASSESSMENT
Patient is at elevated risk in the context of current mood episode with recent SI and lack of outpatient care. He and his brother request voluntary psychiatric admission for safety and stabilization.

## 2019-06-10 NOTE — ED BEHAVIORAL HEALTH ASSESSMENT NOTE - HPI (INCLUDE ILLNESS QUALITY, SEVERITY, DURATION, TIMING, CONTEXT, MODIFYING FACTORS, ASSOCIATED SIGNS AND SYMPTOMS)
59 y/o M domiciled with brother, unemployed, single, past psychiatric history of Schizophrenia, at least 3 past psychiatric hospitalizations (last at [facility] on Low 4 from 9/27/17 to 10/19/17) presents to [facility] ED BIB brother in the context of       On assessment patient reports that he has been having middle insomnia for the past few days in the broader context of persistently depressed mood and anhedonia over the past month. He also reports anergia but denies changes in appetite. He reports hearing AH of people telling him "they know...they're doing better than you" chronically for about the past 6 years. He denies past and present CAH. He denies paranoia- feels safe at home and in his neighborhood, does not feel that others are taking special notice of him. He denies feeling that things in his environment are specifically targeted towards him. He reports transient passive SI but denies active SI. He reports that for the past week he has spent most of the day in bed because he doesn't have enough energy or interest to do anything.  Patient reports that he tried to see his outpt psychiatrist today but was turned away. He reports that he is compliant w/ his medications except he stopped one since he felt that it made him dizzy. He requests hospitalization w/ Dr. Flores as "I felt better after she took care of me."     Writer spoke w/ patient's brother who reports that .    He reports that patient' current regimen includes-    Brother reports that patient has longstanding hx of schizophrenia but had been doing well and keeping to himself.  Over recent months, he was becoming a bit more disorganized and paranoid on: Quetiapine 400mg, Clonazepam 1mg qd, Fanapt 12mg, and Venlafaxine 150mg, Senna 17.2mg and Cogentin 1mg.  Brother notes that he stopped Cogentin 3 days ago and other meds (Meclizine, Atorvastatin, Metoprolol, Metformin) and has become more disorganized.  Brother feels that he cannot care for self. 57 y/o M domiciled with brother, unemployed, single, past psychiatric history of Schizophrenia, at least 3 past psychiatric hospitalizations (last at [facility] on Low 4 from 9/27/17 to 10/19/17) presents to [facility] ED BIB brother in the context of       On assessment patient reports that he has been having middle insomnia for the past few days in the broader context of persistently depressed mood and anhedonia over the past month. He also reports anergia but denies changes in appetite. He reports hearing AH of people telling him "they know...they're doing better than you" chronically for about the past 6 years. He denies past and present CAH. He denies paranoia- feels safe at home and in his neighborhood, does not feel that others are taking special notice of him. He denies feeling that things in his environment are specifically targeted towards him. He reports transient passive SI but denies active SI. He reports that for the past week he has spent most of the day in bed because he doesn't have enough energy or interest to do anything.  Patient reports that he tried to see his outpt psychiatrist today but was turned away. He reports that he is compliant w/ his medications except he stopped one since he felt that it made him dizzy. He requests hospitalization w/ Dr. Flores as "I felt better after she took care of me."     Writer spoke w/ patient's brother who reports that he brought patient to the ED in the context of patient reporting that he no longer wants to be alive. He reports that they tried to go see his outpt psychiatrist "but he just gives him whatever medications he wants" and today the psychiatrist got mad at the patient's brother for saying this and then said he would no longer care for Abo. Deo reports that he has noticed Abo being more reclusive over the past 2 weeks, not interested in doing anything, appearing sad. He does point out that the patient's psychosis seems to be in good control- "before he used to talk and not make any sense." He has noticed that the patient has been somewhat unsteady on his feet of late- "he's on too many medications." Deo reports that he is worried about his brother's safety and does not feel comfortable taking him home.    Deo presents with a large bag of medications which he reports the patient is compliant with- Effexor ER 75mg daily, Prolixin 5mg bid, Seroquel 200mg qhs (patient stopped this because he felt dizzy on it), Klonopin 1mg bid (takes it inconsistently), Ingrezza 40mg daily, Guanfacine 2mg qhs, Metoprolol 100mg daily, Atorvastatin 20mg daily, Losartan 100mg daily, Metformin 500mg daily, aspirin 81mg daily, B-12 1000mg daily, Senna, B-complex, multivitamin, Tylenol PRN for arthritis. Patient also received Prolixin dec ?dose on . 59 y/o M domiciled with brother, unemployed, single, past psychiatric history of Schizophrenia, at least 3 past psychiatric hospitalizations (last at [facility] on Low 4 from 9/27/17 to 10/19/17) presents to [facility] ED BIB brother in the context of increasingly depressed mood, anhedonia, and restlessness.    On assessment patient reports that he has been having middle insomnia for the past few days in the broader context of persistently depressed mood and anhedonia over the past month. He also reports anergia but denies changes in appetite. He reports hearing AH of people telling him "they know...they're doing better than you" chronically for about the past 6 years. He denies past and present CAH. He denies paranoia- feels safe at home and in his neighborhood, does not feel that others are taking special notice of him. He denies feeling that things in his environment are specifically targeted towards him. He reports transient passive SI but denies active SI. He reports that for the past week he has spent most of the day in bed because he doesn't have enough energy or interest to do anything.  Patient reports that he tried to see his outpt psychiatrist today but was turned away. He reports that he is compliant w/ his medications except he stopped one since he felt that it made him dizzy. He requests hospitalization w/ Dr. Flores as "I felt better after she took care of me."     Writer spoke w/ patient's brother who reports that he brought patient to the ED in the context of patient reporting that he no longer wants to be alive. He reports that they tried to go see his outpt psychiatrist "but he just gives him whatever medications he wants" and today the psychiatrist got mad at the patient's brother for saying this and then said he would no longer care for Abo. Deo reports that he has noticed Abo being more reclusive over the past 2 weeks, not interested in doing anything, appearing sad. He does point out that the patient's psychosis seems to be in good control- "before he used to talk and not make any sense." He has noticed that the patient has been somewhat unsteady on his feet of late- "he's on too many medications." Deo reports that he is worried about his brother's safety and does not feel comfortable taking him home.    Deo presents with a large bag of medications which he reports the patient is compliant with- Effexor ER 75mg daily, Prolixin 5mg bid, Seroquel 200mg qhs (patient stopped this because he felt dizzy on it), Klonopin 1mg bid (takes it inconsistently), Ingrezza 40mg daily, Guanfacine 2mg qhs, Metoprolol 100mg daily, Atorvastatin 20mg daily, Losartan 100mg daily, Metformin 500mg daily, aspirin 81mg daily, B-12 1000mg daily, Senna, B-complex, multivitamin, Tylenol PRN for arthritis. Patient also received Aristada CARTER "a few months ago-" dose and exact date of administration unknown. Patient sees Dr. Jeffrey Figueredo as outpt psychiatrist. 57 y/o M domiciled with brother, unemployed, single, past psychiatric history of Schizophrenia, at least 3 past psychiatric hospitalizations (last at [facility] on Low 4 from 9/27/17 to 10/19/17) presents to [facility] ED BIB brother in the context of increasingly depressed mood, anhedonia, and restlessness.    On assessment patient reports that he has been having middle insomnia for the past few days in the broader context of persistently depressed mood and anhedonia over the past month. He also reports anergia but denies changes in appetite. He reports hearing AH of people telling him "they know...they're doing better than you" chronically for about the past 6 years. He denies past and present CAH. He denies paranoia- feels safe at home and in his neighborhood, does not feel that others are taking special notice of him. He denies feeling that things in his environment are specifically targeted towards him. He reports transient passive SI but denies active SI. He reports that for the past week he has spent most of the day in bed because he doesn't have enough energy or interest to do anything.  Patient reports that he tried to see his outpt psychiatrist today but was turned away. He reports that he is compliant w/ his medications except he stopped one since he felt that it made him dizzy. He requests hospitalization w/ Dr. Flores as "I felt better after she took care of me." Endorses passive SI at this time, denies active.    Writer spoke w/ patient's brother who reports that he brought patient to the ED in the context of patient reporting that he no longer wants to be alive. He reports that they tried to go see his outpt psychiatrist "but he just gives him whatever medications he wants" and today the psychiatrist got mad at the patient's brother for saying this and then said he would no longer care for Abo. Deo reports that he has noticed Abo being more reclusive over the past 2 weeks, not interested in doing anything, appearing sad. He does point out that the patient's psychosis seems to be in good control- "before he used to talk and not make any sense." He has noticed that the patient has been somewhat unsteady on his feet of late- "he's on too many medications." Deo reports that he is worried about his brother's safety and does not feel comfortable taking him home.    Deo presents with a large bag of medications which he reports the patient is compliant with- Effexor ER 75mg daily, Prolixin 5mg bid, Seroquel 200mg qhs (patient stopped this because he felt dizzy on it), Klonopin 1mg bid (takes it inconsistently), Ingrezza 40mg daily, Guanfacine 2mg qhs, Metoprolol 100mg daily, Atorvastatin 20mg daily, Losartan 100mg daily, Metformin 500mg daily, aspirin 81mg daily, B-12 1000mg daily, Senna, B-complex, multivitamin, Tylenol PRN for arthritis. Patient also received Aristada CARTER "a few months ago-" dose and exact date of administration unknown. Patient sees Dr. Jeffrey Figueredo as outpt psychiatrist.

## 2019-06-10 NOTE — ED PROVIDER NOTE - OBJECTIVE STATEMENT
57 y/o M hx Depression, Schizophrenia, DM, HTN, HLD 57 y/o M hx Depression, Schizophrenia, DM, HTN, HLD BIB brother w c/o depression and bizarre behaviour.  Admit to kneeling and crawling on the floor in an attempt to reduce his anxiety.  Patient appears paranoia and anxious.  Admit to medication non compliance. Denies falling, punching or kicking any objects. Denies pain, SOB, fever, chills, chest/abdominal discomfort.  Denies recent use of alcohol ot illicit drugs compliance,. 59 y/o M hx Depression, Schizophrenia, DM, HTN, HLD BIB brother w c/o depression and bizarre behaviour.  Admit to kneeling and crawling on the floor in an attempt to reduce his anxiety.  Patient appears paranoia and anxious.  Admit to medication non compliance. Denies falling, punching or kicking any objects. Denies pain, SOB, fever, chills, chest/abdominal discomfort.  Denies recent use of alcohol ot illicit drugs compliance

## 2019-06-10 NOTE — ED BEHAVIORAL HEALTH ASSESSMENT NOTE - DESCRIPTION
HLD, constipation, HTN per above calm but slightly confused  ICU Vital Signs Last 24 Hrs  T(C): 37.2 (27 Sep 2017 16:32), Max: 37.2 (27 Sep 2017 16:32)  T(F): 99 (27 Sep 2017 16:32), Max: 99 (27 Sep 2017 16:32)  HR: 82 (27 Sep 2017 16:32) (82 - 82)  BP: 125/95 (27 Sep 2017 16:32) (125/95 - 125/95)  BP(mean): --  ABP: --  ABP(mean): --  RR: 20 (27 Sep 2017 16:32) (20 - 20)  SpO2: 96% (27 Sep 2017 16:32) (96% - 96%)

## 2019-06-10 NOTE — ED BEHAVIORAL HEALTH ASSESSMENT NOTE - OTHER PAST PSYCHIATRIC HISTORY (INCLUDE DETAILS REGARDING ONSET, COURSE OF ILLNESS, INPATIENT/OUTPATIENT TREATMENT)
multiple past medication trials, brother does not remember, from numerous psychiatrists and hospitals all over [facility]

## 2019-06-10 NOTE — ED PROVIDER NOTE - CLINICAL SUMMARY MEDICAL DECISION MAKING FREE TEXT BOX
Labs, Urine Tox/UA, EKG.   Ecchymotic area to right Knee. No evidence of deformity or infection.  Medical evaluation performed. There is no clinical evidence of intoxication or any acute medical problem requiring immediate intervention. Patient is awaiting psychiatric consultation. Final disposition will be determined by psychiatrist. 59 y/o M hx Depression, Schizophrenia, DM, HTN, HLD  Labs, Urine Tox/UA, EKG.   Ecchymotic area to right Knee. No evidence of deformity or infection.  Medical evaluation performed. There is no clinical evidence of intoxication or any acute medical problem requiring immediate intervention. Patient is awaiting psychiatric consultation. Final disposition will be determined by psychiatrist.

## 2019-06-10 NOTE — ED BEHAVIORAL HEALTH ASSESSMENT NOTE - SUMMARY
57 y/o Monegasque speaking M domiciled with brother, unemployed, single, past psychiatric history of Schizophrenia, at least 3 past psychiatric hospitalizations (last at [facility] on Low 4 from 9/27/17 to 10/19/17) presents to [facility] ED BIB brother in the context of increasingly depressed mood, anhedonia, and restlessness. On assessment patient reports being persistently depressed and anhedonic. As per brother the patient endorsed SI at home. Medication list is notable for significant polypharmacy and for outpt psychiatrist reportedly firing patient earlier today. Both patient and brother request voluntary hospitalization. Of note they request hospitalization on 1N with Dr. Flores as she is a native Mone[place] speaker and has treated him in the past.

## 2019-06-10 NOTE — ED BEHAVIORAL HEALTH ASSESSMENT NOTE - MEDICAL ISSUES AND PLAN (INCLUDE STANDING AND PRN MEDICATION)
Guanfacine 2mg qhs, Metoprolol 100mg daily, Atorvastatin 20mg daily, Losartan 100mg daily, Metformin 500mg daily, aspirin 81mg daily, B-12 1000mg daily, Senna, B-complex, multivitamin, Tylenol PRN for arthritis

## 2019-06-11 DIAGNOSIS — F20.9 SCHIZOPHRENIA, UNSPECIFIED: ICD-10-CM

## 2019-06-11 LAB
CHOLEST SERPL-MCNC: 135 MG/DL — SIGNIFICANT CHANGE UP (ref 120–199)
HBA1C BLD-MCNC: 6.2 % — HIGH (ref 4–5.6)
HDLC SERPL-MCNC: 38 MG/DL — SIGNIFICANT CHANGE UP (ref 35–55)
LIPID PNL WITH DIRECT LDL SERPL: 83 MG/DL — SIGNIFICANT CHANGE UP
TRIGL SERPL-MCNC: 114 MG/DL — SIGNIFICANT CHANGE UP (ref 10–149)

## 2019-06-11 PROCEDURE — 99223 1ST HOSP IP/OBS HIGH 75: CPT

## 2019-06-11 RX ORDER — VENLAFAXINE HCL 75 MG
75 CAPSULE, EXT RELEASE 24 HR ORAL DAILY
Refills: 0 | Status: DISCONTINUED | OUTPATIENT
Start: 2019-06-11 | End: 2019-06-13

## 2019-06-11 RX ORDER — DIPHENHYDRAMINE HCL 50 MG
50 CAPSULE ORAL EVERY 6 HOURS
Refills: 0 | Status: DISCONTINUED | OUTPATIENT
Start: 2019-06-11 | End: 2019-06-11

## 2019-06-11 RX ORDER — VENLAFAXINE HCL 75 MG
75 CAPSULE, EXT RELEASE 24 HR ORAL DAILY
Refills: 0 | Status: DISCONTINUED | OUTPATIENT
Start: 2019-06-11 | End: 2019-06-11

## 2019-06-11 RX ORDER — ATORVASTATIN CALCIUM 80 MG/1
20 TABLET, FILM COATED ORAL ONCE
Refills: 0 | Status: COMPLETED | OUTPATIENT
Start: 2019-06-11 | End: 2019-06-11

## 2019-06-11 RX ORDER — CLONAZEPAM 1 MG
1 TABLET ORAL ONCE
Refills: 0 | Status: DISCONTINUED | OUTPATIENT
Start: 2019-06-11 | End: 2019-06-11

## 2019-06-11 RX ORDER — FLUPHENAZINE HYDROCHLORIDE 1 MG/1
5 TABLET, FILM COATED ORAL ONCE
Refills: 0 | Status: COMPLETED | OUTPATIENT
Start: 2019-06-11 | End: 2019-06-11

## 2019-06-11 RX ORDER — LANOLIN ALCOHOL/MO/W.PET/CERES
3 CREAM (GRAM) TOPICAL ONCE
Refills: 0 | Status: COMPLETED | OUTPATIENT
Start: 2019-06-11 | End: 2019-06-11

## 2019-06-11 RX ORDER — METFORMIN HYDROCHLORIDE 850 MG/1
500 TABLET ORAL DAILY
Refills: 0 | Status: DISCONTINUED | OUTPATIENT
Start: 2019-06-11 | End: 2019-06-25

## 2019-06-11 RX ORDER — METFORMIN HYDROCHLORIDE 850 MG/1
500 TABLET ORAL DAILY
Refills: 0 | Status: DISCONTINUED | OUTPATIENT
Start: 2019-06-11 | End: 2019-06-11

## 2019-06-11 RX ADMIN — LOSARTAN POTASSIUM 100 MILLIGRAM(S): 100 TABLET, FILM COATED ORAL at 09:27

## 2019-06-11 RX ADMIN — Medication 1 MILLIGRAM(S): at 21:26

## 2019-06-11 RX ADMIN — Medication 3 MILLIGRAM(S): at 23:08

## 2019-06-11 RX ADMIN — Medication 1 TABLET(S): at 09:27

## 2019-06-11 RX ADMIN — Medication 100 MILLIGRAM(S): at 09:27

## 2019-06-11 RX ADMIN — SENNA PLUS 2 TABLET(S): 8.6 TABLET ORAL at 21:26

## 2019-06-11 RX ADMIN — Medication 81 MILLIGRAM(S): at 09:27

## 2019-06-11 RX ADMIN — FLUPHENAZINE HYDROCHLORIDE 5 MILLIGRAM(S): 1 TABLET, FILM COATED ORAL at 21:26

## 2019-06-11 RX ADMIN — FLUPHENAZINE HYDROCHLORIDE 5 MILLIGRAM(S): 1 TABLET, FILM COATED ORAL at 09:27

## 2019-06-11 RX ADMIN — Medication 1000 UNIT(S): at 09:27

## 2019-06-11 RX ADMIN — FLUPHENAZINE HYDROCHLORIDE 5 MILLIGRAM(S): 1 TABLET, FILM COATED ORAL at 01:09

## 2019-06-11 RX ADMIN — Medication 1 MILLIGRAM(S): at 09:27

## 2019-06-11 RX ADMIN — ATORVASTATIN CALCIUM 20 MILLIGRAM(S): 80 TABLET, FILM COATED ORAL at 01:09

## 2019-06-11 RX ADMIN — Medication 1 MILLIGRAM(S): at 01:09

## 2019-06-11 RX ADMIN — ATORVASTATIN CALCIUM 20 MILLIGRAM(S): 80 TABLET, FILM COATED ORAL at 21:26

## 2019-06-12 PROCEDURE — 99232 SBSQ HOSP IP/OBS MODERATE 35: CPT

## 2019-06-12 RX ORDER — CLONAZEPAM 1 MG
0.5 TABLET ORAL
Refills: 0 | Status: DISCONTINUED | OUTPATIENT
Start: 2019-06-13 | End: 2019-06-14

## 2019-06-12 RX ADMIN — LOSARTAN POTASSIUM 100 MILLIGRAM(S): 100 TABLET, FILM COATED ORAL at 09:16

## 2019-06-12 RX ADMIN — Medication 1 TABLET(S): at 09:16

## 2019-06-12 RX ADMIN — Medication 1 MILLIGRAM(S): at 09:16

## 2019-06-12 RX ADMIN — Medication 100 MILLIGRAM(S): at 09:16

## 2019-06-12 RX ADMIN — FLUPHENAZINE HYDROCHLORIDE 5 MILLIGRAM(S): 1 TABLET, FILM COATED ORAL at 21:08

## 2019-06-12 RX ADMIN — Medication 2 MILLIGRAM(S): at 00:32

## 2019-06-12 RX ADMIN — METFORMIN HYDROCHLORIDE 500 MILLIGRAM(S): 850 TABLET ORAL at 09:16

## 2019-06-12 RX ADMIN — PREGABALIN 1000 MICROGRAM(S): 225 CAPSULE ORAL at 09:16

## 2019-06-12 RX ADMIN — ATORVASTATIN CALCIUM 20 MILLIGRAM(S): 80 TABLET, FILM COATED ORAL at 21:08

## 2019-06-12 RX ADMIN — Medication 2 MILLIGRAM(S): at 07:26

## 2019-06-12 RX ADMIN — Medication 75 MILLIGRAM(S): at 09:16

## 2019-06-12 RX ADMIN — FLUPHENAZINE HYDROCHLORIDE 5 MILLIGRAM(S): 1 TABLET, FILM COATED ORAL at 09:16

## 2019-06-12 RX ADMIN — Medication 1000 UNIT(S): at 09:16

## 2019-06-12 RX ADMIN — Medication 81 MILLIGRAM(S): at 09:16

## 2019-06-12 RX ADMIN — SENNA PLUS 2 TABLET(S): 8.6 TABLET ORAL at 21:08

## 2019-06-13 LAB
ANION GAP SERPL CALC-SCNC: 15 MMO/L — HIGH (ref 7–14)
BASOPHILS # BLD AUTO: 0.07 K/UL — SIGNIFICANT CHANGE UP (ref 0–0.2)
BASOPHILS NFR BLD AUTO: 0.6 % — SIGNIFICANT CHANGE UP (ref 0–2)
BUN SERPL-MCNC: 16 MG/DL — SIGNIFICANT CHANGE UP (ref 7–23)
CALCIUM SERPL-MCNC: 9.2 MG/DL — SIGNIFICANT CHANGE UP (ref 8.4–10.5)
CHLORIDE SERPL-SCNC: 101 MMOL/L — SIGNIFICANT CHANGE UP (ref 98–107)
CO2 SERPL-SCNC: 23 MMOL/L — SIGNIFICANT CHANGE UP (ref 22–31)
CREAT SERPL-MCNC: 1.05 MG/DL — SIGNIFICANT CHANGE UP (ref 0.5–1.3)
EOSINOPHIL # BLD AUTO: 0.32 K/UL — SIGNIFICANT CHANGE UP (ref 0–0.5)
EOSINOPHIL NFR BLD AUTO: 2.9 % — SIGNIFICANT CHANGE UP (ref 0–6)
GLUCOSE SERPL-MCNC: 259 MG/DL — HIGH (ref 70–99)
HCT VFR BLD CALC: 38.4 % — LOW (ref 39–50)
HGB BLD-MCNC: 12 G/DL — LOW (ref 13–17)
IMM GRANULOCYTES NFR BLD AUTO: 1.2 % — SIGNIFICANT CHANGE UP (ref 0–1.5)
LYMPHOCYTES # BLD AUTO: 2.39 K/UL — SIGNIFICANT CHANGE UP (ref 1–3.3)
LYMPHOCYTES # BLD AUTO: 22 % — SIGNIFICANT CHANGE UP (ref 13–44)
MCHC RBC-ENTMCNC: 27.8 PG — SIGNIFICANT CHANGE UP (ref 27–34)
MCHC RBC-ENTMCNC: 31.3 % — LOW (ref 32–36)
MCV RBC AUTO: 88.9 FL — SIGNIFICANT CHANGE UP (ref 80–100)
MONOCYTES # BLD AUTO: 0.63 K/UL — SIGNIFICANT CHANGE UP (ref 0–0.9)
MONOCYTES NFR BLD AUTO: 5.8 % — SIGNIFICANT CHANGE UP (ref 2–14)
NEUTROPHILS # BLD AUTO: 7.33 K/UL — SIGNIFICANT CHANGE UP (ref 1.8–7.4)
NEUTROPHILS NFR BLD AUTO: 67.5 % — SIGNIFICANT CHANGE UP (ref 43–77)
NRBC # FLD: 0 K/UL — SIGNIFICANT CHANGE UP (ref 0–0)
PLATELET # BLD AUTO: 271 K/UL — SIGNIFICANT CHANGE UP (ref 150–400)
PMV BLD: 11.3 FL — SIGNIFICANT CHANGE UP (ref 7–13)
POTASSIUM SERPL-MCNC: 3.7 MMOL/L — SIGNIFICANT CHANGE UP (ref 3.5–5.3)
POTASSIUM SERPL-SCNC: 3.7 MMOL/L — SIGNIFICANT CHANGE UP (ref 3.5–5.3)
RBC # BLD: 4.32 M/UL — SIGNIFICANT CHANGE UP (ref 4.2–5.8)
RBC # FLD: 14.3 % — SIGNIFICANT CHANGE UP (ref 10.3–14.5)
SODIUM SERPL-SCNC: 139 MMOL/L — SIGNIFICANT CHANGE UP (ref 135–145)
WBC # BLD: 10.87 K/UL — HIGH (ref 3.8–10.5)
WBC # FLD AUTO: 10.87 K/UL — HIGH (ref 3.8–10.5)

## 2019-06-13 PROCEDURE — 99232 SBSQ HOSP IP/OBS MODERATE 35: CPT

## 2019-06-13 RX ORDER — ACETAMINOPHEN 500 MG
650 TABLET ORAL EVERY 6 HOURS
Refills: 0 | Status: DISCONTINUED | OUTPATIENT
Start: 2019-06-13 | End: 2019-06-25

## 2019-06-13 RX ORDER — VENLAFAXINE HCL 75 MG
112.5 CAPSULE, EXT RELEASE 24 HR ORAL DAILY
Refills: 0 | Status: DISCONTINUED | OUTPATIENT
Start: 2019-06-13 | End: 2019-06-14

## 2019-06-13 RX ORDER — IBUPROFEN 200 MG
200 TABLET ORAL EVERY 8 HOURS
Refills: 0 | Status: DISCONTINUED | OUTPATIENT
Start: 2019-06-13 | End: 2019-06-25

## 2019-06-13 RX ADMIN — METFORMIN HYDROCHLORIDE 500 MILLIGRAM(S): 850 TABLET ORAL at 08:21

## 2019-06-13 RX ADMIN — Medication 200 MILLIGRAM(S): at 15:22

## 2019-06-13 RX ADMIN — FLUPHENAZINE HYDROCHLORIDE 5 MILLIGRAM(S): 1 TABLET, FILM COATED ORAL at 08:21

## 2019-06-13 RX ADMIN — FLUPHENAZINE HYDROCHLORIDE 5 MILLIGRAM(S): 1 TABLET, FILM COATED ORAL at 20:54

## 2019-06-13 RX ADMIN — Medication 0.5 MILLIGRAM(S): at 10:01

## 2019-06-13 RX ADMIN — LOSARTAN POTASSIUM 100 MILLIGRAM(S): 100 TABLET, FILM COATED ORAL at 08:21

## 2019-06-13 RX ADMIN — Medication 1 TABLET(S): at 08:21

## 2019-06-13 RX ADMIN — Medication 650 MILLIGRAM(S): at 20:54

## 2019-06-13 RX ADMIN — Medication 0.5 MILLIGRAM(S): at 20:54

## 2019-06-13 RX ADMIN — ATORVASTATIN CALCIUM 20 MILLIGRAM(S): 80 TABLET, FILM COATED ORAL at 20:54

## 2019-06-13 RX ADMIN — Medication 1000 UNIT(S): at 10:01

## 2019-06-13 RX ADMIN — PREGABALIN 1000 MICROGRAM(S): 225 CAPSULE ORAL at 10:01

## 2019-06-13 RX ADMIN — Medication 650 MILLIGRAM(S): at 13:00

## 2019-06-13 RX ADMIN — Medication 81 MILLIGRAM(S): at 10:01

## 2019-06-13 RX ADMIN — Medication 650 MILLIGRAM(S): at 12:42

## 2019-06-13 RX ADMIN — Medication 650 MILLIGRAM(S): at 21:31

## 2019-06-13 RX ADMIN — SENNA PLUS 2 TABLET(S): 8.6 TABLET ORAL at 20:54

## 2019-06-13 RX ADMIN — Medication 112.5 MILLIGRAM(S): at 08:21

## 2019-06-13 RX ADMIN — Medication 100 MILLIGRAM(S): at 08:21

## 2019-06-13 RX ADMIN — Medication 200 MILLIGRAM(S): at 17:00

## 2019-06-14 PROCEDURE — 99232 SBSQ HOSP IP/OBS MODERATE 35: CPT

## 2019-06-14 RX ORDER — CLONAZEPAM 1 MG
0.25 TABLET ORAL
Refills: 0 | Status: DISCONTINUED | OUTPATIENT
Start: 2019-06-14 | End: 2019-06-17

## 2019-06-14 RX ORDER — FLUPHENAZINE HYDROCHLORIDE 1 MG/1
7.5 TABLET, FILM COATED ORAL AT BEDTIME
Refills: 0 | Status: DISCONTINUED | OUTPATIENT
Start: 2019-06-14 | End: 2019-06-19

## 2019-06-14 RX ORDER — FLUPHENAZINE HYDROCHLORIDE 1 MG/1
7.5 TABLET, FILM COATED ORAL
Refills: 0 | Status: DISCONTINUED | OUTPATIENT
Start: 2019-06-14 | End: 2019-06-14

## 2019-06-14 RX ORDER — FLUPHENAZINE HYDROCHLORIDE 1 MG/1
5 TABLET, FILM COATED ORAL DAILY
Refills: 0 | Status: DISCONTINUED | OUTPATIENT
Start: 2019-06-14 | End: 2019-06-25

## 2019-06-14 RX ORDER — LANOLIN ALCOHOL/MO/W.PET/CERES
3 CREAM (GRAM) TOPICAL ONCE
Refills: 0 | Status: COMPLETED | OUTPATIENT
Start: 2019-06-14 | End: 2019-06-14

## 2019-06-14 RX ORDER — VENLAFAXINE HCL 75 MG
150 CAPSULE, EXT RELEASE 24 HR ORAL DAILY
Refills: 0 | Status: DISCONTINUED | OUTPATIENT
Start: 2019-06-14 | End: 2019-06-25

## 2019-06-14 RX ADMIN — PREGABALIN 1000 MICROGRAM(S): 225 CAPSULE ORAL at 09:09

## 2019-06-14 RX ADMIN — LOSARTAN POTASSIUM 100 MILLIGRAM(S): 100 TABLET, FILM COATED ORAL at 09:09

## 2019-06-14 RX ADMIN — Medication 150 MILLIGRAM(S): at 09:09

## 2019-06-14 RX ADMIN — FLUPHENAZINE HYDROCHLORIDE 5 MILLIGRAM(S): 1 TABLET, FILM COATED ORAL at 09:09

## 2019-06-14 RX ADMIN — FLUPHENAZINE HYDROCHLORIDE 7.5 MILLIGRAM(S): 1 TABLET, FILM COATED ORAL at 20:44

## 2019-06-14 RX ADMIN — ATORVASTATIN CALCIUM 20 MILLIGRAM(S): 80 TABLET, FILM COATED ORAL at 20:44

## 2019-06-14 RX ADMIN — METFORMIN HYDROCHLORIDE 500 MILLIGRAM(S): 850 TABLET ORAL at 09:09

## 2019-06-14 RX ADMIN — Medication 1000 UNIT(S): at 09:09

## 2019-06-14 RX ADMIN — Medication 0.5 MILLIGRAM(S): at 09:09

## 2019-06-14 RX ADMIN — Medication 3 MILLIGRAM(S): at 01:24

## 2019-06-14 RX ADMIN — Medication 0.25 MILLIGRAM(S): at 20:44

## 2019-06-14 RX ADMIN — Medication 100 MILLIGRAM(S): at 09:09

## 2019-06-14 RX ADMIN — SENNA PLUS 2 TABLET(S): 8.6 TABLET ORAL at 20:55

## 2019-06-14 RX ADMIN — Medication 1 TABLET(S): at 09:09

## 2019-06-14 RX ADMIN — Medication 81 MILLIGRAM(S): at 09:09

## 2019-06-15 PROCEDURE — 99232 SBSQ HOSP IP/OBS MODERATE 35: CPT

## 2019-06-15 RX ADMIN — LOSARTAN POTASSIUM 100 MILLIGRAM(S): 100 TABLET, FILM COATED ORAL at 09:18

## 2019-06-15 RX ADMIN — SENNA PLUS 2 TABLET(S): 8.6 TABLET ORAL at 20:52

## 2019-06-15 RX ADMIN — PREGABALIN 1000 MICROGRAM(S): 225 CAPSULE ORAL at 09:22

## 2019-06-15 RX ADMIN — Medication 81 MILLIGRAM(S): at 09:15

## 2019-06-15 RX ADMIN — Medication 200 MILLIGRAM(S): at 09:24

## 2019-06-15 RX ADMIN — Medication 0.25 MILLIGRAM(S): at 20:51

## 2019-06-15 RX ADMIN — Medication 100 MILLIGRAM(S): at 09:23

## 2019-06-15 RX ADMIN — Medication 1 TABLET(S): at 09:23

## 2019-06-15 RX ADMIN — Medication 150 MILLIGRAM(S): at 09:18

## 2019-06-15 RX ADMIN — METFORMIN HYDROCHLORIDE 500 MILLIGRAM(S): 850 TABLET ORAL at 09:18

## 2019-06-15 RX ADMIN — Medication 1000 UNIT(S): at 09:15

## 2019-06-15 RX ADMIN — FLUPHENAZINE HYDROCHLORIDE 7.5 MILLIGRAM(S): 1 TABLET, FILM COATED ORAL at 20:51

## 2019-06-15 RX ADMIN — ATORVASTATIN CALCIUM 20 MILLIGRAM(S): 80 TABLET, FILM COATED ORAL at 20:51

## 2019-06-15 RX ADMIN — Medication 200 MILLIGRAM(S): at 11:11

## 2019-06-15 RX ADMIN — FLUPHENAZINE HYDROCHLORIDE 5 MILLIGRAM(S): 1 TABLET, FILM COATED ORAL at 09:18

## 2019-06-15 RX ADMIN — Medication 0.25 MILLIGRAM(S): at 09:15

## 2019-06-16 PROCEDURE — 99232 SBSQ HOSP IP/OBS MODERATE 35: CPT

## 2019-06-16 RX ADMIN — PREGABALIN 1000 MICROGRAM(S): 225 CAPSULE ORAL at 09:50

## 2019-06-16 RX ADMIN — SENNA PLUS 2 TABLET(S): 8.6 TABLET ORAL at 20:23

## 2019-06-16 RX ADMIN — Medication 650 MILLIGRAM(S): at 15:00

## 2019-06-16 RX ADMIN — Medication 0.25 MILLIGRAM(S): at 20:23

## 2019-06-16 RX ADMIN — Medication 0.25 MILLIGRAM(S): at 09:50

## 2019-06-16 RX ADMIN — FLUPHENAZINE HYDROCHLORIDE 5 MILLIGRAM(S): 1 TABLET, FILM COATED ORAL at 09:50

## 2019-06-16 RX ADMIN — LOSARTAN POTASSIUM 100 MILLIGRAM(S): 100 TABLET, FILM COATED ORAL at 09:50

## 2019-06-16 RX ADMIN — Medication 81 MILLIGRAM(S): at 09:50

## 2019-06-16 RX ADMIN — Medication 1000 UNIT(S): at 09:50

## 2019-06-16 RX ADMIN — Medication 650 MILLIGRAM(S): at 21:10

## 2019-06-16 RX ADMIN — Medication 150 MILLIGRAM(S): at 09:50

## 2019-06-16 RX ADMIN — Medication 1 TABLET(S): at 09:50

## 2019-06-16 RX ADMIN — Medication 100 MILLIGRAM(S): at 09:50

## 2019-06-16 RX ADMIN — Medication 650 MILLIGRAM(S): at 13:38

## 2019-06-16 RX ADMIN — METFORMIN HYDROCHLORIDE 500 MILLIGRAM(S): 850 TABLET ORAL at 09:50

## 2019-06-16 RX ADMIN — ATORVASTATIN CALCIUM 20 MILLIGRAM(S): 80 TABLET, FILM COATED ORAL at 20:23

## 2019-06-16 RX ADMIN — Medication 650 MILLIGRAM(S): at 20:23

## 2019-06-16 RX ADMIN — FLUPHENAZINE HYDROCHLORIDE 7.5 MILLIGRAM(S): 1 TABLET, FILM COATED ORAL at 20:23

## 2019-06-17 PROCEDURE — 99232 SBSQ HOSP IP/OBS MODERATE 35: CPT | Mod: GC

## 2019-06-17 RX ORDER — LANOLIN ALCOHOL/MO/W.PET/CERES
3 CREAM (GRAM) TOPICAL AT BEDTIME
Refills: 0 | Status: DISCONTINUED | OUTPATIENT
Start: 2019-06-17 | End: 2019-06-25

## 2019-06-17 RX ORDER — CLONAZEPAM 1 MG
0.25 TABLET ORAL AT BEDTIME
Refills: 0 | Status: DISCONTINUED | OUTPATIENT
Start: 2019-06-17 | End: 2019-06-18

## 2019-06-17 RX ORDER — BENZTROPINE MESYLATE 1 MG
0.5 TABLET ORAL ONCE
Refills: 0 | Status: COMPLETED | OUTPATIENT
Start: 2019-06-17 | End: 2019-06-17

## 2019-06-17 RX ORDER — BENZTROPINE MESYLATE 1 MG
0.5 TABLET ORAL DAILY
Refills: 0 | Status: DISCONTINUED | OUTPATIENT
Start: 2019-06-17 | End: 2019-06-25

## 2019-06-17 RX ORDER — CLONAZEPAM 1 MG
0.5 TABLET ORAL DAILY
Refills: 0 | Status: DISCONTINUED | OUTPATIENT
Start: 2019-06-17 | End: 2019-06-19

## 2019-06-17 RX ADMIN — ATORVASTATIN CALCIUM 20 MILLIGRAM(S): 80 TABLET, FILM COATED ORAL at 21:04

## 2019-06-17 RX ADMIN — Medication 1000 UNIT(S): at 09:18

## 2019-06-17 RX ADMIN — METFORMIN HYDROCHLORIDE 500 MILLIGRAM(S): 850 TABLET ORAL at 09:18

## 2019-06-17 RX ADMIN — Medication 1 TABLET(S): at 09:18

## 2019-06-17 RX ADMIN — Medication 100 MILLIGRAM(S): at 09:18

## 2019-06-17 RX ADMIN — Medication 0.25 MILLIGRAM(S): at 09:18

## 2019-06-17 RX ADMIN — Medication 150 MILLIGRAM(S): at 09:18

## 2019-06-17 RX ADMIN — Medication 81 MILLIGRAM(S): at 09:18

## 2019-06-17 RX ADMIN — FLUPHENAZINE HYDROCHLORIDE 7.5 MILLIGRAM(S): 1 TABLET, FILM COATED ORAL at 21:04

## 2019-06-17 RX ADMIN — FLUPHENAZINE HYDROCHLORIDE 5 MILLIGRAM(S): 1 TABLET, FILM COATED ORAL at 09:18

## 2019-06-17 RX ADMIN — PREGABALIN 1000 MICROGRAM(S): 225 CAPSULE ORAL at 09:18

## 2019-06-17 RX ADMIN — Medication 650 MILLIGRAM(S): at 13:52

## 2019-06-17 RX ADMIN — Medication 0.5 MILLIGRAM(S): at 11:44

## 2019-06-17 RX ADMIN — SENNA PLUS 2 TABLET(S): 8.6 TABLET ORAL at 21:04

## 2019-06-17 RX ADMIN — Medication 0.25 MILLIGRAM(S): at 21:04

## 2019-06-17 RX ADMIN — Medication 3 MILLIGRAM(S): at 23:20

## 2019-06-17 RX ADMIN — LOSARTAN POTASSIUM 100 MILLIGRAM(S): 100 TABLET, FILM COATED ORAL at 09:18

## 2019-06-17 RX ADMIN — Medication 650 MILLIGRAM(S): at 14:50

## 2019-06-18 PROCEDURE — 99232 SBSQ HOSP IP/OBS MODERATE 35: CPT | Mod: GC

## 2019-06-18 RX ORDER — CLONAZEPAM 1 MG
0.5 TABLET ORAL AT BEDTIME
Refills: 0 | Status: DISCONTINUED | OUTPATIENT
Start: 2019-06-18 | End: 2019-06-19

## 2019-06-18 RX ORDER — HYDROCHLOROTHIAZIDE 25 MG
12.5 TABLET ORAL DAILY
Refills: 0 | Status: DISCONTINUED | OUTPATIENT
Start: 2019-06-18 | End: 2019-06-25

## 2019-06-18 RX ORDER — DIPHENHYDRAMINE HCL 50 MG
50 CAPSULE ORAL ONCE
Refills: 0 | Status: COMPLETED | OUTPATIENT
Start: 2019-06-18 | End: 2019-06-18

## 2019-06-18 RX ADMIN — Medication 650 MILLIGRAM(S): at 06:39

## 2019-06-18 RX ADMIN — FLUPHENAZINE HYDROCHLORIDE 7.5 MILLIGRAM(S): 1 TABLET, FILM COATED ORAL at 20:56

## 2019-06-18 RX ADMIN — SENNA PLUS 2 TABLET(S): 8.6 TABLET ORAL at 20:56

## 2019-06-18 RX ADMIN — Medication 81 MILLIGRAM(S): at 09:13

## 2019-06-18 RX ADMIN — Medication 0.5 MILLIGRAM(S): at 09:13

## 2019-06-18 RX ADMIN — Medication 0.5 MILLIGRAM(S): at 00:55

## 2019-06-18 RX ADMIN — PREGABALIN 1000 MICROGRAM(S): 225 CAPSULE ORAL at 09:14

## 2019-06-18 RX ADMIN — Medication 0.5 MILLIGRAM(S): at 09:48

## 2019-06-18 RX ADMIN — Medication 3 MILLIGRAM(S): at 21:00

## 2019-06-18 RX ADMIN — ATORVASTATIN CALCIUM 20 MILLIGRAM(S): 80 TABLET, FILM COATED ORAL at 20:56

## 2019-06-18 RX ADMIN — LOSARTAN POTASSIUM 100 MILLIGRAM(S): 100 TABLET, FILM COATED ORAL at 09:14

## 2019-06-18 RX ADMIN — Medication 12.5 MILLIGRAM(S): at 17:51

## 2019-06-18 RX ADMIN — Medication 0.5 MILLIGRAM(S): at 20:56

## 2019-06-18 RX ADMIN — Medication 0.5 MILLIGRAM(S): at 23:00

## 2019-06-18 RX ADMIN — Medication 100 MILLIGRAM(S): at 09:15

## 2019-06-18 RX ADMIN — Medication 1000 UNIT(S): at 09:13

## 2019-06-18 RX ADMIN — Medication 50 MILLIGRAM(S): at 21:50

## 2019-06-18 RX ADMIN — Medication 150 MILLIGRAM(S): at 09:15

## 2019-06-18 RX ADMIN — Medication 650 MILLIGRAM(S): at 07:20

## 2019-06-18 RX ADMIN — FLUPHENAZINE HYDROCHLORIDE 5 MILLIGRAM(S): 1 TABLET, FILM COATED ORAL at 09:14

## 2019-06-18 RX ADMIN — Medication 1 TABLET(S): at 09:15

## 2019-06-18 RX ADMIN — METFORMIN HYDROCHLORIDE 500 MILLIGRAM(S): 850 TABLET ORAL at 09:15

## 2019-06-19 DIAGNOSIS — E78.5 HYPERLIPIDEMIA, UNSPECIFIED: ICD-10-CM

## 2019-06-19 DIAGNOSIS — I10 ESSENTIAL (PRIMARY) HYPERTENSION: ICD-10-CM

## 2019-06-19 DIAGNOSIS — E11.9 TYPE 2 DIABETES MELLITUS WITHOUT COMPLICATIONS: ICD-10-CM

## 2019-06-19 LAB
GLUCOSE BLDC GLUCOMTR-MCNC: 145 MG/DL — HIGH (ref 70–99)
GLUCOSE BLDC GLUCOMTR-MCNC: 167 MG/DL — HIGH (ref 70–99)

## 2019-06-19 PROCEDURE — 99232 SBSQ HOSP IP/OBS MODERATE 35: CPT

## 2019-06-19 PROCEDURE — 99222 1ST HOSP IP/OBS MODERATE 55: CPT

## 2019-06-19 RX ORDER — DEXTROSE 50 % IN WATER 50 %
25 SYRINGE (ML) INTRAVENOUS ONCE
Refills: 0 | Status: DISCONTINUED | OUTPATIENT
Start: 2019-06-19 | End: 2019-06-25

## 2019-06-19 RX ORDER — INSULIN LISPRO 100/ML
VIAL (ML) SUBCUTANEOUS
Refills: 0 | Status: DISCONTINUED | OUTPATIENT
Start: 2019-06-19 | End: 2019-06-25

## 2019-06-19 RX ORDER — MIRTAZAPINE 45 MG/1
15 TABLET, ORALLY DISINTEGRATING ORAL AT BEDTIME
Refills: 0 | Status: DISCONTINUED | OUTPATIENT
Start: 2019-06-19 | End: 2019-06-24

## 2019-06-19 RX ORDER — FLUPHENAZINE HYDROCHLORIDE 1 MG/1
10 TABLET, FILM COATED ORAL AT BEDTIME
Refills: 0 | Status: DISCONTINUED | OUTPATIENT
Start: 2019-06-19 | End: 2019-06-25

## 2019-06-19 RX ORDER — DEXTROSE 50 % IN WATER 50 %
15 SYRINGE (ML) INTRAVENOUS ONCE
Refills: 0 | Status: DISCONTINUED | OUTPATIENT
Start: 2019-06-19 | End: 2019-06-25

## 2019-06-19 RX ORDER — SODIUM CHLORIDE 9 MG/ML
1000 INJECTION, SOLUTION INTRAVENOUS
Refills: 0 | Status: DISCONTINUED | OUTPATIENT
Start: 2019-06-19 | End: 2019-06-25

## 2019-06-19 RX ORDER — DEXTROSE 50 % IN WATER 50 %
12.5 SYRINGE (ML) INTRAVENOUS ONCE
Refills: 0 | Status: DISCONTINUED | OUTPATIENT
Start: 2019-06-19 | End: 2019-06-25

## 2019-06-19 RX ORDER — GLUCAGON INJECTION, SOLUTION 0.5 MG/.1ML
1 INJECTION, SOLUTION SUBCUTANEOUS ONCE
Refills: 0 | Status: DISCONTINUED | OUTPATIENT
Start: 2019-06-19 | End: 2019-06-25

## 2019-06-19 RX ORDER — CLONAZEPAM 1 MG
0.5 TABLET ORAL AT BEDTIME
Refills: 0 | Status: DISCONTINUED | OUTPATIENT
Start: 2019-06-19 | End: 2019-06-25

## 2019-06-19 RX ORDER — CLONAZEPAM 1 MG
0.5 TABLET ORAL DAILY
Refills: 0 | Status: DISCONTINUED | OUTPATIENT
Start: 2019-06-19 | End: 2019-06-25

## 2019-06-19 RX ADMIN — Medication 0.5 MILLIGRAM(S): at 08:57

## 2019-06-19 RX ADMIN — MIRTAZAPINE 15 MILLIGRAM(S): 45 TABLET, ORALLY DISINTEGRATING ORAL at 20:01

## 2019-06-19 RX ADMIN — ATORVASTATIN CALCIUM 20 MILLIGRAM(S): 80 TABLET, FILM COATED ORAL at 20:01

## 2019-06-19 RX ADMIN — METFORMIN HYDROCHLORIDE 500 MILLIGRAM(S): 850 TABLET ORAL at 08:58

## 2019-06-19 RX ADMIN — LOSARTAN POTASSIUM 100 MILLIGRAM(S): 100 TABLET, FILM COATED ORAL at 08:57

## 2019-06-19 RX ADMIN — FLUPHENAZINE HYDROCHLORIDE 10 MILLIGRAM(S): 1 TABLET, FILM COATED ORAL at 20:01

## 2019-06-19 RX ADMIN — SENNA PLUS 2 TABLET(S): 8.6 TABLET ORAL at 20:02

## 2019-06-19 RX ADMIN — Medication 1000 UNIT(S): at 08:57

## 2019-06-19 RX ADMIN — Medication 650 MILLIGRAM(S): at 13:40

## 2019-06-19 RX ADMIN — FLUPHENAZINE HYDROCHLORIDE 5 MILLIGRAM(S): 1 TABLET, FILM COATED ORAL at 08:57

## 2019-06-19 RX ADMIN — Medication 200 MILLIGRAM(S): at 18:24

## 2019-06-19 RX ADMIN — Medication 0.5 MILLIGRAM(S): at 20:01

## 2019-06-19 RX ADMIN — Medication 650 MILLIGRAM(S): at 14:40

## 2019-06-19 RX ADMIN — Medication 100 MILLIGRAM(S): at 08:58

## 2019-06-19 RX ADMIN — PREGABALIN 1000 MICROGRAM(S): 225 CAPSULE ORAL at 08:57

## 2019-06-19 RX ADMIN — Medication 12.5 MILLIGRAM(S): at 08:57

## 2019-06-19 RX ADMIN — Medication 650 MILLIGRAM(S): at 04:47

## 2019-06-19 RX ADMIN — Medication 150 MILLIGRAM(S): at 08:58

## 2019-06-19 RX ADMIN — Medication 81 MILLIGRAM(S): at 08:57

## 2019-06-19 RX ADMIN — Medication 3 MILLIGRAM(S): at 21:20

## 2019-06-19 RX ADMIN — Medication 1 TABLET(S): at 08:58

## 2019-06-19 RX ADMIN — Medication 200 MILLIGRAM(S): at 18:15

## 2019-06-19 NOTE — CONSULT NOTE ADULT - SUBJECTIVE AND OBJECTIVE BOX
Nasra Tejada is a 58 year old Gambian speaking gentlemen with h/o Depression, Schizophrenia, T2DM, HTN, HLD transferred from ED on June 10th b/o depression and bizarre behavior. Medical consult requested to manage patient's blood pressure. On admission it was noted that -170s despite being on losartan 100mg daily and Metorpolol ER 100mg daily. HCTZ 12.5 mg daily added with appropriate response. Patient denies any active medical symptoms. Patient seen with Dr. Flores who assisted in translation.    PAST MEDICAL & SURGICAL HISTORY:  DM (diabetes mellitus)  Schizophrenia  HLD (hyperlipidemia)  HTN (hypertension)  Depression  No significant past surgical history      Review of Systems:   CONSTITUTIONAL: No fever, weight loss, or fatigue  EYES: No eye pain, visual disturbances, or discharge  ENMT:  No difficulty hearing, tinnitus, vertigo; No sinus or throat pain  NECK: No pain or stiffness  RESPIRATORY: No cough, wheezing, chills or hemoptysis; No shortness of breath  CARDIOVASCULAR: No chest pain, palpitations, dizziness, or leg swelling  GASTROINTESTINAL: No abdominal or epigastric pain. No nausea, vomiting, or hematemesis; No diarrhea or constipation. No melena or hematochezia.  GENITOURINARY: No dysuria, frequency, hematuria, or incontinence  NEUROLOGICAL: No headaches, memory loss, loss of strength, numbness, or tremors  SKIN: No itching, burning, rashes, or lesions   LYMPH NODES: No enlarged glands  ENDOCRINE: No heat or cold intolerance; No hair loss  MUSCULOSKELETAL: No joint pain or swelling; No muscle, back, or extremity pain  HEME/LYMPH: No easy bruising, or bleeding gums  ALLERY AND IMMUNOLOGIC: No hives or eczema    Allergies    No Known Allergies    Intolerances        Social History: smokes about 5 cigs a day, denies etoh or drug use    FAMILY HISTORY:  No pertinent family history in first degree relatives      MEDICATIONS  (STANDING):  aspirin enteric coated 81 milliGRAM(s) Oral daily  atorvastatin 20 milliGRAM(s) Oral at bedtime  benztropine 0.5 milliGRAM(s) Oral daily  cholecalciferol 1000 Unit(s) Oral daily  clonazePAM  Tablet 0.5 milliGRAM(s) Oral daily  clonazePAM  Tablet 0.5 milliGRAM(s) Oral at bedtime  cyanocobalamin 1000 MICROGram(s) Oral daily  fluPHENAZine 5 milliGRAM(s) Oral daily  fluPHENAZine 10 milliGRAM(s) Oral at bedtime  hydrochlorothiazide 12.5 milliGRAM(s) Oral daily  losartan 100 milliGRAM(s) Oral daily  metFORMIN Extended-Release 500 milliGRAM(s) Oral daily  metoprolol succinate  milliGRAM(s) Oral daily  mirtazapine 15 milliGRAM(s) Oral at bedtime  Nephro-yaquelin 1 Tablet(s) Oral daily  senna 2 Tablet(s) Oral at bedtime  venlafaxine  milliGRAM(s) Oral daily    MEDICATIONS  (PRN):  acetaminophen   Tablet .. 650 milliGRAM(s) Oral every 6 hours PRN Mild Pain (1 - 3), Moderate Pain (4 - 6), Severe Pain (7 - 10)  acetaminophen   Tablet .. 650 milliGRAM(s) Oral every 6 hours PRN Moderate Pain (4 - 6)  ibuprofen  Tablet. 200 milliGRAM(s) Oral every 8 hours PRN Moderate Pain (4 - 6)  LORazepam     Tablet 0.5 milliGRAM(s) Oral every 6 hours PRN Agitation  melatonin. 3 milliGRAM(s) Oral at bedtime PRN Insomnia      Vital Signs Last 24 Hrs  T(C): 37 (19 Jun 2019 07:52), Max: 37 (19 Jun 2019 07:52)  T(F): 98.6 (19 Jun 2019 07:52), Max: 98.6 (19 Jun 2019 07:52)  HR: 70 (18 Jun 2019 15:00) (70 - 70)  BP: 147/76 (18 Jun 2019 15:00) (147/76 - 147/76)  BP(mean): --  RR: --  SpO2: --  CAPILLARY BLOOD GLUCOSE            PHYSICAL EXAM:  GENERAL: NAD, well-developed  HEAD:  Atraumatic, Normocephalic  EYES: EOMI, conjunctiva and sclera clear  NECK: Supple, No JVD  CHEST/LUNG: Clear to auscultation bilaterally; No wheeze  HEART: Regular rate and rhythm; No murmurs, rubs, or gallops  ABDOMEN: Soft, Nontender, Nondistended; Bowel sounds present  EXTREMITIES:  2+ Peripheral Pulses, No clubbing, cyanosis, or edema  NEUROLOGY: non-focal  SKIN: No rashes or lesions    LABS:                    EKG(personally reviewed):    RADIOLOGY & ADDITIONAL TESTS:    Imaging Personally Reviewed:    Consultant(s) Notes Reviewed:      Care Discussed with Consultants/Other Providers:

## 2019-06-19 NOTE — CONSULT NOTE ADULT - PROBLEM SELECTOR RECOMMENDATION 2
Last recorded sugar was 259, patient will likely from insulin sliding scale while admitted at [facility]  If finger sticks are noted to be consistently below 200 than sliding scale can be discontinued

## 2019-06-19 NOTE — CONSULT NOTE ADULT - PROBLEM SELECTOR RECOMMENDATION 9
On admission it was noted that -170s despite being on losartan 100mg daily and Metorpolol ER 100mg daily.   HCTZ 12.5 mg daily added with appropriate response.

## 2019-06-20 LAB
GLUCOSE BLDC GLUCOMTR-MCNC: 113 MG/DL — HIGH (ref 70–99)
GLUCOSE BLDC GLUCOMTR-MCNC: 157 MG/DL — HIGH (ref 70–99)
GLUCOSE BLDC GLUCOMTR-MCNC: 162 MG/DL — HIGH (ref 70–99)
GLUCOSE BLDC GLUCOMTR-MCNC: 85 MG/DL — SIGNIFICANT CHANGE UP (ref 70–99)

## 2019-06-20 PROCEDURE — 99232 SBSQ HOSP IP/OBS MODERATE 35: CPT | Mod: GC

## 2019-06-20 RX ADMIN — Medication 12.5 MILLIGRAM(S): at 08:38

## 2019-06-20 RX ADMIN — Medication 0.5 MILLIGRAM(S): at 08:37

## 2019-06-20 RX ADMIN — Medication 650 MILLIGRAM(S): at 18:44

## 2019-06-20 RX ADMIN — FLUPHENAZINE HYDROCHLORIDE 10 MILLIGRAM(S): 1 TABLET, FILM COATED ORAL at 20:44

## 2019-06-20 RX ADMIN — LOSARTAN POTASSIUM 100 MILLIGRAM(S): 100 TABLET, FILM COATED ORAL at 08:38

## 2019-06-20 RX ADMIN — Medication 81 MILLIGRAM(S): at 08:37

## 2019-06-20 RX ADMIN — SENNA PLUS 2 TABLET(S): 8.6 TABLET ORAL at 20:44

## 2019-06-20 RX ADMIN — MIRTAZAPINE 15 MILLIGRAM(S): 45 TABLET, ORALLY DISINTEGRATING ORAL at 20:44

## 2019-06-20 RX ADMIN — Medication 1000 UNIT(S): at 08:37

## 2019-06-20 RX ADMIN — METFORMIN HYDROCHLORIDE 500 MILLIGRAM(S): 850 TABLET ORAL at 08:38

## 2019-06-20 RX ADMIN — ATORVASTATIN CALCIUM 20 MILLIGRAM(S): 80 TABLET, FILM COATED ORAL at 20:44

## 2019-06-20 RX ADMIN — Medication 150 MILLIGRAM(S): at 08:38

## 2019-06-20 RX ADMIN — PREGABALIN 1000 MICROGRAM(S): 225 CAPSULE ORAL at 08:37

## 2019-06-20 RX ADMIN — Medication 100 MILLIGRAM(S): at 08:38

## 2019-06-20 RX ADMIN — Medication 0.5 MILLIGRAM(S): at 20:44

## 2019-06-20 RX ADMIN — Medication 1 TABLET(S): at 08:38

## 2019-06-20 RX ADMIN — Medication 3 MILLIGRAM(S): at 21:51

## 2019-06-20 RX ADMIN — FLUPHENAZINE HYDROCHLORIDE 5 MILLIGRAM(S): 1 TABLET, FILM COATED ORAL at 08:37

## 2019-06-21 LAB
GLUCOSE BLDC GLUCOMTR-MCNC: 101 MG/DL — HIGH (ref 70–99)
GLUCOSE BLDC GLUCOMTR-MCNC: 104 MG/DL — HIGH (ref 70–99)
GLUCOSE BLDC GLUCOMTR-MCNC: 133 MG/DL — HIGH (ref 70–99)
GLUCOSE BLDC GLUCOMTR-MCNC: 158 MG/DL — HIGH (ref 70–99)

## 2019-06-21 PROCEDURE — 99232 SBSQ HOSP IP/OBS MODERATE 35: CPT

## 2019-06-21 RX ADMIN — Medication 81 MILLIGRAM(S): at 09:06

## 2019-06-21 RX ADMIN — Medication 1000 UNIT(S): at 09:06

## 2019-06-21 RX ADMIN — MIRTAZAPINE 15 MILLIGRAM(S): 45 TABLET, ORALLY DISINTEGRATING ORAL at 20:25

## 2019-06-21 RX ADMIN — SENNA PLUS 2 TABLET(S): 8.6 TABLET ORAL at 20:25

## 2019-06-21 RX ADMIN — Medication 0.5 MILLIGRAM(S): at 09:06

## 2019-06-21 RX ADMIN — Medication 1 TABLET(S): at 09:05

## 2019-06-21 RX ADMIN — LOSARTAN POTASSIUM 100 MILLIGRAM(S): 100 TABLET, FILM COATED ORAL at 09:06

## 2019-06-21 RX ADMIN — FLUPHENAZINE HYDROCHLORIDE 5 MILLIGRAM(S): 1 TABLET, FILM COATED ORAL at 09:06

## 2019-06-21 RX ADMIN — Medication 0.5 MILLIGRAM(S): at 20:25

## 2019-06-21 RX ADMIN — ATORVASTATIN CALCIUM 20 MILLIGRAM(S): 80 TABLET, FILM COATED ORAL at 20:25

## 2019-06-21 RX ADMIN — PREGABALIN 1000 MICROGRAM(S): 225 CAPSULE ORAL at 09:06

## 2019-06-21 RX ADMIN — Medication 100 MILLIGRAM(S): at 09:05

## 2019-06-21 RX ADMIN — Medication 12.5 MILLIGRAM(S): at 09:06

## 2019-06-21 RX ADMIN — METFORMIN HYDROCHLORIDE 500 MILLIGRAM(S): 850 TABLET ORAL at 09:06

## 2019-06-21 RX ADMIN — Medication 150 MILLIGRAM(S): at 09:05

## 2019-06-21 RX ADMIN — FLUPHENAZINE HYDROCHLORIDE 10 MILLIGRAM(S): 1 TABLET, FILM COATED ORAL at 20:25

## 2019-06-22 LAB
GLUCOSE BLDC GLUCOMTR-MCNC: 124 MG/DL — HIGH (ref 70–99)
GLUCOSE BLDC GLUCOMTR-MCNC: 308 MG/DL — HIGH (ref 70–99)
GLUCOSE BLDC GLUCOMTR-MCNC: 93 MG/DL — SIGNIFICANT CHANGE UP (ref 70–99)
GLUCOSE BLDC GLUCOMTR-MCNC: 94 MG/DL — SIGNIFICANT CHANGE UP (ref 70–99)

## 2019-06-22 RX ADMIN — Medication 0.5 MILLIGRAM(S): at 09:35

## 2019-06-22 RX ADMIN — Medication 100 MILLIGRAM(S): at 09:35

## 2019-06-22 RX ADMIN — Medication 0.5 MILLIGRAM(S): at 20:54

## 2019-06-22 RX ADMIN — FLUPHENAZINE HYDROCHLORIDE 5 MILLIGRAM(S): 1 TABLET, FILM COATED ORAL at 09:35

## 2019-06-22 RX ADMIN — Medication 12.5 MILLIGRAM(S): at 09:35

## 2019-06-22 RX ADMIN — SENNA PLUS 2 TABLET(S): 8.6 TABLET ORAL at 20:54

## 2019-06-22 RX ADMIN — Medication 1000 UNIT(S): at 09:35

## 2019-06-22 RX ADMIN — FLUPHENAZINE HYDROCHLORIDE 10 MILLIGRAM(S): 1 TABLET, FILM COATED ORAL at 20:54

## 2019-06-22 RX ADMIN — MIRTAZAPINE 15 MILLIGRAM(S): 45 TABLET, ORALLY DISINTEGRATING ORAL at 20:54

## 2019-06-22 RX ADMIN — Medication 150 MILLIGRAM(S): at 09:35

## 2019-06-22 RX ADMIN — METFORMIN HYDROCHLORIDE 500 MILLIGRAM(S): 850 TABLET ORAL at 09:35

## 2019-06-22 RX ADMIN — Medication 1 TABLET(S): at 09:35

## 2019-06-22 RX ADMIN — ATORVASTATIN CALCIUM 20 MILLIGRAM(S): 80 TABLET, FILM COATED ORAL at 20:54

## 2019-06-22 RX ADMIN — LOSARTAN POTASSIUM 100 MILLIGRAM(S): 100 TABLET, FILM COATED ORAL at 09:35

## 2019-06-22 RX ADMIN — Medication 81 MILLIGRAM(S): at 09:35

## 2019-06-22 RX ADMIN — PREGABALIN 1000 MICROGRAM(S): 225 CAPSULE ORAL at 09:35

## 2019-06-23 LAB
GLUCOSE BLDC GLUCOMTR-MCNC: 101 MG/DL — HIGH (ref 70–99)
GLUCOSE BLDC GLUCOMTR-MCNC: 115 MG/DL — HIGH (ref 70–99)
GLUCOSE BLDC GLUCOMTR-MCNC: 143 MG/DL — HIGH (ref 70–99)
GLUCOSE BLDC GLUCOMTR-MCNC: 174 MG/DL — HIGH (ref 70–99)

## 2019-06-23 RX ADMIN — ATORVASTATIN CALCIUM 20 MILLIGRAM(S): 80 TABLET, FILM COATED ORAL at 20:59

## 2019-06-23 RX ADMIN — Medication 3 MILLIGRAM(S): at 21:37

## 2019-06-23 RX ADMIN — MIRTAZAPINE 15 MILLIGRAM(S): 45 TABLET, ORALLY DISINTEGRATING ORAL at 20:57

## 2019-06-23 RX ADMIN — Medication 150 MILLIGRAM(S): at 09:17

## 2019-06-23 RX ADMIN — PREGABALIN 1000 MICROGRAM(S): 225 CAPSULE ORAL at 09:17

## 2019-06-23 RX ADMIN — Medication 0.5 MILLIGRAM(S): at 20:59

## 2019-06-23 RX ADMIN — Medication 12.5 MILLIGRAM(S): at 09:17

## 2019-06-23 RX ADMIN — Medication 650 MILLIGRAM(S): at 16:00

## 2019-06-23 RX ADMIN — METFORMIN HYDROCHLORIDE 500 MILLIGRAM(S): 850 TABLET ORAL at 09:17

## 2019-06-23 RX ADMIN — Medication 81 MILLIGRAM(S): at 09:17

## 2019-06-23 RX ADMIN — Medication 0.5 MILLIGRAM(S): at 09:17

## 2019-06-23 RX ADMIN — LOSARTAN POTASSIUM 100 MILLIGRAM(S): 100 TABLET, FILM COATED ORAL at 09:17

## 2019-06-23 RX ADMIN — Medication 1000 UNIT(S): at 09:17

## 2019-06-23 RX ADMIN — FLUPHENAZINE HYDROCHLORIDE 5 MILLIGRAM(S): 1 TABLET, FILM COATED ORAL at 09:17

## 2019-06-23 RX ADMIN — Medication 1 TABLET(S): at 09:17

## 2019-06-23 RX ADMIN — Medication 650 MILLIGRAM(S): at 15:29

## 2019-06-23 RX ADMIN — Medication 100 MILLIGRAM(S): at 09:17

## 2019-06-23 RX ADMIN — SENNA PLUS 2 TABLET(S): 8.6 TABLET ORAL at 20:57

## 2019-06-23 RX ADMIN — FLUPHENAZINE HYDROCHLORIDE 10 MILLIGRAM(S): 1 TABLET, FILM COATED ORAL at 20:57

## 2019-06-24 LAB
ALBUMIN SERPL ELPH-MCNC: 4.1 G/DL — SIGNIFICANT CHANGE UP (ref 3.3–5)
ALP SERPL-CCNC: 82 U/L — SIGNIFICANT CHANGE UP (ref 40–120)
ALT FLD-CCNC: 37 U/L — SIGNIFICANT CHANGE UP (ref 4–41)
ANION GAP SERPL CALC-SCNC: 11 MMO/L — SIGNIFICANT CHANGE UP (ref 7–14)
AST SERPL-CCNC: 22 U/L — SIGNIFICANT CHANGE UP (ref 4–40)
BASOPHILS # BLD AUTO: 0.06 K/UL — SIGNIFICANT CHANGE UP (ref 0–0.2)
BASOPHILS NFR BLD AUTO: 0.7 % — SIGNIFICANT CHANGE UP (ref 0–2)
BILIRUB SERPL-MCNC: < 0.2 MG/DL — LOW (ref 0.2–1.2)
BUN SERPL-MCNC: 18 MG/DL — SIGNIFICANT CHANGE UP (ref 7–23)
CALCIUM SERPL-MCNC: 9.6 MG/DL — SIGNIFICANT CHANGE UP (ref 8.4–10.5)
CHLORIDE SERPL-SCNC: 98 MMOL/L — SIGNIFICANT CHANGE UP (ref 98–107)
CO2 SERPL-SCNC: 26 MMOL/L — SIGNIFICANT CHANGE UP (ref 22–31)
CREAT SERPL-MCNC: 1.12 MG/DL — SIGNIFICANT CHANGE UP (ref 0.5–1.3)
EOSINOPHIL # BLD AUTO: 0.3 K/UL — SIGNIFICANT CHANGE UP (ref 0–0.5)
EOSINOPHIL NFR BLD AUTO: 3.7 % — SIGNIFICANT CHANGE UP (ref 0–6)
GLUCOSE BLDC GLUCOMTR-MCNC: 104 MG/DL — HIGH (ref 70–99)
GLUCOSE BLDC GLUCOMTR-MCNC: 171 MG/DL — HIGH (ref 70–99)
GLUCOSE BLDC GLUCOMTR-MCNC: 93 MG/DL — SIGNIFICANT CHANGE UP (ref 70–99)
GLUCOSE BLDC GLUCOMTR-MCNC: 96 MG/DL — SIGNIFICANT CHANGE UP (ref 70–99)
GLUCOSE SERPL-MCNC: 211 MG/DL — HIGH (ref 70–99)
HCT VFR BLD CALC: 40.6 % — SIGNIFICANT CHANGE UP (ref 39–50)
HGB BLD-MCNC: 12.5 G/DL — LOW (ref 13–17)
IMM GRANULOCYTES NFR BLD AUTO: 0.5 % — SIGNIFICANT CHANGE UP (ref 0–1.5)
LYMPHOCYTES # BLD AUTO: 2.33 K/UL — SIGNIFICANT CHANGE UP (ref 1–3.3)
LYMPHOCYTES # BLD AUTO: 28.7 % — SIGNIFICANT CHANGE UP (ref 13–44)
MCHC RBC-ENTMCNC: 27.5 PG — SIGNIFICANT CHANGE UP (ref 27–34)
MCHC RBC-ENTMCNC: 30.8 % — LOW (ref 32–36)
MCV RBC AUTO: 89.2 FL — SIGNIFICANT CHANGE UP (ref 80–100)
MONOCYTES # BLD AUTO: 0.57 K/UL — SIGNIFICANT CHANGE UP (ref 0–0.9)
MONOCYTES NFR BLD AUTO: 7 % — SIGNIFICANT CHANGE UP (ref 2–14)
NEUTROPHILS # BLD AUTO: 4.83 K/UL — SIGNIFICANT CHANGE UP (ref 1.8–7.4)
NEUTROPHILS NFR BLD AUTO: 59.4 % — SIGNIFICANT CHANGE UP (ref 43–77)
NRBC # FLD: 0 K/UL — SIGNIFICANT CHANGE UP (ref 0–0)
PLATELET # BLD AUTO: 261 K/UL — SIGNIFICANT CHANGE UP (ref 150–400)
PMV BLD: 11.9 FL — SIGNIFICANT CHANGE UP (ref 7–13)
POTASSIUM SERPL-MCNC: 4.3 MMOL/L — SIGNIFICANT CHANGE UP (ref 3.5–5.3)
POTASSIUM SERPL-SCNC: 4.3 MMOL/L — SIGNIFICANT CHANGE UP (ref 3.5–5.3)
PROT SERPL-MCNC: 6.9 G/DL — SIGNIFICANT CHANGE UP (ref 6–8.3)
RBC # BLD: 4.55 M/UL — SIGNIFICANT CHANGE UP (ref 4.2–5.8)
RBC # FLD: 14.2 % — SIGNIFICANT CHANGE UP (ref 10.3–14.5)
SODIUM SERPL-SCNC: 135 MMOL/L — SIGNIFICANT CHANGE UP (ref 135–145)
WBC # BLD: 8.13 K/UL — SIGNIFICANT CHANGE UP (ref 3.8–10.5)
WBC # FLD AUTO: 8.13 K/UL — SIGNIFICANT CHANGE UP (ref 3.8–10.5)

## 2019-06-24 PROCEDURE — 99232 SBSQ HOSP IP/OBS MODERATE 35: CPT | Mod: GC

## 2019-06-24 RX ORDER — MIRTAZAPINE 45 MG/1
1 TABLET, ORALLY DISINTEGRATING ORAL
Qty: 30 | Refills: 0
Start: 2019-06-24 | End: 2019-07-23

## 2019-06-24 RX ORDER — ASPIRIN/CALCIUM CARB/MAGNESIUM 324 MG
1 TABLET ORAL
Qty: 30 | Refills: 0
Start: 2019-06-24 | End: 2019-07-23

## 2019-06-24 RX ORDER — ERGOCALCIFEROL 1.25 MG/1
1 CAPSULE ORAL
Qty: 4 | Refills: 0
Start: 2019-06-24 | End: 2019-07-23

## 2019-06-24 RX ORDER — METOPROLOL TARTRATE 50 MG
1 TABLET ORAL
Qty: 30 | Refills: 0
Start: 2019-06-24 | End: 2019-07-23

## 2019-06-24 RX ORDER — LANOLIN ALCOHOL/MO/W.PET/CERES
1 CREAM (GRAM) TOPICAL
Qty: 30 | Refills: 0
Start: 2019-06-24 | End: 2019-07-23

## 2019-06-24 RX ORDER — CHOLECALCIFEROL (VITAMIN D3) 125 MCG
1000 CAPSULE ORAL
Qty: 0 | Refills: 0 | DISCHARGE
Start: 2019-06-24

## 2019-06-24 RX ORDER — FLUPHENAZINE HYDROCHLORIDE 1 MG/1
1 TABLET, FILM COATED ORAL
Qty: 30 | Refills: 0
Start: 2019-06-24 | End: 2019-07-23

## 2019-06-24 RX ORDER — METFORMIN HYDROCHLORIDE 850 MG/1
1 TABLET ORAL
Qty: 30 | Refills: 0
Start: 2019-06-24 | End: 2019-07-23

## 2019-06-24 RX ORDER — VENLAFAXINE HCL 75 MG
1 CAPSULE, EXT RELEASE 24 HR ORAL
Qty: 30 | Refills: 0
Start: 2019-06-24 | End: 2019-07-23

## 2019-06-24 RX ORDER — CLONAZEPAM 1 MG
1 TABLET ORAL
Qty: 14 | Refills: 0
Start: 2019-06-24 | End: 2019-07-07

## 2019-06-24 RX ORDER — PREGABALIN 225 MG/1
1 CAPSULE ORAL
Qty: 0 | Refills: 0 | DISCHARGE
Start: 2019-06-24

## 2019-06-24 RX ORDER — CLONAZEPAM 1 MG
1 TABLET ORAL
Qty: 30 | Refills: 0
Start: 2019-06-24 | End: 2019-07-23

## 2019-06-24 RX ORDER — SENNA PLUS 8.6 MG/1
2 TABLET ORAL
Qty: 60 | Refills: 0
Start: 2019-06-24 | End: 2019-07-23

## 2019-06-24 RX ORDER — ATORVASTATIN CALCIUM 80 MG/1
1 TABLET, FILM COATED ORAL
Qty: 30 | Refills: 0
Start: 2019-06-24 | End: 2019-07-23

## 2019-06-24 RX ORDER — LOSARTAN POTASSIUM 100 MG/1
1 TABLET, FILM COATED ORAL
Qty: 30 | Refills: 0
Start: 2019-06-24 | End: 2019-07-23

## 2019-06-24 RX ORDER — MIRTAZAPINE 45 MG/1
30 TABLET, ORALLY DISINTEGRATING ORAL AT BEDTIME
Refills: 0 | Status: DISCONTINUED | OUTPATIENT
Start: 2019-06-24 | End: 2019-06-25

## 2019-06-24 RX ORDER — BENZTROPINE MESYLATE 1 MG
1 TABLET ORAL
Qty: 30 | Refills: 0
Start: 2019-06-24 | End: 2019-07-23

## 2019-06-24 RX ORDER — DOCUSATE SODIUM 100 MG
1 CAPSULE ORAL
Qty: 45 | Refills: 0
Start: 2019-06-24 | End: 2019-07-08

## 2019-06-24 RX ADMIN — Medication 81 MILLIGRAM(S): at 09:08

## 2019-06-24 RX ADMIN — Medication 0.5 MILLIGRAM(S): at 20:37

## 2019-06-24 RX ADMIN — Medication 650 MILLIGRAM(S): at 16:30

## 2019-06-24 RX ADMIN — FLUPHENAZINE HYDROCHLORIDE 10 MILLIGRAM(S): 1 TABLET, FILM COATED ORAL at 20:37

## 2019-06-24 RX ADMIN — Medication 0.5 MILLIGRAM(S): at 09:08

## 2019-06-24 RX ADMIN — PREGABALIN 1000 MICROGRAM(S): 225 CAPSULE ORAL at 09:08

## 2019-06-24 RX ADMIN — LOSARTAN POTASSIUM 100 MILLIGRAM(S): 100 TABLET, FILM COATED ORAL at 09:07

## 2019-06-24 RX ADMIN — MIRTAZAPINE 30 MILLIGRAM(S): 45 TABLET, ORALLY DISINTEGRATING ORAL at 20:37

## 2019-06-24 RX ADMIN — Medication 150 MILLIGRAM(S): at 09:07

## 2019-06-24 RX ADMIN — Medication 1 TABLET(S): at 09:07

## 2019-06-24 RX ADMIN — SENNA PLUS 2 TABLET(S): 8.6 TABLET ORAL at 20:37

## 2019-06-24 RX ADMIN — ATORVASTATIN CALCIUM 20 MILLIGRAM(S): 80 TABLET, FILM COATED ORAL at 20:37

## 2019-06-24 RX ADMIN — Medication 650 MILLIGRAM(S): at 15:41

## 2019-06-24 RX ADMIN — FLUPHENAZINE HYDROCHLORIDE 5 MILLIGRAM(S): 1 TABLET, FILM COATED ORAL at 09:08

## 2019-06-24 RX ADMIN — Medication 12.5 MILLIGRAM(S): at 09:08

## 2019-06-24 RX ADMIN — Medication 100 MILLIGRAM(S): at 09:07

## 2019-06-24 RX ADMIN — Medication 1000 UNIT(S): at 09:08

## 2019-06-24 RX ADMIN — METFORMIN HYDROCHLORIDE 500 MILLIGRAM(S): 850 TABLET ORAL at 09:07

## 2019-06-25 VITALS — SYSTOLIC BLOOD PRESSURE: 141 MMHG | TEMPERATURE: 97 F | DIASTOLIC BLOOD PRESSURE: 94 MMHG | HEART RATE: 78 BPM

## 2019-06-25 LAB
GLUCOSE BLDC GLUCOMTR-MCNC: 174 MG/DL — HIGH (ref 70–99)
GLUCOSE BLDC GLUCOMTR-MCNC: 97 MG/DL — SIGNIFICANT CHANGE UP (ref 70–99)

## 2019-06-25 PROCEDURE — 99239 HOSP IP/OBS DSCHRG MGMT >30: CPT | Mod: GC

## 2019-06-25 RX ORDER — LOSARTAN POTASSIUM 100 MG/1
1 TABLET, FILM COATED ORAL
Qty: 0 | Refills: 0 | DISCHARGE
Start: 2019-06-25

## 2019-06-25 RX ORDER — METOPROLOL TARTRATE 50 MG
1 TABLET ORAL
Qty: 0 | Refills: 0 | DISCHARGE
Start: 2019-06-25

## 2019-06-25 RX ORDER — CHOLECALCIFEROL (VITAMIN D3) 125 MCG
1000 CAPSULE ORAL
Qty: 30 | Refills: 0
Start: 2019-06-25 | End: 2019-07-24

## 2019-06-25 RX ORDER — CLONAZEPAM 1 MG
1 TABLET ORAL
Qty: 60 | Refills: 0
Start: 2019-06-25 | End: 2019-07-24

## 2019-06-25 RX ADMIN — PREGABALIN 1000 MICROGRAM(S): 225 CAPSULE ORAL at 09:03

## 2019-06-25 RX ADMIN — Medication 12.5 MILLIGRAM(S): at 09:03

## 2019-06-25 RX ADMIN — Medication 150 MILLIGRAM(S): at 09:04

## 2019-06-25 RX ADMIN — Medication 81 MILLIGRAM(S): at 09:03

## 2019-06-25 RX ADMIN — Medication 650 MILLIGRAM(S): at 14:25

## 2019-06-25 RX ADMIN — Medication 0.5 MILLIGRAM(S): at 09:03

## 2019-06-25 RX ADMIN — LOSARTAN POTASSIUM 100 MILLIGRAM(S): 100 TABLET, FILM COATED ORAL at 09:03

## 2019-06-25 RX ADMIN — Medication 100 MILLIGRAM(S): at 09:04

## 2019-06-25 RX ADMIN — Medication 200 MILLIGRAM(S): at 14:30

## 2019-06-25 RX ADMIN — Medication 650 MILLIGRAM(S): at 10:27

## 2019-06-25 RX ADMIN — FLUPHENAZINE HYDROCHLORIDE 5 MILLIGRAM(S): 1 TABLET, FILM COATED ORAL at 09:03

## 2019-06-25 RX ADMIN — METFORMIN HYDROCHLORIDE 500 MILLIGRAM(S): 850 TABLET ORAL at 09:03

## 2019-06-25 RX ADMIN — Medication 1 TABLET(S): at 09:04

## 2019-06-25 RX ADMIN — Medication 1000 UNIT(S): at 09:03

## 2020-03-03 NOTE — ED PROVIDER NOTE - EYES, MLM
Spoke with grandmother to relay that confirmation number for [facility] reservation is [id number]. Informed grandmother that only one person would be allowed to stay at the bedside with Lena should she have to stay in the hospital overnight on Wednesday. Grandmother inquired about additional nights at the [facility]. Informed that she would need to speak with the [facility] directly to amend her reservation and that the cost of any additional nights would be the family responsibility. Reminded of NPO Instructions of no food after midnight, water until 7 am and nothing after that time. Check in at 10:15 in [facility]. Grandmother voiced understanding of all instructions.    Clear bilaterally, pupils equal, round and reactive to light.

## 2020-05-28 ENCOUNTER — EMERGENCY (EMERGENCY)
Facility: HOSPITAL | Age: 60
LOS: 1 days | Discharge: HOME | End: 2020-05-28
Attending: EMERGENCY MEDICINE
Payer: MEDICAID

## 2020-05-28 VITALS
TEMPERATURE: 99 F | OXYGEN SATURATION: 95 % | DIASTOLIC BLOOD PRESSURE: 85 MMHG | SYSTOLIC BLOOD PRESSURE: 124 MMHG | RESPIRATION RATE: 17 BRPM | HEART RATE: 99 BPM

## 2020-05-28 VITALS
OXYGEN SATURATION: 97 % | RESPIRATION RATE: 16 BRPM | HEART RATE: 104 BPM | TEMPERATURE: 98 F | DIASTOLIC BLOOD PRESSURE: 81 MMHG | HEIGHT: 66 IN | WEIGHT: 182.1 LBS | SYSTOLIC BLOOD PRESSURE: 131 MMHG

## 2020-05-28 DIAGNOSIS — F32.9 MAJOR DEPRESSIVE DISORDER, SINGLE EPISODE, UNSPECIFIED: ICD-10-CM

## 2020-05-28 DIAGNOSIS — F20.9 SCHIZOPHRENIA, UNSPECIFIED: ICD-10-CM

## 2020-05-28 LAB
ANION GAP SERPL CALC-SCNC: 14 MMOL/L — SIGNIFICANT CHANGE UP (ref 5–17)
APAP SERPL-MCNC: <15 UG/ML — SIGNIFICANT CHANGE UP (ref 10–30)
APPEARANCE UR: CLEAR — SIGNIFICANT CHANGE UP
BASOPHILS # BLD AUTO: 0.07 K/UL — SIGNIFICANT CHANGE UP (ref 0–0.2)
BASOPHILS NFR BLD AUTO: 0.9 % — SIGNIFICANT CHANGE UP (ref 0–2)
BILIRUB UR-MCNC: NEGATIVE — SIGNIFICANT CHANGE UP
BUN SERPL-MCNC: 19 MG/DL — SIGNIFICANT CHANGE UP (ref 7–23)
CALCIUM SERPL-MCNC: 10.3 MG/DL — SIGNIFICANT CHANGE UP (ref 8.4–10.5)
CHLORIDE SERPL-SCNC: 102 MMOL/L — SIGNIFICANT CHANGE UP (ref 96–108)
CO2 SERPL-SCNC: 25 MMOL/L — SIGNIFICANT CHANGE UP (ref 22–31)
COLOR SPEC: SIGNIFICANT CHANGE UP
CREAT SERPL-MCNC: 1.09 MG/DL — SIGNIFICANT CHANGE UP (ref 0.5–1.3)
DIFF PNL FLD: NEGATIVE — SIGNIFICANT CHANGE UP
EOSINOPHIL # BLD AUTO: 0.52 K/UL — HIGH (ref 0–0.5)
EOSINOPHIL NFR BLD AUTO: 6.5 % — HIGH (ref 0–6)
ETHANOL SERPL-MCNC: SIGNIFICANT CHANGE UP MG/DL (ref 0–10)
GLUCOSE SERPL-MCNC: 149 MG/DL — HIGH (ref 70–99)
GLUCOSE UR QL: NEGATIVE — SIGNIFICANT CHANGE UP
HCT VFR BLD CALC: 46.2 % — SIGNIFICANT CHANGE UP (ref 39–50)
HGB BLD-MCNC: 14.5 G/DL — SIGNIFICANT CHANGE UP (ref 13–17)
IMM GRANULOCYTES NFR BLD AUTO: 0.5 % — SIGNIFICANT CHANGE UP (ref 0–1.5)
KETONES UR-MCNC: NEGATIVE — SIGNIFICANT CHANGE UP
LEUKOCYTE ESTERASE UR-ACNC: NEGATIVE — SIGNIFICANT CHANGE UP
LYMPHOCYTES # BLD AUTO: 2.61 K/UL — SIGNIFICANT CHANGE UP (ref 1–3.3)
LYMPHOCYTES # BLD AUTO: 32.4 % — SIGNIFICANT CHANGE UP (ref 13–44)
MCHC RBC-ENTMCNC: 27.7 PG — SIGNIFICANT CHANGE UP (ref 27–34)
MCHC RBC-ENTMCNC: 31.4 GM/DL — LOW (ref 32–36)
MCV RBC AUTO: 88.3 FL — SIGNIFICANT CHANGE UP (ref 80–100)
MONOCYTES # BLD AUTO: 0.57 K/UL — SIGNIFICANT CHANGE UP (ref 0–0.9)
MONOCYTES NFR BLD AUTO: 7.1 % — SIGNIFICANT CHANGE UP (ref 2–14)
NEUTROPHILS # BLD AUTO: 4.24 K/UL — SIGNIFICANT CHANGE UP (ref 1.8–7.4)
NEUTROPHILS NFR BLD AUTO: 52.6 % — SIGNIFICANT CHANGE UP (ref 43–77)
NITRITE UR-MCNC: NEGATIVE — SIGNIFICANT CHANGE UP
NRBC # BLD: 0 /100 WBCS — SIGNIFICANT CHANGE UP (ref 0–0)
PCP SPEC-MCNC: SIGNIFICANT CHANGE UP
PH UR: 6 — SIGNIFICANT CHANGE UP (ref 5–8)
PLATELET # BLD AUTO: 258 K/UL — SIGNIFICANT CHANGE UP (ref 150–400)
POTASSIUM SERPL-MCNC: 3.7 MMOL/L — SIGNIFICANT CHANGE UP (ref 3.5–5.3)
POTASSIUM SERPL-SCNC: 3.7 MMOL/L — SIGNIFICANT CHANGE UP (ref 3.5–5.3)
PROT UR-MCNC: NEGATIVE — SIGNIFICANT CHANGE UP
RBC # BLD: 5.23 M/UL — SIGNIFICANT CHANGE UP (ref 4.2–5.8)
RBC # FLD: 13.2 % — SIGNIFICANT CHANGE UP (ref 10.3–14.5)
SALICYLATES SERPL-MCNC: <2 MG/DL — LOW (ref 15–30)
SARS-COV-2 RNA SPEC QL NAA+PROBE: SIGNIFICANT CHANGE UP
SODIUM SERPL-SCNC: 141 MMOL/L — SIGNIFICANT CHANGE UP (ref 135–145)
SP GR SPEC: 1.01 — SIGNIFICANT CHANGE UP (ref 1.01–1.02)
UROBILINOGEN FLD QL: NEGATIVE — SIGNIFICANT CHANGE UP
WBC # BLD: 8.05 K/UL — SIGNIFICANT CHANGE UP (ref 3.8–10.5)
WBC # FLD AUTO: 8.05 K/UL — SIGNIFICANT CHANGE UP (ref 3.8–10.5)

## 2020-05-28 PROCEDURE — 90792 PSYCH DIAG EVAL W/MED SRVCS: CPT

## 2020-05-28 PROCEDURE — 93005 ELECTROCARDIOGRAM TRACING: CPT

## 2020-05-28 PROCEDURE — 80307 DRUG TEST PRSMV CHEM ANLYZR: CPT

## 2020-05-28 PROCEDURE — 81003 URINALYSIS AUTO W/O SCOPE: CPT

## 2020-05-28 PROCEDURE — 99285 EMERGENCY DEPT VISIT HI MDM: CPT

## 2020-05-28 PROCEDURE — 80048 BASIC METABOLIC PNL TOTAL CA: CPT

## 2020-05-28 PROCEDURE — 85027 COMPLETE CBC AUTOMATED: CPT

## 2020-05-28 RX ORDER — ACETAMINOPHEN 500 MG
650 TABLET ORAL ONCE
Refills: 0 | Status: COMPLETED | OUTPATIENT
Start: 2020-05-28 | End: 2020-05-28

## 2020-05-28 RX ADMIN — Medication 650 MILLIGRAM(S): at 16:03

## 2020-05-28 NOTE — ED BEHAVIORAL HEALTH ASSESSMENT NOTE - HPI (INCLUDE ILLNESS QUALITY, SEVERITY, DURATION, TIMING, CONTEXT, MODIFYING FACTORS, ASSOCIATED SIGNS AND SYMPTOMS)
Pt is a 59 y.o. Citizen of the [place] speaking CM, single, non-caregiver, domiciled in private residence with brother and mother with HHA, on SSI, PPHx of depression and schizophrenia for approximately 25 years, followed by Dr. Josh Webb outpt, last known psych hospitalization at [facility] from 6/10-6/25/2019 for depression, no known SA/SIB, on prolixin 10 mg BID, effexor 150 mg, cogentin 2 mg, haldol 5mg, and hydroxizine 25 mg TID, PMHx of DM, HLD, HTN, pt reports vaping, no other substance use, no legal hx/hx of violence, BIB brother to ED for AH and suicidal statements. On interview, pt is calm, engaged, and cooperative. Pt is A&Ox2-3, roughly oriented to time and place. Pt reports voices making statements like "look at how others are doing so much and you're not." At this time, pt denying SIIP and pt does not have a plan; pt able to identify mother and brother as protective factors and reasons for living. Pt denies HIIP and VH, but endorsing sleep disturbances and some anxious feelings. Pt states his goals are to get better medication management as he is not doing well on his current meds.  Denies access to firearms.    Collateral obtained from brother, Deo Webb and pt's nephew, Josh. Per brother, pt has not been sleeping, staying in bed all day, depressed, and having nightmares. No acute stressors/changes identified. Pt has had several med changes in the past few weeks (clonazepam-->diazepam-->haldol). Family has held off on bringing pt into hospital d/t COVID, but feel he has worsened over the past 3 months. Pt more combative with mother, resisting ADLs. Denies access to firearms. Denies hx of SA. Brother states pt does not have aggressive nature. At this time, pt does not meet criteria for admission and family was notified of [facility] outpatient walk in clinic for better medication management and educated that in case of worsening condition/emergency, to return to ED. Pt is a 59 y.o. Malawian speaking CM, single, non-caregiver, domiciled in private residence with brother and mother with HHA, on SSI, PPHx of depression and schizophrenia for approximately 25 years, followed by Dr. Josh Webb outpt, last known psych hospitalization at [facility] from 6/10-6/25/2019 for depression, no known SA/SIB, on prolixin 10 mg BID, effexor 150 mg, cogentin 2 mg, haldol 5mg, and hydroxizine 25 mg TID, PMHx of DM, HLD, HTN, pt reports vaping, no other substance use, no legal hx/hx of violence, BIB brother to ED for AH and suicidal statements. On interview, pt is calm, engaged, and cooperative. Pt is A&Ox2-3, roughly oriented to time and place. Pt reports voices making statements like "look at how others are doing so much and you're not." At this time, pt denying SIIP and pt does not have a plan; pt able to identify mother and brother as protective factors and reasons for living. Pt denies HIIP and VH, but endorsing sleep disturbances and some anxious feelings. Pt states his goals are to get better medication management as he is not doing well on his current meds.  Denies access to firearms. no hx suicide attempts     Collateral obtained from brother, Deo Webb and pt's nephew, Josh. Per brother, pt has not been sleeping, staying in bed all day, depressed, and having nightmares. No acute stressors/changes identified. Pt has had several med changes in the past few weeks (clonazepam-->diazepam-->haldol). Family has held off on bringing pt into hospital d/t COVID, but feel he has worsened over the past 3 months. Pt more confrontational with mother, however not physically aggressive, not threatening to hurt family. Pt at times resisting ADLs, but does takes showers,changes clothing,eating well.  Denies access to firearms. Denies hx of SA. Brother states pt does not have aggressive nature. At this time, pt does not meet criteria for admission and family was notified of [facility] outpatient walk in clinic for better medication management and educated that in case of worsening condition/emergency, to return to ED

## 2020-05-28 NOTE — ED PROVIDER NOTE - PROGRESS NOTE DETAILS
Attending MD Baugh.  Pt signed out to me in stable condition pending psych recs, [facility] [phone], depressed, schizophrenia, speaks mostly Namibian. Hanna LESLIE: Spoke with Psych; patient clear for d/c home and outpatient follow-up.  In process of finishing Psych note. Hanna LESLIE: Spoke with Psych; patient clear for d/c home and outpatient follow-up.  In process of finishing Psych note..

## 2020-05-28 NOTE — ED BEHAVIORAL HEALTH ASSESSMENT NOTE - REFERRAL / APPOINTMENT DETAILS
Family and pt referred to [facility] outpt walk in clinic for further med management [phone]

## 2020-05-28 NOTE — ED PROVIDER NOTE - PATIENT PORTAL LINK FT
You can access the FollowMyHealth Patient Portal offered by [facility] by registering at the following website: [email or web address]. By joining [employer]’s FollowMyHealth portal, you will also be able to view your health information using other applications (apps) compatible with our system.

## 2020-05-28 NOTE — ED PROVIDER NOTE - CLINICAL SUMMARY MEDICAL DECISION MAKING FREE TEXT BOX
59 Y M with hx of depression and schizophrenia here with worsening depression and SI. Will get labs for psych clearance, COVID swab, 1 to 1 and pt will likely need admission for SI. 59 Y M with hx of depression and schizophrenia here with worsening depression and SI. Will get labs for psych clearance, COVID swab, 1 to 1 and pt will likely need admission for SI.  Marilyn: 59 hx of depression and schizo expressing SI and depression. will put on 1 to 1 and get psych involved. no medical issues active

## 2020-05-28 NOTE — ED ADULT NURSE NOTE - OBJECTIVE STATEMENT
59 year old man BIB brother with insomnia and depression; A&O; denies any active suicidal plan or intent at this time, he is hearing voices telling him to harm himself; denies ETOH and drug use; axis III: HTN. This is a Mauritanian speaking only pt,  utilized, he is cooperative and compliant; states "I have not been sleeping, I hear voices telling me to hang myself, I have a HX of schizophrenia and depression, I take Effexor and Zyprexa and I have been prescribed clonazepam but don't take it, I see a psychiatrist regularly"; states he was last hospitalized a few years ago, has a HX of "hanging myself with a belt" but could not provide time frame; fair historian but insight appears limited; secuirty applied metal detection and belonigs taken; continue to monitor. 59 year old man BIB brother with insomnia and depression; A&O; denies any active suicidal plan or intent at this time, he is hearing voices telling him to harm himself; denies ETOH and drug use; axis III: HTN. This is a Cymro speaking only pt,  utilized, he is cooperative and compliant, somewhat restless and anxious ; states "I have not been sleeping, I hear voices telling me to hang myself, I have a HX of schizophrenia and depression, I take Effexor and Zyprexa and I have been prescribed clonazepam but don't take it, I see a psychiatrist regularly"; states he was last hospitalized a few years ago, states has a HX of "hanging myself with a belt" but could not provide time frame; fair historian but insight appears limited; security applied metal detection and belongings taken; continue to monitor.

## 2020-05-28 NOTE — ED BEHAVIORAL HEALTH ASSESSMENT NOTE - REMOTE MEMORY
Vascular consult called per attending and GI request for ? pseudoaneurysm near pancreas. Since no evidence of pseudoaneurysm on MRI Abdomen no vascular surgery consult warranted as per surgery. Attending aware. Normal

## 2020-05-28 NOTE — ED BEHAVIORAL HEALTH ASSESSMENT NOTE - SAFETY PLAN DETAILS
Please return to ED if pt's condition worsens or if pt becomes danger to self/others/ is unable to care for self

## 2020-05-28 NOTE — ED PROVIDER NOTE - ATTENDING CONTRIBUTION TO CARE
I performed a history and physical exam of the patient and discussed their management with the resident and /or advanced care provider. I reviewed the resident and /or ACP's note and agree with the documented findings and plan of care. My medical decision making and observations are found above.  Lungs clear, talking

## 2020-05-28 NOTE — ED PROVIDER NOTE - NSFOLLOWUPINSTRUCTIONS_ED_ALL_ED_FT
Follow-up with your [facility] as detailed above within 24 - 48 hours.  Please return to the Emergency Department immediately for any new, worsening or concerning symptoms.    Copy of your lab work, imaging and/or other testing performed in the ER is attached along with your discharge paperwork.  Please take this to your Primary Care Physician for further discussion, evaluation and comparison with your prior blood work results.

## 2020-05-28 NOTE — ED PROVIDER NOTE - OBJECTIVE STATEMENT
59 Y M with hx of depression and schizophrenia, he says that he is feeling very depressed. He has been following with a doctor for this for many years. Pt admits to worsening thoughts of self harm, says that he is hearing voices that are telling him to harm himself. He says that he has prior suicide attempts where he tried to hang himself with a belt. He admits to prior psychiatric hospitalizations says that this was 30 years ago.

## 2020-05-28 NOTE — ED BEHAVIORAL HEALTH ASSESSMENT NOTE - SAFETY PLAN ADDT'L DETAILS
Education provided regarding environmental safety / lethal means restriction/Safety plan discussed with...

## 2020-05-28 NOTE — ED BEHAVIORAL HEALTH ASSESSMENT NOTE - SUICIDE PROTECTIVE FACTORS
Supportive social network of family or friends/Responsibility to family and others/Identifies reasons for living Fear of death or the actual act of killing self/Supportive social network of family or friends/Identifies reasons for living/Has future plans/Responsibility to family and others

## 2020-05-28 NOTE — ED BEHAVIORAL HEALTH ASSESSMENT NOTE - CASE SUMMARY
Pt is a 59 y.o. American speaking CM, single, non-caregiver, domiciled in private residence with brother and mother with HHA, on SSI, PPHx of depression and schizophrenia for approximately 25 years, followed by Dr. Josh peacockpt, last known psych hospitalization at [facility] from 6/10-6/25/2019 for depression, no known SA/SIB, on prolixin 10 mg BID, effexor 150 mg, cogentin 2 mg, haldol 5mg, and hydroxizine 25 mg TID, PMHx of DM, HLD, HTN, pt reports vaping, no other substance use, no legal hx/hx of violence, BIB brother to ED for AH and suicidal statements. On interview, pt is calm, engaged, and cooperative. Pt denies current si/hi, hallucinations. Pt could not identify any recent stressors. As per family pt has been more depressed, not engaging as much at home, they are not concerned pt would harm himself. they are unhappy with pt's current psychiatrist, and would like further med changes. pt has chronic depression, irritability. pt does not pose an acute threat to himself or others. pt doesn't warrant psychiatric admission at this time. pt can f/u at [facility] walk in clinic, med mgt.

## 2020-05-28 NOTE — ED BEHAVIORAL HEALTH ASSESSMENT NOTE - RISK ASSESSMENT
Risk factors: + h/o psych admissions, long standing hx of mood disorder and schizophrenia    Protective factors: no current SIIP/HIIP, no h/o SA/SIB, no access to weapons, no active substance abuse, domiciled, social supports, engaged in treatment, compliant with treatment    Overall, pt is a ___low/moderate/high___ risk of harm to self/others and ___ requires/does not meet criteria for psychiatric admission. Low Acute Suicide Risk Risk factors: + h/o psych admissions, long standing hx of mood disorder and schizophrenia    Protective factors: no current SIIP/HIIP, no h/o SA/SIB, no access to weapons, no active substance abuse, domiciled, social supports, engaged in treatment, compliant with treatment    Overall, pt is a  low risk

## 2020-05-28 NOTE — ED BEHAVIORAL HEALTH ASSESSMENT NOTE - SUMMARY
Pt is a 59 y.o. Omani speaking CM, single, non-caregiver, domiciled in private residence with brother and mother with HHA, on SSI, PPHx of depression and schizophrenia for approximately 25 years, followed by Dr. Josh Webb outpt, last known psych hospitalization at [facility] from 6/10-6/25/2019 for depression, no known SA/SIB, on prolixin 10 mg BID, effexor 150 mg, cogentin 2 mg, haldol 5mg, and hydroxizine 25 mg TID, PMHx of DM, HLD, HTN, pt reports vaping, no other substance use, no legal hx/hx of violence, BIB brother to ED for AH and suicidal statements. On interview, pt is calm, engaged, and cooperative. Pt is A&Ox2-3, roughly oriented to time and place. Pt reports voices making statements like "look at how others are doing so much and you're not." At this time, pt denying SIIP and pt does not have a plan; pt able to identify mother and brother as protective factors and reasons for living. Pt denies HIIP and VH, but endorsing sleep disturbances and some anxious feelings. Pt states his goals are to get better medication management as he is not doing well on his current meds.  Denies access to firearms.    Collateral obtained from brother, Deo Webb and pt's nephew, Josh. Per brother, pt has not been sleeping, staying in bed all day, depressed, and having nightmares. No acute stressors/changes identified. Pt has had several med changes in the past few weeks (clonazepam-->diazepam-->haldol). Family has held off on bringing pt into hospital d/t COVID, but feel he has worsened over the past 3 months. Pt more combative with mother, resisting ADLs. Denies access to firearms. Denies hx of SA. Brother states pt does not have aggressive nature. At this time, pt does not meet criteria for admission and family was notified of [facility] outpatient walk in clinic for better medication management and educated that in case of worsening condition/emergency, to return to ED. Pt is a 59 y.o. Grenadian speaking CM, single, non-caregiver, domiciled in private residence with brother and mother with HHA, on SSI, PPHx of depression and schizophrenia for approximately 25 years, followed by Dr. Josh Webb outpt, last known psych hospitalization at [facility] from 6/10-6/25/2019 for depression, no known SA/SIB, on prolixin 10 mg BID, effexor 150 mg, cogentin 2 mg, haldol 5mg, and hydroxizine 25 mg TID, PMHx of DM, HLD, HTN, pt reports vaping, no other substance use, no legal hx/hx of violence, BIB brother to ED for AH and suicidal statements. On interview, pt is calm, engaged, and cooperative. Pt is A&Ox2-3, roughly oriented to time and place. Pt reports voices making statements like "look at how others are doing so much and you're not." At this time, pt denying SIIP and pt does not have a plan; pt able to identify mother and brother as protective factors and reasons for living. Pt denies HIIP and VH, but endorsing sleep disturbances and some anxious feelings. Pt states his goals are to get better medication management as he is not doing well on his current meds.  Denies access to firearms.    Collateral obtained from brother, Deo Webb and pt's nephew, Josh. At this time, pt does not meet criteria for admission and family was notified of [facility] outpatient walk in clinic for better medication management and educated that in case of worsening condition/emergency, to return to ED.

## 2020-05-28 NOTE — ED PROVIDER NOTE - NSFOLLOWUPCLINICS_GEN_ALL_ED_FT
[facility]  Psychiatry  [id number] [place] [address]  Phone: [phone]  Fax:   Follow Up Time: 1-3 Days

## 2020-07-07 ENCOUNTER — INPATIENT (INPATIENT)
Facility: HOSPITAL | Age: 60
LOS: 14 days | Discharge: HOME | End: 2020-07-22
Attending: PSYCHIATRY & NEUROLOGY | Admitting: HOSPITALIST
Payer: MEDICAID

## 2020-07-07 VITALS
DIASTOLIC BLOOD PRESSURE: 91 MMHG | HEART RATE: 79 BPM | OXYGEN SATURATION: 100 % | SYSTOLIC BLOOD PRESSURE: 130 MMHG | RESPIRATION RATE: 16 BRPM | TEMPERATURE: 99 F

## 2020-07-07 DIAGNOSIS — F20.9 SCHIZOPHRENIA, UNSPECIFIED: ICD-10-CM

## 2020-07-07 LAB
ALBUMIN SERPL ELPH-MCNC: 4.7 G/DL — SIGNIFICANT CHANGE UP (ref 3.3–5)
ALP SERPL-CCNC: 85 U/L — SIGNIFICANT CHANGE UP (ref 40–120)
ALT FLD-CCNC: 28 U/L — SIGNIFICANT CHANGE UP (ref 4–41)
AMPHET UR-MCNC: NEGATIVE — SIGNIFICANT CHANGE UP
ANION GAP SERPL CALC-SCNC: 12 MMO/L — SIGNIFICANT CHANGE UP (ref 7–14)
APAP SERPL-MCNC: < 15 UG/ML — LOW (ref 15–25)
APPEARANCE UR: CLEAR — SIGNIFICANT CHANGE UP
AST SERPL-CCNC: 19 U/L — SIGNIFICANT CHANGE UP (ref 4–40)
BARBITURATES UR SCN-MCNC: NEGATIVE — SIGNIFICANT CHANGE UP
BASOPHILS # BLD AUTO: 0.06 K/UL — SIGNIFICANT CHANGE UP (ref 0–0.2)
BASOPHILS NFR BLD AUTO: 0.6 % — SIGNIFICANT CHANGE UP (ref 0–2)
BENZODIAZ UR-MCNC: NEGATIVE — SIGNIFICANT CHANGE UP
BILIRUB SERPL-MCNC: 0.4 MG/DL — SIGNIFICANT CHANGE UP (ref 0.2–1.2)
BILIRUB UR-MCNC: NEGATIVE — SIGNIFICANT CHANGE UP
BLOOD UR QL VISUAL: NEGATIVE — SIGNIFICANT CHANGE UP
BUN SERPL-MCNC: 19 MG/DL — SIGNIFICANT CHANGE UP (ref 7–23)
CALCIUM SERPL-MCNC: 10.4 MG/DL — SIGNIFICANT CHANGE UP (ref 8.4–10.5)
CANNABINOIDS UR-MCNC: NEGATIVE — SIGNIFICANT CHANGE UP
CHLORIDE SERPL-SCNC: 97 MMOL/L — LOW (ref 98–107)
CO2 SERPL-SCNC: 26 MMOL/L — SIGNIFICANT CHANGE UP (ref 22–31)
COCAINE METAB.OTHER UR-MCNC: NEGATIVE — SIGNIFICANT CHANGE UP
COLOR SPEC: COLORLESS — SIGNIFICANT CHANGE UP
CREAT SERPL-MCNC: 1.13 MG/DL — SIGNIFICANT CHANGE UP (ref 0.5–1.3)
EOSINOPHIL # BLD AUTO: 0.6 K/UL — HIGH (ref 0–0.5)
EOSINOPHIL NFR BLD AUTO: 5.5 % — SIGNIFICANT CHANGE UP (ref 0–6)
ETHANOL BLD-MCNC: < 10 MG/DL — SIGNIFICANT CHANGE UP
GLUCOSE SERPL-MCNC: 159 MG/DL — HIGH (ref 70–99)
GLUCOSE UR-MCNC: NEGATIVE — SIGNIFICANT CHANGE UP
HCT VFR BLD CALC: 40.6 % — SIGNIFICANT CHANGE UP (ref 39–50)
HGB BLD-MCNC: 13.7 G/DL — SIGNIFICANT CHANGE UP (ref 13–17)
IMM GRANULOCYTES NFR BLD AUTO: 0.6 % — SIGNIFICANT CHANGE UP (ref 0–1.5)
KETONES UR-MCNC: NEGATIVE — SIGNIFICANT CHANGE UP
LEUKOCYTE ESTERASE UR-ACNC: NEGATIVE — SIGNIFICANT CHANGE UP
LYMPHOCYTES # BLD AUTO: 3.83 K/UL — HIGH (ref 1–3.3)
LYMPHOCYTES # BLD AUTO: 35.4 % — SIGNIFICANT CHANGE UP (ref 13–44)
MCHC RBC-ENTMCNC: 28.7 PG — SIGNIFICANT CHANGE UP (ref 27–34)
MCHC RBC-ENTMCNC: 33.7 % — SIGNIFICANT CHANGE UP (ref 32–36)
MCV RBC AUTO: 84.9 FL — SIGNIFICANT CHANGE UP (ref 80–100)
METHADONE UR-MCNC: NEGATIVE — SIGNIFICANT CHANGE UP
MONOCYTES # BLD AUTO: 0.78 K/UL — SIGNIFICANT CHANGE UP (ref 0–0.9)
MONOCYTES NFR BLD AUTO: 7.2 % — SIGNIFICANT CHANGE UP (ref 2–14)
NEUTROPHILS # BLD AUTO: 5.49 K/UL — SIGNIFICANT CHANGE UP (ref 1.8–7.4)
NEUTROPHILS NFR BLD AUTO: 50.7 % — SIGNIFICANT CHANGE UP (ref 43–77)
NITRITE UR-MCNC: NEGATIVE — SIGNIFICANT CHANGE UP
NRBC # FLD: 0 K/UL — SIGNIFICANT CHANGE UP (ref 0–0)
OPIATES UR-MCNC: NEGATIVE — SIGNIFICANT CHANGE UP
OXYCODONE UR-MCNC: NEGATIVE — SIGNIFICANT CHANGE UP
PCP UR-MCNC: NEGATIVE — SIGNIFICANT CHANGE UP
PH UR: 6.5 — SIGNIFICANT CHANGE UP (ref 5–8)
PLATELET # BLD AUTO: 219 K/UL — SIGNIFICANT CHANGE UP (ref 150–400)
PMV BLD: 11.4 FL — SIGNIFICANT CHANGE UP (ref 7–13)
POTASSIUM SERPL-MCNC: 3.8 MMOL/L — SIGNIFICANT CHANGE UP (ref 3.5–5.3)
POTASSIUM SERPL-SCNC: 3.8 MMOL/L — SIGNIFICANT CHANGE UP (ref 3.5–5.3)
PROT SERPL-MCNC: 7.1 G/DL — SIGNIFICANT CHANGE UP (ref 6–8.3)
PROT UR-MCNC: NEGATIVE — SIGNIFICANT CHANGE UP
RBC # BLD: 4.78 M/UL — SIGNIFICANT CHANGE UP (ref 4.2–5.8)
RBC # FLD: 13.1 % — SIGNIFICANT CHANGE UP (ref 10.3–14.5)
SALICYLATES SERPL-MCNC: < 5 MG/DL — LOW (ref 15–30)
SARS-COV-2 RNA SPEC QL NAA+PROBE: SIGNIFICANT CHANGE UP
SODIUM SERPL-SCNC: 135 MMOL/L — SIGNIFICANT CHANGE UP (ref 135–145)
SP GR SPEC: 1.01 — SIGNIFICANT CHANGE UP (ref 1–1.04)
TSH SERPL-MCNC: 2.28 UIU/ML — SIGNIFICANT CHANGE UP (ref 0.27–4.2)
UROBILINOGEN FLD QL: NORMAL — SIGNIFICANT CHANGE UP
WBC # BLD: 10.82 K/UL — HIGH (ref 3.8–10.5)
WBC # FLD AUTO: 10.82 K/UL — HIGH (ref 3.8–10.5)

## 2020-07-07 PROCEDURE — 70450 CT HEAD/BRAIN W/O DYE: CPT | Mod: 26

## 2020-07-07 RX ORDER — ACETAMINOPHEN 500 MG
650 TABLET ORAL ONCE
Refills: 0 | Status: COMPLETED | OUTPATIENT
Start: 2020-07-07 | End: 2020-07-07

## 2020-07-07 RX ADMIN — Medication 650 MILLIGRAM(S): at 23:01

## 2020-07-07 NOTE — ED ADULT NURSE REASSESSMENT NOTE - NS ED NURSE REASSESS COMMENT FT1
Pt remains awake, calm and cooperative. labs drawn, CThead completed, results pending. Pt c/o HA, requesting PO medications, NP Danya made aware, awaiting orders. Will continue to monitor for safety.

## 2020-07-07 NOTE — ED BEHAVIORAL HEALTH ASSESSMENT NOTE - OTHER PAST PSYCHIATRIC HISTORY (INCLUDE DETAILS REGARDING ONSET, COURSE OF ILLNESS, INPATIENT/OUTPATIENT TREATMENT)
4 in-Pt psych admissions to [facility]  no documented hx of SA or self injurious behavior  OP psychiatrist listed is: Dr Josh Webb

## 2020-07-07 NOTE — ED BEHAVIORAL HEALTH NOTE - BEHAVIORAL HEALTH NOTE
**Telephone Contact Note**    Obtained collateral from patient's brother Bertrand, [phone].    Per Bertrand, patient lives in the home with him, their mother, and Bertrand's children. For the past 2 nights, patient has not been sleeping at all. He has been aggressive, fighting with mom, and has been reporting CAH of voices telling him to kill himself. He has been showering only 1x/week at the urging of his brother, and has been spending all day in bed, withdrawn. Food intake has been okay. Patient has also had a few falls lately; he has been reporting dizziness/lightheadedness and brother is concerned this may be a result of medication. Brother was unaware of the name of patient's outpatient psychiatrist but was able to read a list of his medications by looking at his pill bottles: Haldol 5mg, Cogentin, Klonopin 0.5mg, venlafaxine, fluphenazine, hydroxyzine. In addition, patient takes metformin, metoprolol, atorvastatin, losartan, senna, and bisacodyl.     Patient has been reporting a "burning" feeling in his head as well. As far as brother knows, there has been no recent substance use, including alcohol and illicit substances.         Tami Rizvi M.D.   PGY-3 Psychiatry Resident

## 2020-07-07 NOTE — ED BEHAVIORAL HEALTH ASSESSMENT NOTE - SUICIDE RISK FACTORS
Unable to engage in safety planning/Insomnia/Hopelessness or despair/Recent onset of current/past psychiatric diagnosis/Psychotic disorder current/past/Mood Disorder current/past

## 2020-07-07 NOTE — ED BEHAVIORAL HEALTH ASSESSMENT NOTE - HPI (INCLUDE ILLNESS QUALITY, SEVERITY, DURATION, TIMING, CONTEXT, MODIFYING FACTORS, ASSOCIATED SIGNS AND SYMPTOMS)
The Pt is a 59 yr old  male (Mexican speaking only), single, domiciled and unemployed.  Has long hx of schizophrenia, multiple past psych admissions (including 4 [facility] admissions last discharged from , 6/10-6/25/2019 due to depressed mood, anhedonia and restlessness.  Pt was previously seen at [facility] ED by Psych service (on 5/28/2020) for AH and SI but was subsequently cleared for discharge during that encounter.  Per chart review, there has been no hx of SA or self injurious behavior but the Pt claims that last yr, he OD on pills.  Pt has no hx of substance abuse sans for intermittent cigarettes PRN to "calm himself".  He has no hx of violence or legal issues.  Pertinent medical issues include: HTN, DM and HLD.  Today, self presented to the ED accompanied by brother due to worsening AH.      He is seen at the triage along with  staff.  Appears cooperative, jittery, disheveled.  Claims that he has been hearing voices since he was in his mid 20's but lately, for the past few months, the intensity of these voices have worsening to an extent that it has made him feel intermittently sad and anxious.  For the past few days now, Pt claims that the voices have been telling him to kill himself.  This morning, voices told him to hang himself.  He reportedly ignored the voices and became increasingly distraught.  He also adds that the voices were telling him to kill his elderly mother.  This evening, upon the arrival of his brother at home, he told the brother that the voices have been increasingly getting more bothersome.  They decided to come to the hospital.      Pt claimed that he has been off his meds for around 5 days now.  He stated that taking these meds make him feel lethargic.  Pt also endorses feeling weak; low energy level - "staying in bed often" as well as decreased appetite.  Described mood as "on and off" - sad at times, at times, "feeling ok".  Currently, denies harboring any SI/HI.  Denied experiencing any VH or any other perceptual disturbances.  Reports feeling anxious but denied having any specific anxiety disorder symptoms.  In addition, he reported feeling dizzy at times.     Per chart review (from the last [facility] ED encounter on 5/28/2020), Pt reported that voices were commentary in nature: "Look at how others are doing so much and you're not."  Collateral information was gathered from the brother who reported during that encounter that Pt has not been sleeping, depressed, withdrawn and having nightmares.  There was reported psych meds changes for the past fewl weeks (Klonopin --> Valium -->Haldol). Brother reported that the Pt has been progressively worsening over the past 3 months but with held option of taking Pt to the hospital due to the pandemic.  Pt was also reported to have been increasingly agitated, confrontational with the mother.  No reported physical aggression or threats made by Pt towards family. ADLs were reported to have been compromised.  During that encounter, Psych service did not deemed Pt to meet criteria for in-Pt psych admission.  Pt was discharged and family was notified of [facility] outpatient walk in clinic for better medication management.      COLLATERAL INFORMATION WAS AGAIN OBTAINED FROM THE BROTHER - SEE SEPARATE MD NOTES FOR DETAILS   brother corroborated accounts provided by the Pt pertaining to the worsening of the AH.  Brother is aware of the nature of the Pt's command AH and decided to bring Pt to the ED today.  He added that the Pt has not been sleeping well, not taking care of himself.  No HI nor any violence noted.  in view of Pt's worsening symptoms, family advocates for in-Pt psych admission

## 2020-07-07 NOTE — ED BEHAVIORAL HEALTH ASSESSMENT NOTE - CURRENT MEDICATION
Per his last visit at [facility] ED, meds listed were as follows: Prolixin 10 mg BID, Effexor  mg daily, Cogentin 2 mg daily, Haldol 5mg daily, and Hydroxyzine 25 mg TID, Melatonin 3mg HS PRN and ? Klonopin 0.5mg HS (per [facility], no recent controlled substance prescribed)

## 2020-07-07 NOTE — ED BEHAVIORAL HEALTH ASSESSMENT NOTE - OTHER
[facility], [facility] Reference #: [id number] - NO RECENT CONTROLLED SUBSTANCES PRESCRIBED. LAST PRESCRIBED ON 07/23/2019 and filled on 07/25/2019 with clonazepam 0.5 mg x 60 tablets for 30 days Josh Webb MD Medicaid [facility] brother dishlindad pt's speech not clear

## 2020-07-07 NOTE — ED PROVIDER NOTE - CLINICAL SUMMARY MEDICAL DECISION MAKING FREE TEXT BOX
This is a  59 yr old M, pmh schizophrenia, htn, hld, dm with SI/HI and aggression. Pt Bahraini speaking pacific  service used - ID [id number] Nazanin. Pt states couple of month ago the voices were telling him to kill his mother, who he lives with. He states the voices were commanding him to hang himself today.   Labs, ekg, imaging study, medication, psych consult. This is a  59 yr old M, pmh schizophrenia, htn, hld, dm with SI/HI and aggression. Pt Spanish speaking pacific  service used - ID [id number] Nazanin. Pt states couple of month ago the voices were telling him to kill his mother, who he lives with. He states the voices were commanding him to hang himself today.   Labs, ekg, imaging study, medication, psych consult.- recommendation inpatient treatment This is a  59 yr old M, pmh schizophrenia, htn, hld, dm with SI/HI and aggression. Pt Montserratian speaking pacific  service used - ID [id number] Nazanin. Pt states couple of month ago the voices were telling him to kill his mother, who he lives with. He states the voices were commanding him to hang himself today.   Labs, ekg, imaging study, - results WNL, medication, psych consult.- recommendation inpatient treatment

## 2020-07-07 NOTE — ED BEHAVIORAL HEALTH ASSESSMENT NOTE - DESCRIPTION
Since his  ED arrival, the Pt has been jittery but cooperative.  There has been no agitation/aggressive behavior.  No verbalization of active/ passive SI/HI.   Pt is not showing any signs/symptoms of intoxication or withdrawal.  Pt is not delirious.  He has not tested limits.. Has maintained appropriate boundaries. Pt has been easily redirected.  Overall, there has been no management issues.    Vital Signs Last 24 Hrs  T(C): 37.2 (07 Jul 2020 21:19), Max: 37.2 (07 Jul 2020 21:19)  T(F): 99 (07 Jul 2020 21:19), Max: 99 (07 Jul 2020 21:19)  HR: 79 (07 Jul 2020 21:19) (79 - 79)  BP: 130/91 (07 Jul 2020 21:19) (130/91 - 130/91)  BP(mean): --  RR: 16 (07 Jul 2020 21:19) (16 - 16)  SpO2: 100% (07 Jul 2020 21:19) (100% - 100%) HTN, DM, HLD single, no children, lives with brother and mother

## 2020-07-07 NOTE — ED ADULT NURSE NOTE - LANGUAGE ASSISTANCE NEEDED
Chief Complaint   Patient presents with   • Eye Exam     Patient is here today for a complete exam. Patient uses pataday in both eyes daily. He is not having any changes or problems with vision       ROS:  1. Do you have pain?  no  2. Do you have fever, chills, sweats or weight change? no  3. Do you have headaches or seizures? no  4. Do you have ringing in your ear/s or hearing loss? no  5. Do you have chest pain, palpitations or heart murmur? no  6. Do you have shortness of breath or wheezing? no  7. Do you have abdominal pain, nausea, vomiting, diarrhea or constipation? no  8. Do you have pain, frequent or difficulty with urination? no  9. Do you have joint or back pain? no  10. Do you have weakness, numbness or tingling? no  11. Do you have skin changes such as a rash? no  12. Do you experience easy bruising or bleeding? no  13. Are you depressed? no  03/24/17  Patient: Kodak Gaspar  YOB: 1963  53 year old  Race: White   Gender: male    HISTORY OF PRESENT ILLNESS: The patient is a pleasant 53 year old  who comes in today for a complete eye exam noting no visual complaints. He is continuing to use Pataday for chronic ocular allergies.     Past Medical History:   Diagnosis Date   • Allergy    • Dyslipidemia    • Essential (primary) hypertension    • High cholesterol    • Sinusitis, chronic      Past Surgical History:   Procedure Laterality Date   • BACK SURGERY  2001   • COLONOSCOPY  12-11-14    Repeat in 5 years     Family History   Problem Relation Age of Onset   • Diabetes Mother    • Kidney disease Father    • Heart disease Father    • Diabetes Sister    • High blood pressure Sister    • Diabetes Brother    • High blood pressure Brother    • Aneurysm Paternal Grandmother    • Heart attack Paternal Grandfather    • Cataracts Maternal Grandmother          ALLERGIES:  No Known Allergies    Current Medications    ASPIRIN 81 MG TABLET    Take 81 mg by mouth daily.    ATORVASTATIN (LIPITOR) 10 MG  TABLET    Take 1 tablet by mouth daily.    CETIRIZINE HCL (ZYRTEC PO)    Take  by mouth.    FLUTICASONE (FLONASE) 50 MCG/ACT NASAL SPRAY    INSTILL 2 SPRAYS INTO EACH NOSTRIL ONCE DAILY    LISINOPRIL-HYDROCHLOROTHIAZIDE (PRINZIDE,ZESTORETIC) 10-12.5 MG PER TABLET    Take 1 tablet by mouth daily.    MULTIPLE VITAMINS-MINERALS (MULTIVITAMIN PO)        OLOPATADINE HCL (PATADAY) 0.2 % OPHTHALMIC SOLUTION    Place 1 drop into both eyes daily.    PSEUDOEPHEDRINE (SUDAFED 12 HOUR) 120 MG 12 HR TABLET    Take 120 mg by mouth every 12 hours as needed.      Patient Active Problem List   Diagnosis   • Allergic rhinitis (trees/weeds/grasses/mold) skin test 7/2013   • Nonallergic rhinitis   • Cough   • Hyperlipidemia   • HTN (hypertension)   • Chronic allergic conjunctivitis   • Pain in limb   • Nasal congestion   • Benign neoplasm of rectum and anal canal   • Diverticulosis   • Obesity (BMI 30-39.9)   • Impaired fasting glucose       Social History     Social History   • Marital status:      Spouse name: N/A   • Number of children: N/A   • Years of education: N/A     Occupational History   • Not on file.     Social History Main Topics   • Smoking status: Never Smoker   • Smokeless tobacco: Never Used      Comment: no pets   • Alcohol use No   • Drug use: Not on file   • Sexual activity: Not on file     Other Topics Concern   • Not on file     Social History Narrative             Assessment   (H52.13,  H52.203) Myopia with astigmatism and presbyopia, bilateral  (primary encounter diagnosis)    (Z98.890) Hx of LASIK         Plan   Recommended updating glasses prescription and educated on adaptation to new prescription.        Return in about 1 year (around 3/24/2018) for routine eye exam.    Maureen Frank [address]  [place] [address] [phone]         No-Patient/Caregiver offered and refused free interpretation services.

## 2020-07-07 NOTE — ED ADULT TRIAGE NOTE - CHIEF COMPLAINT QUOTE
Pt. not sleeping, aggressive towards mom.  Pt. admitted to hearing voices " Pt. stated voices told him to kill" admitted to thoughts of hurting mom. Denies any drug or alcohol use. no recent sick contacts.

## 2020-07-07 NOTE — ED BEHAVIORAL HEALTH ASSESSMENT NOTE - SUMMARY
59/M with long hx of schizophrenia, multiple past psych admissions, no documented hx of SA or self injurious behavior but the Pt claims that last yr, he OD on pills.  Pt has no hx of substance abuse and no hx of violence or legal issues.  Today, self presented to the ED accompanied by brother due to worsening AH.      At this time, the Pt is exhibiting depressed mood + anxiety.  Said symptoms likely stemming from the worsening imperative AH that the Pt has been experiencing lately.  Likely precipitant is due to noncompliance to meds as there are no other discernible psychosocial stressors involved.  Pt's current presentation have led to significant functional impairment such that ADLs have been compromised.  The Pt has been withdrawn/ isolating self, not sleeping, not eating well.   Currently, does not appear to be delirious.  Is not intoxicated or exhibiting withdrawal symptoms.  Is not homicidal or suicidal currently.  However, Pt unable to partake towards safety planning and collateral information gathered from brother points out to Pt decompensating significantly.  As such, Pt will benefit from in-Pt psych admission for stabilization (via re-initiation of meds with adequate titration to desired effects) and ensuring safety.      RECOMMENDATIONS:   1. Restart Prolixin 10mg tonight then daily thereafter + Haldol 5mg daily;  Effexor XR 150mg daily, Cogentin at 0.5mg HS; Hydroxyzine 25mg TID and melatonin 3mg HS.  All antipsychotic and antidepressant meds to be titrated accordingly by primary in-Pt psych team once Pt is at the unit  2. PRN: Haldol 5mg PO/IM + Ativan 2mg PO/IM q6hrs PRN for psychotic agitation/ SEVERE PSYCHOTIC AGITATION  3. continue all other prescribed meds need for control of his BP, glucose and cholesterol:  Atorvastatin 20mg HS, Losartan 100mg daily, HCTZ  12.5 mg daily, ASA 81mg daily, Metformin XR 500mg daily, Metoprolol ER 100mg daily, cyanocobalamin 1000mcg daily, cholecalciferol oral tablet: 1000 unit(s) daily, multivitamin 1 tab daily, and docusate sodium 100mg TID  4. once medically cleared, facilitate [facility] 1N transfer on 9.39 status

## 2020-07-07 NOTE — ED BEHAVIORAL HEALTH ASSESSMENT NOTE - PAST PSYCHOTROPIC MEDICATION
During Pt's last admission to [facility] , he was discharged with Klonopin 0.5 mg BID, Prolixin 5 mg daily + Prolixin 10 mg HS, Effexor  mg daily, Remeron 30 mg HS, Melatonin 3 mg HS PRN

## 2020-07-07 NOTE — ED BEHAVIORAL HEALTH ASSESSMENT NOTE - DETAILS
JIM brother informed of dispo, ROLAND Hagan reports prior hx of OD on pills last yr Pt claimed that Klonopin "affected his memory" - hence, stopped taking it denied any Covid related signs/ symptoms see HPI for details

## 2020-07-07 NOTE — ED BEHAVIORAL HEALTH ASSESSMENT NOTE - ACTIVATING EVENTS/STRESSORS
Change in provider or treatment (i.e., medications, psychotherapy, milieu)/Non-compliant or not receiving treatment

## 2020-07-07 NOTE — ED ADULT NURSE NOTE - OBJECTIVE STATEMENT
55 yr male patient presents to  after being brought in by brother for SI with a plan to hang himself. primarily [place] Islander speaking. reports auditory hallucinations starting 3 months ago but today voices got stronger. patient reports being hospitalized 1 year ago for previous suicide attempt. patient calm and cooperative at this time. denies visual hallucinations. PMx. schizophrenia, HTN, HLD, DM. reports non compliance with Meds over the last 5 days. labs sent. Covid-19 swab. will continue to monitor.

## 2020-07-07 NOTE — ED BEHAVIORAL HEALTH ASSESSMENT NOTE - VIOLENCE PROTECTIVE FACTORS:
Insight into violence risk and need for management/treatment/Residential stability/Relationship stability/Engagement in treatment

## 2020-07-08 LAB
SARS-COV-2 IGG SERPL QL IA: NEGATIVE — SIGNIFICANT CHANGE UP
SARS-COV-2 IGM SERPL IA-ACNC: <3.8 AU/ML — SIGNIFICANT CHANGE UP

## 2020-07-08 RX ORDER — HYDROXYZINE HCL 10 MG
50 TABLET ORAL AT BEDTIME
Refills: 0 | Status: DISCONTINUED | OUTPATIENT
Start: 2020-07-08 | End: 2020-07-09

## 2020-07-08 RX ORDER — METOPROLOL TARTRATE 50 MG
100 TABLET ORAL DAILY
Refills: 0 | Status: DISCONTINUED | OUTPATIENT
Start: 2020-07-08 | End: 2020-07-22

## 2020-07-08 RX ORDER — LANOLIN ALCOHOL/MO/W.PET/CERES
3 CREAM (GRAM) TOPICAL AT BEDTIME
Refills: 0 | Status: DISCONTINUED | OUTPATIENT
Start: 2020-07-08 | End: 2020-07-22

## 2020-07-08 RX ORDER — ACETAMINOPHEN 500 MG
650 TABLET ORAL EVERY 6 HOURS
Refills: 0 | Status: DISCONTINUED | OUTPATIENT
Start: 2020-07-08 | End: 2020-07-22

## 2020-07-08 RX ORDER — BENZTROPINE MESYLATE 1 MG
0.5 TABLET ORAL AT BEDTIME
Refills: 0 | Status: DISCONTINUED | OUTPATIENT
Start: 2020-07-08 | End: 2020-07-22

## 2020-07-08 RX ORDER — ATORVASTATIN CALCIUM 80 MG/1
20 TABLET, FILM COATED ORAL ONCE
Refills: 0 | Status: COMPLETED | OUTPATIENT
Start: 2020-07-08 | End: 2020-07-08

## 2020-07-08 RX ORDER — VENLAFAXINE HCL 75 MG
150 CAPSULE, EXT RELEASE 24 HR ORAL DAILY
Refills: 0 | Status: DISCONTINUED | OUTPATIENT
Start: 2020-07-08 | End: 2020-07-22

## 2020-07-08 RX ORDER — ATORVASTATIN CALCIUM 80 MG/1
20 TABLET, FILM COATED ORAL AT BEDTIME
Refills: 0 | Status: DISCONTINUED | OUTPATIENT
Start: 2020-07-08 | End: 2020-07-22

## 2020-07-08 RX ORDER — HYDROXYZINE HCL 10 MG
25 TABLET ORAL THREE TIMES A DAY
Refills: 0 | Status: DISCONTINUED | OUTPATIENT
Start: 2020-07-08 | End: 2020-07-09

## 2020-07-08 RX ORDER — HALOPERIDOL DECANOATE 100 MG/ML
5 INJECTION INTRAMUSCULAR EVERY 6 HOURS
Refills: 0 | Status: DISCONTINUED | OUTPATIENT
Start: 2020-07-08 | End: 2020-07-22

## 2020-07-08 RX ORDER — ASPIRIN/CALCIUM CARB/MAGNESIUM 324 MG
81 TABLET ORAL DAILY
Refills: 0 | Status: DISCONTINUED | OUTPATIENT
Start: 2020-07-08 | End: 2020-07-22

## 2020-07-08 RX ORDER — METFORMIN HYDROCHLORIDE 850 MG/1
500 TABLET ORAL DAILY
Refills: 0 | Status: DISCONTINUED | OUTPATIENT
Start: 2020-07-08 | End: 2020-07-22

## 2020-07-08 RX ORDER — HYDROXYZINE HCL 10 MG
25 TABLET ORAL ONCE
Refills: 0 | Status: COMPLETED | OUTPATIENT
Start: 2020-07-08 | End: 2020-07-08

## 2020-07-08 RX ORDER — LANOLIN ALCOHOL/MO/W.PET/CERES
3 CREAM (GRAM) TOPICAL ONCE
Refills: 0 | Status: COMPLETED | OUTPATIENT
Start: 2020-07-08 | End: 2020-07-08

## 2020-07-08 RX ORDER — BENZTROPINE MESYLATE 1 MG
0.5 TABLET ORAL ONCE
Refills: 0 | Status: COMPLETED | OUTPATIENT
Start: 2020-07-08 | End: 2020-07-08

## 2020-07-08 RX ORDER — CHOLECALCIFEROL (VITAMIN D3) 125 MCG
1000 CAPSULE ORAL DAILY
Refills: 0 | Status: DISCONTINUED | OUTPATIENT
Start: 2020-07-08 | End: 2020-07-22

## 2020-07-08 RX ORDER — PREGABALIN 225 MG/1
1000 CAPSULE ORAL DAILY
Refills: 0 | Status: DISCONTINUED | OUTPATIENT
Start: 2020-07-08 | End: 2020-07-22

## 2020-07-08 RX ORDER — HALOPERIDOL DECANOATE 100 MG/ML
5 INJECTION INTRAMUSCULAR DAILY
Refills: 0 | Status: DISCONTINUED | OUTPATIENT
Start: 2020-07-08 | End: 2020-07-10

## 2020-07-08 RX ORDER — FLUPHENAZINE HYDROCHLORIDE 1 MG/1
10 TABLET, FILM COATED ORAL AT BEDTIME
Refills: 0 | Status: DISCONTINUED | OUTPATIENT
Start: 2020-07-08 | End: 2020-07-10

## 2020-07-08 RX ORDER — FLUPHENAZINE HYDROCHLORIDE 1 MG/1
10 TABLET, FILM COATED ORAL ONCE
Refills: 0 | Status: COMPLETED | OUTPATIENT
Start: 2020-07-08 | End: 2020-07-08

## 2020-07-08 RX ORDER — HYDROCHLOROTHIAZIDE 25 MG
12.5 TABLET ORAL DAILY
Refills: 0 | Status: DISCONTINUED | OUTPATIENT
Start: 2020-07-08 | End: 2020-07-22

## 2020-07-08 RX ORDER — LOSARTAN POTASSIUM 100 MG/1
100 TABLET, FILM COATED ORAL DAILY
Refills: 0 | Status: DISCONTINUED | OUTPATIENT
Start: 2020-07-08 | End: 2020-07-22

## 2020-07-08 RX ADMIN — Medication 3 MILLIGRAM(S): at 01:35

## 2020-07-08 RX ADMIN — Medication 1 TABLET(S): at 09:40

## 2020-07-08 RX ADMIN — Medication 650 MILLIGRAM(S): at 21:14

## 2020-07-08 RX ADMIN — Medication 650 MILLIGRAM(S): at 16:45

## 2020-07-08 RX ADMIN — Medication 25 MILLIGRAM(S): at 21:07

## 2020-07-08 RX ADMIN — FLUPHENAZINE HYDROCHLORIDE 10 MILLIGRAM(S): 1 TABLET, FILM COATED ORAL at 21:07

## 2020-07-08 RX ADMIN — Medication 2 MILLIGRAM(S): at 21:54

## 2020-07-08 RX ADMIN — Medication 100 MILLIGRAM(S): at 09:40

## 2020-07-08 RX ADMIN — Medication 0.5 MILLIGRAM(S): at 21:07

## 2020-07-08 RX ADMIN — Medication 0.5 MILLIGRAM(S): at 01:35

## 2020-07-08 RX ADMIN — METFORMIN HYDROCHLORIDE 500 MILLIGRAM(S): 850 TABLET ORAL at 09:40

## 2020-07-08 RX ADMIN — FLUPHENAZINE HYDROCHLORIDE 10 MILLIGRAM(S): 1 TABLET, FILM COATED ORAL at 01:35

## 2020-07-08 RX ADMIN — HALOPERIDOL DECANOATE 5 MILLIGRAM(S): 100 INJECTION INTRAMUSCULAR at 09:40

## 2020-07-08 RX ADMIN — Medication 12.5 MILLIGRAM(S): at 09:40

## 2020-07-08 RX ADMIN — Medication 1000 UNIT(S): at 09:40

## 2020-07-08 RX ADMIN — Medication 3 MILLIGRAM(S): at 21:08

## 2020-07-08 RX ADMIN — ATORVASTATIN CALCIUM 20 MILLIGRAM(S): 80 TABLET, FILM COATED ORAL at 01:35

## 2020-07-08 RX ADMIN — Medication 25 MILLIGRAM(S): at 01:35

## 2020-07-08 RX ADMIN — Medication 150 MILLIGRAM(S): at 09:40

## 2020-07-08 RX ADMIN — Medication 81 MILLIGRAM(S): at 09:40

## 2020-07-08 RX ADMIN — Medication 25 MILLIGRAM(S): at 13:39

## 2020-07-08 RX ADMIN — LOSARTAN POTASSIUM 100 MILLIGRAM(S): 100 TABLET, FILM COATED ORAL at 09:40

## 2020-07-08 RX ADMIN — PREGABALIN 1000 MICROGRAM(S): 225 CAPSULE ORAL at 09:40

## 2020-07-08 RX ADMIN — Medication 50 MILLIGRAM(S): at 21:07

## 2020-07-08 RX ADMIN — Medication 650 MILLIGRAM(S): at 10:25

## 2020-07-08 RX ADMIN — Medication 25 MILLIGRAM(S): at 09:40

## 2020-07-08 RX ADMIN — ATORVASTATIN CALCIUM 20 MILLIGRAM(S): 80 TABLET, FILM COATED ORAL at 21:07

## 2020-07-09 LAB
CHOLEST SERPL-MCNC: 158 MG/DL — SIGNIFICANT CHANGE UP (ref 120–199)
GLUCOSE BLDC GLUCOMTR-MCNC: 92 MG/DL — SIGNIFICANT CHANGE UP (ref 70–99)
HBA1C BLD-MCNC: 6.2 % — HIGH (ref 4–5.6)
HDLC SERPL-MCNC: 34 MG/DL — LOW (ref 35–55)
LIPID PNL WITH DIRECT LDL SERPL: 107 MG/DL — SIGNIFICANT CHANGE UP
TRIGL SERPL-MCNC: 152 MG/DL — HIGH (ref 10–149)

## 2020-07-09 PROCEDURE — 99231 SBSQ HOSP IP/OBS SF/LOW 25: CPT

## 2020-07-09 RX ADMIN — Medication 0.5 MILLIGRAM(S): at 20:20

## 2020-07-09 RX ADMIN — Medication 100 MILLIGRAM(S): at 09:06

## 2020-07-09 RX ADMIN — Medication 12.5 MILLIGRAM(S): at 09:06

## 2020-07-09 RX ADMIN — Medication 25 MILLIGRAM(S): at 12:44

## 2020-07-09 RX ADMIN — HALOPERIDOL DECANOATE 5 MILLIGRAM(S): 100 INJECTION INTRAMUSCULAR at 09:06

## 2020-07-09 RX ADMIN — Medication 25 MILLIGRAM(S): at 09:06

## 2020-07-09 RX ADMIN — Medication 1 TABLET(S): at 09:06

## 2020-07-09 RX ADMIN — FLUPHENAZINE HYDROCHLORIDE 10 MILLIGRAM(S): 1 TABLET, FILM COATED ORAL at 20:20

## 2020-07-09 RX ADMIN — Medication 81 MILLIGRAM(S): at 09:06

## 2020-07-09 RX ADMIN — PREGABALIN 1000 MICROGRAM(S): 225 CAPSULE ORAL at 09:06

## 2020-07-09 RX ADMIN — METFORMIN HYDROCHLORIDE 500 MILLIGRAM(S): 850 TABLET ORAL at 09:06

## 2020-07-09 RX ADMIN — ATORVASTATIN CALCIUM 20 MILLIGRAM(S): 80 TABLET, FILM COATED ORAL at 20:20

## 2020-07-09 RX ADMIN — LOSARTAN POTASSIUM 100 MILLIGRAM(S): 100 TABLET, FILM COATED ORAL at 09:06

## 2020-07-09 RX ADMIN — Medication 1000 UNIT(S): at 09:06

## 2020-07-09 RX ADMIN — Medication 3 MILLIGRAM(S): at 20:20

## 2020-07-09 RX ADMIN — Medication 150 MILLIGRAM(S): at 09:06

## 2020-07-10 PROCEDURE — 90832 PSYTX W PT 30 MINUTES: CPT

## 2020-07-10 PROCEDURE — 99232 SBSQ HOSP IP/OBS MODERATE 35: CPT

## 2020-07-10 RX ORDER — FLUPHENAZINE HYDROCHLORIDE 1 MG/1
7.5 TABLET, FILM COATED ORAL
Refills: 0 | Status: DISCONTINUED | OUTPATIENT
Start: 2020-07-10 | End: 2020-07-13

## 2020-07-10 RX ORDER — SENNA PLUS 8.6 MG/1
2 TABLET ORAL AT BEDTIME
Refills: 0 | Status: DISCONTINUED | OUTPATIENT
Start: 2020-07-10 | End: 2020-07-22

## 2020-07-10 RX ORDER — LANOLIN ALCOHOL/MO/W.PET/CERES
3 CREAM (GRAM) TOPICAL ONCE
Refills: 0 | Status: COMPLETED | OUTPATIENT
Start: 2020-07-10 | End: 2020-07-10

## 2020-07-10 RX ORDER — POLYETHYLENE GLYCOL 3350 17 G/17G
17 POWDER, FOR SOLUTION ORAL DAILY
Refills: 0 | Status: DISCONTINUED | OUTPATIENT
Start: 2020-07-10 | End: 2020-07-22

## 2020-07-10 RX ADMIN — HALOPERIDOL DECANOATE 5 MILLIGRAM(S): 100 INJECTION INTRAMUSCULAR at 09:33

## 2020-07-10 RX ADMIN — SENNA PLUS 2 TABLET(S): 8.6 TABLET ORAL at 21:42

## 2020-07-10 RX ADMIN — POLYETHYLENE GLYCOL 3350 17 GRAM(S): 17 POWDER, FOR SOLUTION ORAL at 13:08

## 2020-07-10 RX ADMIN — Medication 3 MILLIGRAM(S): at 21:42

## 2020-07-10 RX ADMIN — FLUPHENAZINE HYDROCHLORIDE 7.5 MILLIGRAM(S): 1 TABLET, FILM COATED ORAL at 21:42

## 2020-07-10 RX ADMIN — ATORVASTATIN CALCIUM 20 MILLIGRAM(S): 80 TABLET, FILM COATED ORAL at 21:42

## 2020-07-10 RX ADMIN — Medication 0.5 MILLIGRAM(S): at 21:42

## 2020-07-10 RX ADMIN — LOSARTAN POTASSIUM 100 MILLIGRAM(S): 100 TABLET, FILM COATED ORAL at 09:33

## 2020-07-10 RX ADMIN — Medication 100 MILLIGRAM(S): at 09:34

## 2020-07-10 RX ADMIN — Medication 3 MILLIGRAM(S): at 23:43

## 2020-07-10 RX ADMIN — METFORMIN HYDROCHLORIDE 500 MILLIGRAM(S): 850 TABLET ORAL at 09:34

## 2020-07-10 RX ADMIN — Medication 150 MILLIGRAM(S): at 09:34

## 2020-07-10 RX ADMIN — Medication 1000 UNIT(S): at 09:33

## 2020-07-10 RX ADMIN — Medication 0.5 MILLIGRAM(S): at 09:33

## 2020-07-10 RX ADMIN — Medication 12.5 MILLIGRAM(S): at 09:33

## 2020-07-10 RX ADMIN — PREGABALIN 1000 MICROGRAM(S): 225 CAPSULE ORAL at 09:33

## 2020-07-10 RX ADMIN — Medication 1 TABLET(S): at 09:34

## 2020-07-10 RX ADMIN — Medication 81 MILLIGRAM(S): at 09:33

## 2020-07-11 RX ADMIN — Medication 1000 UNIT(S): at 08:51

## 2020-07-11 RX ADMIN — Medication 1 MILLIGRAM(S): at 01:08

## 2020-07-11 RX ADMIN — Medication 12.5 MILLIGRAM(S): at 08:51

## 2020-07-11 RX ADMIN — FLUPHENAZINE HYDROCHLORIDE 7.5 MILLIGRAM(S): 1 TABLET, FILM COATED ORAL at 20:28

## 2020-07-11 RX ADMIN — Medication 3 MILLIGRAM(S): at 20:28

## 2020-07-11 RX ADMIN — METFORMIN HYDROCHLORIDE 500 MILLIGRAM(S): 850 TABLET ORAL at 08:51

## 2020-07-11 RX ADMIN — LOSARTAN POTASSIUM 100 MILLIGRAM(S): 100 TABLET, FILM COATED ORAL at 08:51

## 2020-07-11 RX ADMIN — Medication 0.5 MILLIGRAM(S): at 20:16

## 2020-07-11 RX ADMIN — Medication 100 MILLIGRAM(S): at 08:51

## 2020-07-11 RX ADMIN — SENNA PLUS 2 TABLET(S): 8.6 TABLET ORAL at 20:28

## 2020-07-11 RX ADMIN — FLUPHENAZINE HYDROCHLORIDE 7.5 MILLIGRAM(S): 1 TABLET, FILM COATED ORAL at 08:51

## 2020-07-11 RX ADMIN — Medication 81 MILLIGRAM(S): at 08:51

## 2020-07-11 RX ADMIN — Medication 0.5 MILLIGRAM(S): at 20:28

## 2020-07-11 RX ADMIN — Medication 150 MILLIGRAM(S): at 08:51

## 2020-07-11 RX ADMIN — Medication 0.5 MILLIGRAM(S): at 08:51

## 2020-07-11 RX ADMIN — PREGABALIN 1000 MICROGRAM(S): 225 CAPSULE ORAL at 08:51

## 2020-07-11 RX ADMIN — Medication 1 TABLET(S): at 08:51

## 2020-07-11 RX ADMIN — ATORVASTATIN CALCIUM 20 MILLIGRAM(S): 80 TABLET, FILM COATED ORAL at 20:28

## 2020-07-12 PROCEDURE — 99232 SBSQ HOSP IP/OBS MODERATE 35: CPT

## 2020-07-13 PROCEDURE — 99232 SBSQ HOSP IP/OBS MODERATE 35: CPT

## 2020-07-13 RX ORDER — FLUPHENAZINE HYDROCHLORIDE 1 MG/1
5 TABLET, FILM COATED ORAL DAILY
Refills: 0 | Status: DISCONTINUED | OUTPATIENT
Start: 2020-07-13 | End: 2020-07-22

## 2020-07-13 RX ORDER — FLUPHENAZINE HYDROCHLORIDE 1 MG/1
10 TABLET, FILM COATED ORAL AT BEDTIME
Refills: 0 | Status: DISCONTINUED | OUTPATIENT
Start: 2020-07-13 | End: 2020-07-22

## 2020-07-13 RX ORDER — MAGNESIUM HYDROXIDE 400 MG/1
30 TABLET, CHEWABLE ORAL DAILY
Refills: 0 | Status: DISCONTINUED | OUTPATIENT
Start: 2020-07-13 | End: 2020-07-22

## 2020-07-13 RX ADMIN — Medication 1 MILLIGRAM(S): at 20:08

## 2020-07-13 RX ADMIN — FLUPHENAZINE HYDROCHLORIDE 5 MILLIGRAM(S): 1 TABLET, FILM COATED ORAL at 09:47

## 2020-07-13 RX ADMIN — MAGNESIUM HYDROXIDE 30 MILLILITER(S): 400 TABLET, CHEWABLE ORAL at 11:11

## 2020-07-13 RX ADMIN — FLUPHENAZINE HYDROCHLORIDE 10 MILLIGRAM(S): 1 TABLET, FILM COATED ORAL at 20:47

## 2020-07-14 PROCEDURE — 99232 SBSQ HOSP IP/OBS MODERATE 35: CPT

## 2020-07-14 RX ORDER — ZOLPIDEM TARTRATE 10 MG/1
5 TABLET ORAL AT BEDTIME
Refills: 0 | Status: DISCONTINUED | OUTPATIENT
Start: 2020-07-14 | End: 2020-07-21

## 2020-07-14 RX ORDER — DIPHENHYDRAMINE HCL 50 MG
25 CAPSULE ORAL AT BEDTIME
Refills: 0 | Status: DISCONTINUED | OUTPATIENT
Start: 2020-07-14 | End: 2020-07-22

## 2020-07-14 RX ADMIN — ZOLPIDEM TARTRATE 5 MILLIGRAM(S): 10 TABLET ORAL at 20:46

## 2020-07-14 RX ADMIN — FLUPHENAZINE HYDROCHLORIDE 5 MILLIGRAM(S): 1 TABLET, FILM COATED ORAL at 09:19

## 2020-07-14 RX ADMIN — Medication 0.5 MILLIGRAM(S): at 17:59

## 2020-07-14 RX ADMIN — FLUPHENAZINE HYDROCHLORIDE 10 MILLIGRAM(S): 1 TABLET, FILM COATED ORAL at 20:46

## 2020-07-15 PROCEDURE — 99232 SBSQ HOSP IP/OBS MODERATE 35: CPT

## 2020-07-15 RX ADMIN — Medication 0.5 MILLIGRAM(S): at 14:48

## 2020-07-15 RX ADMIN — Medication 0.5 MILLIGRAM(S): at 22:25

## 2020-07-15 RX ADMIN — FLUPHENAZINE HYDROCHLORIDE 5 MILLIGRAM(S): 1 TABLET, FILM COATED ORAL at 09:33

## 2020-07-15 RX ADMIN — MAGNESIUM HYDROXIDE 30 MILLILITER(S): 400 TABLET, CHEWABLE ORAL at 11:11

## 2020-07-15 RX ADMIN — FLUPHENAZINE HYDROCHLORIDE 10 MILLIGRAM(S): 1 TABLET, FILM COATED ORAL at 20:14

## 2020-07-15 RX ADMIN — ZOLPIDEM TARTRATE 5 MILLIGRAM(S): 10 TABLET ORAL at 20:14

## 2020-07-16 PROCEDURE — 99232 SBSQ HOSP IP/OBS MODERATE 35: CPT

## 2020-07-16 RX ADMIN — FLUPHENAZINE HYDROCHLORIDE 5 MILLIGRAM(S): 1 TABLET, FILM COATED ORAL at 09:28

## 2020-07-16 RX ADMIN — Medication 0.5 MILLIGRAM(S): at 14:28

## 2020-07-16 RX ADMIN — ZOLPIDEM TARTRATE 5 MILLIGRAM(S): 10 TABLET ORAL at 20:44

## 2020-07-16 RX ADMIN — Medication 0.5 MILLIGRAM(S): at 09:28

## 2020-07-16 RX ADMIN — FLUPHENAZINE HYDROCHLORIDE 10 MILLIGRAM(S): 1 TABLET, FILM COATED ORAL at 20:44

## 2020-07-17 PROCEDURE — 99232 SBSQ HOSP IP/OBS MODERATE 35: CPT

## 2020-07-17 RX ADMIN — Medication 0.5 MILLIGRAM(S): at 15:35

## 2020-07-17 RX ADMIN — Medication 0.5 MILLIGRAM(S): at 09:33

## 2020-07-17 RX ADMIN — FLUPHENAZINE HYDROCHLORIDE 10 MILLIGRAM(S): 1 TABLET, FILM COATED ORAL at 20:40

## 2020-07-17 RX ADMIN — FLUPHENAZINE HYDROCHLORIDE 5 MILLIGRAM(S): 1 TABLET, FILM COATED ORAL at 09:33

## 2020-07-17 RX ADMIN — ZOLPIDEM TARTRATE 5 MILLIGRAM(S): 10 TABLET ORAL at 20:39

## 2020-07-18 RX ADMIN — Medication 25 MILLIGRAM(S): at 00:50

## 2020-07-18 RX ADMIN — ZOLPIDEM TARTRATE 5 MILLIGRAM(S): 10 TABLET ORAL at 20:20

## 2020-07-18 RX ADMIN — FLUPHENAZINE HYDROCHLORIDE 10 MILLIGRAM(S): 1 TABLET, FILM COATED ORAL at 20:20

## 2020-07-18 RX ADMIN — Medication 0.5 MILLIGRAM(S): at 17:42

## 2020-07-18 RX ADMIN — FLUPHENAZINE HYDROCHLORIDE 5 MILLIGRAM(S): 1 TABLET, FILM COATED ORAL at 09:31

## 2020-07-18 RX ADMIN — Medication 0.5 MILLIGRAM(S): at 09:32

## 2020-07-19 RX ADMIN — ZOLPIDEM TARTRATE 5 MILLIGRAM(S): 10 TABLET ORAL at 20:52

## 2020-07-19 RX ADMIN — FLUPHENAZINE HYDROCHLORIDE 10 MILLIGRAM(S): 1 TABLET, FILM COATED ORAL at 20:53

## 2020-07-19 RX ADMIN — Medication 25 MILLIGRAM(S): at 22:52

## 2020-07-19 RX ADMIN — Medication 0.5 MILLIGRAM(S): at 14:10

## 2020-07-19 RX ADMIN — FLUPHENAZINE HYDROCHLORIDE 5 MILLIGRAM(S): 1 TABLET, FILM COATED ORAL at 08:47

## 2020-07-19 RX ADMIN — Medication 0.5 MILLIGRAM(S): at 08:48

## 2020-07-20 PROCEDURE — 99232 SBSQ HOSP IP/OBS MODERATE 35: CPT

## 2020-07-20 RX ADMIN — Medication 0.5 MILLIGRAM(S): at 22:49

## 2020-07-20 RX ADMIN — ZOLPIDEM TARTRATE 5 MILLIGRAM(S): 10 TABLET ORAL at 21:01

## 2020-07-20 RX ADMIN — FLUPHENAZINE HYDROCHLORIDE 10 MILLIGRAM(S): 1 TABLET, FILM COATED ORAL at 21:01

## 2020-07-20 RX ADMIN — Medication 0.5 MILLIGRAM(S): at 09:08

## 2020-07-20 RX ADMIN — Medication 0.5 MILLIGRAM(S): at 13:38

## 2020-07-20 RX ADMIN — FLUPHENAZINE HYDROCHLORIDE 5 MILLIGRAM(S): 1 TABLET, FILM COATED ORAL at 09:08

## 2020-07-20 RX ADMIN — Medication 25 MILLIGRAM(S): at 20:20

## 2020-07-21 PROCEDURE — 99232 SBSQ HOSP IP/OBS MODERATE 35: CPT

## 2020-07-21 RX ORDER — METOPROLOL TARTRATE 50 MG
1 TABLET ORAL
Qty: 30 | Refills: 0
Start: 2020-07-21 | End: 2020-08-19

## 2020-07-21 RX ORDER — VENLAFAXINE HCL 75 MG
1 CAPSULE, EXT RELEASE 24 HR ORAL
Qty: 30 | Refills: 0
Start: 2020-07-21 | End: 2020-08-19

## 2020-07-21 RX ORDER — FLUPHENAZINE HYDROCHLORIDE 1 MG/1
1 TABLET, FILM COATED ORAL
Qty: 90 | Refills: 0
Start: 2020-07-21 | End: 2020-08-19

## 2020-07-21 RX ORDER — BENZTROPINE MESYLATE 1 MG
1 TABLET ORAL
Qty: 30 | Refills: 0
Start: 2020-07-21 | End: 2020-08-19

## 2020-07-21 RX ORDER — DOCUSATE SODIUM 100 MG
1 CAPSULE ORAL
Qty: 90 | Refills: 0
Start: 2020-07-21 | End: 2020-08-19

## 2020-07-21 RX ORDER — CHOLECALCIFEROL (VITAMIN D3) 125 MCG
1000 CAPSULE ORAL
Qty: 30 | Refills: 0
Start: 2020-07-21 | End: 2020-08-19

## 2020-07-21 RX ORDER — SENNA PLUS 8.6 MG/1
2 TABLET ORAL
Qty: 60 | Refills: 0
Start: 2020-07-21 | End: 2020-08-19

## 2020-07-21 RX ORDER — ATORVASTATIN CALCIUM 80 MG/1
1 TABLET, FILM COATED ORAL
Qty: 30 | Refills: 0
Start: 2020-07-21 | End: 2020-08-19

## 2020-07-21 RX ORDER — METFORMIN HYDROCHLORIDE 850 MG/1
1 TABLET ORAL
Qty: 30 | Refills: 0
Start: 2020-07-21 | End: 2020-08-19

## 2020-07-21 RX ORDER — ASPIRIN/CALCIUM CARB/MAGNESIUM 324 MG
1 TABLET ORAL
Qty: 30 | Refills: 0
Start: 2020-07-21 | End: 2020-08-19

## 2020-07-21 RX ORDER — PREGABALIN 225 MG/1
1 CAPSULE ORAL
Qty: 30 | Refills: 0
Start: 2020-07-21 | End: 2020-08-19

## 2020-07-21 RX ORDER — LOSARTAN POTASSIUM 100 MG/1
1 TABLET, FILM COATED ORAL
Qty: 30 | Refills: 0
Start: 2020-07-21 | End: 2020-08-19

## 2020-07-21 RX ORDER — DIPHENHYDRAMINE HCL 50 MG
1 CAPSULE ORAL
Qty: 30 | Refills: 0
Start: 2020-07-21 | End: 2020-08-19

## 2020-07-21 RX ADMIN — FLUPHENAZINE HYDROCHLORIDE 5 MILLIGRAM(S): 1 TABLET, FILM COATED ORAL at 09:44

## 2020-07-21 RX ADMIN — FLUPHENAZINE HYDROCHLORIDE 10 MILLIGRAM(S): 1 TABLET, FILM COATED ORAL at 21:03

## 2020-07-21 RX ADMIN — Medication 0.5 MILLIGRAM(S): at 14:31

## 2020-07-21 RX ADMIN — Medication 0.5 MILLIGRAM(S): at 20:31

## 2020-07-21 RX ADMIN — Medication 0.5 MILLIGRAM(S): at 09:44

## 2020-07-22 VITALS — DIASTOLIC BLOOD PRESSURE: 85 MMHG | HEART RATE: 83 BPM | SYSTOLIC BLOOD PRESSURE: 124 MMHG

## 2020-07-22 PROCEDURE — 99239 HOSP IP/OBS DSCHRG MGMT >30: CPT

## 2020-07-22 RX ADMIN — Medication 0.5 MILLIGRAM(S): at 08:40

## 2020-07-22 RX ADMIN — FLUPHENAZINE HYDROCHLORIDE 5 MILLIGRAM(S): 1 TABLET, FILM COATED ORAL at 08:39

## 2021-07-15 NOTE — ED ADULT NURSE REASSESSMENT NOTE - CARDIO ASSESSMENT
Detail Level: Detailed Quality 110: Preventive Care And Screening: Influenza Immunization: Influenza Immunization Administered during Influenza season WDL

## 2022-03-31 ENCOUNTER — APPOINTMENT (OUTPATIENT)
Dept: OPHTHALMOLOGY | Facility: CLINIC | Age: 62
End: 2022-03-31

## 2022-05-08 ENCOUNTER — INPATIENT (INPATIENT)
Facility: HOSPITAL | Age: 62
LOS: 14 days | Discharge: HOME | End: 2022-05-23
Attending: PSYCHIATRY & NEUROLOGY | Admitting: PSYCHIATRY & NEUROLOGY
Payer: MEDICAID

## 2022-05-08 VITALS
RESPIRATION RATE: 16 BRPM | HEIGHT: 66 IN | DIASTOLIC BLOOD PRESSURE: 81 MMHG | SYSTOLIC BLOOD PRESSURE: 155 MMHG | TEMPERATURE: 98 F | HEART RATE: 72 BPM | OXYGEN SATURATION: 100 %

## 2022-05-08 DIAGNOSIS — F20.9 SCHIZOPHRENIA, UNSPECIFIED: ICD-10-CM

## 2022-05-08 LAB
ALBUMIN SERPL ELPH-MCNC: 4.6 G/DL — SIGNIFICANT CHANGE UP (ref 3.3–5)
ALP SERPL-CCNC: 86 U/L — SIGNIFICANT CHANGE UP (ref 40–120)
ALT FLD-CCNC: 22 U/L — SIGNIFICANT CHANGE UP (ref 4–41)
AMPHET UR-MCNC: NEGATIVE — SIGNIFICANT CHANGE UP
ANION GAP SERPL CALC-SCNC: 11 MMOL/L — SIGNIFICANT CHANGE UP (ref 7–14)
APAP SERPL-MCNC: <10 UG/ML — LOW (ref 15–25)
APPEARANCE UR: CLEAR — SIGNIFICANT CHANGE UP
AST SERPL-CCNC: 17 U/L — SIGNIFICANT CHANGE UP (ref 4–40)
B PERT DNA SPEC QL NAA+PROBE: SIGNIFICANT CHANGE UP
B PERT+PARAPERT DNA PNL SPEC NAA+PROBE: SIGNIFICANT CHANGE UP
BARBITURATES UR SCN-MCNC: NEGATIVE — SIGNIFICANT CHANGE UP
BASOPHILS # BLD AUTO: 0.09 K/UL — SIGNIFICANT CHANGE UP (ref 0–0.2)
BASOPHILS NFR BLD AUTO: 0.9 % — SIGNIFICANT CHANGE UP (ref 0–2)
BENZODIAZ UR-MCNC: NEGATIVE — SIGNIFICANT CHANGE UP
BILIRUB SERPL-MCNC: 0.3 MG/DL — SIGNIFICANT CHANGE UP (ref 0.2–1.2)
BILIRUB UR-MCNC: NEGATIVE — SIGNIFICANT CHANGE UP
BORDETELLA PARAPERTUSSIS (RAPRVP): SIGNIFICANT CHANGE UP
BUN SERPL-MCNC: 17 MG/DL — SIGNIFICANT CHANGE UP (ref 7–23)
C PNEUM DNA SPEC QL NAA+PROBE: SIGNIFICANT CHANGE UP
CALCIUM SERPL-MCNC: 9.7 MG/DL — SIGNIFICANT CHANGE UP (ref 8.4–10.5)
CHLORIDE SERPL-SCNC: 98 MMOL/L — SIGNIFICANT CHANGE UP (ref 98–107)
CO2 SERPL-SCNC: 28 MMOL/L — SIGNIFICANT CHANGE UP (ref 22–31)
COCAINE METAB.OTHER UR-MCNC: NEGATIVE — SIGNIFICANT CHANGE UP
COLOR SPEC: COLORLESS — SIGNIFICANT CHANGE UP
CREAT SERPL-MCNC: 1.48 MG/DL — HIGH (ref 0.5–1.3)
CREATININE URINE RESULT, DAU: 29 MG/DL — SIGNIFICANT CHANGE UP
DIFF PNL FLD: NEGATIVE — SIGNIFICANT CHANGE UP
EGFR: 54 ML/MIN/1.73M2 — LOW
EOSINOPHIL # BLD AUTO: 0.58 K/UL — HIGH (ref 0–0.5)
EOSINOPHIL NFR BLD AUTO: 5.8 % — SIGNIFICANT CHANGE UP (ref 0–6)
ETHANOL SERPL-MCNC: <10 MG/DL — SIGNIFICANT CHANGE UP
FLUAV SUBTYP SPEC NAA+PROBE: SIGNIFICANT CHANGE UP
FLUBV RNA SPEC QL NAA+PROBE: SIGNIFICANT CHANGE UP
GLUCOSE SERPL-MCNC: 88 MG/DL — SIGNIFICANT CHANGE UP (ref 70–99)
GLUCOSE UR QL: NEGATIVE — SIGNIFICANT CHANGE UP
HADV DNA SPEC QL NAA+PROBE: SIGNIFICANT CHANGE UP
HCOV 229E RNA SPEC QL NAA+PROBE: SIGNIFICANT CHANGE UP
HCOV HKU1 RNA SPEC QL NAA+PROBE: SIGNIFICANT CHANGE UP
HCOV NL63 RNA SPEC QL NAA+PROBE: SIGNIFICANT CHANGE UP
HCOV OC43 RNA SPEC QL NAA+PROBE: SIGNIFICANT CHANGE UP
HCT VFR BLD CALC: 40.9 % — SIGNIFICANT CHANGE UP (ref 39–50)
HGB BLD-MCNC: 13.4 G/DL — SIGNIFICANT CHANGE UP (ref 13–17)
HMPV RNA SPEC QL NAA+PROBE: SIGNIFICANT CHANGE UP
HPIV1 RNA SPEC QL NAA+PROBE: SIGNIFICANT CHANGE UP
HPIV2 RNA SPEC QL NAA+PROBE: SIGNIFICANT CHANGE UP
HPIV3 RNA SPEC QL NAA+PROBE: SIGNIFICANT CHANGE UP
HPIV4 RNA SPEC QL NAA+PROBE: SIGNIFICANT CHANGE UP
IANC: 5.18 K/UL — SIGNIFICANT CHANGE UP (ref 1.8–7.4)
IMM GRANULOCYTES NFR BLD AUTO: 0.4 % — SIGNIFICANT CHANGE UP (ref 0–1.5)
KETONES UR-MCNC: NEGATIVE — SIGNIFICANT CHANGE UP
LEUKOCYTE ESTERASE UR-ACNC: NEGATIVE — SIGNIFICANT CHANGE UP
LYMPHOCYTES # BLD AUTO: 3.36 K/UL — HIGH (ref 1–3.3)
LYMPHOCYTES # BLD AUTO: 33.6 % — SIGNIFICANT CHANGE UP (ref 13–44)
M PNEUMO DNA SPEC QL NAA+PROBE: SIGNIFICANT CHANGE UP
MCHC RBC-ENTMCNC: 28.8 PG — SIGNIFICANT CHANGE UP (ref 27–34)
MCHC RBC-ENTMCNC: 32.8 GM/DL — SIGNIFICANT CHANGE UP (ref 32–36)
MCV RBC AUTO: 88 FL — SIGNIFICANT CHANGE UP (ref 80–100)
METHADONE UR-MCNC: NEGATIVE — SIGNIFICANT CHANGE UP
MONOCYTES # BLD AUTO: 0.76 K/UL — SIGNIFICANT CHANGE UP (ref 0–0.9)
MONOCYTES NFR BLD AUTO: 7.6 % — SIGNIFICANT CHANGE UP (ref 2–14)
NEUTROPHILS # BLD AUTO: 5.18 K/UL — SIGNIFICANT CHANGE UP (ref 1.8–7.4)
NEUTROPHILS NFR BLD AUTO: 51.7 % — SIGNIFICANT CHANGE UP (ref 43–77)
NITRITE UR-MCNC: NEGATIVE — SIGNIFICANT CHANGE UP
NRBC # BLD: 0 /100 WBCS — SIGNIFICANT CHANGE UP
NRBC # FLD: 0 K/UL — SIGNIFICANT CHANGE UP
OPIATES UR-MCNC: NEGATIVE — SIGNIFICANT CHANGE UP
OXYCODONE UR-MCNC: NEGATIVE — SIGNIFICANT CHANGE UP
PCP SPEC-MCNC: SIGNIFICANT CHANGE UP
PCP UR-MCNC: NEGATIVE — SIGNIFICANT CHANGE UP
PH UR: 6.5 — SIGNIFICANT CHANGE UP (ref 5–8)
PLATELET # BLD AUTO: 238 K/UL — SIGNIFICANT CHANGE UP (ref 150–400)
POTASSIUM SERPL-MCNC: 4.2 MMOL/L — SIGNIFICANT CHANGE UP (ref 3.5–5.3)
POTASSIUM SERPL-SCNC: 4.2 MMOL/L — SIGNIFICANT CHANGE UP (ref 3.5–5.3)
PROT SERPL-MCNC: 7.5 G/DL — SIGNIFICANT CHANGE UP (ref 6–8.3)
PROT UR-MCNC: NEGATIVE — SIGNIFICANT CHANGE UP
RAPID RVP RESULT: SIGNIFICANT CHANGE UP
RBC # BLD: 4.65 M/UL — SIGNIFICANT CHANGE UP (ref 4.2–5.8)
RBC # FLD: 13.2 % — SIGNIFICANT CHANGE UP (ref 10.3–14.5)
RSV RNA SPEC QL NAA+PROBE: SIGNIFICANT CHANGE UP
RV+EV RNA SPEC QL NAA+PROBE: SIGNIFICANT CHANGE UP
SALICYLATES SERPL-MCNC: <0.3 MG/DL — LOW (ref 15–30)
SARS-COV-2 RNA SPEC QL NAA+PROBE: SIGNIFICANT CHANGE UP
SODIUM SERPL-SCNC: 137 MMOL/L — SIGNIFICANT CHANGE UP (ref 135–145)
SP GR SPEC: 1.01 — SIGNIFICANT CHANGE UP (ref 1–1.05)
THC UR QL: NEGATIVE — SIGNIFICANT CHANGE UP
TOXICOLOGY SCREEN, DRUGS OF ABUSE, SERUM RESULT: SIGNIFICANT CHANGE UP
TSH SERPL-MCNC: 1.09 UIU/ML — SIGNIFICANT CHANGE UP (ref 0.27–4.2)
UROBILINOGEN FLD QL: SIGNIFICANT CHANGE UP
WBC # BLD: 10.01 K/UL — SIGNIFICANT CHANGE UP (ref 3.8–10.5)
WBC # FLD AUTO: 10.01 K/UL — SIGNIFICANT CHANGE UP (ref 3.8–10.5)

## 2022-05-08 PROCEDURE — 93010 ELECTROCARDIOGRAM REPORT: CPT

## 2022-05-08 PROCEDURE — 70450 CT HEAD/BRAIN W/O DYE: CPT | Mod: 26,MG

## 2022-05-08 PROCEDURE — G1004: CPT

## 2022-05-08 PROCEDURE — 99285 EMERGENCY DEPT VISIT HI MDM: CPT | Mod: 25

## 2022-05-08 RX ORDER — FLUPHENAZINE HYDROCHLORIDE 1 MG/1
2.5 TABLET, FILM COATED ORAL ONCE
Refills: 0 | Status: DISCONTINUED | OUTPATIENT
Start: 2022-05-09 | End: 2022-05-23

## 2022-05-08 RX ORDER — FLUPHENAZINE HYDROCHLORIDE 1 MG/1
10 TABLET, FILM COATED ORAL ONCE
Refills: 0 | Status: COMPLETED | OUTPATIENT
Start: 2022-05-08 | End: 2022-05-08

## 2022-05-08 RX ORDER — CLONAZEPAM 1 MG
0.5 TABLET ORAL DAILY
Refills: 0 | Status: DISCONTINUED | OUTPATIENT
Start: 2022-05-09 | End: 2022-05-10

## 2022-05-08 RX ORDER — FLUPHENAZINE HYDROCHLORIDE 1 MG/1
5 TABLET, FILM COATED ORAL EVERY 6 HOURS
Refills: 0 | Status: DISCONTINUED | OUTPATIENT
Start: 2022-05-09 | End: 2022-05-23

## 2022-05-08 RX ORDER — DIPHENHYDRAMINE HCL 50 MG
50 CAPSULE ORAL EVERY 6 HOURS
Refills: 0 | Status: DISCONTINUED | OUTPATIENT
Start: 2022-05-09 | End: 2022-05-23

## 2022-05-08 RX ORDER — ATORVASTATIN CALCIUM 80 MG/1
40 TABLET, FILM COATED ORAL AT BEDTIME
Refills: 0 | Status: DISCONTINUED | OUTPATIENT
Start: 2022-05-09 | End: 2022-05-10

## 2022-05-08 RX ORDER — FLUPHENAZINE HYDROCHLORIDE 1 MG/1
10 TABLET, FILM COATED ORAL
Refills: 0 | Status: DISCONTINUED | OUTPATIENT
Start: 2022-05-09 | End: 2022-05-10

## 2022-05-08 RX ORDER — DIPHENHYDRAMINE HCL 50 MG
50 CAPSULE ORAL ONCE
Refills: 0 | Status: DISCONTINUED | OUTPATIENT
Start: 2022-05-09 | End: 2022-05-23

## 2022-05-08 RX ADMIN — FLUPHENAZINE HYDROCHLORIDE 10 MILLIGRAM(S): 1 TABLET, FILM COATED ORAL at 23:23

## 2022-05-08 NOTE — ED BEHAVIORAL HEALTH ASSESSMENT NOTE - OTHER
brother Brother, ben perioral movements concerning for TD slow gait, sometimes requiring wall for assistance no beds currently, will re-evaluate tomorrow

## 2022-05-08 NOTE — ED ADULT NURSE NOTE - CHIEF COMPLAINT QUOTE
fs 89 Ghanaian speaking brother with pt who states pt  hearing voices x mos ,not sleeping. pt stating " sometimes I do not want to live" brother [facility] [phone]

## 2022-05-08 NOTE — ED BEHAVIORAL HEALTH ASSESSMENT NOTE - HPI (INCLUDE ILLNESS QUALITY, SEVERITY, DURATION, TIMING, CONTEXT, MODIFYING FACTORS, ASSOCIATED SIGNS AND SYMPTOMS)
Patient Seen in: [facility] Emergency Department      History   Patient presents with:  Eval-G    Stated Complaint: eval-g , cramping, and blood clots, feeling weak, unsure of pregnacy    HPI/Subjective:   HPI    18-year-old -American female supple. Lungs are clear to auscultation bilaterally. Heart is regular rate and rhythm without murmur gallop or rub    Abdomen is soft nondistended nontender to deep palpation there is no rebound or guarding noted no hepatosplenomegaly is noted.   No mas encounter diagnosis)     Disposition:  Discharge  4/29/2021  6:36 am    Follow-up:  Shahram Plasencia [address] [phone]    In 2 days      [address], [address] [place] [address] Ave  Padmini Pop [id number] [facility] The Pt is a 61 yr old  male (Paraguayan speaking only), single, domiciled and unemployed, PPHx schizophrenia, multiple past psych admissions (including 5 [facility] admissions last discharged in summer 2020, no known past SA, legal or substance hx, PMHx of HTN, DM and HLD.  Today, BIB family to worsening AH The Pt is a 61 yr old  male (Eritrean speaking only), single, domiciled and unemployed, PPHx schizophrenia, multiple past psych admissions (including 5 [facility] admissions last discharged in summer 2020, no known past SA, legal or substance hx, PMHx of HTN, DM and HLD.  Today, BIB family to worsening AH.    On interview, patient seen with Eritrean-fluent attending, he feels that his symptoms have been worsening for one month. He reports depressed mood, "yelling at my family", hearing voices that tell him "look, other people are doing things and you're no good". He endorses having SI with plan to hang himself, denies any preparatory actions or any attempts. Reports poor sleep, low energy, poor appetite as well. Denies CAH. Otherwise endorses 1.5 year history of dizziness that his PCP wanted patient to see Neurologist for, which he has not yet done. Endorses current dizziness as well. Otherwise denies active SI, and feels safe now in the hospital. Expresses his desire to get help and asks to be admitted. Patient completes voluntary paperwork in the room with team.    Collateral obtained from patient's brother, Deo, who corroborates much of above. He shares that for the past 3-4 months patient has been complaining of hearing voices, talking to himself, not sleeping, pacing around the house, not eating or showering unless reminded to do so, and even verbalizing wish to be dead. He denies any suicidal actions or attempts at home by patient. Reports compliance with medications and provides med list to team.

## 2022-05-08 NOTE — ED BEHAVIORAL HEALTH ASSESSMENT NOTE - CASE SUMMARY
The pt is a 61 yr old  male (Rwandan speaking only), single, domiciled and unemployed, PPHx schizophrenia, multiple past psych admissions (including 5 [facility] admissions last discharged in summer 2020, no known past SA, legal or substance hx, PMHx of HTN, DM and HLD.  Today, BIB family to worsening AH. 1:1 for fall risk   Patient presents today with depressive symptoms he believes are secondary to worsening AH that are derogatory in nature. He states that voices are teasing him and  putting him down. He is depressed, can't sleep; c/o poor energy level, he states that he is not functioning and does not do anything. He can't take care of himself, and feels that he wants to hang himself, denies any intent.   Collateral information received  from  patient's brother. Brother endorses months of social withdrawal, pacing, insomnia, responding to internal stimuli suggesting prolonged decompensation. At this time patient warrants inpatient admission for stabilization of his acute symptoms as these symptoms place him at an acutely elevated risk of harm.  patient will be admitted  on voluntary bases for stabilization and safety. . 9.13 completed, in chart  patient will continue Fluphenazine 10mg qAM, 10mg qHS aand fluphenazine 5mg Q6hrs PRN , benadryl  50mg and Ativan 2 mg PRN Q6hrs   Patient has been receiving Klonopin BID per Istop, though given his age and ambulation would start to taper by decreasing to 0.5mg daily tomorrow  4. Continue Effexor 150mg  patient will have Head CT in the ER given chronic hx of falling and patient not seeing neurologist as recommended by PCP. He is unsteady here as well

## 2022-05-08 NOTE — ED BEHAVIORAL HEALTH ASSESSMENT NOTE - DETAILS
Klonopin per chart review caused memory concerns. He has active ISTOP scripts of Klonopin BID currently plan to hang self, no actions spoke with brother see above Hand off given to Dr. Talbert

## 2022-05-08 NOTE — ED BEHAVIORAL HEALTH ASSESSMENT NOTE - DESCRIPTION
Calm, cooperative in ED    ICU Vital Signs Last 24 Hrs  T(C): 36.9 (08 May 2022 19:55), Max: 36.9 (08 May 2022 19:55)  T(F): 98.5 (08 May 2022 19:55), Max: 98.5 (08 May 2022 19:55)  HR: 72 (08 May 2022 19:55) (72 - 72)  BP: 155/81 (08 May 2022 19:55) (155/81 - 155/81)  BP(mean): --  ABP: --  ABP(mean): --  RR: 16 (08 May 2022 19:55) (16 - 16)  SpO2: 100% (08 May 2022 19:55) (100% - 100%) single, no children, lives with brother and brother's family HTN, HLD, DM

## 2022-05-08 NOTE — ED BEHAVIORAL HEALTH ASSESSMENT NOTE - RISK ASSESSMENT
Moderate Acute Suicide Risk Pt is an elevated acute risk of harm to self given recent SI with plan, acute psychosis, depressed mood, male, hx of admissions, current passive SI, guilty, compromised medical health. His chronic risk is elevated given hx of admissions, his underlying diagnosis. At this time given his elevated acute risk he warrants inpatient admission for further stabilization at this time

## 2022-05-08 NOTE — ED ADULT NURSE NOTE - OBJECTIVE STATEMENT
Pt received to  accompanied by his brother, who patient requests to translate as he is Senegalese speaking. Pt has hx of schizophrenia and has been having auditory hallucinations, not sleeping and expressing intermittent suicidal ideation. Pt denies plan/intent and denies HI. Pts belongings secured for safety. Pt awaiting psychiatric evaluation.

## 2022-05-08 NOTE — ED BEHAVIORAL HEALTH ASSESSMENT NOTE - NSSUICPROTFACT_PSY_ALL_CORE
Responsibility to children, family, or others/Identifies reasons for living/Fear of death or the actual act of killing self/Positive therapeutic relationships

## 2022-05-08 NOTE — ED BEHAVIORAL HEALTH ASSESSMENT NOTE - SUMMARY
The Pt is a 61 yr old  male (Peruvian speaking only), single, domiciled and unemployed, PPHx schizophrenia, multiple past psych admissions (including 5 [facility] admissions last discharged in summer 2020, no known past SA, legal or substance hx, PMHx of HTN, DM and HLD.  Today, BIB family to worsening AH.    Patient presents today with depressive symptoms he believes are secondary to worsening AH that are derogatory in nature. Brother endorses months of social withdrawal, pacing, insomnia, responding to internal stimuli suggesting prolonged decompensation. At this time patient warrants inpatient admission for stabilization of his acute symptoms as these symptoms place him at an acutely elevated risk of harm.    Plan:  1. 9.13 completed, in chart  2. Continue Fluphenazine 10mg qAM, 10mg qHS  3. Patient has been receiving Klonopin BID per Istop, though given his age and ambulation would start to taper by decreasing to 0.5mg daily tomorrow  4. Continue Effexor 150mg  5. PRN Fluphenazine 5mg PO/IM q6h for agitation, Benadryl 50mg PO/IM q6h for agitation. Ativan 2mg IM for severe agitation  6. To acquire Head CT given chronic hx of falling and patient not seeing neurologist as recommended by PCP. He is unsteady here as well

## 2022-05-08 NOTE — ED PROVIDER NOTE - PROGRESS NOTE DETAILS
HERIBERTO: I was signed out this pt pending bed availability at [facility]. Pt is requesting medication for sleep at this time while boarding in ED. Will provide benadryl and monitor. HERIBERTO: pt has had no complaints overnight. Still pending bed availability at [facility]. Will continue to monitor. Medically cleared.

## 2022-05-08 NOTE — ED PROVIDER NOTE - NSICDXPASTMEDICALHX_GEN_ALL_CORE_FT
PAST MEDICAL HISTORY:  Depression     DM (diabetes mellitus)     HLD (hyperlipidemia)     HTN (hypertension)     Schizophrenia

## 2022-05-08 NOTE — ED BEHAVIORAL HEALTH ASSESSMENT NOTE - MEDICAL ISSUES AND PLAN (INCLUDE STANDING AND PRN MEDICATION)
Rosuvastatin 10mg daily, Senna BID, Metformin 500mg qd, Ergocalciferol 1.25mg once weekly, Metoprolol 100mg qD, Losartan-HCTZ 50-12.5 daily

## 2022-05-08 NOTE — ED PROVIDER NOTE - CLINICAL SUMMARY MEDICAL DECISION MAKING FREE TEXT BOX
62 y/o  M  hx  HTN, DM , Schizophrenia   Labs, Urine Tox/UA, EKG  Medical evaluation performed. There is no clinical evidence of intoxication or any acute medical problem requiring immediate intervention. Patient is awaiting psychiatric consultation. Final disposition will be determined by psychiatrist. 60 y/o  M  hx  HTN, DM , Schizophrenia   Labs, Urine Tox/UA, EKG, CT Head    Medical evaluation performed. There is no clinical evidence of intoxication or any acute medical problem requiring immediate intervention. Patient is awaiting psychiatric consultation. Final disposition will be determined by psychiatrist.

## 2022-05-08 NOTE — ED PROVIDER NOTE - OBJECTIVE STATEMENT
62 y/o  M  hx  HTN, DM , Schizophrenia  BIBA secondary to worsening depression, insomnia and command auditory hallucination. Admits to racing thoughts . Patient appears paranoid. Denies SI/AH/VH/HI.  Admits to  intermittent dizziness. Patient admits to falling twice .  Denies punching, or kicking any objects.  Denies , headache , blurred vision,  pain SOB, fever, chills, chest/abdominal  discomfort. No evidence of physical injuries, broken  skin or deformations.  Denies recent use of alcohol or illicit drugs. Admits to medication compliance.

## 2022-05-08 NOTE — ED BEHAVIORAL HEALTH ASSESSMENT NOTE - OTHER PAST PSYCHIATRIC HISTORY (INCLUDE DETAILS REGARDING ONSET, COURSE OF ILLNESS, INPATIENT/OUTPATIENT TREATMENT)
5 previous inpatient admissions, most recent two years ago. No documented hx SA or SIB. Follows outpatient with Dr. Josh Webb

## 2022-05-08 NOTE — ED ADULT TRIAGE NOTE - CHIEF COMPLAINT QUOTE
fs 89 Citizen of [place] speaking brother with pt who states pt  hearing voices x mos ,not sleeping. pt stating " sometimes I do not want to live" brother [facility] [phone]

## 2022-05-09 DIAGNOSIS — E78.5 HYPERLIPIDEMIA, UNSPECIFIED: ICD-10-CM

## 2022-05-09 DIAGNOSIS — F25.9 SCHIZOAFFECTIVE DISORDER, UNSPECIFIED: ICD-10-CM

## 2022-05-09 DIAGNOSIS — E11.9 TYPE 2 DIABETES MELLITUS WITHOUT COMPLICATIONS: ICD-10-CM

## 2022-05-09 DIAGNOSIS — I10 ESSENTIAL (PRIMARY) HYPERTENSION: ICD-10-CM

## 2022-05-09 LAB
COVID-19 SPIKE DOMAIN AB INTERP: POSITIVE
COVID-19 SPIKE DOMAIN ANTIBODY RESULT: 2.41 U/ML — HIGH
SARS-COV-2 IGG+IGM SERPL QL IA: 2.41 U/ML — HIGH
SARS-COV-2 IGG+IGM SERPL QL IA: POSITIVE

## 2022-05-09 PROCEDURE — 99221 1ST HOSP IP/OBS SF/LOW 40: CPT | Mod: 1L

## 2022-05-09 RX ORDER — ACETAMINOPHEN 500 MG
325 TABLET ORAL EVERY 4 HOURS
Refills: 0 | Status: DISCONTINUED | OUTPATIENT
Start: 2022-05-09 | End: 2022-05-16

## 2022-05-09 RX ORDER — BENZTROPINE MESYLATE 1 MG
1 TABLET ORAL ONCE
Refills: 0 | Status: COMPLETED | OUTPATIENT
Start: 2022-05-09 | End: 2022-05-09

## 2022-05-09 RX ORDER — VENLAFAXINE HCL 75 MG
150 CAPSULE, EXT RELEASE 24 HR ORAL ONCE
Refills: 0 | Status: COMPLETED | OUTPATIENT
Start: 2022-05-09 | End: 2022-05-09

## 2022-05-09 RX ORDER — LOSARTAN POTASSIUM 100 MG/1
50 TABLET, FILM COATED ORAL ONCE
Refills: 0 | Status: COMPLETED | OUTPATIENT
Start: 2022-05-09 | End: 2022-05-09

## 2022-05-09 RX ORDER — INSULIN LISPRO 100/ML
VIAL (ML) SUBCUTANEOUS
Refills: 0 | Status: DISCONTINUED | OUTPATIENT
Start: 2022-05-09 | End: 2022-05-09

## 2022-05-09 RX ORDER — VENLAFAXINE HCL 75 MG
150 CAPSULE, EXT RELEASE 24 HR ORAL DAILY
Refills: 0 | Status: DISCONTINUED | OUTPATIENT
Start: 2022-05-10 | End: 2022-05-17

## 2022-05-09 RX ORDER — DEXTROSE 50 % IN WATER 50 %
15 SYRINGE (ML) INTRAVENOUS ONCE
Refills: 0 | Status: DISCONTINUED | OUTPATIENT
Start: 2022-05-09 | End: 2022-05-09

## 2022-05-09 RX ORDER — BENZTROPINE MESYLATE 1 MG
1 TABLET ORAL
Refills: 0 | Status: DISCONTINUED | OUTPATIENT
Start: 2022-05-10 | End: 2022-05-10

## 2022-05-09 RX ORDER — METOPROLOL TARTRATE 50 MG
100 TABLET ORAL DAILY
Refills: 0 | Status: DISCONTINUED | OUTPATIENT
Start: 2022-05-09 | End: 2022-05-09

## 2022-05-09 RX ORDER — HYDROCHLOROTHIAZIDE 25 MG
12.5 TABLET ORAL ONCE
Refills: 0 | Status: COMPLETED | OUTPATIENT
Start: 2022-05-09 | End: 2022-05-09

## 2022-05-09 RX ORDER — BENZTROPINE MESYLATE 1 MG
1 TABLET ORAL AT BEDTIME
Refills: 0 | Status: COMPLETED | OUTPATIENT
Start: 2022-05-09 | End: 2022-05-09

## 2022-05-09 RX ORDER — DIPHENHYDRAMINE HCL 50 MG
50 CAPSULE ORAL ONCE
Refills: 0 | Status: COMPLETED | OUTPATIENT
Start: 2022-05-09 | End: 2022-05-09

## 2022-05-09 RX ORDER — TRAZODONE HCL 50 MG
50 TABLET ORAL ONCE
Refills: 0 | Status: COMPLETED | OUTPATIENT
Start: 2022-05-09 | End: 2022-05-09

## 2022-05-09 RX ORDER — NICOTINE POLACRILEX 2 MG
2 GUM BUCCAL
Refills: 0 | Status: DISCONTINUED | OUTPATIENT
Start: 2022-05-09 | End: 2022-05-09

## 2022-05-09 RX ORDER — HYDROCHLOROTHIAZIDE 25 MG
12.5 TABLET ORAL DAILY
Refills: 0 | Status: DISCONTINUED | OUTPATIENT
Start: 2022-05-09 | End: 2022-05-09

## 2022-05-09 RX ORDER — SENNA PLUS 8.6 MG/1
2 TABLET ORAL AT BEDTIME
Refills: 0 | Status: DISCONTINUED | OUTPATIENT
Start: 2022-05-09 | End: 2022-05-09

## 2022-05-09 RX ORDER — LOSARTAN POTASSIUM 100 MG/1
50 TABLET, FILM COATED ORAL DAILY
Refills: 0 | Status: DISCONTINUED | OUTPATIENT
Start: 2022-05-10 | End: 2022-05-23

## 2022-05-09 RX ORDER — VENLAFAXINE HCL 75 MG
150 CAPSULE, EXT RELEASE 24 HR ORAL DAILY
Refills: 0 | Status: DISCONTINUED | OUTPATIENT
Start: 2022-05-09 | End: 2022-05-09

## 2022-05-09 RX ORDER — METFORMIN HYDROCHLORIDE 850 MG/1
500 TABLET ORAL DAILY
Refills: 0 | Status: DISCONTINUED | OUTPATIENT
Start: 2022-05-10 | End: 2022-05-23

## 2022-05-09 RX ORDER — HYDROCHLOROTHIAZIDE 25 MG
12.5 TABLET ORAL DAILY
Refills: 0 | Status: DISCONTINUED | OUTPATIENT
Start: 2022-05-10 | End: 2022-05-23

## 2022-05-09 RX ORDER — INSULIN LISPRO 100/ML
VIAL (ML) SUBCUTANEOUS
Refills: 0 | Status: DISCONTINUED | OUTPATIENT
Start: 2022-05-09 | End: 2022-05-23

## 2022-05-09 RX ORDER — VENLAFAXINE HCL 75 MG
150 CAPSULE, EXT RELEASE 24 HR ORAL ONCE
Refills: 0 | Status: DISCONTINUED | OUTPATIENT
Start: 2022-05-09 | End: 2022-05-09

## 2022-05-09 RX ORDER — METOPROLOL TARTRATE 50 MG
100 TABLET ORAL DAILY
Refills: 0 | Status: DISCONTINUED | OUTPATIENT
Start: 2022-05-10 | End: 2022-05-23

## 2022-05-09 RX ORDER — LOSARTAN POTASSIUM 100 MG/1
50 TABLET, FILM COATED ORAL DAILY
Refills: 0 | Status: DISCONTINUED | OUTPATIENT
Start: 2022-05-09 | End: 2022-05-09

## 2022-05-09 RX ORDER — BENZTROPINE MESYLATE 1 MG
1 TABLET ORAL
Refills: 0 | Status: DISCONTINUED | OUTPATIENT
Start: 2022-05-09 | End: 2022-05-09

## 2022-05-09 RX ORDER — INSULIN LISPRO 100/ML
VIAL (ML) SUBCUTANEOUS AT BEDTIME
Refills: 0 | Status: DISCONTINUED | OUTPATIENT
Start: 2022-05-09 | End: 2022-05-23

## 2022-05-09 RX ORDER — NICOTINE POLACRILEX 2 MG
2 GUM BUCCAL
Refills: 0 | Status: DISCONTINUED | OUTPATIENT
Start: 2022-05-09 | End: 2022-05-23

## 2022-05-09 RX ORDER — METOPROLOL TARTRATE 50 MG
100 TABLET ORAL ONCE
Refills: 0 | Status: COMPLETED | OUTPATIENT
Start: 2022-05-09 | End: 2022-05-09

## 2022-05-09 RX ORDER — SENNA PLUS 8.6 MG/1
2 TABLET ORAL AT BEDTIME
Refills: 0 | Status: DISCONTINUED | OUTPATIENT
Start: 2022-05-09 | End: 2022-05-23

## 2022-05-09 RX ADMIN — LOSARTAN POTASSIUM 50 MILLIGRAM(S): 100 TABLET, FILM COATED ORAL at 10:44

## 2022-05-09 RX ADMIN — Medication 12.5 MILLIGRAM(S): at 13:44

## 2022-05-09 RX ADMIN — FLUPHENAZINE HYDROCHLORIDE 10 MILLIGRAM(S): 1 TABLET, FILM COATED ORAL at 20:44

## 2022-05-09 RX ADMIN — Medication 50 MILLIGRAM(S): at 23:46

## 2022-05-09 RX ADMIN — SENNA PLUS 2 TABLET(S): 8.6 TABLET ORAL at 20:43

## 2022-05-09 RX ADMIN — Medication 325 MILLIGRAM(S): at 14:25

## 2022-05-09 RX ADMIN — Medication 1 MILLIGRAM(S): at 13:44

## 2022-05-09 RX ADMIN — Medication 325 MILLIGRAM(S): at 13:44

## 2022-05-09 RX ADMIN — ATORVASTATIN CALCIUM 40 MILLIGRAM(S): 80 TABLET, FILM COATED ORAL at 20:44

## 2022-05-09 RX ADMIN — Medication 100 MILLIGRAM(S): at 10:44

## 2022-05-09 RX ADMIN — Medication 50 MILLIGRAM(S): at 21:52

## 2022-05-09 RX ADMIN — Medication 325 MILLIGRAM(S): at 19:02

## 2022-05-09 RX ADMIN — Medication 0.5 MILLIGRAM(S): at 20:44

## 2022-05-09 RX ADMIN — Medication 50 MILLIGRAM(S): at 01:04

## 2022-05-09 RX ADMIN — Medication 1 MILLIGRAM(S): at 20:43

## 2022-05-09 RX ADMIN — FLUPHENAZINE HYDROCHLORIDE 5 MILLIGRAM(S): 1 TABLET, FILM COATED ORAL at 23:03

## 2022-05-09 RX ADMIN — Medication 150 MILLIGRAM(S): at 13:47
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
## 2022-05-09 NOTE — BH INPATIENT PSYCHIATRY ASSESSMENT NOTE - CURRENT MEDICATION
MEDICATIONS  (STANDING):  atorvastatin 40 milliGRAM(s) Oral at bedtime  benztropine 1 milliGRAM(s) Oral at bedtime  clonazePAM  Tablet 0.5 milliGRAM(s) Oral daily  fluPHENAZine 10 milliGRAM(s) Oral two times a day  insulin lispro (ADMELOG) corrective regimen sliding scale   SubCutaneous three times a day before meals  insulin lispro (ADMELOG) corrective regimen sliding scale   SubCutaneous at bedtime  senna 2 Tablet(s) Oral at bedtime    MEDICATIONS  (PRN):  acetaminophen     Tablet .. 325 milliGRAM(s) Oral every 4 hours PRN Mild Pain (1 - 3), Moderate Pain (4 - 6)  diphenhydrAMINE 50 milliGRAM(s) Oral every 6 hours PRN agitation  diphenhydrAMINE Injectable 50 milliGRAM(s) IntraMuscular once PRN severe agitation  fluPHENAZine 5 milliGRAM(s) Oral every 6 hours PRN AGITATION  fluPHENAZine  Injectable 2.5 milliGRAM(s) IntraMuscular Once PRN severe agitation   MEDICATIONS  (STANDING):  atorvastatin 40 milliGRAM(s) Oral at bedtime  clonazePAM  Tablet 0.5 milliGRAM(s) Oral daily  fluPHENAZine 10 milliGRAM(s) Oral two times a day  insulin lispro (ADMELOG) corrective regimen sliding scale   SubCutaneous three times a day before meals  insulin lispro (ADMELOG) corrective regimen sliding scale   SubCutaneous at bedtime  senna 2 Tablet(s) Oral at bedtime    MEDICATIONS  (PRN):  acetaminophen     Tablet .. 325 milliGRAM(s) Oral every 4 hours PRN Mild Pain (1 - 3), Moderate Pain (4 - 6)  diphenhydrAMINE 50 milliGRAM(s) Oral every 6 hours PRN agitation  diphenhydrAMINE Injectable 50 milliGRAM(s) IntraMuscular once PRN severe agitation  fluPHENAZine 5 milliGRAM(s) Oral every 6 hours PRN AGITATION  fluPHENAZine  Injectable 2.5 milliGRAM(s) IntraMuscular Once PRN severe agitation  nicotine  Polacrilex Gum 2 milliGRAM(s) Oral every 2 hours PRN NRT

## 2022-05-09 NOTE — ED BEHAVIORAL HEALTH NOTE - BEHAVIORAL HEALTH NOTE
MICHELLE contacted [facility] intake and spoke with Char who confirmed bed availability on [address] for pt. SW alerted psychiatry. No other SW needs at this time.
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
## 2022-05-09 NOTE — BH PATIENT PROFILE - FALL HARM RISK - RISK INTERVENTIONS

## 2022-05-09 NOTE — BH INPATIENT PSYCHIATRY ASSESSMENT NOTE - NSBHCHARTREVIEWINVESTIGATE_PSY_A_CORE FT
Ventricular Rate 70 BPM  Atrial Rate 70 BPM  P-R Interval 174 ms  QRS Duration 88 ms  Q-T Interval 396 ms  QTC Calculation(Bazett) 427 ms  P Axis 73 degrees  R Axis 4 degrees  T Axis 54 degrees  Diagnosis Line Normal sinus rhythm  Increased R/S ratio in V1, consider early transition or posterior infarct  Abnormal ECG      ACC: [id number] EXAM:  CT BRAIN                        PROCEDURE DATE:  05/08/2022    FINDINGS:  Exam limited due to motion.  There is no acute intracranial mass-effect, hemorrhage, midline shift, or   abnormal extra-axial fluid collection. Gray-white differentiation is   maintained.  Ventricles, sulci, and cisterns are normal in size for the patient's age   without hydrocephalus.There are periventricular and subcortical white   matter hypodensities, consistent with microvascular changes. Basal   cisterns are patent.  Mucosal thickening of the right maxillary, ethmoid, sphenoid, and right   frontal sinus. The mastoid air cells are clear.  Left scalp lipoma. The calvarium is intact.    IMPRESSION:  No acute intracranial bleeding or shift.  Pansinus mucosal thickening of the right maxillary, ethmoid, sphenoid,   and right frontal sinuses.

## 2022-05-09 NOTE — ED BEHAVIORAL HEALTH NOTE - BEHAVIORAL HEALTH NOTE
*Has the patient had a COVID-19 test in the last 90 days?  (  ) Yes   (  X) No   (  ) Unknown- Reason: _____     IF YES PROCEED TO QUESTION #2. IF NO OR UNKNOWN, PLEASE SKIP TO QUESTION #3.     Date of test(s) and result(s): ________     *Has the patient tested positive for COVID-19 antibodies? ( X ) Yes   (  ) No   (  ) Unknown- Reason: _____     IF YES PROCEED TO QUESTION #4. IF NO or UNKNOWN, PLEASE SKIP TO QUESTION #5.     Date of positive antibody test: ____5/9/22____     *Has the patient received 2 doses of the COVID-19 vaccine? (  ) Yes   ( X ) No   (  ) Unknown- Reason: _____     IF YES PROCEED TO QUESTION #6. IF NO or UNKNOWN, PLEASE SKIP TO QUESTION #7.      Date of second dose: ________     *In the past 10 days, has the patient been around anyone with a positive COVID-19 test?* (  ) Yes   ( X ) No   (  ) Unknown- Reason: __     IF YES PROCEED TO QUESTION #8. IF NO or UNKNOWN, PLEASE SKIP TO QUESTION #13.     Was the patient within 6 feet of them for at least 15 minutes? (  ) Yes   (  ) No   (  ) Unknown- Reason: _____     Did the patient provide care for them? (  ) Yes   (  ) No   (  ) Unknown- Reason: ______     Did the patient have direct physical contact with them (touched, hugged, or kissed them)? (  ) Yes   (  ) No    (  ) Unknown- Reason: __     Did the patient share eating or drinking utensils with them? (  ) Yes   (  ) No    (  ) Unknown- Reason: ____     Did they sneeze, cough, or somehow get respiratory droplets on the patient? (  ) Yes   (  ) No    (  ) Unknown- Reason: ______     *Has the patient been out of [place] within the past 10 days?* (  ) Yes   (  X) No   (  ) Unknown- Reason: _____     IF YES PLEASE ANSWER THE FOLLOWING QUESTIONS:     Which state/country did they go to? ______     Were they there over 24 hours? (  ) Yes   (  ) No    (  ) Unknown- Reason: ______     Date of return to [facility]: ______

## 2022-05-09 NOTE — ED BEHAVIORAL HEALTH NOTE - BEHAVIORAL HEALTH NOTE
Upon reassessment pt appears sleeping, undisturbed, per RN, pt report of insomnia and was given benadryl  50 mg po with good effect. Will reevaluate when fully awake.

## 2022-05-09 NOTE — BH INPATIENT PSYCHIATRY ASSESSMENT NOTE - CASE SUMMARY
Pt presenting with psychosis in context of recent med changes.  Will obtain collateral.  Minimize polypharmacy will not restart quetiapine and risperidone.

## 2022-05-09 NOTE — BH INPATIENT PSYCHIATRY ASSESSMENT NOTE - RISK ASSESSMENT
Pt is an elevated acute risk of harm to self given recent SI with plan, acute psychosis, depressed mood, male, hx of admissions, current passive SI, guilty, compromised medical health. His chronic risk is elevated given hx of admissions, his underlying diagnosis. At this time given his elevated acute risk he warrants inpatient admission for further stabilization at this time

## 2022-05-09 NOTE — BH PATIENT PROFILE - STATED REASON FOR ADMISSION
Pt stated his reason for being admitted was that he was hearing voices and that they were becoming loud and hard to ignore. In the past, the voices have told him to hurt himself but denies active S/H/I.

## 2022-05-09 NOTE — ED ADULT NURSE REASSESSMENT NOTE - NS ED NURSE REASSESS COMMENT FT1
Break Covering RN: Pt sleeping in bed in room 4. NAD noted. Respirations even and unlabored, no accessory muscle use. Pt awaiting a psychiatric bed.

## 2022-05-09 NOTE — BH PATIENT PROFILE - NSDASAIRRITABILITY_PSY_ALL_CORE
"Ca Yan is a 78 year old female who is being evaluated via a billable telephone visit.      The patient has been notified of following:     \"This telephone visit will be conducted via a call between you and your physician/provider. We have found that certain health care needs can be provided without the need for a physical exam.  This service lets us provide the care you need with a short phone conversation.  If a prescription is necessary we can send it directly to your pharmacy.  If lab work is needed we can place an order for that and you can then stop by our lab to have the test done at a later time.    Telephone visits are billed at different rates depending on your insurance coverage. During this emergency period, for some insurers they may be billed the same as an in-person visit.  Please reach out to your insurance provider with any questions.    If during the course of the call the physician/provider feels a telephone visit is not appropriate, you will not be charged for this service.\"    Patient has given verbal consent for Telephone visit?  Yes    What phone number would you like to be contacted at? [phone]    How would you like to obtain your AVS? Mail a copy    Phone call duration: 58 minutes    .[facility]  Inderjit Shelton Jr., M.D., F.A.C.S.  Luann Arzate M.D., M.P.H.  Domonique Roche M.D.  Mirta Blake, Ph.D., CCC/SLP  JC [email or web address].JC. (voice), M.A., CCC/SLP  Wiley Snow M.M. (voice), M.A., [facility]SLP    [facility] VOICE [facility]  VOICE/SPEECH/BREATHING THERAPY PROGRESS REPORT    Patient: Ca Yan  Date of Service: 12/21/2020    Date of Last Service: 10/16/20, in a virtual visit with Dr. Roche  Referring physician: Dr. Roche  Initial evaluation: 4/30/20    I had the pleasure of seeing Ms. Yan today, for speech therapy to address a diagnosis of:  R49.0 (Dysphonia)   J38.01 (Unilateral Vocal Fold Paralysis)    PROGRESS SINCE LAST SESSION  At the last therapy session, " "Ms. Yan worked on therapeutic activities to address the above diagnosis.  She was seen for follow-up by Dr. Roche on 10/16.  Dr. Roche explained possible vocal fold augmentation procedures, including the pros and cons of each.  One concern is with the possibility of a sensation of narrowed airway resulting in paradoxical vocal fold motion.  Dr. Roche referred Ms. Yan for speech therapy to learn breathing techniques before she can consider any augmentation procedure.      Regarding practice, Ms. Yan reports the following:     She tried voice exercises for a while, but they weren't helpful, so she stopped    Ms. Yan also states that:    She is struggling a great deal with her voice, but has many questions about the potential for any medical intervention to improve her voice    She often cannot be heard, especially in phone conversations    She cannot breathe well with the mask; she goes to pulmonary rehab, but that is \"just oxygen\" and she is not learning any techniques for improved breathing function     Ms. Yan presents today with the following:  Voice quality:    Markedly breathy, with some very light phonation that indicates vocal fold vibration with weak but periodic glottic closure    Any increase in volume results in marked roughness and diplophonia    Pitch is quite high, usually around D4 to E4, and sounds even higher due to the breathiness    Some inspiratory stridor during fast conversation; inhalations are quiet at rest    Obvious respiratory phonatory incoordination, as well as phonatory effort    THERAPEUTIC ACTIVITIES  Today Ms. Yan participated in the following therapeutic activities:    Asked many questions about the nature of her symptoms, and I answered all of these thoroughly.  o I reviewed the note from Dr. Roche, and explained many of the concepts; she demonstrated poor understanding of what she had discussed with Dr. Roche, and what her options are  o I provided more explanation of " the options for vocal fold augmentation  - Her questions indicated that she did not have good understanding of the options, and also she has a great deal of anxiety regarding the medical interventions  - She tended to be quite tangential, which made answering her questions more difficult as I was not always able to provide a thorough answer before she had a question about a different topic  - I did try to explain the reason why an awake procedure is not indicated for her, because she has such discomfort with the endoscopy procedure  - Also, any procedure could possibly result in a compensa ory response of paradoxical vocal fold motion, and I reiterated how important it is for her to learn techniques for breathing.    I provided instruction for rescue breathing techniques to use if she has any difficulty inhaling; this was challenging over the phone  o She cannot do straw inhalation because she doesn't have a straw; it is not clear if she had the right sensation with rounded-lip inhalation  o She understands the need to practice these techniques like a fire drill.    IMPRESSIONS/GOALS/PLAN  Ms. Yan had a challenging session of speech therapy today, to address the following:  R49.0 (Dysphonia)   J38.01 (Unilateral Vocal Fold Paralysis)   Speech therapy for her is medically necessary to allow  her to meet personal and professional demands and fully engage in activities of daily living. This will remain challenging as the phone visits do not allow for visual aids to improve her understanding of her medical options or therapy techniques.  Still, she is quite willing to pratice the techniques, and check in again in two weeks to see how she is learning to use these.    Goals for this practice period:     practice breathing exercises according to instructions    Plan: I will visit with Ms. Yan by phone in two weeks to work on using the breathing techniques to arrest any episode of dyspnea.    TOTAL SERVICE TIME: 58  minutes  TREATMENT ([id number])  NO CHARGE FACILITY FEE    Mirta Blake, Ph.D., [facility]-SLP  Speech-Language Pathologist  Director, [facility]  [phone]                 No
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
## 2022-05-09 NOTE — BH INPATIENT PSYCHIATRY ASSESSMENT NOTE - NSBHCHARTREVIEWVS_PSY_A_CORE FT
Vital Signs Last 24 Hrs  T(C): 36.4 (05-09-22 @ 07:31), Max: 36.9 (05-08-22 @ 19:55)  T(F): 97.6 (05-09-22 @ 07:31), Max: 98.5 (05-08-22 @ 19:55)  HR: 70 (05-09-22 @ 07:31) (70 - 72)  BP: 141/94 (05-09-22 @ 07:31) (141/94 - 155/81)  BP(mean): --  RR: 16 (05-09-22 @ 07:31) (16 - 16)  SpO2: 97% (05-09-22 @ 07:31) (97% - 100%)     Vital Signs Last 24 Hrs  T(C): 36.6 (05-09-22 @ 13:55), Max: 36.6 (05-09-22 @ 13:55)  T(F): 97.8 (05-09-22 @ 13:55), Max: 97.8 (05-09-22 @ 13:55)  HR: 70 (05-09-22 @ 07:31) (70 - 70)  BP: 141/94 (05-09-22 @ 07:31) (141/94 - 141/94)  BP(mean): --  RR: 16 (05-09-22 @ 07:31) (16 - 16)  SpO2: 97% (05-09-22 @ 07:31) (97% - 97%)    Orthostatic VS  05-09-22 @ 13:55  Lying BP: --/-- HR: --  Sitting BP: 138/86 HR: 90  Standing BP: 134/82 HR: 86  Site: --  Mode: --

## 2022-05-09 NOTE — ED ADULT NURSE REASSESSMENT NOTE - NS ED NURSE REASSESS COMMENT FT1
Received report from night RN, pt calm & cooperative tolerated breakfast pt awaiting bed assignment eval on going.
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
## 2022-05-09 NOTE — BH PATIENT PROFILE - HOME MEDICATIONS
cyanocobalamin 1000 mcg oral tablet , 1 tab(s) orally once a day  cholecalciferol oral tablet , 1000 unit(s) orally once a day  senna oral tablet , 2 tab(s) orally once a day (at bedtime)  docusate sodium 100 mg oral capsule , 1 cap(s) orally 3 times a day  metoprolol succinate 100 mg oral tablet, extended release , 1 tab(s) orally once a day  LORazepam 0.5 mg oral tablet , 1 tab(s) orally 2 times a day MDD:1 mg a day  Benadryl 25 mg oral capsule , 1 cap(s) orally once a day (at bedtime), As Needed -insomnia - for insomnia   fluPHENAZine 5 mg oral tablet , 1 tab(s) orally in am and 2 tab(s) at bed time  Aspirin Enteric Coated 81 mg oral delayed release tablet , 1 tab(s) orally once a day  benztropine 0.5 mg oral tablet , 1 tab(s) orally once a day (at bedtime)  Lipitor 20 mg oral tablet , 1 tab(s) orally once a day (at bedtime)  Glucophage  mg oral tablet, extended release , 1 tab(s) orally once a day  venlafaxine 150 mg oral capsule, extended release , 1 cap(s) orally once a day -for agitation   losartan 100 mg oral tablet , 1 tab(s) orally once a day

## 2022-05-09 NOTE — BH INPATIENT PSYCHIATRY ASSESSMENT NOTE - NSBHCHARTREVIEWLAB_PSY_A_CORE FT
05-08    137  |  98  |  17  ----------------------------<  88  4.2   |  28  |  1.48<H>    Ca    9.7      08 May 2022 21:22    TPro  7.5  /  Alb  4.6  /  TBili  0.3  /  DBili  x   /  AST  17  /  ALT  22  /  AlkPhos  86  05-08                          13.4   10.01 )-----------( 238      ( 08 May 2022 21:24 )             40.9     Urinalysis (05.08.22 @ 23:00)   Glucose Qualitative, Urine: Negative   Blood, Urine: Negative   pH Urine: 6.5   Color: Colorless   Urine Appearance: Clear   Bilirubin: Negative   Ketone - Urine: Negative   Specific Gravity: 1.007   Protein, Urine: Negative   Urobilinogen: <2 mg/dL   Nitrite: Negative   Leukocyte Esterase Concentration: Negative     Thyroid Stimulating Hormone, Serum: 1.09 uIU/mL (05.08.22 @ 21:22)

## 2022-05-09 NOTE — BH INPATIENT PSYCHIATRY ASSESSMENT NOTE - NSBHMETABOLIC_PSY_ALL_CORE_FT
BMI:   HbA1c:   Glucose: POCT Blood Glucose.: 92 mg/dL (05-08-22 @ 19:59)    BP: 141/94 (05-09-22 @ 07:31) (141/94 - 155/81)  Lipid Panel:  BMI: BMI (kg/m2): 28.9 (05-09-22 @ 13:55)  HbA1c:   Glucose: POCT Blood Glucose.: 118 mg/dL (05-09-22 @ 16:47)    BP: 141/94 (05-09-22 @ 07:31) (141/94 - 155/81)  Lipid Panel:  BMI: BMI (kg/m2): 28.9 (05-09-22 @ 13:55)  HbA1c:   Glucose: POCT Blood Glucose.: 102 mg/dL (05-09-22 @ 20:13)    BP: 141/94 (05-09-22 @ 07:31) (141/94 - 155/81)  Lipid Panel:

## 2022-05-09 NOTE — ED ADULT NURSE REASSESSMENT NOTE - NS ED NURSE REASSESS COMMENT FT1
Pt c/o insomnia and is requesting something for sleep. MD made aware and medication administered as ordered.

## 2022-05-10 PROCEDURE — 99232 SBSQ HOSP IP/OBS MODERATE 35: CPT | Mod: 1L

## 2022-05-10 RX ORDER — FLUPHENAZINE HYDROCHLORIDE 1 MG/1
5 TABLET, FILM COATED ORAL
Refills: 0 | Status: DISCONTINUED | OUTPATIENT
Start: 2022-05-10 | End: 2022-05-10

## 2022-05-10 RX ORDER — FLUPHENAZINE HYDROCHLORIDE 1 MG/1
5 TABLET, FILM COATED ORAL DAILY
Refills: 0 | Status: DISCONTINUED | OUTPATIENT
Start: 2022-05-10 | End: 2022-05-10

## 2022-05-10 RX ORDER — DIPHENHYDRAMINE HCL 50 MG
25 CAPSULE ORAL AT BEDTIME
Refills: 0 | Status: DISCONTINUED | OUTPATIENT
Start: 2022-05-10 | End: 2022-05-13

## 2022-05-10 RX ORDER — BENZTROPINE MESYLATE 1 MG
1 TABLET ORAL DAILY
Refills: 0 | Status: DISCONTINUED | OUTPATIENT
Start: 2022-05-11 | End: 2022-05-16

## 2022-05-10 RX ORDER — FLUPHENAZINE HYDROCHLORIDE 1 MG/1
5 TABLET, FILM COATED ORAL
Refills: 0 | Status: DISCONTINUED | OUTPATIENT
Start: 2022-05-10 | End: 2022-05-11

## 2022-05-10 RX ORDER — FLUPHENAZINE HYDROCHLORIDE 1 MG/1
10 TABLET, FILM COATED ORAL AT BEDTIME
Refills: 0 | Status: DISCONTINUED | OUTPATIENT
Start: 2022-05-10 | End: 2022-05-10

## 2022-05-10 RX ADMIN — Medication 1: at 17:16

## 2022-05-10 RX ADMIN — LOSARTAN POTASSIUM 50 MILLIGRAM(S): 100 TABLET, FILM COATED ORAL at 09:29

## 2022-05-10 RX ADMIN — SENNA PLUS 2 TABLET(S): 8.6 TABLET ORAL at 20:29

## 2022-05-10 RX ADMIN — FLUPHENAZINE HYDROCHLORIDE 5 MILLIGRAM(S): 1 TABLET, FILM COATED ORAL at 09:30

## 2022-05-10 RX ADMIN — Medication 325 MILLIGRAM(S): at 02:09

## 2022-05-10 RX ADMIN — Medication 12.5 MILLIGRAM(S): at 09:29

## 2022-05-10 RX ADMIN — Medication 1 MILLIGRAM(S): at 09:30

## 2022-05-10 RX ADMIN — Medication 50 MILLIGRAM(S): at 03:55

## 2022-05-10 RX ADMIN — Medication 150 MILLIGRAM(S): at 09:29

## 2022-05-10 RX ADMIN — METFORMIN HYDROCHLORIDE 500 MILLIGRAM(S): 850 TABLET ORAL at 09:30

## 2022-05-10 RX ADMIN — Medication 100 MILLIGRAM(S): at 09:29

## 2022-05-10 RX ADMIN — FLUPHENAZINE HYDROCHLORIDE 5 MILLIGRAM(S): 1 TABLET, FILM COATED ORAL at 20:29

## 2022-05-10 RX ADMIN — Medication 25 MILLIGRAM(S): at 20:29

## 2022-05-10 NOTE — BH INPATIENT PSYCHIATRY PROGRESS NOTE - NSBHCHARTREVIEWVS_PSY_A_CORE FT
Vital Signs Last 24 Hrs  T(C): 36.3 (05-10-22 @ 08:25), Max: 36.6 (05-09-22 @ 13:55)  T(F): 97.3 (05-10-22 @ 08:25), Max: 97.8 (05-09-22 @ 13:55)  HR: 61 (05-10-22 @ 08:25) (61 - 61)  BP: 128/68 (05-10-22 @ 08:25) (128/68 - 128/68)  BP(mean): --  RR: --  SpO2: --    Orthostatic VS  05-09-22 @ 13:55  Lying BP: --/-- HR: --  Sitting BP: 138/86 HR: 90  Standing BP: 134/82 HR: 86  Site: --  Mode: --   Vital Signs Last 24 Hrs  T(C): 36.3 (05-10-22 @ 08:25), Max: 36.3 (05-10-22 @ 08:25)  T(F): 97.3 (05-10-22 @ 08:25), Max: 97.3 (05-10-22 @ 08:25)  HR: 61 (05-10-22 @ 08:25) (61 - 61)  BP: 128/68 (05-10-22 @ 08:25) (128/68 - 128/68)  BP(mean): --  RR: --  SpO2: --    Orthostatic VS  05-09-22 @ 13:55  Lying BP: --/-- HR: --  Sitting BP: 138/86 HR: 90  Standing BP: 134/82 HR: 86  Site: --  Mode: --   Vital Signs Last 24 Hrs  T(C): 36.4 (05-10-22 @ 17:28), Max: 36.4 (05-10-22 @ 17:28)  T(F): 97.5 (05-10-22 @ 17:28), Max: 97.5 (05-10-22 @ 17:28)  HR: 61 (05-10-22 @ 08:25) (61 - 61)  BP: 128/68 (05-10-22 @ 08:25) (128/68 - 128/68)  BP(mean): --  RR: --  SpO2: --    Orthostatic VS  05-09-22 @ 13:55  Lying BP: --/-- HR: --  Sitting BP: 138/86 HR: 90  Standing BP: 134/82 HR: 86  Site: --  Mode: --

## 2022-05-10 NOTE — BH INPATIENT PSYCHIATRY PROGRESS NOTE - PRN MEDS
MEDICATIONS  (PRN):  acetaminophen     Tablet .. 325 milliGRAM(s) Oral every 4 hours PRN Mild Pain (1 - 3), Moderate Pain (4 - 6)  diphenhydrAMINE 50 milliGRAM(s) Oral every 6 hours PRN agitation  diphenhydrAMINE Injectable 50 milliGRAM(s) IntraMuscular once PRN severe agitation  fluPHENAZine 5 milliGRAM(s) Oral every 6 hours PRN AGITATION  fluPHENAZine  Injectable 2.5 milliGRAM(s) IntraMuscular Once PRN severe agitation  nicotine  Polacrilex Gum 2 milliGRAM(s) Oral every 2 hours PRN NRT

## 2022-05-10 NOTE — PSYCHIATRIC REHAB INITIAL EVALUATION - NSBHPRRECOMMEND_PSY_ALL_CORE
Writer met with pt to orient pt to the unit and introduce psychiatric rehabilitation staff/functions. Throughout the session, pt was cooperative, attentive, and engaged to build rapport with the writer as evidenced by him demonstrating patience and expressing gratitude. The session was proceeded, using an  (#[id number]). Pt reported poor sleep and poor appetite which exacerbates AH and SI. Pt endorsed SI, however reported feeling better after admission. Pt reported that the staff on unit has been helpful. In the event, pt experiences SI, pt stated that he will inform the treatment team for support. Pt reported that medication helps with AH, however, reported lack of coping skills to create distraction for the voices. Pt also shared his stress and “trouble” in interpersonal relationships and expressed desire to improve his communication skills. The writer and pt collaborated on establishing a psych rehab goal of developing internal coping skills and effective communication skills to improve interpersonal relationships. Pt’s ADLs are good. Pt denied HI and VH. The psychiatric rehabilitation staff will monitor his progress over the next 7 days and engage with pt daily to build therapeutic rapport.

## 2022-05-10 NOTE — BH INPATIENT PSYCHIATRY PROGRESS NOTE - NSBHMETABOLIC_PSY_ALL_CORE_FT
BMI: BMI (kg/m2): 28.9 (05-09-22 @ 13:55)  HbA1c:   Glucose: POCT Blood Glucose.: 101 mg/dL (05-10-22 @ 07:54)    BP: 128/68 (05-10-22 @ 08:25) (128/68 - 155/81)  Lipid Panel:  BMI: BMI (kg/m2): 28.9 (05-09-22 @ 13:55)  HbA1c:   Glucose: POCT Blood Glucose.: 163 mg/dL (05-10-22 @ 16:31)    BP: 128/68 (05-10-22 @ 08:25) (128/68 - 155/81)  Lipid Panel:

## 2022-05-10 NOTE — BH INPATIENT PSYCHIATRY PROGRESS NOTE - NSBHFUPINTERVALHXFT_PSY_A_CORE
Interviewed with professional interpretation services: Varsha ID [id number]    Patient seen for follow up of psychosis. Accepted all medications as scheduled. Received additional medications overnight due to insomnia, including trazodone 50 and benadryl 50. Sleep very poor with only 2 hours recorded. Appetite wnl.    Patient interviewed in private room with treatment team. Patient states that he has had very poor sleep for 2-3 years. He endorses 2-3 years of restlessness and feeling that he cannot sit still, and states that this is the primary issue preventing him from sleeping. He also states that AH contribute to insomnia, however states that the restlessness is a greater problem for him. Endorses ongoing derogatory AH. Denies active SIIP/HIIP at this time.

## 2022-05-10 NOTE — BH INPATIENT PSYCHIATRY PROGRESS NOTE - CASE SUMMARY
Pt at this time reporting voices and restlessness contributing to insomnia.  Concern for akathisia.  Pt also hx of EPS, leading to Cogentin use.  Benzo + anticholinergic raising concern for lightheadedness and fall risk.  Prolixin appears to not be effective in controlling psychosis.  Given side effects and resulting polypharmacy, will begin to reduce dose and address polypharmacy, with eventual goal of switching to alternative antipsychotic that addresses pt's goal of avoiding weight gain/metabolic effects (e.g. ziprasidone, lurasidone).
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
## 2022-05-10 NOTE — BH INPATIENT PSYCHIATRY PROGRESS NOTE - MSE UNSTRUCTURED FT
The patient appears stated age, fair hygiene and dressed appropriately in casual clothing.  The patient was calm, cooperative with the interview and maintained appropriate eye contact.  No psychomotor agitation or retardation noted.  Steady gait observed.  The patient's speech was fluent, normal in tone, rate, and volume.  The patient's mood is "good."  Affect is constricted, stable and appropriate.  The patient's thoughts are goal directed.  Denies any delusions or hallucinations. Denies any suicidal or homicidal ideation, intent, or plan.  Insight is fair.  Judgment is fair.  Impulse control has been fair on the unit. The patient appears stated age, fair hygiene and dressed appropriately in casual clothing.  The patient was calm, cooperative with the interview and maintained appropriate eye contact.  No psychomotor agitation or retardation noted, shifts in chair.  Steady gait observed.  The patient's speech was loud in volume, normal in rate. Speech in Moroccan with translation service.  The patient's mood is "ok."  Affect is anxious constricted, stable and appropriate.  The patient's thoughts are goal directed.  Endorses ongoing AH. Denies active suicidal or homicidal ideation, intent, or plan.  Insight is fair.  Judgment is fair.  Impulse control has been fair on the unit.

## 2022-05-10 NOTE — BH INPATIENT PSYCHIATRY PROGRESS NOTE - CURRENT MEDICATION
MEDICATIONS  (STANDING):  benztropine 1 milliGRAM(s) Oral two times a day  fluPHENAZine 5 milliGRAM(s) Oral daily  fluPHENAZine 10 milliGRAM(s) Oral at bedtime  hydrochlorothiazide 12.5 milliGRAM(s) Oral daily  insulin lispro (ADMELOG) corrective regimen sliding scale   SubCutaneous three times a day before meals  insulin lispro (ADMELOG) corrective regimen sliding scale   SubCutaneous at bedtime  losartan 50 milliGRAM(s) Oral daily  metFORMIN 500 milliGRAM(s) Oral daily  metoprolol succinate  milliGRAM(s) Oral daily  senna 2 Tablet(s) Oral at bedtime  venlafaxine  milliGRAM(s) Oral daily    MEDICATIONS  (PRN):  acetaminophen     Tablet .. 325 milliGRAM(s) Oral every 4 hours PRN Mild Pain (1 - 3), Moderate Pain (4 - 6)  diphenhydrAMINE 50 milliGRAM(s) Oral every 6 hours PRN agitation  diphenhydrAMINE Injectable 50 milliGRAM(s) IntraMuscular once PRN severe agitation  fluPHENAZine 5 milliGRAM(s) Oral every 6 hours PRN AGITATION  fluPHENAZine  Injectable 2.5 milliGRAM(s) IntraMuscular Once PRN severe agitation  nicotine  Polacrilex Gum 2 milliGRAM(s) Oral every 2 hours PRN NRT   MEDICATIONS  (STANDING):  benztropine 1 milliGRAM(s) Oral two times a day  fluPHENAZine 5 milliGRAM(s) Oral daily  hydrochlorothiazide 12.5 milliGRAM(s) Oral daily  insulin lispro (ADMELOG) corrective regimen sliding scale   SubCutaneous three times a day before meals  insulin lispro (ADMELOG) corrective regimen sliding scale   SubCutaneous at bedtime  losartan 50 milliGRAM(s) Oral daily  metFORMIN 500 milliGRAM(s) Oral daily  metoprolol succinate  milliGRAM(s) Oral daily  senna 2 Tablet(s) Oral at bedtime  venlafaxine  milliGRAM(s) Oral daily    MEDICATIONS  (PRN):  acetaminophen     Tablet .. 325 milliGRAM(s) Oral every 4 hours PRN Mild Pain (1 - 3), Moderate Pain (4 - 6)  diphenhydrAMINE 50 milliGRAM(s) Oral every 6 hours PRN agitation  diphenhydrAMINE Injectable 50 milliGRAM(s) IntraMuscular once PRN severe agitation  fluPHENAZine 5 milliGRAM(s) Oral every 6 hours PRN AGITATION  fluPHENAZine  Injectable 2.5 milliGRAM(s) IntraMuscular Once PRN severe agitation  nicotine  Polacrilex Gum 2 milliGRAM(s) Oral every 2 hours PRN NRT   MEDICATIONS  (STANDING):  benztropine 1 milliGRAM(s) Oral two times a day  fluPHENAZine 5 milliGRAM(s) Oral two times a day  hydrochlorothiazide 12.5 milliGRAM(s) Oral daily  insulin lispro (ADMELOG) corrective regimen sliding scale   SubCutaneous three times a day before meals  insulin lispro (ADMELOG) corrective regimen sliding scale   SubCutaneous at bedtime  losartan 50 milliGRAM(s) Oral daily  metFORMIN 500 milliGRAM(s) Oral daily  metoprolol succinate  milliGRAM(s) Oral daily  senna 2 Tablet(s) Oral at bedtime  venlafaxine  milliGRAM(s) Oral daily    MEDICATIONS  (PRN):  acetaminophen     Tablet .. 325 milliGRAM(s) Oral every 4 hours PRN Mild Pain (1 - 3), Moderate Pain (4 - 6)  diphenhydrAMINE 50 milliGRAM(s) Oral every 6 hours PRN agitation  diphenhydrAMINE Injectable 50 milliGRAM(s) IntraMuscular once PRN severe agitation  fluPHENAZine 5 milliGRAM(s) Oral every 6 hours PRN AGITATION  fluPHENAZine  Injectable 2.5 milliGRAM(s) IntraMuscular Once PRN severe agitation  nicotine  Polacrilex Gum 2 milliGRAM(s) Oral every 2 hours PRN NRT   MEDICATIONS  (STANDING):  diphenhydrAMINE 25 milliGRAM(s) Oral at bedtime  fluPHENAZine 5 milliGRAM(s) Oral two times a day  hydrochlorothiazide 12.5 milliGRAM(s) Oral daily  insulin lispro (ADMELOG) corrective regimen sliding scale   SubCutaneous three times a day before meals  insulin lispro (ADMELOG) corrective regimen sliding scale   SubCutaneous at bedtime  losartan 50 milliGRAM(s) Oral daily  metFORMIN 500 milliGRAM(s) Oral daily  metoprolol succinate  milliGRAM(s) Oral daily  senna 2 Tablet(s) Oral at bedtime  venlafaxine  milliGRAM(s) Oral daily    MEDICATIONS  (PRN):  acetaminophen     Tablet .. 325 milliGRAM(s) Oral every 4 hours PRN Mild Pain (1 - 3), Moderate Pain (4 - 6)  diphenhydrAMINE 50 milliGRAM(s) Oral every 6 hours PRN agitation  diphenhydrAMINE Injectable 50 milliGRAM(s) IntraMuscular once PRN severe agitation  fluPHENAZine 5 milliGRAM(s) Oral every 6 hours PRN AGITATION  fluPHENAZine  Injectable 2.5 milliGRAM(s) IntraMuscular Once PRN severe agitation  nicotine  Polacrilex Gum 2 milliGRAM(s) Oral every 2 hours PRN NRT

## 2022-05-11 PROCEDURE — 99231 SBSQ HOSP IP/OBS SF/LOW 25: CPT | Mod: 1L

## 2022-05-11 RX ORDER — ZIPRASIDONE HYDROCHLORIDE 20 MG/1
20 CAPSULE ORAL
Refills: 0 | Status: DISCONTINUED | OUTPATIENT
Start: 2022-05-15 | End: 2022-05-16

## 2022-05-11 RX ORDER — LOXAPINE SUCCINATE 25 MG
1 CAPSULE ORAL
Qty: 14 | Refills: 0
Start: 2022-05-11 | End: 2022-05-24

## 2022-05-11 RX ORDER — FLUPHENAZINE HYDROCHLORIDE 1 MG/1
5 TABLET, FILM COATED ORAL
Refills: 0 | Status: COMPLETED | OUTPATIENT
Start: 2022-05-11 | End: 2022-05-15

## 2022-05-11 RX ORDER — DIPHENHYDRAMINE HCL 50 MG
25 CAPSULE ORAL AT BEDTIME
Refills: 0 | Status: DISCONTINUED | OUTPATIENT
Start: 2022-05-11 | End: 2022-05-16

## 2022-05-11 RX ADMIN — Medication 1 MILLIGRAM(S): at 09:00

## 2022-05-11 RX ADMIN — LOSARTAN POTASSIUM 50 MILLIGRAM(S): 100 TABLET, FILM COATED ORAL at 09:00

## 2022-05-11 RX ADMIN — FLUPHENAZINE HYDROCHLORIDE 5 MILLIGRAM(S): 1 TABLET, FILM COATED ORAL at 20:47

## 2022-05-11 RX ADMIN — FLUPHENAZINE HYDROCHLORIDE 5 MILLIGRAM(S): 1 TABLET, FILM COATED ORAL at 09:00

## 2022-05-11 RX ADMIN — SENNA PLUS 2 TABLET(S): 8.6 TABLET ORAL at 20:47

## 2022-05-11 RX ADMIN — Medication 325 MILLIGRAM(S): at 15:44

## 2022-05-11 RX ADMIN — Medication 150 MILLIGRAM(S): at 09:01

## 2022-05-11 RX ADMIN — METFORMIN HYDROCHLORIDE 500 MILLIGRAM(S): 850 TABLET ORAL at 09:00

## 2022-05-11 RX ADMIN — Medication 25 MILLIGRAM(S): at 20:47

## 2022-05-11 RX ADMIN — Medication 25 MILLIGRAM(S): at 22:15

## 2022-05-11 RX ADMIN — Medication 100 MILLIGRAM(S): at 09:00

## 2022-05-11 RX ADMIN — Medication 50 MILLIGRAM(S): at 01:43

## 2022-05-11 RX ADMIN — Medication 12.5 MILLIGRAM(S): at 09:00

## 2022-05-11 NOTE — BH SOCIAL WORK INITIAL PSYCHOSOCIAL EVALUATION - OTHER PAST PSYCHIATRIC HISTORY (INCLUDE DETAILS REGARDING ONSET, COURSE OF ILLNESS, INPATIENT/OUTPATIENT TREATMENT)
As per the ED Behavioral Health Assessment Note completed on May 8th 2022: "The Pt is a 61 yr old  male (Australian speaking only), single, domiciled and unemployed, PPHx schizophrenia, multiple past psych admissions (including 5 [facility] admissions last discharged in summer 2020, no known past SA, legal or substance hx, PMHx of HTN, DM and HLD.  Today, BIB family to worsening AH. On interview, patient seen with Australian-fluent attending, he feels that his symptoms have been worsening for one month. He reports depressed mood, "yelling at my family", hearing voices that tell him "look, other people are doing things and you're no good". He endorses having SI with plan to hang himself, denies any preparatory actions or any attempts. Reports poor sleep, low energy, poor appetite as well. Denies CAH. Otherwise endorses 1.5 year history of dizziness that his PCP wanted patient to see Neurologist for, which he has not yet done. Endorses current dizziness as well. Otherwise denies active SI, and feels safe now in the hospital. Expresses his desire to get help and asks to be admitted. Patient completes voluntary paperwork in the room with team."
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
## 2022-05-11 NOTE — BH INPATIENT PSYCHIATRY PROGRESS NOTE - CASE SUMMARY
60 yo M admitted for decompensated psychosis and associated mood symptoms. Patient calm and in behavioral control, subtle paranoia present, intermittent AH. Patient on PO Prolixin and with concerns for Akathisia and will reduce dose to Prolixin 5mg BID and ultimately transition to Geodon. EKG wnl.

## 2022-05-11 NOTE — BH SOCIAL WORK INITIAL PSYCHOSOCIAL EVALUATION - NSBHTREATHXNAMEFT_PSY_ALL_CORE
[facility] - Josh Webb MD / Radha Emery, Psychotherapist [phone]

## 2022-05-11 NOTE — BH INPATIENT PSYCHIATRY PROGRESS NOTE - PRN MEDS
MEDICATIONS  (PRN):  acetaminophen     Tablet .. 325 milliGRAM(s) Oral every 4 hours PRN Mild Pain (1 - 3), Moderate Pain (4 - 6)  diphenhydrAMINE 50 milliGRAM(s) Oral every 6 hours PRN agitation  diphenhydrAMINE 25 milliGRAM(s) Oral at bedtime PRN Insomnia  diphenhydrAMINE Injectable 50 milliGRAM(s) IntraMuscular once PRN severe agitation  fluPHENAZine 5 milliGRAM(s) Oral every 6 hours PRN AGITATION  fluPHENAZine  Injectable 2.5 milliGRAM(s) IntraMuscular Once PRN severe agitation  nicotine  Polacrilex Gum 2 milliGRAM(s) Oral every 2 hours PRN NRT

## 2022-05-11 NOTE — BH INPATIENT PSYCHIATRY PROGRESS NOTE - NSBHFUPINTERVALHXFT_PSY_A_CORE
Interviewed with professional interpretation services: Pilar [id number]    Patient seen for follow up of psychosis. Accepted all medications as scheduled. Received additional medication overnight due to insomnia: benadryl 50. Slept throughout the night. Appetite wnl.    Patient interviewed in private room with treatment team. Reports improved sleep with benadryl PRNs, counseled regarding risks and time-limited nature of this intervention. Engaged in planning medication adjustment, amenable to transitioning from fluphenazine. Concerned about possible weight gain. Endorses ongoing derogatory AH however improved from prior. Less restless than yesterday. Denies active SIIP/HIIP at this time.

## 2022-05-11 NOTE — BH INPATIENT PSYCHIATRY PROGRESS NOTE - NSBHCHARTREVIEWVS_PSY_A_CORE FT
Vital Signs Last 24 Hrs  T(C): 36.5 (05-11-22 @ 08:44), Max: 36.5 (05-11-22 @ 08:44)  T(F): 97.7 (05-11-22 @ 08:44), Max: 97.7 (05-11-22 @ 08:44)  HR: --  BP: --  BP(mean): --  RR: --  SpO2: --    Orthostatic VS  05-11-22 @ 08:44  Lying BP: --/-- HR: --  Sitting BP: 135/76 HR: 64  Standing BP: --/-- HR: --  Site: --  Mode: --  Orthostatic VS  05-09-22 @ 13:55  Lying BP: --/-- HR: --  Sitting BP: 138/86 HR: 90  Standing BP: 134/82 HR: 86  Site: --  Mode: --   Vital Signs Last 24 Hrs  T(C): 36.5 (05-11-22 @ 08:44), Max: 36.5 (05-11-22 @ 08:44)  T(F): 97.7 (05-11-22 @ 08:44), Max: 97.7 (05-11-22 @ 08:44)  HR: --  BP: --  BP(mean): --  RR: --  SpO2: --    Orthostatic VS  05-11-22 @ 08:44  Lying BP: --/-- HR: --  Sitting BP: 135/76 HR: 64  Standing BP: --/-- HR: --  Site: --  Mode: --

## 2022-05-11 NOTE — BH INPATIENT PSYCHIATRY PROGRESS NOTE - CURRENT MEDICATION
MEDICATIONS  (STANDING):  benztropine 1 milliGRAM(s) Oral daily  diphenhydrAMINE 25 milliGRAM(s) Oral at bedtime  fluPHENAZine 5 milliGRAM(s) Oral two times a day  hydrochlorothiazide 12.5 milliGRAM(s) Oral daily  insulin lispro (ADMELOG) corrective regimen sliding scale   SubCutaneous three times a day before meals  insulin lispro (ADMELOG) corrective regimen sliding scale   SubCutaneous at bedtime  losartan 50 milliGRAM(s) Oral daily  metFORMIN 500 milliGRAM(s) Oral daily  metoprolol succinate  milliGRAM(s) Oral daily  senna 2 Tablet(s) Oral at bedtime  venlafaxine  milliGRAM(s) Oral daily    MEDICATIONS  (PRN):  acetaminophen     Tablet .. 325 milliGRAM(s) Oral every 4 hours PRN Mild Pain (1 - 3), Moderate Pain (4 - 6)  diphenhydrAMINE 50 milliGRAM(s) Oral every 6 hours PRN agitation  diphenhydrAMINE 25 milliGRAM(s) Oral at bedtime PRN Insomnia  diphenhydrAMINE Injectable 50 milliGRAM(s) IntraMuscular once PRN severe agitation  fluPHENAZine 5 milliGRAM(s) Oral every 6 hours PRN AGITATION  fluPHENAZine  Injectable 2.5 milliGRAM(s) IntraMuscular Once PRN severe agitation  nicotine  Polacrilex Gum 2 milliGRAM(s) Oral every 2 hours PRN NRT

## 2022-05-11 NOTE — BH INPATIENT PSYCHIATRY PROGRESS NOTE - NSBHMETABOLIC_PSY_ALL_CORE_FT
BMI: BMI (kg/m2): 28.9 (05-09-22 @ 13:55)  HbA1c:   Glucose: POCT Blood Glucose.: 120 mg/dL (05-11-22 @ 11:19)    BP: 128/68 (05-10-22 @ 08:25) (128/68 - 155/81)  Lipid Panel:  BMI: BMI (kg/m2): 28.9 (05-09-22 @ 13:55)  HbA1c:   Glucose: POCT Blood Glucose.: 142 mg/dL (05-11-22 @ 16:31)    BP: 128/68 (05-10-22 @ 08:25) (128/68 - 155/81)  Lipid Panel:

## 2022-05-11 NOTE — BH INPATIENT PSYCHIATRY PROGRESS NOTE - MSE UNSTRUCTURED FT
The patient appears stated age, fair hygiene and dressed appropriately in casual clothing.  The patient was calm, cooperative with the interview and maintained appropriate eye contact.  No psychomotor agitation or retardation noted, shifts in chair.  Steady gait observed.  The patient's speech was loud in volume, normal in rate. Speech in [place] with translation service.  The patient's mood is "ok."  Affect is anxious constricted, stable and appropriate.  The patient's thoughts are goal directed.  Endorses ongoing AH. Denies active suicidal or homicidal ideation, intent, or plan.  Insight is fair.  Judgment is fair.  Impulse control has been fair on the unit. The patient appears stated age, fair hygiene and dressed appropriately in casual clothing.  The patient was calm, cooperative with the interview and maintained appropriate eye contact.  No psychomotor agitation or retardation noted, orofacial dyskinesia noted.  The patient's speech was loud in volume, normal in rate. Speech in Iranian with translation service.  The patient's mood is "not good, not bad."  Affect is thoughtful, constricted, stable and appropriate.  The patient's thoughts are goal directed.  Endorses ongoing AH. Denies active suicidal or homicidal ideation, intent, or plan.  Insight is fair.  Judgment is fair.  Impulse control has been fair on the unit.

## 2022-05-12 PROCEDURE — 99231 SBSQ HOSP IP/OBS SF/LOW 25: CPT | Mod: 1L

## 2022-05-12 RX ORDER — LANOLIN ALCOHOL/MO/W.PET/CERES
3 CREAM (GRAM) TOPICAL ONCE
Refills: 0 | Status: COMPLETED | OUTPATIENT
Start: 2022-05-12 | End: 2022-05-12

## 2022-05-12 RX ADMIN — FLUPHENAZINE HYDROCHLORIDE 5 MILLIGRAM(S): 1 TABLET, FILM COATED ORAL at 08:49

## 2022-05-12 RX ADMIN — Medication 325 MILLIGRAM(S): at 11:45

## 2022-05-12 RX ADMIN — Medication 1 MILLIGRAM(S): at 08:48

## 2022-05-12 RX ADMIN — METFORMIN HYDROCHLORIDE 500 MILLIGRAM(S): 850 TABLET ORAL at 08:49

## 2022-05-12 RX ADMIN — Medication 3 MILLIGRAM(S): at 00:55

## 2022-05-12 RX ADMIN — Medication 100 MILLIGRAM(S): at 08:48

## 2022-05-12 RX ADMIN — Medication 150 MILLIGRAM(S): at 08:49

## 2022-05-12 RX ADMIN — Medication 12.5 MILLIGRAM(S): at 08:49

## 2022-05-12 RX ADMIN — LOSARTAN POTASSIUM 50 MILLIGRAM(S): 100 TABLET, FILM COATED ORAL at 08:48

## 2022-05-12 RX ADMIN — Medication 325 MILLIGRAM(S): at 02:32

## 2022-05-12 RX ADMIN — SENNA PLUS 2 TABLET(S): 8.6 TABLET ORAL at 20:37

## 2022-05-12 RX ADMIN — Medication 25 MILLIGRAM(S): at 20:37

## 2022-05-12 RX ADMIN — FLUPHENAZINE HYDROCHLORIDE 5 MILLIGRAM(S): 1 TABLET, FILM COATED ORAL at 20:37

## 2022-05-12 NOTE — BH INPATIENT PSYCHIATRY PROGRESS NOTE - NSBHMETABOLIC_PSY_ALL_CORE_FT
BMI: BMI (kg/m2): 28.9 (05-09-22 @ 13:55)  HbA1c:   Glucose: POCT Blood Glucose.: 76 mg/dL (05-12-22 @ 11:29)    BP: 128/68 (05-10-22 @ 08:25) (128/68 - 128/68)  Lipid Panel:

## 2022-05-12 NOTE — BH INPATIENT PSYCHIATRY PROGRESS NOTE - NSBHCHARTREVIEWVS_PSY_A_CORE FT
Vital Signs Last 24 Hrs  T(C): 35.9 (05-12-22 @ 08:59), Max: 36.7 (05-11-22 @ 17:34)  T(F): 96.7 (05-12-22 @ 08:59), Max: 98.1 (05-11-22 @ 17:34)  HR: --  BP: --  BP(mean): --  RR: --  SpO2: --    Orthostatic VS  05-12-22 @ 08:59  Lying BP: --/-- HR: 61  Sitting BP: 152/125 HR: --  Standing BP: --/-- HR: --  Site: --  Mode: electronic  Orthostatic VS  05-11-22 @ 08:44  Lying BP: --/-- HR: --  Sitting BP: 135/76 HR: 64  Standing BP: --/-- HR: --  Site: --  Mode: --

## 2022-05-12 NOTE — BH INPATIENT PSYCHIATRY PROGRESS NOTE - MSE UNSTRUCTURED FT
Appearance: Dressed appropriately in casual clothing   Behavior: superficially cooperative. somnolent. Relatedness: poor  Eye contact: poor  Motor: No abnormal movements, no psychomotor slowing or activation.  Speech: Regular rate. loud. spontaneous   Mood: "ok"  Affect: flat   Thought Process: concrete   Thought Content: denies SIIP, HIIP   Perceptual: endorses AH, denies CAH, denies VH  Insight: fair  Judgment: appropriate related to med adherence  Impulse control:  fair  Attention: fair  Gait: Intact.

## 2022-05-12 NOTE — BH INPATIENT PSYCHIATRY PROGRESS NOTE - NSBHFUPINTERVALHXFT_PSY_A_CORE
Chart reviewed including pertinent labs, imaging, ekg. case discussed with treatment team.  Over this interval: patient reports feeling tired, slept poorly, feels restless, endorsing AH denies command type.

## 2022-05-12 NOTE — PHYSICAL THERAPY INITIAL EVALUATION ADULT - GAIT PATTERN USED, PT EVAL
patient demonstrated antalgic pattern which was absent when he was walking to the bathroom during encounter.

## 2022-05-12 NOTE — PHYSICAL THERAPY INITIAL EVALUATION ADULT - PERTINENT HX OF CURRENT PROBLEM, REHAB EVAL
patient is a 61 year old male admitted for psychiatric care. PT consulted for dizziness and unsteady gait
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
## 2022-05-13 PROCEDURE — 99231 SBSQ HOSP IP/OBS SF/LOW 25: CPT | Mod: 1L

## 2022-05-13 RX ORDER — LANOLIN ALCOHOL/MO/W.PET/CERES
3 CREAM (GRAM) TOPICAL AT BEDTIME
Refills: 0 | Status: DISCONTINUED | OUTPATIENT
Start: 2022-05-13 | End: 2022-05-23

## 2022-05-13 RX ORDER — DIPHENHYDRAMINE HCL 50 MG
50 CAPSULE ORAL AT BEDTIME
Refills: 0 | Status: DISCONTINUED | OUTPATIENT
Start: 2022-05-13 | End: 2022-05-16

## 2022-05-13 RX ADMIN — FLUPHENAZINE HYDROCHLORIDE 5 MILLIGRAM(S): 1 TABLET, FILM COATED ORAL at 08:18

## 2022-05-13 RX ADMIN — Medication 325 MILLIGRAM(S): at 00:32

## 2022-05-13 RX ADMIN — Medication 325 MILLIGRAM(S): at 05:56

## 2022-05-13 RX ADMIN — Medication 25 MILLIGRAM(S): at 01:27

## 2022-05-13 RX ADMIN — FLUPHENAZINE HYDROCHLORIDE 5 MILLIGRAM(S): 1 TABLET, FILM COATED ORAL at 20:19

## 2022-05-13 RX ADMIN — Medication 325 MILLIGRAM(S): at 01:32

## 2022-05-13 RX ADMIN — SENNA PLUS 2 TABLET(S): 8.6 TABLET ORAL at 20:19

## 2022-05-13 RX ADMIN — Medication 325 MILLIGRAM(S): at 10:00

## 2022-05-13 RX ADMIN — Medication 100 MILLIGRAM(S): at 08:17

## 2022-05-13 RX ADMIN — Medication 150 MILLIGRAM(S): at 08:17

## 2022-05-13 RX ADMIN — LOSARTAN POTASSIUM 50 MILLIGRAM(S): 100 TABLET, FILM COATED ORAL at 08:17

## 2022-05-13 RX ADMIN — Medication 12.5 MILLIGRAM(S): at 08:17

## 2022-05-13 RX ADMIN — METFORMIN HYDROCHLORIDE 500 MILLIGRAM(S): 850 TABLET ORAL at 08:17

## 2022-05-13 RX ADMIN — Medication 3 MILLIGRAM(S): at 20:19

## 2022-05-13 RX ADMIN — Medication 1 MILLIGRAM(S): at 08:17

## 2022-05-13 NOTE — BH INPATIENT PSYCHIATRY PROGRESS NOTE - MSE UNSTRUCTURED FT
Appearance: Dressed appropriately in casual clothing   Behavior: cooperative. calm.   Eye contact: good  Motor: No abnormal movements, no psychomotor slowing or activation.  Speech: Regular rate. loud. spontaneous   Mood: "fine"  Affect: flat   Thought Process: concrete   Thought Content: denies SIIP, HIIP   Perceptual: denies AH, denies VH  Insight: fair  Judgment: appropriate related to med adherence  Impulse control:  fair  Attention: fair  Gait: Intact.

## 2022-05-13 NOTE — BH INPATIENT PSYCHIATRY PROGRESS NOTE - NSBHFUPINTERVALHXFT_PSY_A_CORE
Chart reviewed including pertinent labs, imaging, ekg. case discussed with treatment team.  Over this interval: no behavioral issues, adherent to medications, reports sleeping poorly. denies current AH. amenable to changes in medication to decrease restlessness possibly attributed to prolixin.

## 2022-05-13 NOTE — BH INPATIENT PSYCHIATRY PROGRESS NOTE - PRN MEDS
MEDICATIONS  (PRN):  acetaminophen     Tablet .. 325 milliGRAM(s) Oral every 4 hours PRN Mild Pain (1 - 3), Moderate Pain (4 - 6)  diphenhydrAMINE 50 milliGRAM(s) Oral every 6 hours PRN agitation  diphenhydrAMINE 25 milliGRAM(s) Oral at bedtime PRN Insomnia  diphenhydrAMINE Injectable 50 milliGRAM(s) IntraMuscular once PRN severe agitation  fluPHENAZine 5 milliGRAM(s) Oral every 6 hours PRN AGITATION  fluPHENAZine  Injectable 2.5 milliGRAM(s) IntraMuscular Once PRN severe agitation  nicotine  Polacrilex Gum 2 milliGRAM(s) Oral every 2 hours PRN NRT   MEDICATIONS  (PRN):  acetaminophen     Tablet .. 325 milliGRAM(s) Oral every 4 hours PRN Mild Pain (1 - 3), Moderate Pain (4 - 6)  diphenhydrAMINE 50 milliGRAM(s) Oral every 6 hours PRN agitation  diphenhydrAMINE 25 milliGRAM(s) Oral at bedtime PRN Insomnia  diphenhydrAMINE 50 milliGRAM(s) Oral at bedtime PRN insomnia  diphenhydrAMINE Injectable 50 milliGRAM(s) IntraMuscular once PRN severe agitation  fluPHENAZine 5 milliGRAM(s) Oral every 6 hours PRN AGITATION  fluPHENAZine  Injectable 2.5 milliGRAM(s) IntraMuscular Once PRN severe agitation  nicotine  Polacrilex Gum 2 milliGRAM(s) Oral every 2 hours PRN NRT

## 2022-05-13 NOTE — BH INPATIENT PSYCHIATRY PROGRESS NOTE - CURRENT MEDICATION
MEDICATIONS  (STANDING):  benztropine 1 milliGRAM(s) Oral daily  diphenhydrAMINE 25 milliGRAM(s) Oral at bedtime  fluPHENAZine 5 milliGRAM(s) Oral two times a day  hydrochlorothiazide 12.5 milliGRAM(s) Oral daily  insulin lispro (ADMELOG) corrective regimen sliding scale   SubCutaneous three times a day before meals  insulin lispro (ADMELOG) corrective regimen sliding scale   SubCutaneous at bedtime  losartan 50 milliGRAM(s) Oral daily  metFORMIN 500 milliGRAM(s) Oral daily  metoprolol succinate  milliGRAM(s) Oral daily  senna 2 Tablet(s) Oral at bedtime  venlafaxine  milliGRAM(s) Oral daily    MEDICATIONS  (PRN):  acetaminophen     Tablet .. 325 milliGRAM(s) Oral every 4 hours PRN Mild Pain (1 - 3), Moderate Pain (4 - 6)  diphenhydrAMINE 50 milliGRAM(s) Oral every 6 hours PRN agitation  diphenhydrAMINE 25 milliGRAM(s) Oral at bedtime PRN Insomnia  diphenhydrAMINE Injectable 50 milliGRAM(s) IntraMuscular once PRN severe agitation  fluPHENAZine 5 milliGRAM(s) Oral every 6 hours PRN AGITATION  fluPHENAZine  Injectable 2.5 milliGRAM(s) IntraMuscular Once PRN severe agitation  nicotine  Polacrilex Gum 2 milliGRAM(s) Oral every 2 hours PRN NRT   MEDICATIONS  (STANDING):  benztropine 1 milliGRAM(s) Oral daily  fluPHENAZine 5 milliGRAM(s) Oral two times a day  hydrochlorothiazide 12.5 milliGRAM(s) Oral daily  insulin lispro (ADMELOG) corrective regimen sliding scale   SubCutaneous three times a day before meals  insulin lispro (ADMELOG) corrective regimen sliding scale   SubCutaneous at bedtime  losartan 50 milliGRAM(s) Oral daily  metFORMIN 500 milliGRAM(s) Oral daily  metoprolol succinate  milliGRAM(s) Oral daily  senna 2 Tablet(s) Oral at bedtime  venlafaxine  milliGRAM(s) Oral daily    MEDICATIONS  (PRN):  acetaminophen     Tablet .. 325 milliGRAM(s) Oral every 4 hours PRN Mild Pain (1 - 3), Moderate Pain (4 - 6)  diphenhydrAMINE 50 milliGRAM(s) Oral every 6 hours PRN agitation  diphenhydrAMINE 25 milliGRAM(s) Oral at bedtime PRN Insomnia  diphenhydrAMINE 50 milliGRAM(s) Oral at bedtime PRN insomnia  diphenhydrAMINE Injectable 50 milliGRAM(s) IntraMuscular once PRN severe agitation  fluPHENAZine 5 milliGRAM(s) Oral every 6 hours PRN AGITATION  fluPHENAZine  Injectable 2.5 milliGRAM(s) IntraMuscular Once PRN severe agitation  nicotine  Polacrilex Gum 2 milliGRAM(s) Oral every 2 hours PRN NRT
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
## 2022-05-13 NOTE — BH INPATIENT PSYCHIATRY PROGRESS NOTE - NSBHMETABOLIC_PSY_ALL_CORE_FT
BMI: BMI (kg/m2): 28.9 (05-09-22 @ 13:55)  HbA1c:   Glucose: POCT Blood Glucose.: 87 mg/dL (05-13-22 @ 11:47)    BP: --  Lipid Panel:

## 2022-05-13 NOTE — BH INPATIENT PSYCHIATRY PROGRESS NOTE - NSBHCHARTREVIEWVS_PSY_A_CORE FT
Vital Signs Last 24 Hrs  T(C): 36.8 (05-13-22 @ 08:06), Max: 36.8 (05-13-22 @ 08:06)  T(F): 98.2 (05-13-22 @ 08:06), Max: 98.2 (05-13-22 @ 08:06)  HR: --  BP: --  BP(mean): --  RR: --  SpO2: --    Orthostatic VS  05-12-22 @ 08:59  Lying BP: --/-- HR: 61  Sitting BP: 152/125 HR: --  Standing BP: --/-- HR: --  Site: --  Mode: electronic

## 2022-05-14 RX ADMIN — Medication 100 MILLIGRAM(S): at 08:06

## 2022-05-14 RX ADMIN — Medication 3 MILLIGRAM(S): at 21:29

## 2022-05-14 RX ADMIN — Medication 325 MILLIGRAM(S): at 15:53

## 2022-05-14 RX ADMIN — Medication 150 MILLIGRAM(S): at 08:07

## 2022-05-14 RX ADMIN — Medication 325 MILLIGRAM(S): at 16:43

## 2022-05-14 RX ADMIN — LOSARTAN POTASSIUM 50 MILLIGRAM(S): 100 TABLET, FILM COATED ORAL at 08:06

## 2022-05-14 RX ADMIN — Medication 325 MILLIGRAM(S): at 11:04

## 2022-05-14 RX ADMIN — Medication 12.5 MILLIGRAM(S): at 08:06

## 2022-05-14 RX ADMIN — SENNA PLUS 2 TABLET(S): 8.6 TABLET ORAL at 21:29

## 2022-05-14 RX ADMIN — Medication 1 MILLIGRAM(S): at 08:07

## 2022-05-14 RX ADMIN — FLUPHENAZINE HYDROCHLORIDE 5 MILLIGRAM(S): 1 TABLET, FILM COATED ORAL at 08:06

## 2022-05-14 RX ADMIN — FLUPHENAZINE HYDROCHLORIDE 5 MILLIGRAM(S): 1 TABLET, FILM COATED ORAL at 21:29

## 2022-05-14 RX ADMIN — Medication 325 MILLIGRAM(S): at 10:23

## 2022-05-14 RX ADMIN — METFORMIN HYDROCHLORIDE 500 MILLIGRAM(S): 850 TABLET ORAL at 08:07

## 2022-05-15 RX ADMIN — Medication 150 MILLIGRAM(S): at 08:36

## 2022-05-15 RX ADMIN — ZIPRASIDONE HYDROCHLORIDE 20 MILLIGRAM(S): 20 CAPSULE ORAL at 17:13

## 2022-05-15 RX ADMIN — Medication 325 MILLIGRAM(S): at 22:13

## 2022-05-15 RX ADMIN — Medication 12.5 MILLIGRAM(S): at 08:37

## 2022-05-15 RX ADMIN — SENNA PLUS 2 TABLET(S): 8.6 TABLET ORAL at 20:08

## 2022-05-15 RX ADMIN — Medication 100 MILLIGRAM(S): at 08:36

## 2022-05-15 RX ADMIN — Medication 325 MILLIGRAM(S): at 12:35

## 2022-05-15 RX ADMIN — Medication 1 MILLIGRAM(S): at 08:36

## 2022-05-15 RX ADMIN — FLUPHENAZINE HYDROCHLORIDE 5 MILLIGRAM(S): 1 TABLET, FILM COATED ORAL at 08:36

## 2022-05-15 RX ADMIN — Medication 325 MILLIGRAM(S): at 23:13

## 2022-05-15 RX ADMIN — Medication 325 MILLIGRAM(S): at 17:13

## 2022-05-15 RX ADMIN — METFORMIN HYDROCHLORIDE 500 MILLIGRAM(S): 850 TABLET ORAL at 08:37

## 2022-05-15 RX ADMIN — LOSARTAN POTASSIUM 50 MILLIGRAM(S): 100 TABLET, FILM COATED ORAL at 08:37

## 2022-05-15 RX ADMIN — Medication 3 MILLIGRAM(S): at 20:08

## 2022-05-16 PROCEDURE — 99232 SBSQ HOSP IP/OBS MODERATE 35: CPT | Mod: 1L

## 2022-05-16 RX ORDER — ACETAMINOPHEN 500 MG
650 TABLET ORAL EVERY 6 HOURS
Refills: 0 | Status: DISCONTINUED | OUTPATIENT
Start: 2022-05-16 | End: 2022-05-23

## 2022-05-16 RX ORDER — ZIPRASIDONE HYDROCHLORIDE 20 MG/1
20 CAPSULE ORAL
Refills: 0 | Status: DISCONTINUED | OUTPATIENT
Start: 2022-05-16 | End: 2022-05-16

## 2022-05-16 RX ORDER — DIPHENHYDRAMINE HCL 50 MG
25 CAPSULE ORAL AT BEDTIME
Refills: 0 | Status: DISCONTINUED | OUTPATIENT
Start: 2022-05-16 | End: 2022-05-23

## 2022-05-16 RX ADMIN — ZIPRASIDONE HYDROCHLORIDE 20 MILLIGRAM(S): 20 CAPSULE ORAL at 09:17

## 2022-05-16 RX ADMIN — Medication 3 MILLIGRAM(S): at 20:28

## 2022-05-16 RX ADMIN — Medication 100 MILLIGRAM(S): at 08:22

## 2022-05-16 RX ADMIN — Medication 12.5 MILLIGRAM(S): at 08:22

## 2022-05-16 RX ADMIN — SENNA PLUS 2 TABLET(S): 8.6 TABLET ORAL at 20:28

## 2022-05-16 RX ADMIN — Medication 150 MILLIGRAM(S): at 08:22

## 2022-05-16 RX ADMIN — METFORMIN HYDROCHLORIDE 500 MILLIGRAM(S): 850 TABLET ORAL at 08:22

## 2022-05-16 RX ADMIN — Medication 1: at 16:51

## 2022-05-16 RX ADMIN — Medication 650 MILLIGRAM(S): at 10:06

## 2022-05-16 RX ADMIN — Medication 650 MILLIGRAM(S): at 17:04

## 2022-05-16 RX ADMIN — LOSARTAN POTASSIUM 50 MILLIGRAM(S): 100 TABLET, FILM COATED ORAL at 08:22

## 2022-05-16 RX ADMIN — Medication 25 MILLIGRAM(S): at 23:47

## 2022-05-16 NOTE — BH INPATIENT PSYCHIATRY PROGRESS NOTE - NSBHCHARTREVIEWLAB_PSY_A_CORE FT
POCT Blood Glucose.: 113 mg/dL (05.16.22 @ 11:49)   POCT Blood Glucose.: 111 mg/dL (05.16.22 @ 08:06)   POCT Blood Glucose.: 131 mg/dL (05.15.22 @ 20:29)   POCT Blood Glucose.: 143 mg/dL (05.15.22 @ 16:34)   POCT Blood Glucose.: 98 mg/dL (05.15.22 @ 11:28)   POCT Blood Glucose.: 99 mg/dL (05.15.22 @ 07:50)
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
## 2022-05-16 NOTE — BH INPATIENT PSYCHIATRY PROGRESS NOTE - PRN MEDS
MEDICATIONS  (PRN):  acetaminophen     Tablet .. 650 milliGRAM(s) Oral every 6 hours PRN Mild Pain (1 - 3), Moderate Pain (4 - 6)  diphenhydrAMINE 50 milliGRAM(s) Oral at bedtime PRN insomnia  diphenhydrAMINE 25 milliGRAM(s) Oral at bedtime PRN Insomnia  diphenhydrAMINE 50 milliGRAM(s) Oral every 6 hours PRN agitation  diphenhydrAMINE Injectable 50 milliGRAM(s) IntraMuscular once PRN severe agitation  fluPHENAZine 5 milliGRAM(s) Oral every 6 hours PRN AGITATION  fluPHENAZine  Injectable 2.5 milliGRAM(s) IntraMuscular Once PRN severe agitation  nicotine  Polacrilex Gum 2 milliGRAM(s) Oral every 2 hours PRN NRT   MEDICATIONS  (PRN):  acetaminophen     Tablet .. 650 milliGRAM(s) Oral every 6 hours PRN Mild Pain (1 - 3), Moderate Pain (4 - 6)  diphenhydrAMINE 50 milliGRAM(s) Oral every 6 hours PRN agitation  diphenhydrAMINE 25 milliGRAM(s) Oral at bedtime PRN Insomnia  diphenhydrAMINE Injectable 50 milliGRAM(s) IntraMuscular once PRN severe agitation  fluPHENAZine 5 milliGRAM(s) Oral every 6 hours PRN AGITATION  fluPHENAZine  Injectable 2.5 milliGRAM(s) IntraMuscular Once PRN severe agitation  nicotine  Polacrilex Gum 2 milliGRAM(s) Oral every 2 hours PRN NRT

## 2022-05-16 NOTE — BH INPATIENT PSYCHIATRY PROGRESS NOTE - NSBHFUPINTERVALHXFT_PSY_A_CORE
Interview conducted in patient's preferred language Ghanaian with interpretation via [employer] Vincent [id number].  Chart reviewed including pertinent labs, imaging, ekg. case discussed with treatment team.  Over this interval: no behavioral issues, adherent to medications.   Patient states that his sleep is much improved for the past 3 nights, no longer requesting additional PRN diphenhydramine for sleep, no longer feeling restless at night. Endorses reduction in restlessness overall. States his energy is improved with better sleep however endorsing daytime sleepiness. Reports that AH are reduced from last week, not currently command. Inquires about discharge. Denies SIIP/HIIP.  Reports mild longstanding pain in shoulders, hips, and lumbar back which he attributes to arthritis. Reports that while at home he was feeling weak and dizzy while ambulating and afraid of falling, he feels much steadier here.

## 2022-05-16 NOTE — BH INPATIENT PSYCHIATRY PROGRESS NOTE - CASE SUMMARY
As above.  Seen with Lao .  Patient denies CAH.  Denies SI.  Hallucinations overall are diminishing.  Tolerating Geodon well overall.

## 2022-05-16 NOTE — BH INPATIENT PSYCHIATRY PROGRESS NOTE - NSBHCHARTREVIEWVS_PSY_A_CORE FT
Vital Signs Last 24 Hrs  T(C): 36.5 (05-16-22 @ 07:42), Max: 36.5 (05-16-22 @ 07:42)  T(F): 97.7 (05-16-22 @ 07:42), Max: 97.7 (05-16-22 @ 07:42)  HR: --  BP: --  BP(mean): --  RR: --  SpO2: --    Orthostatic VS  05-16-22 @ 07:42  Lying BP: --/-- HR: --  Sitting BP: 115/82 HR: 73  Standing BP: --/-- HR: --  Site: --  Mode: --  Orthostatic VS  05-15-22 @ 08:40  Lying BP: --/-- HR: --  Sitting BP: 139/89 HR: 80  Standing BP: --/-- HR: --  Site: --  Mode: --

## 2022-05-16 NOTE — BH INPATIENT PSYCHIATRY PROGRESS NOTE - CURRENT MEDICATION
MEDICATIONS  (STANDING):  hydrochlorothiazide 12.5 milliGRAM(s) Oral daily  insulin lispro (ADMELOG) corrective regimen sliding scale   SubCutaneous three times a day before meals  insulin lispro (ADMELOG) corrective regimen sliding scale   SubCutaneous at bedtime  losartan 50 milliGRAM(s) Oral daily  melatonin. 3 milliGRAM(s) Oral at bedtime  metFORMIN 500 milliGRAM(s) Oral daily  metoprolol succinate  milliGRAM(s) Oral daily  senna 2 Tablet(s) Oral at bedtime  venlafaxine  milliGRAM(s) Oral daily    MEDICATIONS  (PRN):  acetaminophen     Tablet .. 650 milliGRAM(s) Oral every 6 hours PRN Mild Pain (1 - 3), Moderate Pain (4 - 6)  diphenhydrAMINE 50 milliGRAM(s) Oral at bedtime PRN insomnia  diphenhydrAMINE 25 milliGRAM(s) Oral at bedtime PRN Insomnia  diphenhydrAMINE 50 milliGRAM(s) Oral every 6 hours PRN agitation  diphenhydrAMINE Injectable 50 milliGRAM(s) IntraMuscular once PRN severe agitation  fluPHENAZine 5 milliGRAM(s) Oral every 6 hours PRN AGITATION  fluPHENAZine  Injectable 2.5 milliGRAM(s) IntraMuscular Once PRN severe agitation  nicotine  Polacrilex Gum 2 milliGRAM(s) Oral every 2 hours PRN NRT   MEDICATIONS  (STANDING):  hydrochlorothiazide 12.5 milliGRAM(s) Oral daily  insulin lispro (ADMELOG) corrective regimen sliding scale   SubCutaneous three times a day before meals  insulin lispro (ADMELOG) corrective regimen sliding scale   SubCutaneous at bedtime  losartan 50 milliGRAM(s) Oral daily  melatonin. 3 milliGRAM(s) Oral at bedtime  metFORMIN 500 milliGRAM(s) Oral daily  metoprolol succinate  milliGRAM(s) Oral daily  senna 2 Tablet(s) Oral at bedtime  venlafaxine  milliGRAM(s) Oral daily    MEDICATIONS  (PRN):  acetaminophen     Tablet .. 650 milliGRAM(s) Oral every 6 hours PRN Mild Pain (1 - 3), Moderate Pain (4 - 6)  diphenhydrAMINE 50 milliGRAM(s) Oral every 6 hours PRN agitation  diphenhydrAMINE 25 milliGRAM(s) Oral at bedtime PRN Insomnia  diphenhydrAMINE Injectable 50 milliGRAM(s) IntraMuscular once PRN severe agitation  fluPHENAZine 5 milliGRAM(s) Oral every 6 hours PRN AGITATION  fluPHENAZine  Injectable 2.5 milliGRAM(s) IntraMuscular Once PRN severe agitation  nicotine  Polacrilex Gum 2 milliGRAM(s) Oral every 2 hours PRN NRT

## 2022-05-16 NOTE — BH INPATIENT PSYCHIATRY PROGRESS NOTE - MSE UNSTRUCTURED FT
Appearance: Dressed appropriately in casual clothing   Behavior: cooperative. calm.   Eye contact: good  Motor: No psychomotor slowing or activation. Mild orofacial dyskinesia, less apparent than prior.  Speech: Regular rate. loud. spontaneous   Mood: "good"  Affect: flat. Thoughtful.   Thought Process: concrete   Thought Content: denies SIIP, HIIP   Perceptual: endorses AH reduced from prior, denies VH  Insight: fair  Judgment: fair on exam  Impulse control:  fair  Attention: fair  Gait: Intact and improved

## 2022-05-16 NOTE — BH INPATIENT PSYCHIATRY PROGRESS NOTE - NSBHMETABOLIC_PSY_ALL_CORE_FT
BMI: BMI (kg/m2): 28.9 (05-09-22 @ 13:55)  HbA1c:   Glucose: POCT Blood Glucose.: 113 mg/dL (05-16-22 @ 11:49)    BP: --  Lipid Panel:  BMI: BMI (kg/m2): 28.9 (05-09-22 @ 13:55)  HbA1c:   Glucose: POCT Blood Glucose.: 175 mg/dL (05-16-22 @ 16:30)    BP: --  Lipid Panel:

## 2022-05-17 PROCEDURE — 99231 SBSQ HOSP IP/OBS SF/LOW 25: CPT | Mod: 1L

## 2022-05-17 RX ORDER — VENLAFAXINE HCL 75 MG
187.5 CAPSULE, EXT RELEASE 24 HR ORAL DAILY
Refills: 0 | Status: DISCONTINUED | OUTPATIENT
Start: 2022-05-18 | End: 2022-05-20

## 2022-05-17 RX ADMIN — Medication 100 MILLIGRAM(S): at 09:14

## 2022-05-17 RX ADMIN — Medication 650 MILLIGRAM(S): at 11:18

## 2022-05-17 RX ADMIN — Medication 12.5 MILLIGRAM(S): at 09:15

## 2022-05-17 RX ADMIN — SENNA PLUS 2 TABLET(S): 8.6 TABLET ORAL at 20:50

## 2022-05-17 RX ADMIN — Medication 650 MILLIGRAM(S): at 20:50

## 2022-05-17 RX ADMIN — LOSARTAN POTASSIUM 50 MILLIGRAM(S): 100 TABLET, FILM COATED ORAL at 09:14

## 2022-05-17 RX ADMIN — Medication 650 MILLIGRAM(S): at 21:50

## 2022-05-17 RX ADMIN — Medication 25 MILLIGRAM(S): at 02:12

## 2022-05-17 RX ADMIN — METFORMIN HYDROCHLORIDE 500 MILLIGRAM(S): 850 TABLET ORAL at 09:14

## 2022-05-17 RX ADMIN — Medication 650 MILLIGRAM(S): at 12:30

## 2022-05-17 RX ADMIN — Medication 150 MILLIGRAM(S): at 09:14

## 2022-05-17 RX ADMIN — Medication 3 MILLIGRAM(S): at 20:50

## 2022-05-17 NOTE — BH INPATIENT PSYCHIATRY PROGRESS NOTE - NSBHCHARTREVIEWVS_PSY_A_CORE FT
Vital Signs Last 24 Hrs  T(C): 36.5 (05-17-22 @ 08:16), Max: 36.5 (05-17-22 @ 08:16)  T(F): 97.7 (05-17-22 @ 08:16), Max: 97.7 (05-17-22 @ 08:16)  HR: --  BP: --  BP(mean): --  RR: --  SpO2: --    Orthostatic VS  05-17-22 @ 08:16  Lying BP: --/-- HR: --  Sitting BP: 145/80 HR: 70  Standing BP: --/-- HR: --  Site: --  Mode: --  Orthostatic VS  05-16-22 @ 07:42  Lying BP: --/-- HR: --  Sitting BP: 115/82 HR: 73  Standing BP: --/-- HR: --  Site: --  Mode: --   Vital Signs Last 24 Hrs  T(C): 36.9 (05-17-22 @ 17:17), Max: 36.9 (05-17-22 @ 17:17)  T(F): 98.5 (05-17-22 @ 17:17), Max: 98.5 (05-17-22 @ 17:17)  HR: --  BP: --  BP(mean): --  RR: --  SpO2: --    Orthostatic VS  05-17-22 @ 08:16  Lying BP: --/-- HR: --  Sitting BP: 145/80 HR: 70  Standing BP: --/-- HR: --  Site: --  Mode: --  Orthostatic VS  05-16-22 @ 07:42  Lying BP: --/-- HR: --  Sitting BP: 115/82 HR: 73  Standing BP: --/-- HR: --  Site: --  Mode: --
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
## 2022-05-17 NOTE — BH INPATIENT PSYCHIATRY PROGRESS NOTE - NSBHFUPINTERVALHXFT_PSY_A_CORE
Interview conducted in patient's preferred language Filipino with interpretation via [employer] Josh ID [id number].  Chart reviewed including pertinent labs, imaging, ekg. case discussed with treatment team.  Over this interval: no behavioral issues, adherent to medications.   Patient endorses poor sleep last night due to environmental disturbances, denies that AH or physical restlessness contributed to poor sleep. AH continue but "not so bad," not bothering patient, not command. Physical restlessness much improved since admission. Endorses anxiety and perseveration on events from his past; perseveration on these memories a/w SI on presentation, however patient denies SI at this time. Endorses poor energy during the day, unchanged from admission, and feeling like "I can't do anything" - clarified that patient has motivation to perform activities like shopping, however is limited by physical pain. Tolerating PT on the unit. No paranoia or ideas of reference. No SIIP/HIIP.

## 2022-05-17 NOTE — BH INPATIENT PSYCHIATRY PROGRESS NOTE - PRN MEDS
MEDICATIONS  (PRN):  acetaminophen     Tablet .. 650 milliGRAM(s) Oral every 6 hours PRN Mild Pain (1 - 3), Moderate Pain (4 - 6)  diphenhydrAMINE 50 milliGRAM(s) Oral every 6 hours PRN agitation  diphenhydrAMINE 25 milliGRAM(s) Oral at bedtime PRN Insomnia  diphenhydrAMINE Injectable 50 milliGRAM(s) IntraMuscular once PRN severe agitation  fluPHENAZine 5 milliGRAM(s) Oral every 6 hours PRN AGITATION  fluPHENAZine  Injectable 2.5 milliGRAM(s) IntraMuscular Once PRN severe agitation  nicotine  Polacrilex Gum 2 milliGRAM(s) Oral every 2 hours PRN NRT

## 2022-05-17 NOTE — BH INPATIENT PSYCHIATRY PROGRESS NOTE - MSE UNSTRUCTURED FT
Appearance: Dressed appropriately in casual clothing   Behavior: cooperative. calm.   Eye contact: good  Motor: No psychomotor slowing or activation. Mild orofacial dyskinesia, less apparent than prior.  Speech: Regular rate. loud. spontaneous   Mood: "good"  Affect: flat. Thoughtful.   Thought Process: concrete, perseverative  Thought Content: denies SIIP, HIIP   Perceptual: endorses AH reduced from prior, denies VH  Insight: fair  Judgment: fair on exam  Impulse control:  fair  Attention: fair  Gait: stable

## 2022-05-17 NOTE — BH INPATIENT PSYCHIATRY PROGRESS NOTE - CASE SUMMARY
As above.  Concerned about perseverative thoughts that have led to SI and low energy during the day.  Therefore, will increase venlafaxine.  Doing well on Geodon.

## 2022-05-17 NOTE — BH INPATIENT PSYCHIATRY PROGRESS NOTE - NSBHCHARTREVIEWLAB_PSY_A_CORE FT
POCT Blood Glucose.: 104 mg/dL (05.17.22 @ 12:05)   POCT Blood Glucose.: 101 mg/dL (05.17.22 @ 07:45)   POCT Blood Glucose.: 118 mg/dL (05.16.22 @ 20:31)   POCT Blood Glucose.: 175 mg/dL (05.16.22 @ 16:30)   POCT Blood Glucose.: 113 mg/dL (05.16.22 @ 11:49)

## 2022-05-17 NOTE — BH INPATIENT PSYCHIATRY PROGRESS NOTE - CURRENT MEDICATION
MEDICATIONS  (STANDING):  hydrochlorothiazide 12.5 milliGRAM(s) Oral daily  insulin lispro (ADMELOG) corrective regimen sliding scale   SubCutaneous three times a day before meals  insulin lispro (ADMELOG) corrective regimen sliding scale   SubCutaneous at bedtime  losartan 50 milliGRAM(s) Oral daily  melatonin. 3 milliGRAM(s) Oral at bedtime  metFORMIN 500 milliGRAM(s) Oral daily  metoprolol succinate  milliGRAM(s) Oral daily  senna 2 Tablet(s) Oral at bedtime    MEDICATIONS  (PRN):  acetaminophen     Tablet .. 650 milliGRAM(s) Oral every 6 hours PRN Mild Pain (1 - 3), Moderate Pain (4 - 6)  diphenhydrAMINE 50 milliGRAM(s) Oral every 6 hours PRN agitation  diphenhydrAMINE 25 milliGRAM(s) Oral at bedtime PRN Insomnia  diphenhydrAMINE Injectable 50 milliGRAM(s) IntraMuscular once PRN severe agitation  fluPHENAZine 5 milliGRAM(s) Oral every 6 hours PRN AGITATION  fluPHENAZine  Injectable 2.5 milliGRAM(s) IntraMuscular Once PRN severe agitation  nicotine  Polacrilex Gum 2 milliGRAM(s) Oral every 2 hours PRN NRT

## 2022-05-17 NOTE — BH INPATIENT PSYCHIATRY PROGRESS NOTE - NSBHMETABOLIC_PSY_ALL_CORE_FT
BMI: BMI (kg/m2): 28.9 (05-09-22 @ 13:55)  HbA1c:   Glucose: POCT Blood Glucose.: 104 mg/dL (05-17-22 @ 12:05)    BP: --  Lipid Panel:  BMI: BMI (kg/m2): 28.9 (05-09-22 @ 13:55)  HbA1c:   Glucose: POCT Blood Glucose.: 104 mg/dL (05-17-22 @ 16:37)    BP: --  Lipid Panel:

## 2022-05-18 PROCEDURE — 99231 SBSQ HOSP IP/OBS SF/LOW 25: CPT | Mod: 1L

## 2022-05-18 RX ORDER — ZIPRASIDONE HYDROCHLORIDE 20 MG/1
20 CAPSULE ORAL
Refills: 0 | Status: DISCONTINUED | OUTPATIENT
Start: 2022-05-18 | End: 2022-05-19

## 2022-05-18 RX ADMIN — LOSARTAN POTASSIUM 50 MILLIGRAM(S): 100 TABLET, FILM COATED ORAL at 08:41

## 2022-05-18 RX ADMIN — Medication 100 MILLIGRAM(S): at 08:41

## 2022-05-18 RX ADMIN — Medication 650 MILLIGRAM(S): at 18:52

## 2022-05-18 RX ADMIN — Medication 12.5 MILLIGRAM(S): at 08:40

## 2022-05-18 RX ADMIN — Medication 650 MILLIGRAM(S): at 13:23

## 2022-05-18 RX ADMIN — ZIPRASIDONE HYDROCHLORIDE 20 MILLIGRAM(S): 20 CAPSULE ORAL at 17:54

## 2022-05-18 RX ADMIN — Medication 3 MILLIGRAM(S): at 21:40

## 2022-05-18 RX ADMIN — Medication 650 MILLIGRAM(S): at 12:42

## 2022-05-18 RX ADMIN — SENNA PLUS 2 TABLET(S): 8.6 TABLET ORAL at 21:39

## 2022-05-18 RX ADMIN — Medication 187.5 MILLIGRAM(S): at 08:41

## 2022-05-18 RX ADMIN — METFORMIN HYDROCHLORIDE 500 MILLIGRAM(S): 850 TABLET ORAL at 08:41

## 2022-05-18 NOTE — BH INPATIENT PSYCHIATRY PROGRESS NOTE - CASE SUMMARY
Subjective report and objective observations consistent with improvement, however discrepancy with family collateral.    Unclear if pt reporting more somatic complaints to family as they report describing nonspecific malaise.    Also unclear if any physical symptoms and/or irritability/anxiety may be due to recent Effexor increase.    Will monitor for now.  Continue meds.  Dispo planning continuing.

## 2022-05-18 NOTE — BH INPATIENT PSYCHIATRY PROGRESS NOTE - NSBHFUPINTERVALHXFT_PSY_A_CORE
Interview conducted in patient's preferred language Faroese with interpretation via [employer] Nilda ID [id number].  Chart reviewed including pertinent labs, imaging, ekg. case discussed with treatment team.  Over this interval: no behavioral issues, adherent to medications.   Patient states that mod is "good," states that AH are present but not bothersome, reduced from prior. Reports good sleep last night, improved from admission. Today denies anxiety and perseverative thoughts endorsed yesterday. Denies SIIP/HIIP.    Per collateral from brother:  Patient yesterday and today has told brother that he is feeling "not good" but struggles to specify. States he is not sleeping due to the noise of the environment. States he has not eaten today. Endorses "body aches." Most complaints seem to be physical, but collateral unable to specify. Patient appears to be minimizing symptoms to team.   Interview conducted in patient's preferred language Montserratian with interpretation via [employer] Nilda ID [id number].  Chart reviewed including pertinent labs, imaging, ekg. case discussed with treatment team.  Over this interval: no behavioral issues, adherent to medications.   Patient states that mood is "good," states that AH are present but not bothersome, reduced from prior. Reports good sleep last night, improved from admission. Denies restlessness. Today denies anxiety and perseverative thoughts endorsed yesterday. Denies SIIP/HIIP. States that he wishes to go home today and repeatedly requests discharge.    Per collateral from brother:  Patient yesterday and today has told brother that he is feeling "not good" but struggles to specify. States he is not sleeping due to the noise of the environment. States he has not eaten today. Endorses "body aches." Patient stating to brother that he usually comes to the hospital and feels better, but now he feels worse and wants to go home. When told that patient has not reported this to the team, brother believes that patient is minimizing symptoms in order to hasten discharge.     On repeat interview with patient, he immediately requests discharge and denies having any complaints. When told that his brother has reported that patient has no appetite, is not sleeping well, and is saying that he does not feel good, patient denies each claim and continues to deny having any complaints. Repeatedly asks to be discharged today. Refuses to allow  to call brother for family meeting during interview.

## 2022-05-18 NOTE — BH INPATIENT PSYCHIATRY PROGRESS NOTE - MSE UNSTRUCTURED FT
Appearance: Dressed appropriately in casual clothing   Behavior: cooperative. calm.   Eye contact: good  Motor: No psychomotor slowing or activation. Mild orofacial dyskinesia, less apparent than prior.  Speech: Regular rate. loud. spontaneous.   Mood: "good"  Affect: flat. Thoughtful.   Thought Process: concrete  Thought Content: denies SIIP, HIIP   Perceptual: ongoing AH at baseline, denies VH  Insight: fair  Judgment: fair on exam  Impulse control:  fair  Attention: fair  Gait: stable Appearance: Dressed appropriately in casual clothing   Behavior: cooperative. calm.   Eye contact: good  Motor: No psychomotor slowing or activation. Mild orofacial dyskinesia  Speech: Regular rate. loud volume. spontaneous.   Mood: "good"  Affect: flat. irritable.   Thought Process: concrete  Thought Content: Perseverative on discharge. denies SIIP, HIIP   Perceptual: ongoing AH at baseline, denies VH  Insight: poor  Judgment: impaired on exam  Impulse control:  fair on exam  Attention: fair  Gait: stable

## 2022-05-18 NOTE — BH INPATIENT PSYCHIATRY PROGRESS NOTE - NSBHCHARTREVIEWVS_PSY_A_CORE FT
Vital Signs Last 24 Hrs  T(C): 36.2 (05-18-22 @ 08:15), Max: 36.9 (05-17-22 @ 17:17)  T(F): 97.2 (05-18-22 @ 08:15), Max: 98.5 (05-17-22 @ 17:17)  HR: --  BP: --  BP(mean): --  RR: --  SpO2: --    Orthostatic VS  05-18-22 @ 08:15  Lying BP: --/-- HR: --  Sitting BP: 141/71 HR: 78  Standing BP: --/-- HR: --  Site: --  Mode: --  Orthostatic VS  05-17-22 @ 08:16  Lying BP: --/-- HR: --  Sitting BP: 145/80 HR: 70  Standing BP: --/-- HR: --  Site: --  Mode: --   Vital Signs Last 24 Hrs  T(C): 36.3 (05-18-22 @ 17:23), Max: 36.3 (05-18-22 @ 17:23)  T(F): 97.3 (05-18-22 @ 17:23), Max: 97.3 (05-18-22 @ 17:23)  HR: --  BP: --  BP(mean): --  RR: --  SpO2: --    Orthostatic VS  05-18-22 @ 08:15  Lying BP: --/-- HR: --  Sitting BP: 141/71 HR: 78  Standing BP: --/-- HR: --  Site: --  Mode: --  Orthostatic VS  05-17-22 @ 08:16  Lying BP: --/-- HR: --  Sitting BP: 145/80 HR: 70  Standing BP: --/-- HR: --  Site: --  Mode: --

## 2022-05-18 NOTE — BH INPATIENT PSYCHIATRY PROGRESS NOTE - NSBHMETABOLIC_PSY_ALL_CORE_FT
BMI: BMI (kg/m2): 28.9 (05-09-22 @ 13:55)  HbA1c:   Glucose: POCT Blood Glucose.: 117 mg/dL (05-18-22 @ 11:23)    BP: --  Lipid Panel:  BMI: BMI (kg/m2): 28.9 (05-09-22 @ 13:55)  HbA1c:   Glucose: POCT Blood Glucose.: 96 mg/dL (05-18-22 @ 16:22)    BP: --  Lipid Panel:

## 2022-05-18 NOTE — BH INPATIENT PSYCHIATRY PROGRESS NOTE - NSBHCHARTREVIEWLAB_PSY_A_CORE FT
POCT Blood Glucose.: 104 mg/dL (05.17.22 @ 12:05)   POCT Blood Glucose.: 101 mg/dL (05.17.22 @ 07:45)   POCT Blood Glucose.: 118 mg/dL (05.16.22 @ 20:31)   POCT Blood Glucose.: 175 mg/dL (05.16.22 @ 16:30)   POCT Blood Glucose.: 113 mg/dL (05.16.22 @ 11:49)    POCT Blood Glucose.: 96 mg/dL (05.18.22 @ 16:22)   POCT Blood Glucose.: 117 mg/dL (05.18.22 @ 11:23)   POCT Blood Glucose.: 107 mg/dL (05.18.22 @ 07:46)

## 2022-05-18 NOTE — BH INPATIENT PSYCHIATRY PROGRESS NOTE - CURRENT MEDICATION
MEDICATIONS  (STANDING):  hydrochlorothiazide 12.5 milliGRAM(s) Oral daily  insulin lispro (ADMELOG) corrective regimen sliding scale   SubCutaneous three times a day before meals  insulin lispro (ADMELOG) corrective regimen sliding scale   SubCutaneous at bedtime  losartan 50 milliGRAM(s) Oral daily  melatonin. 3 milliGRAM(s) Oral at bedtime  metFORMIN 500 milliGRAM(s) Oral daily  metoprolol succinate  milliGRAM(s) Oral daily  senna 2 Tablet(s) Oral at bedtime  venlafaxine .5 milliGRAM(s) Oral daily  ziprasidone 20 milliGRAM(s) Oral <User Schedule>    MEDICATIONS  (PRN):  acetaminophen     Tablet .. 650 milliGRAM(s) Oral every 6 hours PRN Mild Pain (1 - 3), Moderate Pain (4 - 6)  diphenhydrAMINE 50 milliGRAM(s) Oral every 6 hours PRN agitation  diphenhydrAMINE 25 milliGRAM(s) Oral at bedtime PRN Insomnia  diphenhydrAMINE Injectable 50 milliGRAM(s) IntraMuscular once PRN severe agitation  fluPHENAZine 5 milliGRAM(s) Oral every 6 hours PRN AGITATION  fluPHENAZine  Injectable 2.5 milliGRAM(s) IntraMuscular Once PRN severe agitation  nicotine  Polacrilex Gum 2 milliGRAM(s) Oral every 2 hours PRN NRT

## 2022-05-19 PROCEDURE — 99232 SBSQ HOSP IP/OBS MODERATE 35: CPT | Mod: 1L

## 2022-05-19 RX ORDER — LANOLIN ALCOHOL/MO/W.PET/CERES
5 CREAM (GRAM) TOPICAL ONCE
Refills: 0 | Status: COMPLETED | OUTPATIENT
Start: 2022-05-19 | End: 2022-05-19

## 2022-05-19 RX ORDER — ZIPRASIDONE HYDROCHLORIDE 20 MG/1
40 CAPSULE ORAL
Refills: 0 | Status: DISCONTINUED | OUTPATIENT
Start: 2022-05-19 | End: 2022-05-20

## 2022-05-19 RX ADMIN — Medication 650 MILLIGRAM(S): at 00:10

## 2022-05-19 RX ADMIN — Medication 25 MILLIGRAM(S): at 19:57

## 2022-05-19 RX ADMIN — SENNA PLUS 2 TABLET(S): 8.6 TABLET ORAL at 21:25

## 2022-05-19 RX ADMIN — LOSARTAN POTASSIUM 50 MILLIGRAM(S): 100 TABLET, FILM COATED ORAL at 08:16

## 2022-05-19 RX ADMIN — Medication 3 MILLIGRAM(S): at 21:25

## 2022-05-19 RX ADMIN — Medication 100 MILLIGRAM(S): at 08:15

## 2022-05-19 RX ADMIN — Medication 187.5 MILLIGRAM(S): at 08:27

## 2022-05-19 RX ADMIN — Medication 5 MILLIGRAM(S): at 02:19

## 2022-05-19 RX ADMIN — Medication 650 MILLIGRAM(S): at 16:20

## 2022-05-19 RX ADMIN — Medication 12.5 MILLIGRAM(S): at 08:15

## 2022-05-19 RX ADMIN — METFORMIN HYDROCHLORIDE 500 MILLIGRAM(S): 850 TABLET ORAL at 08:16

## 2022-05-19 NOTE — BH TREATMENT PLAN - NSTXPLANTHERAPYSESSIONSFT_PSY_ALL_CORE
05-17-22  Type of therapy: none  Type of session: Individual  Level of patient participation: Participates  Duration of participation: 15 minutes  Therapy conducted by: Psych rehab  Therapy Summary: Writer met with Pt to discuss Pt’s progress towards Psych Rehab goal. Pt was receptive. Writer conducted the session in patient's preferred language Jamaican via [employer] interpretation via [employer] (ID #[id number]). Pt was alert, engaged and cooperative during the session. Pt reports having a good mood overall. Pt reports that his sleep improved and able to have peaceful sleep. Pt denies SI/HI/AH/VH. Pt reports that there is significant reduction of SI and negative thoughts. Pt is compliant with is meds and treatment. Pt endorses reduction in restlessness overall. Over the past seven days, Pt has no behavioral issues on the unit. Pt is very much visible on the unit and observed being in the day area or watching television with peers. Pt reports that due to language barriers he is unable to communicate with his peers or attend groups on the unit. Pt made no progress towards psych rehab goal because he was unable to verbalize any of coping skills. Pt also made no progress to acquire 2 communication skills to improve interpersonal relationship. Towards the end of the session Pt became discharge focus and appeared not interested to engage in the conversation. Pt thanked the writer and left the therapy room. Pt’s has good ADLs and maintains fair personal hygiene. Pt is cooperative with the staff and continues discharge focused. Writer will explore coping skills with the patient to manage symptoms. Over the Over the next seven days, Psychiatric Rehabilitation staff will utilize group and individual psychotherapy, and psychoeducation to assist the patient in reaching his treatment goal.    05-17-22  Type of therapy: Other,Physical therapy  Type of session: Individual  Level of patient participation: Participates  --  Therapy conducted by: Other (specify),Physical therapy  Therapy Summary: Patient participated in back exercises and gait training.  
  05-17-22  Type of therapy: none  Type of session: Individual  Level of patient participation: Participates  Duration of participation: 15 minutes  Therapy conducted by: Psych rehab  Therapy Summary: Writer met with Pt to discuss Pt’s progress towards Psych Rehab goal. Pt was receptive. Writer conducted the session in patient's preferred language Libyan via [employer] interpretation via [employer] (ID #[id number]). Pt was alert, engaged and cooperative during the session. Pt reports having a good mood overall. Pt reports that his sleep improved and able to have peaceful sleep. Pt denies SI/HI/AH/VH. Pt reports that there is significant reduction of SI and negative thoughts. Pt is compliant with is meds and treatment. Pt endorses reduction in restlessness overall. Over the past seven days, Pt has no behavioral issues on the unit. Pt is very much visible on the unit and observed being in the day area or watching television with peers. Pt reports that due to language barriers he is unable to communicate with his peers or attend groups on the unit. Pt made no progress towards psych rehab goal because he was unable to verbalize any of coping skills. Pt also made no progress to acquire 2 communication skills to improve interpersonal relationship. Towards the end of the session Pt became discharge focus and appeared not interested to engage in the conversation. Pt thanked the writer and left the therapy room. Pt’s has good ADLs and maintains fair personal hygiene. Pt is cooperative with the staff and continues discharge focused. Writer will explore coping skills with the patient to manage symptoms. Over the Over the next seven days, Psychiatric Rehabilitation staff will utilize group and individual psychotherapy, and psychoeducation to assist the patient in reaching his treatment goal.    05-17-22  Type of therapy: Other,Physical therapy  Type of session: Individual  Level of patient participation: Participates  --  Therapy conducted by: Other (specify),Physical therapy  Therapy Summary: Patient participated in back exercises and gait training.

## 2022-05-19 NOTE — BH INPATIENT PSYCHIATRY PROGRESS NOTE - NSBHFUPINTERVALHXFT_PSY_A_CORE
Interview conducted in patient's preferred language Salvadorean with interpretation via [employer] [id number]  Pt requests change in roommate, states it is affecting his sleep.  Denies hearing any voices.

## 2022-05-19 NOTE — BH INPATIENT PSYCHIATRY PROGRESS NOTE - NSBHCHARTREVIEWLAB_PSY_A_CORE FT
POCT Blood Glucose.: 96 mg/dL (05.18.22 @ 16:22)   POCT Blood Glucose.: 117 mg/dL (05.18.22 @ 11:23)   POCT Blood Glucose.: 107 mg/dL (05.18.22 @ 07:46)

## 2022-05-19 NOTE — BH INPATIENT PSYCHIATRY PROGRESS NOTE - MSE UNSTRUCTURED FT
Appearance: Dressed appropriately in casual clothing   Behavior: cooperative.   Eye contact: good  Motor: Mild fidgeting.    Speech: Regular rate. Loud volume.   Mood: "good"  Affect: Irritable.   Thought Process: [place]  Thought Content: Ruminations on sleep and roommate. Denies SIIP, HIIP   Perceptual: No AVH at time of interview.   Insight: poor  Judgment: fair on interview  Impulse control:  fair on exam  Attention: fair  Gait: stable

## 2022-05-19 NOTE — BH TREATMENT PLAN - NSPTSTATEDGOAL_PSY_ALL_CORE
Pt. states that he would like to get well so he can go home. 
Pt. states that he would like to get well so he can go home.

## 2022-05-19 NOTE — BH TREATMENT PLAN - NSTXDIABGOAL_PSY_ALL_CORE
Will identify modifiable risk factors and strategies to counteract them
Will identify modifiable risk factors and strategies to counteract them

## 2022-05-19 NOTE — BH TREATMENT PLAN - NSTXCAREGIVERPARTICIPATE_PSY_P_CORE
Family/Caregiver participated in identification of needs/problems/goals for treatment/Family/Caregiver participated in defining interventions
Yes
Family/Caregiver participated in identification of needs/problems/goals for treatment/Family/Caregiver participated in defining interventions

## 2022-05-19 NOTE — BH TREATMENT PLAN - NSTXDCOTHRGOAL_PSY_ALL_CORE
Pt. will agree to follow up with Care Coordination services.
Pt. will agree to follow up with Care Coordination services.

## 2022-05-19 NOTE — BH TREATMENT PLAN - NSTXHYPERGOAL_PSY_ALL_CORE
Will identify 1 modifiable risk factor and a strategy to improve this
Will identify 1 modifiable risk factor and a strategy to improve this

## 2022-05-19 NOTE — BH TREATMENT PLAN - NSTXDCOTHRINTERSW_PSY_ALL_CORE
SW will provide Pt. with information regarding care coordination and will complete referral.
SW will provide Pt. with information regarding care coordination and will complete referral.

## 2022-05-19 NOTE — BH INPATIENT PSYCHIATRY PROGRESS NOTE - NSBHMETABOLIC_PSY_ALL_CORE_FT
BMI: BMI (kg/m2): 28.9 (05-09-22 @ 13:55)  HbA1c:   Glucose: POCT Blood Glucose.: 138 mg/dL (05-19-22 @ 11:42)    BP: 132/81 (05-19-22 @ 11:42) (132/81 - 140/72)  Lipid Panel:

## 2022-05-19 NOTE — BH TREATMENT PLAN - NSTXSLPPATINTERMD_PSY_ALL_CORE
Medication management, psychotherapy and psychoeducation as appropriate. Encourage sleep hygiene. 
Medication management, psychotherapy and psychoeducation as appropriate. Encourage sleep hygiene.

## 2022-05-19 NOTE — BH INPATIENT PSYCHIATRY PROGRESS NOTE - CURRENT MEDICATION
MEDICATIONS  (STANDING):  hydrochlorothiazide 12.5 milliGRAM(s) Oral daily  insulin lispro (ADMELOG) corrective regimen sliding scale   SubCutaneous three times a day before meals  insulin lispro (ADMELOG) corrective regimen sliding scale   SubCutaneous at bedtime  losartan 50 milliGRAM(s) Oral daily  melatonin. 3 milliGRAM(s) Oral at bedtime  metFORMIN 500 milliGRAM(s) Oral daily  metoprolol succinate  milliGRAM(s) Oral daily  senna 2 Tablet(s) Oral at bedtime  venlafaxine .5 milliGRAM(s) Oral daily  ziprasidone 40 milliGRAM(s) Oral <User Schedule>    MEDICATIONS  (PRN):  acetaminophen     Tablet .. 650 milliGRAM(s) Oral every 6 hours PRN Mild Pain (1 - 3), Moderate Pain (4 - 6)  diphenhydrAMINE 50 milliGRAM(s) Oral every 6 hours PRN agitation  diphenhydrAMINE 25 milliGRAM(s) Oral at bedtime PRN Insomnia  diphenhydrAMINE Injectable 50 milliGRAM(s) IntraMuscular once PRN severe agitation  fluPHENAZine 5 milliGRAM(s) Oral every 6 hours PRN AGITATION  fluPHENAZine  Injectable 2.5 milliGRAM(s) IntraMuscular Once PRN severe agitation  nicotine  Polacrilex Gum 2 milliGRAM(s) Oral every 2 hours PRN NRT

## 2022-05-19 NOTE — BH TREATMENT PLAN - NSTXCOPEINTERMD_PSY_ALL_CORE
Medication management, psychotherapy and psychoeducation as appropriate.
Medication management, psychotherapy and psychoeducation as appropriate.

## 2022-05-19 NOTE — BH TREATMENT PLAN - NSTXDIABINTERRN_PSY_ALL_CORE
Nurse will educate the pt on the difference of hyperglycemia vs hypoglycemia and the s/s associated with them.

## 2022-05-19 NOTE — BH TREATMENT PLAN - NSTXCOPEINTERPR_PSY_ALL_CORE
Over the Over the next seven days, Psychiatric Rehabilitation staff will utilize group and individual psychotherapy, and psychoeducation to assist the patient in reaching his treatment goal.
Over the Over the next seven days, Psychiatric Rehabilitation staff will utilize group and individual psychotherapy, and psychoeducation to assist the patient in reaching his treatment goal.

## 2022-05-19 NOTE — BH INPATIENT PSYCHIATRY PROGRESS NOTE - NSBHCHARTREVIEWVS_PSY_A_CORE FT
Vital Signs Last 24 Hrs  T(C): 36.4 (05-19-22 @ 08:22), Max: 36.4 (05-19-22 @ 08:22)  T(F): 97.6 (05-19-22 @ 08:22), Max: 97.6 (05-19-22 @ 08:22)  HR: 63 (05-19-22 @ 08:22) (63 - 63)  BP: 132/81 (05-19-22 @ 11:42) (132/81 - 140/72)  BP(mean): 79 (05-19-22 @ 11:42) (79 - 79)  RR: --  SpO2: --    Orthostatic VS  05-18-22 @ 08:15  Lying BP: --/-- HR: --  Sitting BP: 141/71 HR: 78  Standing BP: --/-- HR: --  Site: --  Mode: --

## 2022-05-19 NOTE — BH TREATMENT PLAN - NSDCCRITERIA_PSY_ALL_CORE
When pt is no longer an acute or imminent risk of harm to self or others, and is able to care for self safely, pt may then be discharged. 
Improvement in symptoms
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
## 2022-05-20 PROCEDURE — 99232 SBSQ HOSP IP/OBS MODERATE 35: CPT | Mod: 1L

## 2022-05-20 RX ORDER — FLUPHENAZINE HYDROCHLORIDE 1 MG/1
5 TABLET, FILM COATED ORAL
Refills: 0 | Status: DISCONTINUED | OUTPATIENT
Start: 2022-05-20 | End: 2022-05-23

## 2022-05-20 RX ORDER — VENLAFAXINE HCL 75 MG
150 CAPSULE, EXT RELEASE 24 HR ORAL DAILY
Refills: 0 | Status: DISCONTINUED | OUTPATIENT
Start: 2022-05-21 | End: 2022-05-23

## 2022-05-20 RX ADMIN — Medication 100 MILLIGRAM(S): at 08:15

## 2022-05-20 RX ADMIN — Medication 187.5 MILLIGRAM(S): at 08:15

## 2022-05-20 RX ADMIN — LOSARTAN POTASSIUM 50 MILLIGRAM(S): 100 TABLET, FILM COATED ORAL at 08:15

## 2022-05-20 RX ADMIN — Medication 3 MILLIGRAM(S): at 20:55

## 2022-05-20 RX ADMIN — Medication 12.5 MILLIGRAM(S): at 08:15

## 2022-05-20 RX ADMIN — METFORMIN HYDROCHLORIDE 500 MILLIGRAM(S): 850 TABLET ORAL at 08:19

## 2022-05-20 RX ADMIN — FLUPHENAZINE HYDROCHLORIDE 5 MILLIGRAM(S): 1 TABLET, FILM COATED ORAL at 20:55

## 2022-05-20 RX ADMIN — SENNA PLUS 2 TABLET(S): 8.6 TABLET ORAL at 20:55

## 2022-05-20 NOTE — BH INPATIENT PSYCHIATRY PROGRESS NOTE - NSBHMETABOLIC_PSY_ALL_CORE_FT
BMI: BMI (kg/m2): 28.9 (05-09-22 @ 13:55)  HbA1c:   Glucose: POCT Blood Glucose.: 109 mg/dL (05-20-22 @ 16:31)    BP: 132/81 (05-19-22 @ 11:42) (132/81 - 140/72)  Lipid Panel:

## 2022-05-20 NOTE — BH INPATIENT PSYCHIATRY PROGRESS NOTE - MSE UNSTRUCTURED FT
Appearance: Dressed appropriately in casual clothing, more disheveled today.  Behavior: Poor cooperation.  Poorly related.    Eye contact: Intense  Motor: Psychomotor activated.   Speech: Yelling.   Mood: "bad"  Affect: Irritable, frustrated.   Thought Process: [place]  Thought Content: Vague report of malaise, fixated on this.  Perceptual: Denies AVH at time of interview.   Insight: poor  Judgment: poor  Impulse control:  tenuous  Attention: limited  Gait/station: seated

## 2022-05-20 NOTE — BH INPATIENT PSYCHIATRY PROGRESS NOTE - NSBHFUPINTERVALHXFT_PSY_A_CORE
Pt refused Geodon yesterday afternoon.  Interview conducted in patient's preferred language Andorran with interpretation via [employer] [id number]  Interview limited due to dysregulation.  Pt yelling today that he does not want Geodon, feels worse, and demands to be put back on Klonopin and Prolixin.  Pt yells several times that he is not hearing voices.

## 2022-05-20 NOTE — BH INPATIENT PSYCHIATRY PROGRESS NOTE - CURRENT MEDICATION
MEDICATIONS  (STANDING):  fluPHENAZine 5 milliGRAM(s) Oral two times a day  hydrochlorothiazide 12.5 milliGRAM(s) Oral daily  insulin lispro (ADMELOG) corrective regimen sliding scale   SubCutaneous three times a day before meals  insulin lispro (ADMELOG) corrective regimen sliding scale   SubCutaneous at bedtime  losartan 50 milliGRAM(s) Oral daily  melatonin. 3 milliGRAM(s) Oral at bedtime  metFORMIN 500 milliGRAM(s) Oral daily  metoprolol succinate  milliGRAM(s) Oral daily  senna 2 Tablet(s) Oral at bedtime    MEDICATIONS  (PRN):  acetaminophen     Tablet .. 650 milliGRAM(s) Oral every 6 hours PRN Mild Pain (1 - 3), Moderate Pain (4 - 6)  diphenhydrAMINE 50 milliGRAM(s) Oral every 6 hours PRN agitation  diphenhydrAMINE 25 milliGRAM(s) Oral at bedtime PRN Insomnia  diphenhydrAMINE Injectable 50 milliGRAM(s) IntraMuscular once PRN severe agitation  fluPHENAZine 5 milliGRAM(s) Oral every 6 hours PRN AGITATION  fluPHENAZine  Injectable 2.5 milliGRAM(s) IntraMuscular Once PRN severe agitation  nicotine  Polacrilex Gum 2 milliGRAM(s) Oral every 2 hours PRN NRT
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
## 2022-05-21 RX ADMIN — Medication 3 MILLIGRAM(S): at 20:24

## 2022-05-21 RX ADMIN — FLUPHENAZINE HYDROCHLORIDE 5 MILLIGRAM(S): 1 TABLET, FILM COATED ORAL at 08:52

## 2022-05-21 RX ADMIN — Medication 2: at 18:54

## 2022-05-21 RX ADMIN — Medication 100 MILLIGRAM(S): at 08:52

## 2022-05-21 RX ADMIN — SENNA PLUS 2 TABLET(S): 8.6 TABLET ORAL at 20:23

## 2022-05-21 RX ADMIN — FLUPHENAZINE HYDROCHLORIDE 5 MILLIGRAM(S): 1 TABLET, FILM COATED ORAL at 20:26

## 2022-05-21 RX ADMIN — Medication 150 MILLIGRAM(S): at 08:53

## 2022-05-21 RX ADMIN — METFORMIN HYDROCHLORIDE 500 MILLIGRAM(S): 850 TABLET ORAL at 08:52

## 2022-05-21 RX ADMIN — LOSARTAN POTASSIUM 50 MILLIGRAM(S): 100 TABLET, FILM COATED ORAL at 08:52

## 2022-05-21 RX ADMIN — Medication 12.5 MILLIGRAM(S): at 08:52

## 2022-05-22 RX ORDER — DIPHENHYDRAMINE HCL 50 MG
50 CAPSULE ORAL ONCE
Refills: 0 | Status: COMPLETED | OUTPATIENT
Start: 2022-05-22 | End: 2022-05-22

## 2022-05-22 RX ORDER — FLUPHENAZINE HYDROCHLORIDE 1 MG/1
5 TABLET, FILM COATED ORAL ONCE
Refills: 0 | Status: COMPLETED | OUTPATIENT
Start: 2022-05-22 | End: 2022-05-22

## 2022-05-22 RX ADMIN — Medication 650 MILLIGRAM(S): at 15:59

## 2022-05-22 RX ADMIN — FLUPHENAZINE HYDROCHLORIDE 5 MILLIGRAM(S): 1 TABLET, FILM COATED ORAL at 18:39

## 2022-05-22 RX ADMIN — Medication 50 MILLIGRAM(S): at 18:39

## 2022-05-22 RX ADMIN — FLUPHENAZINE HYDROCHLORIDE 5 MILLIGRAM(S): 1 TABLET, FILM COATED ORAL at 09:21

## 2022-05-22 RX ADMIN — Medication 150 MILLIGRAM(S): at 09:21

## 2022-05-22 RX ADMIN — LOSARTAN POTASSIUM 50 MILLIGRAM(S): 100 TABLET, FILM COATED ORAL at 09:22

## 2022-05-22 RX ADMIN — Medication 3 MILLIGRAM(S): at 21:03

## 2022-05-22 RX ADMIN — FLUPHENAZINE HYDROCHLORIDE 5 MILLIGRAM(S): 1 TABLET, FILM COATED ORAL at 21:03

## 2022-05-22 RX ADMIN — FLUPHENAZINE HYDROCHLORIDE 5 MILLIGRAM(S): 1 TABLET, FILM COATED ORAL at 13:24

## 2022-05-22 RX ADMIN — Medication 50 MILLIGRAM(S): at 13:23

## 2022-05-22 RX ADMIN — Medication 12.5 MILLIGRAM(S): at 09:21

## 2022-05-22 RX ADMIN — Medication 100 MILLIGRAM(S): at 09:22

## 2022-05-22 RX ADMIN — METFORMIN HYDROCHLORIDE 500 MILLIGRAM(S): 850 TABLET ORAL at 09:21

## 2022-05-23 VITALS — TEMPERATURE: 97 F | SYSTOLIC BLOOD PRESSURE: 148 MMHG | HEART RATE: 106 BPM | DIASTOLIC BLOOD PRESSURE: 89 MMHG

## 2022-05-23 PROCEDURE — 99238 HOSP IP/OBS DSCHRG MGMT 30/<: CPT

## 2022-05-23 RX ORDER — ROSUVASTATIN CALCIUM 5 MG/1
1 TABLET ORAL
Qty: 0 | Refills: 0 | DISCHARGE

## 2022-05-23 RX ORDER — HYDROCHLOROTHIAZIDE 25 MG
1 TABLET ORAL
Qty: 14 | Refills: 0
Start: 2022-05-23 | End: 2022-06-05

## 2022-05-23 RX ORDER — NICOTINE POLACRILEX 2 MG
1 GUM BUCCAL
Qty: 0 | Refills: 0 | DISCHARGE

## 2022-05-23 RX ORDER — FAMOTIDINE 10 MG/ML
1 INJECTION INTRAVENOUS
Qty: 0 | Refills: 0 | DISCHARGE

## 2022-05-23 RX ORDER — BENZTROPINE MESYLATE 1 MG
1 TABLET ORAL
Qty: 0 | Refills: 0 | DISCHARGE

## 2022-05-23 RX ORDER — LOSARTAN/HYDROCHLOROTHIAZIDE 100MG-25MG
1 TABLET ORAL
Qty: 0 | Refills: 0 | DISCHARGE

## 2022-05-23 RX ORDER — DICLOFENAC SODIUM 30 MG/G
1 GEL TOPICAL
Qty: 0 | Refills: 0 | DISCHARGE

## 2022-05-23 RX ORDER — LOSARTAN POTASSIUM 100 MG/1
1 TABLET, FILM COATED ORAL
Qty: 14 | Refills: 0
Start: 2022-05-23 | End: 2022-06-05

## 2022-05-23 RX ORDER — FLUPHENAZINE HYDROCHLORIDE 1 MG/1
1 TABLET, FILM COATED ORAL
Qty: 28 | Refills: 0
Start: 2022-05-23 | End: 2022-06-05

## 2022-05-23 RX ORDER — METOPROLOL TARTRATE 50 MG
1 TABLET ORAL
Qty: 14 | Refills: 0
Start: 2022-05-23 | End: 2022-06-05

## 2022-05-23 RX ORDER — VENLAFAXINE HCL 75 MG
1 CAPSULE, EXT RELEASE 24 HR ORAL
Qty: 14 | Refills: 0
Start: 2022-05-23 | End: 2022-06-05

## 2022-05-23 RX ORDER — CHOLECALCIFEROL (VITAMIN D3) 125 MCG
1 CAPSULE ORAL
Qty: 0 | Refills: 0 | DISCHARGE

## 2022-05-23 RX ORDER — CLONAZEPAM 1 MG
1 TABLET ORAL
Qty: 0 | Refills: 0 | DISCHARGE

## 2022-05-23 RX ORDER — LIDOCAINE 4 G/100G
1 CREAM TOPICAL
Qty: 0 | Refills: 0 | DISCHARGE

## 2022-05-23 RX ADMIN — Medication 100 MILLIGRAM(S): at 08:04

## 2022-05-23 RX ADMIN — Medication 12.5 MILLIGRAM(S): at 08:03

## 2022-05-23 RX ADMIN — Medication 150 MILLIGRAM(S): at 08:04

## 2022-05-23 RX ADMIN — FLUPHENAZINE HYDROCHLORIDE 5 MILLIGRAM(S): 1 TABLET, FILM COATED ORAL at 11:10

## 2022-05-23 RX ADMIN — FLUPHENAZINE HYDROCHLORIDE 5 MILLIGRAM(S): 1 TABLET, FILM COATED ORAL at 08:06

## 2022-05-23 RX ADMIN — LOSARTAN POTASSIUM 50 MILLIGRAM(S): 100 TABLET, FILM COATED ORAL at 08:04

## 2022-05-23 RX ADMIN — METFORMIN HYDROCHLORIDE 500 MILLIGRAM(S): 850 TABLET ORAL at 08:04

## 2022-05-23 NOTE — BH INPATIENT PSYCHIATRY PROGRESS NOTE - NSBHMETABOLIC_PSY_ALL_CORE_FT
BMI: BMI (kg/m2): 28.9 (05-09-22 @ 13:55)  HbA1c:   Glucose: POCT Blood Glucose.: 126 mg/dL (05-23-22 @ 08:02)    BP: 148/89 (05-23-22 @ 08:46) (148/89 - 149/89)  Lipid Panel:  BMI: BMI (kg/m2): 28.9 (05-09-22 @ 13:55)  HbA1c:   Glucose: POCT Blood Glucose.: 150 mg/dL (05-23-22 @ 11:56)    BP: 148/89 (05-23-22 @ 08:46) (148/89 - 149/89)  Lipid Panel:
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
## 2022-05-23 NOTE — BH DISCHARGE NOTE NURSING/SOCIAL WORK/PSYCH REHAB - PATIENT PORTAL LINK FT
You can access the FollowMyHealth Patient Portal offered by [facility] by registering at the following website: [email or web address]. By joining [employer]s FollowMyHealth portal, you will also be able to view your health information using other applications (apps) compatible with our system.

## 2022-05-23 NOTE — BH DISCHARGE NOTE NURSING/SOCIAL WORK/PSYCH REHAB - NSDCPEPAMP_GEN_ALL_CORE
“[facility] for Tobacco Control” pamphlet given
Attending Attestation (For Attendings USE Only)...

## 2022-05-23 NOTE — BH INPATIENT PSYCHIATRY PROGRESS NOTE - MSE UNSTRUCTURED FT
Appearance: Dressed appropriately in casual clothing, more disheveled today.  Behavior: Better related. Cooperative.  Eye contact: Fair  Motor: Orofacial dyskinesia, worsened from prior.  Speech: Loud, regular rate.   Mood: "fine"  Affect: Concerned, frustrated.   Thought Process: [place]  Thought Content: Perseverative on discharge. Superficial.  Perceptual: Denies AVH at time of interview.   Insight: poor  Judgment: poor  Impulse control:  tenuous  Attention: fair on exam  Gait/station: wnl Appearance: Dressed appropriately in casual clothing, more disheveled today.  Behavior: Better related. Cooperative.  Eye contact: Fair  Motor: Orofacial dyskinesia. No other abnormal movements observed.  Speech: Loud, regular rate.   Mood: "fine"  Affect: Concerned, frustrated.   Thought Process: [place]  Thought Content: Perseverative on discharge. Superficial.  Perceptual: Denies AVH at time of interview.   Insight: poor  Judgment: poor  Impulse control:  tenuous  Attention: fair on exam  Gait/station: wnl Appearance: Dressed appropriately in casual clothing, somewhat unkempt but improved since Friday.  Behavior: Better related. Cooperative.  Eye contact: Fair  Motor: Mild orofacial dyskinesia still improved since admission. No other abnormal movements observed.  Speech: Loud, regular rate.   Mood: "fine"  Affect: Neutral, gets frustrated at times (when discussing discharge), full range, reactive.  Thought Process: [place]  Thought Content: No SIIP or HIIP.  Perceptual: Denies AVH at time of interview.   Insight: Limited  Judgment: Fair on interview  Impulse control:  Fair.   Attention: fair on exam  Gait: intact

## 2022-05-23 NOTE — BH DISCHARGE NOTE NURSING/SOCIAL WORK/PSYCH REHAB - NSCDUDCCRISIS_PSY_A_CORE
[facility]  1 [phone]) [facility]-WELL ([phone])  Text "WELL" to [phone]  Website: www.[employer]/.[facility] 1 (403) 561-EXQH (7066) Website: www.safehorizon.org/.National Suicide Prevention Lifeline 0 (631) 028-3485/.  [facility]  1 (994) LIFENET (459-1009)/.  [facility]  [address] [place] [phone]   Hours:  Monday through Friday from 9 AM to 3 PM/.  [place] [employer] of  Affairs - Veterans Crisis Line  [phone], Option 1

## 2022-05-23 NOTE — BH INPATIENT PSYCHIATRY PROGRESS NOTE - NSDCCRITERIA_PSY_ALL_CORE
Improvement in symptoms
Improvement in symptoms
When pt is no longer an acute or imminent risk of harm to self or others, and is able to care for self safely, pt may then be discharged. 
When pt is no longer an acute or imminent risk of harm to self or others, and is able to care for self safely, pt may then be discharged. 
Improvement in symptoms
When pt is no longer an acute or imminent risk of harm to self or others, and is able to care for self safely, pt may then be discharged. 
Improvement in symptoms
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
## 2022-05-23 NOTE — BH INPATIENT PSYCHIATRY PROGRESS NOTE - MODIFICATIONS
Patient seen by me, chart reviewed, and case discussed with treatment team.  Reviewed and agree with above progress note, assessment and plan; corrections and modification made where appropriate.
I saw and evaluated the patient. I reviewed the trainee's note and agree with  findings and plan as documented with modifications that were made to above trainee note where appropriate and/or are addressed below in case summary. 
Patient seen by me, chart reviewed, and case discussed with treatment team.  Reviewed and agree with above progress note, assessment and plan; corrections and modification made where appropriate.
Modifications were made to above trainee note where appropriate and/or are addressed below in case summary. 

## 2022-05-23 NOTE — BH INPATIENT PSYCHIATRY PROGRESS NOTE - CURRENT MEDICATION
MEDICATIONS  (STANDING):  fluPHENAZine 5 milliGRAM(s) Oral two times a day  hydrochlorothiazide 12.5 milliGRAM(s) Oral daily  insulin lispro (ADMELOG) corrective regimen sliding scale   SubCutaneous three times a day before meals  insulin lispro (ADMELOG) corrective regimen sliding scale   SubCutaneous at bedtime  losartan 50 milliGRAM(s) Oral daily  melatonin. 3 milliGRAM(s) Oral at bedtime  metFORMIN 500 milliGRAM(s) Oral daily  metoprolol succinate  milliGRAM(s) Oral daily  senna 2 Tablet(s) Oral at bedtime  venlafaxine  milliGRAM(s) Oral daily    MEDICATIONS  (PRN):  acetaminophen     Tablet .. 650 milliGRAM(s) Oral every 6 hours PRN Mild Pain (1 - 3), Moderate Pain (4 - 6)  diphenhydrAMINE 50 milliGRAM(s) Oral every 6 hours PRN agitation  diphenhydrAMINE 25 milliGRAM(s) Oral at bedtime PRN Insomnia  diphenhydrAMINE Injectable 50 milliGRAM(s) IntraMuscular once PRN severe agitation  fluPHENAZine 5 milliGRAM(s) Oral every 6 hours PRN AGITATION  fluPHENAZine  Injectable 2.5 milliGRAM(s) IntraMuscular Once PRN severe agitation  nicotine  Polacrilex Gum 2 milliGRAM(s) Oral every 2 hours PRN NRT

## 2022-05-23 NOTE — BH DISCHARGE NOTE NURSING/SOCIAL WORK/PSYCH REHAB - DISCHARGE INSTRUCTIONS AFTERCARE APPOINTMENTS
In order to check the location, date, or time of your aftercare appointment, please refer to your Discharge Instructions Document given to you upon leaving the hospital.  If you have lost the instructions please call [phone] room air

## 2022-05-23 NOTE — BH INPATIENT PSYCHIATRY PROGRESS NOTE - NSTXDIABGOAL_PSY_ALL_CORE
Will identify modifiable risk factors and strategies to counteract them

## 2022-05-23 NOTE — BH INPATIENT PSYCHIATRY PROGRESS NOTE - NSBHCHARTREVIEWINVESTIGATE_PSY_A_CORE FT
Ventricular Rate 70 BPM    Atrial Rate 70 BPM    P-R Interval 174 ms    QRS Duration 88 ms    Q-T Interval 396 ms    QTC Calculation(Bazett) 427 ms    P Axis 73 degrees    R Axis 4 degrees    T Axis 54 degrees    Diagnosis Line Normal sinus rhythm  Increased R/S ratio in V1, consider early transition or posterior infarct  Abnormal ECG  
Ventricular Rate 70 BPM  Atrial Rate 70 BPM  P-R Interval 174 ms  QRS Duration 88 ms  Q-T Interval 396 ms  QTC Calculation(Bazett) 427 ms  P Axis 73 degrees  R Axis 4 degrees  T Axis 54 degrees  Diagnosis Line Normal sinus rhythm  Increased R/S ratio in V1, consider early transition or posterior infarct  Abnormal ECG      ACC: [id number] EXAM:  CT BRAIN                        PROCEDURE DATE:  05/08/2022    FINDINGS:  Exam limited due to motion.  There is no acute intracranial mass-effect, hemorrhage, midline shift, or   abnormal extra-axial fluid collection. Gray-white differentiation is   maintained.  Ventricles, sulci, and cisterns are normal in size for the patient's age   without hydrocephalus.There are periventricular and subcortical white   matter hypodensities, consistent with microvascular changes. Basal   cisterns are patent.  Mucosal thickening of the right maxillary, ethmoid, sphenoid, and right   frontal sinus. The mastoid air cells are clear.  Left scalp lipoma. The calvarium is intact.    IMPRESSION:  No acute intracranial bleeding or shift.  Pansinus mucosal thickening of the right maxillary, ethmoid, sphenoid,   and right frontal sinuses
  Ventricular Rate 70 BPM    Atrial Rate 70 BPM    P-R Interval 174 ms    QRS Duration 88 ms    Q-T Interval 396 ms    QTC Calculation(Bazett) 427 ms    P Axis 73 degrees    R Axis 4 degrees    T Axis 54 degrees    Diagnosis Line Normal sinus rhythm  Increased R/S ratio in V1, consider early transition or posterior infarct  Abnormal ECG  
  Ventricular Rate 70 BPM    Atrial Rate 70 BPM    P-R Interval 174 ms    QRS Duration 88 ms    Q-T Interval 396 ms    QTC Calculation(Bazett) 427 ms    P Axis 73 degrees    R Axis 4 degrees    T Axis 54 degrees    Diagnosis Line Normal sinus rhythm  Increased R/S ratio in V1, consider early transition or posterior infarct  Abnormal ECG  
Ventricular Rate 70 BPM  Atrial Rate 70 BPM  P-R Interval 174 ms  QRS Duration 88 ms  Q-T Interval 396 ms  QTC Calculation(Bazett) 427 ms  P Axis 73 degrees  R Axis 4 degrees  T Axis 54 degrees  Diagnosis Line Normal sinus rhythm  Increased R/S ratio in V1, consider early transition or posterior infarct  Abnormal ECG      ACC: [id number] EXAM:  CT BRAIN                        PROCEDURE DATE:  05/08/2022    FINDINGS:  Exam limited due to motion.  There is no acute intracranial mass-effect, hemorrhage, midline shift, or   abnormal extra-axial fluid collection. Gray-white differentiation is   maintained.  Ventricles, sulci, and cisterns are normal in size for the patient's age   without hydrocephalus.There are periventricular and subcortical white   matter hypodensities, consistent with microvascular changes. Basal   cisterns are patent.  Mucosal thickening of the right maxillary, ethmoid, sphenoid, and right   frontal sinus. The mastoid air cells are clear.  Left scalp lipoma. The calvarium is intact.    IMPRESSION:  No acute intracranial bleeding or shift.  Pansinus mucosal thickening of the right maxillary, ethmoid, sphenoid,   and right frontal sinuses

## 2022-05-23 NOTE — BH INPATIENT PSYCHIATRY PROGRESS NOTE - NSTXDEPRESINTERMD_PSY_ALL_CORE
Medication management, psychotherapy and psychoeducation as appropriate.

## 2022-05-23 NOTE — BH SAFETY PLAN - WARNING SIGN 1
Due to patient being [place] speaking, [employer]  (#[id number]) was utilized during interaction. Writer attempted to collaborate with patient for Safety Planning. Patient did not wish to participate in safety planning. Patient was provided with crisis intervention and community resources on Transitions of Care document.

## 2022-05-23 NOTE — BH INPATIENT PSYCHIATRY PROGRESS NOTE - NSTXPROBSLPPAT_PSY_ALL_CORE
SLEEP PATTERN, DISTURBED

## 2022-05-23 NOTE — BH INPATIENT PSYCHIATRY PROGRESS NOTE - NSCGIIMPROVESX_PSY_ALL_CORE
2 = Much improved - notably better with signficant reduction of symptoms; increase in the level of functioning but some symptoms remain

## 2022-05-23 NOTE — BH INPATIENT PSYCHIATRY DISCHARGE NOTE - NSBHSUICIDESTATUS_PSY_ALL_CORE
Patient remains at elevated chronic risk of suicide, however modifiable risk factors of CAH, depressed mood, and insomnia now resolved. Suicidal and aggression risk assessments: The patient is at elevated chronic risk of self-harm due to history of psychotic disorder, history of SA, history of multiple psychiatric hospitalizations, and chronic pain. Modifiable risk factors included insomnia, depressed mood, worsened derogatory AH at times commanding suicide, and hopelessness. Immediate risk was minimized by inpatient admission to a safe environment with appropriate supervision and limited access to lethal means. Protective factors for suicide and aggression include supportive family, engagement in treatment, and able to identify reason for living. Future risk was minimized before discharge by maximizing outpatient support, providing relevant patient education, discussing emergency procedures, and ensuring close follow-up. Patient denies all suicidal and aggressive/homicidal ideation, intent and plan, and, in view of demonstrated clinical improvement, is NOT an acute danger to self or others at this time. Although the patient remains at their CHRONIC baseline risk of self-harm, such risk cannot be further ameliorated by continued inpatient treatment and the patient is therefore appropriate for discharge.

## 2022-05-23 NOTE — BH INPATIENT PSYCHIATRY PROGRESS NOTE - NSTXHYPERGOAL_PSY_ALL_CORE
Will identify 1 modifiable risk factor and a strategy to improve this

## 2022-05-23 NOTE — BH INPATIENT PSYCHIATRY DISCHARGE NOTE - NSICDXPASTMEDICALHX_GEN_ALL_CORE_FT
PAST MEDICAL HISTORY:  Depression     DM (diabetes mellitus)     HLD (hyperlipidemia)     HTN (hypertension)     Schizophrenia      PAST MEDICAL HISTORY:  DM (diabetes mellitus)     History of depression     HLD (hyperlipidemia)     HTN (hypertension)     Schizophrenia

## 2022-05-23 NOTE — BH INPATIENT PSYCHIATRY PROGRESS NOTE - NSBHFUPINTERVALHXFT_PSY_A_CORE
Interview conducted in patient's preferred language Solomon Islander with interpretation via [employer] [id number]  Patient less dysregulated today though still yells over  when discussing discharge. Denies all complaints, states that he is sleeping well, denies depressed mood, denies AH, which he attributes to resuming Prolixin, stating "I feel better on the old medicine." Endorses tightness in neck and feeling of restlessness in legs which does not prevent him from sitting still.  [employer] [id number].  Patient denies all complaints, states that he is sleeping well, denies depressed mood, denies AH, which he attributes to resuming Prolixin, stating "I feel better on the old medicine."  Endorses some tightness in neck, and feeling of restlessness in legs which does not prevent him from sitting still.

## 2022-05-23 NOTE — BH INPATIENT PSYCHIATRY PROGRESS NOTE - CASE SUMMARY
Pt today is more emotionally regulated, states he is sleeping better (only one interruption as per sleep log) and continues to not hear any voices.  Pt states he feels ready to go home.  Given improvement with reduction of polypharmacy, and then increase in nonspecific mood symptoms with medication increase/changes, with return of improvement with reduced psychotropic burden, suspect polypharmacy may have played large role in presentation.  Will not resume benzos/anticholinergics for now given significantly reduced subjective/objective signs of EPS and TD, and also given risk of falls.    Team spoke with pt's family who feels they can manage pt at home and agree with discharge.  Discussed risk of falls with benzos and anticholinergics with family, advised that pt do not restart these medications.  Discussed that pt is out of time window for serious withdrawal, vitals have been relatively stable, pt otherwise showing no signs/symptoms of withdrawal.      Risk assessment on discharge: Pt has chronic risk factors of schizophrenia, multiple past admissions, unemployment, chronic medical problems, with acute risk factors of recent psychosis in context of psychosocial stressors and polypharmacy, now treated with inpatient care.  Protective factors of stable housing, therapeutic alliances, future orientation, supportive family.  Pt has consistently denied suicidality and homicidality, and is caring for ADLs.  Pt is moderate to high CHRONIC risk of harm, but is NO longer an acute or imminent risk of harm to self or others, can be discharged with outpatient follow up.    1. Will d/c home on current regimen.  2. Psychoeducation provided regarding importance of compliance with outpatient appointments and medications.  3. Pt was advised to remain abstinent with substances.  4. Pt was advised to dial 911 or return to ER if they become danger to self or others.  Pt today is more emotionally regulated, states he is sleeping better (only one interruption as per sleep log) and continues to not hear any voices, has consistently denied suicidality and homicidality.  Pt states he feels ready to go home.  Given improvement with reduction of polypharmacy, and then increase in nonspecific mood symptoms with medication increase/changes, with return of improvement with reduced psychotropic burden, suspect polypharmacy may have played large role in presentation.  Will not resume benzos/anticholinergics for now given significantly reduced subjective/objective signs of EPS and TD, and also given risk of falls.    Team spoke with pt's family who feels they can manage pt at home and agree with discharge.  Discussed risk of falls with benzos and anticholinergics with family, advised that pt do not restart these medications.  Discussed that pt is out of time window for serious withdrawal, vitals have been relatively stable, pt otherwise showing no signs/symptoms of withdrawal.      Risk assessment on discharge: Pt has chronic risk factors of schizophrenia, multiple past admissions, unemployment, chronic medical problems, with acute risk factors of recent psychosis in context of psychosocial stressors and polypharmacy, now treated with inpatient care.  Protective factors of stable housing, therapeutic alliances, future orientation, supportive family.  Pt has consistently denied suicidality and homicidality, and is caring for ADLs.  Pt is moderate to high CHRONIC risk of harm, but is NO longer an acute or imminent risk of harm to self or others, can be discharged with outpatient follow up.    1. Will d/c home on current regimen.  2. Psychoeducation provided regarding importance of compliance with outpatient appointments and medications.  3. Pt was advised to remain abstinent with substances.  4. Pt was advised to dial 911 or return to ER if they become danger to self or others.

## 2022-05-23 NOTE — BH INPATIENT PSYCHIATRY PROGRESS NOTE - NSBHROSSYSTEMS_PSY_ALL_CORE
Psychiatric
Musculoskeletal.../Psychiatric
Psychiatric

## 2022-05-23 NOTE — BH DISCHARGE NOTE NURSING/SOCIAL WORK/PSYCH REHAB - NSDCPEWEB_GEN_ALL_CORE
[facility] for Tobacco Control website --- [email or web address] website --- [email or web address]

## 2022-05-23 NOTE — BH INPATIENT PSYCHIATRY DISCHARGE NOTE - OTHER PAST PSYCHIATRIC HISTORY (INCLUDE DETAILS REGARDING ONSET, COURSE OF ILLNESS, INPATIENT/OUTPATIENT TREATMENT)
As per the ED Behavioral Health Assessment Note completed on May 8th 2022: "The Pt is a 61 yr old  male (Angolan speaking only), single, domiciled and unemployed, PPHx schizophrenia, multiple past psych admissions (including 5 [facility] admissions last discharged in summer 2020, no known past SA, legal or substance hx, PMHx of HTN, DM and HLD.  Today, BIB family to worsening AH. On interview, patient seen with Angolan-fluent attending, he feels that his symptoms have been worsening for one month. He reports depressed mood, "yelling at my family", hearing voices that tell him "look, other people are doing things and you're no good". He endorses having SI with plan to hang himself, denies any preparatory actions or any attempts. Reports poor sleep, low energy, poor appetite as well. Denies CAH. Otherwise endorses 1.5 year history of dizziness that his PCP wanted patient to see Neurologist for, which he has not yet done. Endorses current dizziness as well. Otherwise denies active SI, and feels safe now in the hospital. Expresses his desire to get help and asks to be admitted. Patient completes voluntary paperwork in the room with team." See above

## 2022-05-23 NOTE — BH INPATIENT PSYCHIATRY PROGRESS NOTE - NSBHCHARTREVIEWVS_PSY_A_CORE FT
Vital Signs Last 24 Hrs  T(C): 36.3 (05-23-22 @ 08:46), Max: 36.9 (05-22-22 @ 16:44)  T(F): 97.3 (05-23-22 @ 08:46), Max: 98.4 (05-22-22 @ 16:44)  HR: 106 (05-23-22 @ 08:46) (106 - 106)  BP: 148/89 (05-23-22 @ 08:46) (148/89 - 148/89)  BP(mean): --  RR: --  SpO2: --    Orthostatic VS  05-22-22 @ 08:34  Lying BP: --/-- HR: --  Sitting BP: 149/88 HR: 98  Standing BP: --/-- HR: --  Site: --  Mode: --

## 2022-05-23 NOTE — BH INPATIENT PSYCHIATRY PROGRESS NOTE - NSTXCOPEDATEEST_PSY_ALL_CORE
10-May-2022
09-May-2022
10-May-2022

## 2022-05-23 NOTE — BH INPATIENT PSYCHIATRY PROGRESS NOTE - NSBHCONSDANGERSELF_PSY_A_CORE
unable to care for self
suicidal ideation with plan and means
unable to care for self
suicidal ideation with plan and means
suicidal ideation with plan and means

## 2022-05-23 NOTE — BH INPATIENT PSYCHIATRY DISCHARGE NOTE - NSBHDCSIGEVENTSFT_PSY_A_CORE
none There were no significant behavioral problems on the unit.  Patient did not require emergency intramuscular medications or seclusion/restraints, and did not self-harm on the unit. Patient remained actively engaged in treatment on the unit and was visible in the milieu, although his participation was limited by language barriers.

## 2022-05-23 NOTE — BH DISCHARGE NOTE NURSING/SOCIAL WORK/PSYCH REHAB - NSDCPEEMAIL_GEN_ALL_CORE
[facility] for Tobacco Control email [email or web address]

## 2022-05-23 NOTE — BH INPATIENT PSYCHIATRY DISCHARGE NOTE - NSPRESENTSXS_PSY_ALL_CORE
Depressed mood/Anhedonia/Hopelessness or despair/Global insomnia/Command hallucinations to hurt self

## 2022-05-23 NOTE — BH INPATIENT PSYCHIATRY DISCHARGE NOTE - NSBHMETABOLIC_PSY_ALL_CORE_FT
BMI: BMI (kg/m2): 28.9 (05-09-22 @ 13:55)  HbA1c:   Glucose: POCT Blood Glucose.: 105 mg/dL (05-22-22 @ 20:27)    BP: 149/89 (05-21-22 @ 10:20) (149/89 - 149/89)  Lipid Panel:

## 2022-05-23 NOTE — BH INPATIENT PSYCHIATRY PROGRESS NOTE - NSBHCONSBHPROVDETAILS_PSY_A_CORE  FT
Discussed case with outpatient psychiatrist Dr. Webb with patient's consent 5/10/22.

## 2022-05-23 NOTE — BH INPATIENT PSYCHIATRY PROGRESS NOTE - NSTXSLPPATGOAL_PSY_ALL_CORE
Be able to sleep for a minimum of six hours daily
DIFFICULTY URINATING/increase freq
Be able to sleep for a minimum of six hours daily
Be able to sleep for a minimum of six hours daily

## 2022-05-23 NOTE — BH INPATIENT PSYCHIATRY PROGRESS NOTE - NSBHINPTBILLING_PSY_ALL_CORE
23753 - Inpatient Low Complexity
79561 - Inpatient Low Complexity
73789 - Inpatient Moderate Complexity
00583 - Inpatient Low Complexity
88882 - Inpatient Moderate Complexity
30143 - Inpatient Moderate Complexity
94300 - Inpatient Low Complexity
08809 - Inpatient Low Complexity
91659 - Hospital Discharge Day Management; 30 min or less
47011 - Inpatient Moderate Complexity

## 2022-05-23 NOTE — BH INPATIENT PSYCHIATRY PROGRESS NOTE - NSCGISEVERILLNESS_PSY_ALL_CORE
4 = Moderately ill – overt symptoms causing noticeable, but modest, functional impairment or distress; symptom level may warrant medication

## 2022-05-23 NOTE — BH INPATIENT PSYCHIATRY DISCHARGE NOTE - NSDCMRMEDTOKEN_GEN_ALL_CORE_FT
Aspirin Enteric Coated 81 mg oral delayed release tablet: 1 tab(s) orally once a day  benztropine 1 mg oral tablet: 1 tab(s) orally 2 times a day  cholecalciferol 1250 mcg (50,000 intl units) oral capsule: 1 cap(s) orally once a week  clonazePAM 0.25 mg oral tablet: 1 tab(s) orally 3 times a day  docusate sodium 100 mg oral capsule: 1 cap(s) orally 3 times a day  famotidine 40 mg oral tablet: 1 tab(s) orally once a day (at bedtime)  fluPHENAZine 5 mg oral tablet: 1 tab(s) orally in am and 2 tab(s) at bed time  Glucophage  mg oral tablet, extended release: 1 tab(s) orally once a day  lidocaine 5% patch: 1 patch transdermal once a day, As Needed  losartan-hydrochlorothiazide 50 mg-12.5 mg oral tablet: 1 tab(s) orally once a day  loxapine 10 mg oral capsule: 1 cap(s) orally once a day   metoprolol succinate 100 mg oral tablet, extended release: 1 tab(s) orally once a day  Nicotrol Inhaler 10 mg inhalation device: 1 unit(s) inhaled every 2 to 3 hours, As Needed  Pennsaid 2% topical solution: Apply topically to affected area once a day, As Needed  rosuvastatin 10 mg oral tablet: 1 tab(s) orally once a day  senna oral tablet: 2 tab(s) orally once a day (at bedtime)  venlafaxine 150 mg oral capsule, extended release: 1 cap(s) orally once a day -for agitation    Aspirin Enteric Coated 81 mg oral delayed release tablet: 1 tab(s) orally once a day  cholecalciferol 1250 mcg (50,000 intl units) oral capsule: 1 cap(s) orally once a week  docusate sodium 100 mg oral capsule: 1 cap(s) orally 3 times a day  famotidine 40 mg oral tablet: 1 tab(s) orally once a day (at bedtime)  fluPHENAZine 5 mg oral tablet: 1 tab(s) orally 2 times a day  fluPHENAZine 5 mg oral tablet: 1 tab(s) orally in am and 2 tab(s) at bed time  Glucophage  mg oral tablet, extended release: 1 tab(s) orally once a day  lidocaine 5% patch: 1 patch transdermal once a day, As Needed  losartan-hydrochlorothiazide 50 mg-12.5 mg oral tablet: 1 tab(s) orally once a day  metoprolol succinate 100 mg oral tablet, extended release: 1 tab(s) orally once a day  Nicotrol Inhaler 10 mg inhalation device: 1 unit(s) inhaled every 2 to 3 hours, As Needed  Pennsaid 2% topical solution: Apply topically to affected area once a day, As Needed  rosuvastatin 10 mg oral tablet: 1 tab(s) orally once a day  senna oral tablet: 2 tab(s) orally once a day (at bedtime)  venlafaxine 150 mg oral capsule, extended release: 1 cap(s) orally once a day -for agitation   venlafaxine 150 mg oral capsule, extended release: 1 cap(s) orally once a day   fluPHENAZine 5 mg oral tablet: 1 tab(s) orally 2 times a day  Glucophage  mg oral tablet, extended release: 1 tab(s) orally once a day  hydroCHLOROthiazide 12.5 mg oral capsule: 1 cap(s) orally once a day  losartan 50 mg oral tablet: 1 tab(s) orally once a day  metoprolol succinate 100 mg oral tablet, extended release: 1 tab(s) orally once a day  senna oral tablet: 2 tab(s) orally once a day (at bedtime)  venlafaxine 150 mg oral capsule, extended release: 1 cap(s) orally once a day

## 2022-05-23 NOTE — BH INPATIENT PSYCHIATRY PROGRESS NOTE - NSTXCOPEDATETRGT_PSY_ALL_CORE
17-May-2022
19-May-2022
24-May-2022
24-May-2022
19-May-2022
12-May-2022
19-May-2022
26-May-2022

## 2022-05-23 NOTE — BH INPATIENT PSYCHIATRY DISCHARGE NOTE - HOSPITAL COURSE
Patient presented with suicidal ideation in the setting of insomnia and worsened auditory hallucinations as well as psychosocial stressor of recent death of mother. Also with reported falls and gait instability. Home benzodiazepines were gradually discontinued due to concern for contribution to unsteadiness. Patient and collateral reported sensation of restlessness and inability to lie still in bed, concerning for akathisia. Given concern for akathisia and known EPS on Prolixin, Prolixin was transitioned to Geodon, which was titrated to 40mg daily. Initially patient had significant improvement in subjective restlessness and sleep as well as reduced AH and resolution of SI. Patient endorsed anxiety and perseverative thoughts regarding past events in his life as well as daytime sleepiness, therefore Effexor was increased from 150mg to 187.5mg. However at that time the patient began to be more emotionally dysregulated, frustrated, and agitated, repeatedly requesting discharge. He requested to return to Prolixin despite concerns for side effects. Geodon was transitioned back to Prolixin and Effexor was returned to 150mg. Patient had previously taken Cogentin with Prolixin fro EPS prophylaxis, however given lack of reemergence of EPS upon resuming Prolixin and concern for anticholinergic burden, Cogentin was not restarted. Patient reported feeling better on home medication regimen, denied depressed mood, denied SI, denied AH, and endorsed fair sleep. Patient's family in agreement that patient appears at baseline and willing to accept him home.     There were no significant behavioral problems on the unit.  Patient was emotionally dysregulated and at times shouting when discharge was not imminent, however he did not require emergency intramuscular medications or seclusion/restraints, and did not self-harm on the unit. Patient remained actively engaged in treatment on the unit and was visible in the milieu, although his participation was limited by language barriers.    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 Patient was evaluated and treated by physical therapy for lumbar and leg pain.    DAY OF DISCHARGE    - Mental Status  Appearance: Dressed appropriately in casual clothing, hygiene and grooming fair.  Behavior: Better related. Cooperative.  Eye contact: Fair  Motor: Orofacial dyskinesia. No other abnormal movements observed.  Speech: Loud, regular rate.   Mood: "fine"  Affect: Concerned, frustrated.   Thought Process: Hodge  Thought Content: Perseverative on discharge. Superficial.  Perceptual: Denies AVH at time of interview.   Insight: poor  Judgment: poor  Impulse control:  tenuous  Attention: fair on exam  Gait/station: wnl    - Suicidal and aggression risk assessments  The patient is at elevated chronic risk of self-harm due to history of psychotic disorder, history of SA, history of multiple psychiatric hospitalizations, and chronic pain. Modifiable risk factors included insomnia, depressed mood, worsened derogatory AH at times commanding suicide, and hopelessness. Immediate risk was minimized by inpatient admission to a safe environment with appropriate supervision and limited access to lethal means. Protective factors for suicide and aggression include supportive family, engagement in treatment, and able to identify reason for living. Future risk was minimized before discharge by maximizing outpatient support, providing relevant patient education, discussing emergency procedures, and ensuring close follow-up. Patient denies all suicidal and aggressive/homicidal ideation, intent and plan, and, in view of demonstrated clinical improvement, is NOT an acute danger to self or others at this time. Although the patient remains at their CHRONIC baseline risk of self-harm, such risk cannot be further ameliorated by continued inpatient treatment and the patient is therefore appropriate for discharge.     - Summary  On day of discharge, the patient has improved significantly and no longer requires inpatient treatment and care. Pt will be discharged and follow-up with outpatient care. Patient was provided with a 14-day supply of medications, extensive psychoeducation on treatment options and motivational counseling targeting healthy lifestyle. Patient was instructed on actions for crisis situations, understood and agreed to follow instructions for handling crisis, including coming to ER or calling 911 should the patient or their family feel that they are in danger of hurting self or others. Patient also was given Suicide Prevention Lifeline number 1-684-156-4407 and provided with instructions on its use.     Patient will be discharged with the following DSM5 Diagnoses:   Schizophrenia    Patient will be discharged on the following medications:  fluphenazine 5mg BID  venlafaxine 150mg daily --> last prescription filled 05/05/2022, no new medications will be provided from [facility]  continue remainder of home meds for HTN, HLD, and T2DM as prescribed  patient to continue home NRT per patient preference    Patient will return to his existing outpatient provider Dr. Webb. Handoff was given via fax, including discussion of hospital course, treatment, and medication   Patient presented with suicidal ideation in the setting of insomnia and worsened auditory hallucinations as well as psychosocial stressor of recent death of mother. Also with reported falls and gait instability. Home benzodiazepines were gradually discontinued due to concern for contribution to unsteadiness. Patient and collateral reported sensation of restlessness and inability to lie still in bed, concerning for akathisia. Given concern for akathisia and known EPS on Prolixin, Prolixin was transitioned to Geodon, which was titrated to 40mg daily. Initially patient had significant improvement in subjective restlessness and sleep as well as reduced AH and resolution of SI. Patient endorsed anxiety and perseverative thoughts regarding past events in his life as well as daytime sleepiness, therefore Effexor XR was increased from 150mg to 187.5mg. However at that time the patient began to be more emotionally dysregulated, frustrated, and agitated. He requested to return to Prolixin despite concerns for side effects. Geodon was transitioned back to Prolixin and Effexor XR was returned to 150mg. Patient had previously taken Cogentin with Prolixin fro EPS prophylaxis, however given lack of reemergence of severe EPS upon resuming Prolixin and concern for anticholinergic burden, Cogentin was not restarted. Patient reported feeling better on home medication regimen, denied depressed mood, denied SI, denied AH, and endorsed fair sleep. Patient's family in agreement with discharge.    *** Please refer to attending attestation at end of this document for additional clinical information.

## 2022-05-23 NOTE — BH INPATIENT PSYCHIATRY PROGRESS NOTE - NSICDXBHSECONDARYDX_PSY_ALL_CORE
Diabetes   E11.9  HTN (hypertension)   I10  HLD (hyperlipidemia)   E78.5  

## 2022-05-23 NOTE — BH INPATIENT PSYCHIATRY DISCHARGE NOTE - NSDCCPCAREPLAN_GEN_ALL_CORE_FT
PRINCIPAL DISCHARGE DIAGNOSIS  Diagnosis: Schizophrenia  Assessment and Plan of Treatment: Initially transitioned from Prolixin to Geodon due to concerns for akathisia on Prolixin. Geodon tolerated, however returned to Prolixin due to patient preference.       PRINCIPAL DISCHARGE DIAGNOSIS  Diagnosis: Schizophrenia  Assessment and Plan of Treatment: Medication management and psychotherapy

## 2022-05-23 NOTE — BH DISCHARGE NOTE NURSING/SOCIAL WORK/PSYCH REHAB - NSDCPRGOAL_PSY_ALL_CORE
Due to patient being Slovenian speaking, [employer]  (#[id number]) was utilized, however patient did not wish to speak with writer, therefore the following information has been gathered from staff observations and patient's chart. Patient has demonstrated progress towards psychiatric rehabilitation goals during current hospitalization. Patient has demonstrated daily medication compliance and is tolerating medications well. Per chart, patient has been reporting improvements in symptoms compared to admission. Per chart, patient has denied SI/AH. Due to language barrier, patient was unable to attend/participate in psychiatric rehabilitation groups during hospitalization. Patient was visible in the milieu. Patient did not socialize with peers. Patient was able to make needs known to staff. Writer attempted to complete safety planning with patient, however patient did not wish to participate in safety planning. Patient was provided with crisis intervention and community resources on Transitions of Care document. Patient was provided with a Press Ganey survey to complete prior to discharge.

## 2022-05-23 NOTE — BH INPATIENT PSYCHIATRY DISCHARGE NOTE - NSBHDCMEDICALFT_PSY_A_CORE
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 Patient was evaluated and treated by physical therapy for lumbar and leg pain.

## 2022-05-23 NOTE — BH INPATIENT PSYCHIATRY PROGRESS NOTE - NSICDXBHPRIMARYDX_PSY_ALL_CORE
Schizophrenia, unspecified type   F20.9  

## 2022-05-23 NOTE — BH INPATIENT PSYCHIATRY PROGRESS NOTE - NSTXSLPPATINTERMD_PSY_ALL_CORE
Medication management, psychotherapy and psychoeducation as appropriate. Encourage sleep hygiene. 

## 2022-05-23 NOTE — BH INPATIENT PSYCHIATRY PROGRESS NOTE - NSBHPROGMEDSE_PSY_A_CORE FT
Prolixin: EPS, possible akathisia
fluphenazine: EPS - orofacial dyskinesia, torticollis w/o congentin
Prolixin: EPS, possible akathisia
fluphenazine: EPS - orofacial dyskinesia, torticollis w/o congentin

## 2022-05-23 NOTE — BH INPATIENT PSYCHIATRY PROGRESS NOTE - NSTXDCOTHRDATETRGT_PSY_ALL_CORE
18-May-2022
30-May-2022
18-May-2022
26-May-2022
18-May-2022
18-May-2022
26-May-2022

## 2022-05-23 NOTE — BH INPATIENT PSYCHIATRY DISCHARGE NOTE - CASE SUMMARY
Pt is a 61 year old man with schizophrenia, admitted with persecutory auditory hallucinations, depression, and suicidal ideation, in context of psychosocial stressors.  Pt was noted to be loud and concrete in thought process, but this appeared to be baseline.  Pt initially reported hearing voices, however did not display any responding to internal stimuli or internal preoccupation.  Team's initial concern was for polypharmacy, as pt was on regimen of fluphenazine, quetiapine, risperidone, benztropine, clonazepam, lorazepam.  Quetiapine and risperidone were not restarted on admission, and benzodiazepines were tapered and discontinued.  Pt agreed to switch from fluphenazine to ziprasidone, began reporting rapid and spontaneous resolution of voices.  Pt was also increased in venlafaxine XR given report of depressive symptoms.  Pt after period of relative improvement, began having emotional dysregulation, refused ziprasidone and would only accept fluphenazine.  Out of concern for overactivation, venlafaxine XR was reduced back to 150 mg daily.  Pt was switched back to fluphenazine, at reduced dose of 5 mg bid (to limit risk of EPS and TD).  Pt maintained throughout these medication changes that voices were no longer present.  Pt did not exhibit any severe EPS or TD, so anticholinergics and benzodiazepines were not reinstituted, also given concern for risk of falls.  Pt by time of discharge was calm, denying any voices, reporting improved sleep.  He consistently denied suicidality and homicidality.  He cared for basic ADLs independently.  Pt mostly kept to himself however this was attributed to language barrier.  Aside from ziprasidone, pt was largely compliant with meds and basic care, and did not have any behavioral incidents on unit.  Family was in agreement with discharge.    Risk assessment on discharge: Pt has chronic risk factors of schizophrenia, multiple past admissions, unemployment, chronic medical problems, with acute risk factors of recent psychosis in context of psychosocial stressors and polypharmacy, now treated with inpatient care.  Protective factors of stable housing, therapeutic alliances, future orientation, supportive family.  Pt has consistently denied suicidality and homicidality, and is caring for ADLs.  Pt is moderate to high CHRONIC risk of harm, but is NO longer an acute or imminent risk of harm to self or others, can be discharged with outpatient follow up.

## 2022-05-23 NOTE — BH INPATIENT PSYCHIATRY DISCHARGE NOTE - DESCRIPTION
single, no children, lives with brother and brother's family Single, no children, lives with brother and brother's family

## 2022-05-23 NOTE — BH INPATIENT PSYCHIATRY PROGRESS NOTE - NSBHATTESTSEENBY_PSY_A_CORE
attending Psychiatrist without NP/Trainee
Attending Psychiatrist supervising NP/Trainee, meeting pt...
attending Psychiatrist without NP/Trainee
Attending Psychiatrist supervising NP/Trainee, meeting pt...

## 2022-05-23 NOTE — BH INPATIENT PSYCHIATRY PROGRESS NOTE - NSTXDCOTHRDATEEST_PSY_ALL_CORE
11-May-2022

## 2022-05-23 NOTE — BH INPATIENT PSYCHIATRY PROGRESS NOTE - NSTXDCOTHRGOAL_PSY_ALL_CORE
Pt. will agree to follow up with Care Coordination services.

## 2022-11-16 NOTE — ED PROVIDER NOTE - EYES, MLM
[FreeTextEntry1] : + phrases. + crayons, shoes off. + TT int
Clear bilaterally, pupils equal, round and reactive to light.

## 2023-04-26 NOTE — ED PROVIDER NOTE - QRS
Reason for Visit: seen in f/u of resected cholangiocarcinoma    Oncology HPI: Tyshawn Pal is a 59 year old man, diagnosed with cholangiocarcinoma in October 2015. He had extensive regional adenopathy at the time of diagnosis. He underwent a L hepatectomy and all of the enlarged lymph nodes were shown to be involved by sarcoid rather than metastatic tumor. He had some fatty change in the liver as well as involvement by sarcoid. His final pathologic stage was stage I. He has had portal hypertension and recently had thickening and abnormalities in the duodenum that were concerning for tumor recurrence. He had multiple endoscopies with superficial biopsies that were without any evidence of malignancy.  No adjuvant chemo was given.     Interval history: Bryn is here by himself. No issues. He gained 10 IB with good appetite. No N/V/D/C. No chest pain or  SOB. No rash. Has mild pitting edema from portal HTN. Also CT showed seplenomegaly and para-esophageal varices . No  melena, hematochezia or hematemesis, will see GI nest week. His levothyroxine was increased recently and felt better after that.       Current Outpatient Medications   Medication Sig Dispense Refill     blood glucose monitoring (ACCU-CHEK ANGEL SMARTVIEW) meter device kit as directed       furosemide (LASIX) 40 MG tablet Take 0.5 tablets (20 mg) by mouth daily       GLYBURIDE PO Take 5 mg by mouth 2 times daily (with meals)       levothyroxine (SYNTHROID/LEVOTHROID) 50 MCG tablet TAKE ONE TABLET BY MOUTH ONCE DAILY ON AN EMPTY STOMACH IN THE MORNING  4     LEVOTHYROXINE SODIUM PO Take 0.5 mg by mouth daily       METFORMIN HCL PO Take 1,000 mg by mouth 2 times daily        RaNITidine HCl (ZANTAC PO) Take 150 mg by mouth 2 times daily       spironolactone (ALDACTONE) 50 MG tablet Take 2 tablets (100 mg) by mouth daily            No Known Allergies      Exam: alert, appear well. Blood pressure (!) 142/74, pulse 83, temperature 98.2  F (36.8  C), temperature  THORACIC & FOREGUT SURGERY    S:  No acute overnight events.  Pt seen at bedside resting comfortably.  Able to tolerate CLD yesterday. No N/V    O:  Vitals:    04/25/23 1203 04/25/23 1436 04/25/23 2226 04/26/23 0512   BP:  108/71 108/63 117/72   BP Location:  Left arm Left arm Left arm   Patient Position:       Cuff Size:       Pulse:  77 86 86   Resp:  18 16 16   Temp:  98.5  F (36.9  C) 98.3  F (36.8  C) 98.6  F (37  C)   TempSrc:  Oral Oral Oral   SpO2: 97% 96% 99% 98%   Weight:       Height:           A&O, NAD  Breathing non-labored  Noncyanotic  Nondistended  Distal extremities warm     A/P: Kellee Turk is a 22 year old female with h/o severe pectus excavatum now POD#2 s/p modified Ravitch repair with sternal plating and retrosternal bar placement.  Doing well.    -CT to WS  -Ambulate  -Oral oxycodone with IV Dilaudid backup for pain  -Daily chest x-rays  -Likely discharge in 1 to 2 days if pain control remains adequate  -Lovenox DVT prophylaxis    POSTOP COMPLICATIONS: None    Collin Braun, PGY-1  General Surgery Resident   Pager: [phone]     "source Oral, resp. rate 14, height 1.835 m (6' 0.25\"), weight 117.2 kg (258 lb 6.4 oz), SpO2 99 %.  Wt Readings from Last 4 Encounters:   10/14/19 117.2 kg (258 lb 6.4 oz)   04/09/19 118.4 kg (261 lb)   11/19/18 116.7 kg (257 lb 4.4 oz)   10/23/18 110.9 kg (244 lb 6.4 oz)   Oropharynx is moist and without lesion. Neck supple and without adenopathy. Lungs:CTA. Heart: RRR, no murmur or rub. Abdomen: soft, distended, nontender, BS active, no masses or organomegaly. Bruising on the right mid abdomen, nontender. Extremities: warm, 1+ pretibial edema. Speech is clear. CN wnl. Gait/station wnl.       Labs: reviewed has low PLT 37  and crea 1.4   CA 19-9 stable at 40    Imaging:  10/09/19  IMPRESSION:   In this patient with history of cholangiocarcinoma:  1. Stable postsurgical changes of left hepatectomy with unchanged  appearance of complex cystic lesion at the resection site.  2. No convincing evidence for recurrent or metastatic disease.  3. Prominent gastric, splenic and paraesophageal varices.  4. Overall stable mesenteric edema and thickening of the bilateral  coronal fascia  5. Splenomegaly.  6. Continued thickened distal esophagus likely related to portal  Gastropathy.    Impression/plan:   1. Resected cholangiocarcinoma  -no evidence of recurrence on CT imaging. The esophageal/gastric wall thickening is stable.  x  -will f/u in 6 months with CT CAP and labs and f/u with Dr. Sadler at that time.    2. Portal hypertension with associated gastropathy, splenomegaly and esophageal varices with thrombocytopenia   -Platelets 37,000, no sign/symptoms of bleeding  -continue f/u with [place]. Diez at [facility] [place]  -continue ranitidine bid, furosemide/spironolactone    3. DM2  - Continue glyburide and metformin.     RTC in 6 months with CT abd/pl.     Patient was seen and examined by Dr. Sadler and I. A/P were discussed with the patient.     Clifford Tejada MD  Hematology&Oncology Fellow  Pager: [phone]      Attending note: I saw the " patient in conjunction with the fellow and agree with the above.     86

## 2023-08-20 NOTE — BH INPATIENT PSYCHIATRY PROGRESS NOTE - PRN MEDS
Sammie Area is POD#8- S/P fall at [employer]- S/P Right Hip IM nailing. Patient nonverbal. Hx dementia. Mepilex and dressing to hip intact w small amt old drainage. PT held today due to patient going to IR for IVC filter this afternoon- Low hgb 6.7- transfusing 1 unit PRBC's. Also w sarah hematoma to right gluteal medius. Voids per pure wick. Pain managed w scheduled tylenol. Taking meds crushed w applesauce. NPO for procedure, is a feeder 1;1. Repositioning q 2 hrs when in bed. Blue heel protector boots on . Scd's in place- lovenox and coumadin for DVT proph on hold due to hematoma. Planning SNF when medically cleared. 1735-addendum- had IVC filter placed-site right Internal Jugular . Area dry and intact. Post transfusion H/H ordered for 1745. Will monitor. Problem: PAIN - ADULT  Goal: Verbalizes/displays adequate comfort level or patient's stated pain goal  Description: INTERVENTIONS:  - Encourage pt to monitor pain and request assistance  - Assess pain using appropriate pain scale  - Administer analgesics based on type and severity of pain and evaluate response  - Implement non-pharmacological measures as appropriate and evaluate response  - Consider cultural and social influences on pain and pain management  - Manage/alleviate anxiety  - Utilize distraction and/or relaxation techniques  - Monitor for opioid side effects  - Notify MD/LIP if interventions unsuccessful or patient reports new pain  - Anticipate increased pain with activity and pre-medicate as appropriate  Outcome: Progressing     Problem: SAFETY ADULT - FALL  Goal: Free from fall injury  Description: INTERVENTIONS:  - Assess pt frequently for physical needs  - Identify cognitive and physical deficits and behaviors that affect risk of falls.   - [place] fall precautions as indicated by assessment.  - Educate pt/family on patient safety including physical limitations  - Instruct pt to call for assistance with activity based on assessment  - Modify environment to reduce risk of injury  - Provide assistive devices as appropriate  - Consider OT/PT consult to assist with strengthening/mobility  - Encourage toileting schedule  Outcome: Progressing     Problem: SKIN/TISSUE INTEGRITY - ADULT  Goal: Incision(s), wounds(s) or drain site(s) healing without S/S of infection  Description: INTERVENTIONS:  - Assess and document risk factors for pressure ulcer development  - Assess and document skin integrity  - Assess and document dressing/incision, wound bed, drain sites and surrounding tissue  - Implement wound care per orders  - Initiate isolation precautions as appropriate  - Initiate Pressure Ulcer prevention bundle as indicated  Outcome: Progressing     Problem: DISCHARGE PLANNING  Goal: Discharge to home or other facility with appropriate resources  Description: INTERVENTIONS:  - Identify barriers to discharge w/pt and caregiver  - Include patient/family/discharge partner in discharge planning  - Arrange for needed discharge resources and transportation as appropriate  - Identify discharge learning needs (meds, wound care, etc)  - Arrange for interpreters to assist at discharge as needed  - Consider post-discharge preferences of patient/family/discharge partner  - Complete POLST form as appropriate  - Assess patient's ability to be responsible for managing their own health  - Refer to Case Management Department for coordinating discharge planning if the patient needs post-hospital services based on physician/LIP order or complex needs related to functional status, cognitive ability or social support system  Outcome: Progressing MEDICATIONS  (PRN):  acetaminophen     Tablet .. 325 milliGRAM(s) Oral every 4 hours PRN Mild Pain (1 - 3), Moderate Pain (4 - 6)  diphenhydrAMINE 50 milliGRAM(s) Oral every 6 hours PRN agitation  diphenhydrAMINE 25 milliGRAM(s) Oral at bedtime PRN Insomnia  diphenhydrAMINE Injectable 50 milliGRAM(s) IntraMuscular once PRN severe agitation  fluPHENAZine 5 milliGRAM(s) Oral every 6 hours PRN AGITATION  fluPHENAZine  Injectable 2.5 milliGRAM(s) IntraMuscular Once PRN severe agitation  nicotine  Polacrilex Gum 2 milliGRAM(s) Oral every 2 hours PRN NRT

## 2023-10-29 ENCOUNTER — INPATIENT (INPATIENT)
Facility: HOSPITAL | Age: 63
LOS: 7 days | Discharge: PSYCHIATRIC OR SUBSTANCE USE FACILITY | End: 2023-11-06
Attending: STUDENT IN AN ORGANIZED HEALTH CARE EDUCATION/TRAINING PROGRAM | Admitting: STUDENT IN AN ORGANIZED HEALTH CARE EDUCATION/TRAINING PROGRAM
Payer: MEDICAID

## 2023-10-29 VITALS
DIASTOLIC BLOOD PRESSURE: 105 MMHG | TEMPERATURE: 98 F | RESPIRATION RATE: 18 BRPM | HEART RATE: 95 BPM | OXYGEN SATURATION: 95 % | SYSTOLIC BLOOD PRESSURE: 185 MMHG

## 2023-10-29 PROCEDURE — 99285 EMERGENCY DEPT VISIT HI MDM: CPT

## 2023-10-29 NOTE — ED ADULT TRIAGE NOTE - CHIEF COMPLAINT QUOTE
Pt c/o psychiatric evaluation. Brought in by brother, states pt is not sleeping, does not want to live, and having auditory hallucinations, all worsening over the past few months. Also reports he is having dizziness as side effects of medications with frequent falls. PMHx Schizophrenia, DM, HTN, HLD

## 2023-10-30 DIAGNOSIS — R44.0 AUDITORY HALLUCINATIONS: ICD-10-CM

## 2023-10-30 DIAGNOSIS — I10 ESSENTIAL (PRIMARY) HYPERTENSION: ICD-10-CM

## 2023-10-30 DIAGNOSIS — E11.9 TYPE 2 DIABETES MELLITUS WITHOUT COMPLICATIONS: ICD-10-CM

## 2023-10-30 DIAGNOSIS — Z29.9 ENCOUNTER FOR PROPHYLACTIC MEASURES, UNSPECIFIED: ICD-10-CM

## 2023-10-30 DIAGNOSIS — F20.9 SCHIZOPHRENIA, UNSPECIFIED: ICD-10-CM

## 2023-10-30 DIAGNOSIS — R79.89 OTHER SPECIFIED ABNORMAL FINDINGS OF BLOOD CHEMISTRY: ICD-10-CM

## 2023-10-30 LAB
ALBUMIN SERPL ELPH-MCNC: 4.3 G/DL — SIGNIFICANT CHANGE UP (ref 3.3–5)
ALBUMIN SERPL ELPH-MCNC: 4.3 G/DL — SIGNIFICANT CHANGE UP (ref 3.3–5)
ALP SERPL-CCNC: 82 U/L — SIGNIFICANT CHANGE UP (ref 40–120)
ALP SERPL-CCNC: 82 U/L — SIGNIFICANT CHANGE UP (ref 40–120)
ALT FLD-CCNC: 17 U/L — SIGNIFICANT CHANGE UP (ref 4–41)
ALT FLD-CCNC: 17 U/L — SIGNIFICANT CHANGE UP (ref 4–41)
AMPHET UR-MCNC: NEGATIVE — SIGNIFICANT CHANGE UP
AMPHET UR-MCNC: NEGATIVE — SIGNIFICANT CHANGE UP
ANION GAP SERPL CALC-SCNC: 11 MMOL/L — SIGNIFICANT CHANGE UP (ref 7–14)
ANION GAP SERPL CALC-SCNC: 11 MMOL/L — SIGNIFICANT CHANGE UP (ref 7–14)
APAP SERPL-MCNC: <10 UG/ML — LOW (ref 15–25)
APAP SERPL-MCNC: <10 UG/ML — LOW (ref 15–25)
APPEARANCE UR: CLEAR — SIGNIFICANT CHANGE UP
APPEARANCE UR: CLEAR — SIGNIFICANT CHANGE UP
AST SERPL-CCNC: 19 U/L — SIGNIFICANT CHANGE UP (ref 4–40)
AST SERPL-CCNC: 19 U/L — SIGNIFICANT CHANGE UP (ref 4–40)
BARBITURATES UR SCN-MCNC: NEGATIVE — SIGNIFICANT CHANGE UP
BARBITURATES UR SCN-MCNC: NEGATIVE — SIGNIFICANT CHANGE UP
BASOPHILS # BLD AUTO: 0.06 K/UL — SIGNIFICANT CHANGE UP (ref 0–0.2)
BASOPHILS # BLD AUTO: 0.06 K/UL — SIGNIFICANT CHANGE UP (ref 0–0.2)
BASOPHILS NFR BLD AUTO: 0.6 % — SIGNIFICANT CHANGE UP (ref 0–2)
BASOPHILS NFR BLD AUTO: 0.6 % — SIGNIFICANT CHANGE UP (ref 0–2)
BENZODIAZ UR-MCNC: NEGATIVE — SIGNIFICANT CHANGE UP
BENZODIAZ UR-MCNC: NEGATIVE — SIGNIFICANT CHANGE UP
BILIRUB SERPL-MCNC: <0.2 MG/DL — SIGNIFICANT CHANGE UP (ref 0.2–1.2)
BILIRUB SERPL-MCNC: <0.2 MG/DL — SIGNIFICANT CHANGE UP (ref 0.2–1.2)
BILIRUB UR-MCNC: NEGATIVE — SIGNIFICANT CHANGE UP
BILIRUB UR-MCNC: NEGATIVE — SIGNIFICANT CHANGE UP
BUN SERPL-MCNC: 12 MG/DL — SIGNIFICANT CHANGE UP (ref 7–23)
BUN SERPL-MCNC: 12 MG/DL — SIGNIFICANT CHANGE UP (ref 7–23)
CALCIUM SERPL-MCNC: 9.4 MG/DL — SIGNIFICANT CHANGE UP (ref 8.4–10.5)
CALCIUM SERPL-MCNC: 9.4 MG/DL — SIGNIFICANT CHANGE UP (ref 8.4–10.5)
CHLORIDE SERPL-SCNC: 103 MMOL/L — SIGNIFICANT CHANGE UP (ref 98–107)
CHLORIDE SERPL-SCNC: 103 MMOL/L — SIGNIFICANT CHANGE UP (ref 98–107)
CO2 SERPL-SCNC: 27 MMOL/L — SIGNIFICANT CHANGE UP (ref 22–31)
CO2 SERPL-SCNC: 27 MMOL/L — SIGNIFICANT CHANGE UP (ref 22–31)
COCAINE METAB.OTHER UR-MCNC: NEGATIVE — SIGNIFICANT CHANGE UP
COCAINE METAB.OTHER UR-MCNC: NEGATIVE — SIGNIFICANT CHANGE UP
COLOR SPEC: YELLOW — SIGNIFICANT CHANGE UP
COLOR SPEC: YELLOW — SIGNIFICANT CHANGE UP
CREAT SERPL-MCNC: 1.36 MG/DL — HIGH (ref 0.5–1.3)
CREAT SERPL-MCNC: 1.36 MG/DL — HIGH (ref 0.5–1.3)
CREATININE URINE RESULT, DAU: 46 MG/DL — SIGNIFICANT CHANGE UP
CREATININE URINE RESULT, DAU: 46 MG/DL — SIGNIFICANT CHANGE UP
DIFF PNL FLD: NEGATIVE — SIGNIFICANT CHANGE UP
DIFF PNL FLD: NEGATIVE — SIGNIFICANT CHANGE UP
EGFR: 59 ML/MIN/1.73M2 — LOW
EGFR: 59 ML/MIN/1.73M2 — LOW
EOSINOPHIL # BLD AUTO: 0.65 K/UL — HIGH (ref 0–0.5)
EOSINOPHIL # BLD AUTO: 0.65 K/UL — HIGH (ref 0–0.5)
EOSINOPHIL NFR BLD AUTO: 6.5 % — HIGH (ref 0–6)
EOSINOPHIL NFR BLD AUTO: 6.5 % — HIGH (ref 0–6)
ETHANOL SERPL-MCNC: <10 MG/DL — SIGNIFICANT CHANGE UP
ETHANOL SERPL-MCNC: <10 MG/DL — SIGNIFICANT CHANGE UP
FLUAV AG NPH QL: SIGNIFICANT CHANGE UP
FLUAV AG NPH QL: SIGNIFICANT CHANGE UP
FLUBV AG NPH QL: SIGNIFICANT CHANGE UP
FLUBV AG NPH QL: SIGNIFICANT CHANGE UP
GLUCOSE BLDC GLUCOMTR-MCNC: 114 MG/DL — HIGH (ref 70–99)
GLUCOSE BLDC GLUCOMTR-MCNC: 114 MG/DL — HIGH (ref 70–99)
GLUCOSE BLDC GLUCOMTR-MCNC: 119 MG/DL — HIGH (ref 70–99)
GLUCOSE BLDC GLUCOMTR-MCNC: 119 MG/DL — HIGH (ref 70–99)
GLUCOSE SERPL-MCNC: 87 MG/DL — SIGNIFICANT CHANGE UP (ref 70–99)
GLUCOSE SERPL-MCNC: 87 MG/DL — SIGNIFICANT CHANGE UP (ref 70–99)
GLUCOSE UR QL: NEGATIVE MG/DL — SIGNIFICANT CHANGE UP
GLUCOSE UR QL: NEGATIVE MG/DL — SIGNIFICANT CHANGE UP
HCT VFR BLD CALC: 39.4 % — SIGNIFICANT CHANGE UP (ref 39–50)
HCT VFR BLD CALC: 39.4 % — SIGNIFICANT CHANGE UP (ref 39–50)
HGB BLD-MCNC: 12.9 G/DL — LOW (ref 13–17)
HGB BLD-MCNC: 12.9 G/DL — LOW (ref 13–17)
IANC: 5.15 K/UL — SIGNIFICANT CHANGE UP (ref 1.8–7.4)
IANC: 5.15 K/UL — SIGNIFICANT CHANGE UP (ref 1.8–7.4)
IMM GRANULOCYTES NFR BLD AUTO: 0.6 % — SIGNIFICANT CHANGE UP (ref 0–0.9)
IMM GRANULOCYTES NFR BLD AUTO: 0.6 % — SIGNIFICANT CHANGE UP (ref 0–0.9)
KETONES UR-MCNC: NEGATIVE MG/DL — SIGNIFICANT CHANGE UP
KETONES UR-MCNC: NEGATIVE MG/DL — SIGNIFICANT CHANGE UP
LEUKOCYTE ESTERASE UR-ACNC: NEGATIVE — SIGNIFICANT CHANGE UP
LEUKOCYTE ESTERASE UR-ACNC: NEGATIVE — SIGNIFICANT CHANGE UP
LYMPHOCYTES # BLD AUTO: 3.37 K/UL — HIGH (ref 1–3.3)
LYMPHOCYTES # BLD AUTO: 3.37 K/UL — HIGH (ref 1–3.3)
LYMPHOCYTES # BLD AUTO: 33.5 % — SIGNIFICANT CHANGE UP (ref 13–44)
LYMPHOCYTES # BLD AUTO: 33.5 % — SIGNIFICANT CHANGE UP (ref 13–44)
MCHC RBC-ENTMCNC: 28.6 PG — SIGNIFICANT CHANGE UP (ref 27–34)
MCHC RBC-ENTMCNC: 28.6 PG — SIGNIFICANT CHANGE UP (ref 27–34)
MCHC RBC-ENTMCNC: 32.7 GM/DL — SIGNIFICANT CHANGE UP (ref 32–36)
MCHC RBC-ENTMCNC: 32.7 GM/DL — SIGNIFICANT CHANGE UP (ref 32–36)
MCV RBC AUTO: 87.4 FL — SIGNIFICANT CHANGE UP (ref 80–100)
MCV RBC AUTO: 87.4 FL — SIGNIFICANT CHANGE UP (ref 80–100)
METHADONE UR-MCNC: NEGATIVE — SIGNIFICANT CHANGE UP
METHADONE UR-MCNC: NEGATIVE — SIGNIFICANT CHANGE UP
MONOCYTES # BLD AUTO: 0.77 K/UL — SIGNIFICANT CHANGE UP (ref 0–0.9)
MONOCYTES # BLD AUTO: 0.77 K/UL — SIGNIFICANT CHANGE UP (ref 0–0.9)
MONOCYTES NFR BLD AUTO: 7.7 % — SIGNIFICANT CHANGE UP (ref 2–14)
MONOCYTES NFR BLD AUTO: 7.7 % — SIGNIFICANT CHANGE UP (ref 2–14)
NEUTROPHILS # BLD AUTO: 5.15 K/UL — SIGNIFICANT CHANGE UP (ref 1.8–7.4)
NEUTROPHILS # BLD AUTO: 5.15 K/UL — SIGNIFICANT CHANGE UP (ref 1.8–7.4)
NEUTROPHILS NFR BLD AUTO: 51.1 % — SIGNIFICANT CHANGE UP (ref 43–77)
NEUTROPHILS NFR BLD AUTO: 51.1 % — SIGNIFICANT CHANGE UP (ref 43–77)
NITRITE UR-MCNC: NEGATIVE — SIGNIFICANT CHANGE UP
NITRITE UR-MCNC: NEGATIVE — SIGNIFICANT CHANGE UP
NRBC # BLD: 0 /100 WBCS — SIGNIFICANT CHANGE UP (ref 0–0)
NRBC # BLD: 0 /100 WBCS — SIGNIFICANT CHANGE UP (ref 0–0)
NRBC # FLD: 0 K/UL — SIGNIFICANT CHANGE UP (ref 0–0)
NRBC # FLD: 0 K/UL — SIGNIFICANT CHANGE UP (ref 0–0)
OPIATES UR-MCNC: NEGATIVE — SIGNIFICANT CHANGE UP
OPIATES UR-MCNC: NEGATIVE — SIGNIFICANT CHANGE UP
OXYCODONE UR-MCNC: NEGATIVE — SIGNIFICANT CHANGE UP
OXYCODONE UR-MCNC: NEGATIVE — SIGNIFICANT CHANGE UP
PCP SPEC-MCNC: SIGNIFICANT CHANGE UP
PCP SPEC-MCNC: SIGNIFICANT CHANGE UP
PCP UR-MCNC: NEGATIVE — SIGNIFICANT CHANGE UP
PCP UR-MCNC: NEGATIVE — SIGNIFICANT CHANGE UP
PH UR: 7.5 — SIGNIFICANT CHANGE UP (ref 5–8)
PH UR: 7.5 — SIGNIFICANT CHANGE UP (ref 5–8)
PLATELET # BLD AUTO: 258 K/UL — SIGNIFICANT CHANGE UP (ref 150–400)
PLATELET # BLD AUTO: 258 K/UL — SIGNIFICANT CHANGE UP (ref 150–400)
POTASSIUM SERPL-MCNC: 4.1 MMOL/L — SIGNIFICANT CHANGE UP (ref 3.5–5.3)
POTASSIUM SERPL-MCNC: 4.1 MMOL/L — SIGNIFICANT CHANGE UP (ref 3.5–5.3)
POTASSIUM SERPL-SCNC: 4.1 MMOL/L — SIGNIFICANT CHANGE UP (ref 3.5–5.3)
POTASSIUM SERPL-SCNC: 4.1 MMOL/L — SIGNIFICANT CHANGE UP (ref 3.5–5.3)
PROT SERPL-MCNC: 6.8 G/DL — SIGNIFICANT CHANGE UP (ref 6–8.3)
PROT SERPL-MCNC: 6.8 G/DL — SIGNIFICANT CHANGE UP (ref 6–8.3)
PROT UR-MCNC: NEGATIVE MG/DL — SIGNIFICANT CHANGE UP
PROT UR-MCNC: NEGATIVE MG/DL — SIGNIFICANT CHANGE UP
RBC # BLD: 4.51 M/UL — SIGNIFICANT CHANGE UP (ref 4.2–5.8)
RBC # BLD: 4.51 M/UL — SIGNIFICANT CHANGE UP (ref 4.2–5.8)
RBC # FLD: 13.3 % — SIGNIFICANT CHANGE UP (ref 10.3–14.5)
RBC # FLD: 13.3 % — SIGNIFICANT CHANGE UP (ref 10.3–14.5)
RSV RNA NPH QL NAA+NON-PROBE: SIGNIFICANT CHANGE UP
RSV RNA NPH QL NAA+NON-PROBE: SIGNIFICANT CHANGE UP
SALICYLATES SERPL-MCNC: <0.3 MG/DL — LOW (ref 15–30)
SALICYLATES SERPL-MCNC: <0.3 MG/DL — LOW (ref 15–30)
SARS-COV-2 RNA SPEC QL NAA+PROBE: SIGNIFICANT CHANGE UP
SARS-COV-2 RNA SPEC QL NAA+PROBE: SIGNIFICANT CHANGE UP
SODIUM SERPL-SCNC: 141 MMOL/L — SIGNIFICANT CHANGE UP (ref 135–145)
SODIUM SERPL-SCNC: 141 MMOL/L — SIGNIFICANT CHANGE UP (ref 135–145)
SP GR SPEC: 1.01 — SIGNIFICANT CHANGE UP (ref 1–1.03)
SP GR SPEC: 1.01 — SIGNIFICANT CHANGE UP (ref 1–1.03)
THC UR QL: NEGATIVE — SIGNIFICANT CHANGE UP
THC UR QL: NEGATIVE — SIGNIFICANT CHANGE UP
TOXICOLOGY SCREEN, DRUGS OF ABUSE, SERUM RESULT: SIGNIFICANT CHANGE UP
TOXICOLOGY SCREEN, DRUGS OF ABUSE, SERUM RESULT: SIGNIFICANT CHANGE UP
TSH SERPL-MCNC: 1.99 UIU/ML — SIGNIFICANT CHANGE UP (ref 0.27–4.2)
TSH SERPL-MCNC: 1.99 UIU/ML — SIGNIFICANT CHANGE UP (ref 0.27–4.2)
UROBILINOGEN FLD QL: 1 MG/DL — SIGNIFICANT CHANGE UP (ref 0.2–1)
UROBILINOGEN FLD QL: 1 MG/DL — SIGNIFICANT CHANGE UP (ref 0.2–1)
WBC # BLD: 10.06 K/UL — SIGNIFICANT CHANGE UP (ref 3.8–10.5)
WBC # BLD: 10.06 K/UL — SIGNIFICANT CHANGE UP (ref 3.8–10.5)
WBC # FLD AUTO: 10.06 K/UL — SIGNIFICANT CHANGE UP (ref 3.8–10.5)
WBC # FLD AUTO: 10.06 K/UL — SIGNIFICANT CHANGE UP (ref 3.8–10.5)

## 2023-10-30 PROCEDURE — 70450 CT HEAD/BRAIN W/O DYE: CPT | Mod: 26,MA

## 2023-10-30 PROCEDURE — 71045 X-RAY EXAM CHEST 1 VIEW: CPT | Mod: 26

## 2023-10-30 PROCEDURE — 99285 EMERGENCY DEPT VISIT HI MDM: CPT

## 2023-10-30 PROCEDURE — 99223 1ST HOSP IP/OBS HIGH 75: CPT

## 2023-10-30 RX ORDER — GLUCAGON INJECTION, SOLUTION 0.5 MG/.1ML
1 INJECTION, SOLUTION SUBCUTANEOUS ONCE
Refills: 0 | Status: DISCONTINUED | OUTPATIENT
Start: 2023-10-30 | End: 2023-10-30

## 2023-10-30 RX ORDER — HALOPERIDOL DECANOATE 100 MG/ML
5 INJECTION INTRAMUSCULAR ONCE
Refills: 0 | Status: COMPLETED | OUTPATIENT
Start: 2023-10-30 | End: 2023-10-30

## 2023-10-30 RX ORDER — CLONAZEPAM 1 MG
0.5 TABLET ORAL
Refills: 0 | Status: DISCONTINUED | OUTPATIENT
Start: 2023-10-30 | End: 2023-11-02

## 2023-10-30 RX ORDER — ARIPIPRAZOLE 15 MG/1
2 TABLET ORAL ONCE
Refills: 0 | Status: DISCONTINUED | OUTPATIENT
Start: 2023-10-30 | End: 2023-10-30

## 2023-10-30 RX ORDER — LOSARTAN POTASSIUM 100 MG/1
100 TABLET, FILM COATED ORAL ONCE
Refills: 0 | Status: COMPLETED | OUTPATIENT
Start: 2023-10-30 | End: 2023-10-30

## 2023-10-30 RX ORDER — FLUPHENAZINE HYDROCHLORIDE 1 MG/1
5 TABLET, FILM COATED ORAL EVERY 6 HOURS
Refills: 0 | Status: DISCONTINUED | OUTPATIENT
Start: 2023-10-30 | End: 2023-11-06

## 2023-10-30 RX ORDER — LANOLIN ALCOHOL/MO/W.PET/CERES
3 CREAM (GRAM) TOPICAL AT BEDTIME
Refills: 0 | Status: DISCONTINUED | OUTPATIENT
Start: 2023-10-30 | End: 2023-11-04

## 2023-10-30 RX ORDER — BENZTROPINE MESYLATE 1 MG
2 TABLET ORAL DAILY
Refills: 0 | Status: DISCONTINUED | OUTPATIENT
Start: 2023-10-31 | End: 2023-11-04

## 2023-10-30 RX ORDER — MIDAZOLAM HYDROCHLORIDE 1 MG/ML
2.5 INJECTION, SOLUTION INTRAMUSCULAR; INTRAVENOUS ONCE
Refills: 0 | Status: DISCONTINUED | OUTPATIENT
Start: 2023-10-30 | End: 2023-10-30

## 2023-10-30 RX ORDER — INSULIN LISPRO 100/ML
VIAL (ML) SUBCUTANEOUS
Refills: 0 | Status: DISCONTINUED | OUTPATIENT
Start: 2023-10-30 | End: 2023-10-30

## 2023-10-30 RX ORDER — DEXTROSE 50 % IN WATER 50 %
25 SYRINGE (ML) INTRAVENOUS ONCE
Refills: 0 | Status: DISCONTINUED | OUTPATIENT
Start: 2023-10-30 | End: 2023-10-30

## 2023-10-30 RX ORDER — METOPROLOL TARTRATE 50 MG
50 TABLET ORAL DAILY
Refills: 0 | Status: DISCONTINUED | OUTPATIENT
Start: 2023-10-31 | End: 2023-11-06

## 2023-10-30 RX ORDER — DEXTROSE 50 % IN WATER 50 %
15 SYRINGE (ML) INTRAVENOUS ONCE
Refills: 0 | Status: DISCONTINUED | OUTPATIENT
Start: 2023-10-30 | End: 2023-10-30

## 2023-10-30 RX ORDER — VENLAFAXINE HCL 75 MG
150 CAPSULE, EXT RELEASE 24 HR ORAL DAILY
Refills: 0 | Status: DISCONTINUED | OUTPATIENT
Start: 2023-10-31 | End: 2023-11-06

## 2023-10-30 RX ORDER — IBUPROFEN 200 MG
400 TABLET ORAL ONCE
Refills: 0 | Status: COMPLETED | OUTPATIENT
Start: 2023-10-30 | End: 2023-10-30

## 2023-10-30 RX ORDER — VENLAFAXINE HCL 75 MG
150 CAPSULE, EXT RELEASE 24 HR ORAL ONCE
Refills: 0 | Status: COMPLETED | OUTPATIENT
Start: 2023-10-30 | End: 2023-10-30

## 2023-10-30 RX ORDER — SODIUM CHLORIDE 9 MG/ML
1000 INJECTION, SOLUTION INTRAVENOUS
Refills: 0 | Status: DISCONTINUED | OUTPATIENT
Start: 2023-10-30 | End: 2023-10-30

## 2023-10-30 RX ORDER — FLUPHENAZINE HYDROCHLORIDE 1 MG/1
5 TABLET, FILM COATED ORAL EVERY 6 HOURS
Refills: 0 | Status: DISCONTINUED | OUTPATIENT
Start: 2023-10-30 | End: 2023-10-30

## 2023-10-30 RX ORDER — METOPROLOL TARTRATE 50 MG
50 TABLET ORAL ONCE
Refills: 0 | Status: COMPLETED | OUTPATIENT
Start: 2023-10-30 | End: 2023-10-30

## 2023-10-30 RX ORDER — ACETAMINOPHEN 500 MG
650 TABLET ORAL EVERY 6 HOURS
Refills: 0 | Status: DISCONTINUED | OUTPATIENT
Start: 2023-10-30 | End: 2023-11-06

## 2023-10-30 RX ORDER — BENZTROPINE MESYLATE 1 MG
2 TABLET ORAL ONCE
Refills: 0 | Status: COMPLETED | OUTPATIENT
Start: 2023-10-30 | End: 2023-10-30

## 2023-10-30 RX ORDER — DEXTROSE 50 % IN WATER 50 %
12.5 SYRINGE (ML) INTRAVENOUS ONCE
Refills: 0 | Status: DISCONTINUED | OUTPATIENT
Start: 2023-10-30 | End: 2023-10-30

## 2023-10-30 RX ORDER — ACETAMINOPHEN 500 MG
975 TABLET ORAL ONCE
Refills: 0 | Status: COMPLETED | OUTPATIENT
Start: 2023-10-30 | End: 2023-10-30

## 2023-10-30 RX ORDER — LOSARTAN POTASSIUM 100 MG/1
100 TABLET, FILM COATED ORAL DAILY
Refills: 0 | Status: DISCONTINUED | OUTPATIENT
Start: 2023-10-31 | End: 2023-10-31

## 2023-10-30 RX ORDER — DIPHENHYDRAMINE HCL 50 MG
50 CAPSULE ORAL EVERY 4 HOURS
Refills: 0 | Status: DISCONTINUED | OUTPATIENT
Start: 2023-10-30 | End: 2023-11-06

## 2023-10-30 RX ADMIN — Medication 150 MILLIGRAM(S): at 07:04

## 2023-10-30 RX ADMIN — Medication 0.5 MILLIGRAM(S): at 19:23

## 2023-10-30 RX ADMIN — Medication 1 MILLIGRAM(S): at 07:28

## 2023-10-30 RX ADMIN — Medication 1 MILLIGRAM(S): at 05:14

## 2023-10-30 RX ADMIN — HALOPERIDOL DECANOATE 5 MILLIGRAM(S): 100 INJECTION INTRAMUSCULAR at 07:28

## 2023-10-30 RX ADMIN — MIDAZOLAM HYDROCHLORIDE 2.5 MILLIGRAM(S): 1 INJECTION, SOLUTION INTRAMUSCULAR; INTRAVENOUS at 08:07

## 2023-10-30 RX ADMIN — Medication 975 MILLIGRAM(S): at 03:20

## 2023-10-30 RX ADMIN — LOSARTAN POTASSIUM 100 MILLIGRAM(S): 100 TABLET, FILM COATED ORAL at 06:34

## 2023-10-30 RX ADMIN — Medication 50 MILLIGRAM(S): at 07:28

## 2023-10-30 RX ADMIN — Medication 50 MILLIGRAM(S): at 06:34

## 2023-10-30 RX ADMIN — Medication 2 MILLIGRAM(S): at 06:33

## 2023-10-30 RX ADMIN — Medication 400 MILLIGRAM(S): at 05:02

## 2023-10-30 NOTE — H&P ADULT - HISTORY OF PRESENT ILLNESS
Pt is a 63yr old  male (Japanese speaking only) PPHx schizophrenia, multiple past psych admissions (including 5 [facility] admissions last admission in May, 2022), PMHx of HTN, DM and HLD.  Today, BIB family to worsening AH and si.      In ED, vitals T 98.5, HR 95, /105, RR 18, O2 sat 95% on RA  Labs significant for: Cr 1.36  EKG personally reviewed:  CXR:  Imaging:  ED management: ativan 1 mg x2, psych consulted Pt is a 63yr old  male (Ghanaian speaking only) PPHx schizophrenia, multiple past psych admissions (including 5 [facility] admissions last admission in May, 2022), PMHx of HTN, DM and HLD. Pt was brought in by family to worsening AH and SI. Pt states he has been not sleeping well for the past "7 years". He states sometimes he will sleep at 11pm and then wake up at 7PM the next day. Brother at bedside states pt has not slept in the past 3 days. Pt reports increased auditory hallucinations. He currently denies any SI or thoughts of hurting anyone. He states he has also fallen multiple times over the past few months due to unsteady gait. He denies any head trauma. He does report trauma to right elbow, with large round bump. Denies pain or decreased ROM, states he has had to have it drained twice by his PCP.       In ED, vitals T 98.5, HR 95, /105, RR 18, O2 sat 95% on RA  Labs significant for: Cr 1.36  EKG personally reviewed: NSR, no acute ischemic changes. QTc 444  ED management: ativan 1 mg x2, psych consulted

## 2023-10-30 NOTE — ED ADULT NURSE REASSESSMENT NOTE - NS ED NURSE REASSESS COMMENT FT1
Break RN: Patient awake and resting in stretcher; respirations even and unlabored, no signs/symptoms of acute distress. Non-verbal indicators of pain absent, patient remains on 1:1 for suicidal ideation, PCA at bedside within arms reach, and environment checked for safety. Safety measures in place.
Pt. awake and alert. BGL 86, given breakfast. Will rpt FS in 15 min. Vitals documented. Respirations even and unlabored. No complaints at this time. Will continue to monitor.
Received rpt from ZIA Escalante. Pt. lying in bed comfortably sleeping. Continued on 1:1 for S/I and A/H with PCA at bedside. Given meds and BP repeated prior to shift change. Pt. continued to be monitored.
pt endorsing HA and becoming more restless, sitting at edge of bed with head in hands. MD Rodriguez made aware. medicated as per MD orders. respirations even and unlabored. CO remains, PCA at bedside. safety maintained, side rails up. awaiting psych consult.
pt received A&Ox4 c/o [id number]. MD Ziegler made aware. medicated as per MD orders. respirations even and unlabored. pt calm and cooperative at this time. CO remains d/t SI. PCA at bedside. safety maintained, side rails up. awaiting CT results. belongings obtained by ZIA Hazel and placed across from 21 at this time.

## 2023-10-30 NOTE — ED BEHAVIORAL HEALTH PROGRESS NOTE - RISK MITIGATION STRATEGIES IMPLEMENTED DETAILS FREE TEXT
patient reports he would maintain safety while in the hospital and alert nursing staff should this change

## 2023-10-30 NOTE — ED BEHAVIORAL HEALTH ASSESSMENT NOTE - HPI (INCLUDE ILLNESS QUALITY, SEVERITY, DURATION, TIMING, CONTEXT, MODIFYING FACTORS, ASSOCIATED SIGNS AND SYMPTOMS)
The Pt is a 63yr old  male (Kosovan speaking only), single, domiciled and unemployed, PPHx schizophrenia, multiple past psych admissions (including 5 [facility] admissions last admission in May, 2022, self reports remote hx of interrupted sa via hanging, in treatment with DR. Chino [phone], no  legal or substance hx, PMHx of HTN, DM and HLD.  Today, BIB family to worsening AH and si.    Patient is Kosovan speaking and the interview is conducted in Kosovan with this writer . On assessment, pt is restless , constantly moving his lower extremities , overall in good behavioral control. He reports he is not able to sleep , his mind is constantly working and ruminating about the past "I can't shut it off". He states he is not leaving the house because the +ah are constant and get worse when he goes out . He denies the voices are commanding in nature , but says +ah are bothersome and distressful to him and constantly making negative comments . Patient denies vh, admits to sometimes having IOR but denies any overt paranoia . Pt is endorsing feeling depressed, and anxious, with loss of interest in things, no longer reading newspaper, spends most of the day in bed , has low energy level, appetite varies and feels hopeless and does not want to go on with life having +si but without any concrete plan . Patient states he started a new medications 5 days ago , but rivera snot feel it is helpful to him .     Collateral obtained from patient's brother, Deo, who corroborates much of above. He reports the sx started about 1.5 months ago with hearing voices  and has been verbalizing si "I am tired of living like this I am going to kill myself ". The brother report pt has been verbally agitated and irritable, not sleeping 2-3 days at a time .He reports hx of sa but remotely. Reports compliance with medications and provides med list to team, but feels pt is experiencing side effects , including unsteady gait, . He denies pt uses any illicit drugs use . Patient smokes cigarettes about 1 pack/day . Patient has fallen recently due to unsteady gait

## 2023-10-30 NOTE — ED ADULT NURSE NOTE - OBJECTIVE STATEMENT
pt received to  rm 14 , a&ox4 , ambulatory , phx of DM HTN HLD Schizophrenia  , p/w for psych eval by brother. pt brother endorses pt is not sleeping having increased hallucinations worsening  . Pt breathing even and unlabored on room air.  Denies fever, chills, cough, SOB, chest pain, palpitations, dizziness, nausea, vomiting, diarrhea, constipation, numbness, tingling.  pt educated on fall precautions and confirms understanding via teach back method. Stretcher locked in lowest position with siderails up x2. All personal belongings and valuables given the brother at bedside.

## 2023-10-30 NOTE — ED BEHAVIORAL HEALTH ASSESSMENT NOTE - NS ED BHA ED COURSE UTILIZATION OF 1 TO 1 IN ED YN
No Notification Instructions: Patient will be notified of biopsy results. However, patient instructed to call the office if not contacted within 1 week.

## 2023-10-30 NOTE — ED PROVIDER NOTE - DIFFERENTIAL DIAGNOSIS
Agitated behavior: schizophrenia exacerbation, r/o infectious/metabolic factors Differential Diagnosis

## 2023-10-30 NOTE — H&P ADULT - PROBLEM SELECTOR PLAN 4
Right elbow with likely bursitis. s/p fall 2 weeks ago and has had drained twice.   - f/u XRs  - consider ortho in AM for drainage.

## 2023-10-30 NOTE — ED ADULT NURSE NOTE - NSFALLRISK_ED_ALL_ED
HPI     Glaucoma    Additional comments: 4 month iop ch//  cosopt BID OD, latanoprost QHS   OU,//           Comments   4 month iop ch//  cosopt BID OD, latanoprost QHS OU,//       Last edited by Rachelle Mg on 1/24/2017  8:05 AM. (History)            Assessment /Plan     For exam results, see Encounter Report.    Borderline glaucoma of both eyes with ocular hypertension    Nuclear sclerosis, left    Pseudophakia    PVD (posterior vitreous detachment), unspecified laterality    Refractive error    Other orders  -     dorzolamide-timolol 2-0.5% (COSOPT) 22.3-6.8 mg/mL ophthalmic solution; Place 1 drop into the right eye 2 (two) times daily. To replace Timolol (yellow lid)  Dispense: 30 mL; Refill: 3  -     latanoprost 0.005 % ophthalmic solution; PLACE 1 DROP INTO BOTH EYES EVERY EVENING.  Dispense: 8 mL; Refill: 3             Senile nuclear sclerosis - s/p Phaco/PCIOL OD. IOP back down off Brimonidine, doing well. OS approaching visual significance.   Dilates well, denies flomax.    1. Ocular hypertension - IOP had been creeping up again, despite no back injections for 2 years - doing better with Latanoprost QHS OU and Cosopt BID OD. Clinical exam appears stable. Possible progression on previous HRT but most recent closer to priors - cataract may have been interfering. Last HVF WNL again. Thick CCT. Continue Cosopt BID OD only, changed Lumigan to Latanoprost QHS OU due to cost. Doing better with more frequent non-preserved artificial tears to help with burning. If still feels/sees lousy, will go back to Timolol and Lumigan only.     Possible steroid response component - had been getting steroid shots for back. Last gonio open but abnormal vessel OS (stable). IOP went slightly higher OD after CE on Prednisolone.     Consider blue-yellow HVF in future after CE if white WNL and HRT shows change.    IOP creeping up OS as well - monitor closely - f/u 4 months IOP check with HVF, HRT, DFE   2.    3. PVD (posterior  vitreous detachment) - RD precautions   4. Hyperopia with presbyopia - target emmetropia - toric not recommended based on K's. MRx given today.   5. New vessels in iris - stable on last gonioscopy. Observe. Not NVA       RTC 4 months - HVF, HRT, DFE, MRx, BAT and IOP check OU                        No

## 2023-10-30 NOTE — H&P ADULT - PROBLEM SELECTOR PLAN 1
Pt with worsening AH and SI. Seen by psych and recommending inpatient admission however unable to go to [facility] due to fall risk.   - abilify held due to akathsia   - c/w klonopin 0.5 mg BID, effexor 150 mg QD, cogentin 2 mg QAM  - fluphenazine 5 mg q6h prn, benadryl 50 mg q6h prn and ativan 2 mg IM q6h prn for severe agitation  - c/w 1:1  - f/u psych recs

## 2023-10-30 NOTE — ED BEHAVIORAL HEALTH ASSESSMENT NOTE - RISK ASSESSMENT
Pt is an elevated acute risk of harm to self given recent SI , acute psychosis, depressed mood, male, hx of admissions, current passive SI, guilty, compromised medical health. His chronic risk is elevated given hx of admissions, his underlying diagnosis. At this time given his elevated acute risk he warrants inpatient admission for further stabilization at this time

## 2023-10-30 NOTE — H&P ADULT - NSHPPHYSICALEXAM_GEN_ALL_CORE
VITAL SIGNS:  T(C): 36.4 (10-30-23 @ 18:50), Max: 36.9 (10-29-23 @ 23:25)  T(F): 97.5 (10-30-23 @ 18:50), Max: 98.5 (10-29-23 @ 23:25)  HR: 81 (10-30-23 @ 18:50) (70 - 95)  BP: 153/97 (10-30-23 @ 18:50) (153/97 - 185/105)  BP(mean): --  RR: 18 (10-30-23 @ 18:50) (16 - 18)  SpO2: 97% (10-30-23 @ 18:50) (95% - 100%)  Wt(kg): --    PHYSICAL EXAM:    Constitutional: resting comfortably in bed; NAD  Head: NC/AT  Eyes: PERRL, EOMI, anicteric sclera  ENT: no nasal discharge; uvula midline, no oropharyngeal erythema or exudates; MMM  Neck: supple  Respiratory: CTA B/L; no W/R/R, no retractions  Cardiac: +S1/S2; RRR; no M/R/G  Gastrointestinal: abdomen soft, NT/ND; no rebound or guarding; +BSx4  Back: spine midline, no bony tenderness  Extremities: WWP, no clubbing or cyanosis; no peripheral edema  Musculoskeletal: NROM x4; no joint swelling, tenderness or erythema  Vascular: distal pulses intact  Dermatologic: skin warm, dry and intact; no rashes  Lymphatic: no submandibular or cervical LAD  Neurologic: AAOx3; moves all 4 extremities  Psychiatric: affect and characteristics of appearance, verbalizations, behaviors are appropriate VITAL SIGNS:  T(C): 36.4 (10-30-23 @ 18:50), Max: 36.9 (10-29-23 @ 23:25)  T(F): 97.5 (10-30-23 @ 18:50), Max: 98.5 (10-29-23 @ 23:25)  HR: 81 (10-30-23 @ 18:50) (70 - 95)  BP: 153/97 (10-30-23 @ 18:50) (153/97 - 185/105)  BP(mean): --  RR: 18 (10-30-23 @ 18:50) (16 - 18)  SpO2: 97% (10-30-23 @ 18:50) (95% - 100%)  Wt(kg): --    PHYSICAL EXAM:    Constitutional: resting comfortably in bed; NAD  Head: NC/AT  Eyes: PERRL, EOMI, anicteric sclera  ENT: no nasal discharge; uvula midline, no oropharyngeal erythema or exudates; MMM  Neck: supple  Respiratory: CTA B/L; no W/R/R, no retractions  Cardiac: +S1/S2; RRR; no M/R/G  Gastrointestinal: abdomen soft, NT/ND; no rebound or guarding; +BSx4  Extremities: WWP, no clubbing or cyanosis; no peripheral edema  Musculoskeletal: NROM x4; right elbow with swelling, non erythematous, nontender, soft, full ROM  Vascular: distal pulses intact  Dermatologic: skin warm, dry and intact; no rashes  Lymphatic: no submandibular or cervical LAD  Neurologic: AAOx3; moves all 4 extremities  Psychiatric: calm

## 2023-10-30 NOTE — ED BEHAVIORAL HEALTH PROGRESS NOTE - SUMMARY
The Pt is a 63yr old  male (Scottish speaking only), single, domiciled and unemployed, PPHx schizophrenia, multiple past psych admissions (including 5 [facility] admissions last admission in May, 2022, self reports remote hx of interrupted sa via hanging, in treatment with DR. Chino [phone], no  legal or substance hx, PMHx of HTN, DM and HLD.  Today, BIB family to worsening AH and si.

## 2023-10-30 NOTE — H&P ADULT - NSHPREVIEWOFSYSTEMS_GEN_ALL_CORE
REVIEW OF SYSTEMS:    CONSTITUTIONAL: No weakness, fevers or chills  EYES/ENT: No visual changes;  No vertigo or throat pain   NECK: No pain or stiffness  RESPIRATORY: No cough, wheezing, hemoptysis; No shortness of breath  CARDIOVASCULAR: No chest pain or palpitations  GASTROINTESTINAL: No abdominal or epigastric pain. No nausea, vomiting, or hematemesis; No diarrhea or constipation. No melena or hematochezia.  GENITOURINARY: No dysuria, frequency or hematuria  NEUROLOGICAL: No numbness or weakness. + unsteady gait  SKIN: No itching, rashes  Psych: + hallucinations

## 2023-10-30 NOTE — ED PROVIDER NOTE - ATTENDING CONTRIBUTION TO CARE
Afebrile. Awake and Alert. Lungs CTA. Heart RRR. Abdomen soft NTND. CN II-XII grossly intact. Moves all extremities without lateralization. Afebrile. Awake and Alert. Head atraumatic. No mid-line CS TTP. Lungs CTA. Heart RRR. Abdomen soft NTND. CN II-XII grossly intact. Moves all extremities without lateralization. Afebrile. Awake and Alert. Head atraumatic. No mid-line CS TTP. Lungs CTA. Heart RRR. Abdomen soft NTND. CN II-XII grossly intact. Moves all extremities without lateralization. Involuntary lip smacking and facial grimacing.

## 2023-10-30 NOTE — ED PROVIDER NOTE - NSICDXPASTMEDICALHX_GEN_ALL_CORE_FT
PAST MEDICAL HISTORY:  DM (diabetes mellitus)     History of depression     HLD (hyperlipidemia)     HTN (hypertension)     Schizophrenia

## 2023-10-30 NOTE — ED BEHAVIORAL HEALTH PROGRESS NOTE - STANDING MEDS RECEIVED IN ED DETAILS FREE TEXT
Tylenol 975 mg PO   Abilify 2mg PO   Benztropine 2mg PO   Clonazepam 0.5 mg PO  Losartan 100mg PO   Metoprolol 50mg PO   Venlafaxine XR 150mg PO   HCTZ 12.5

## 2023-10-30 NOTE — ED BEHAVIORAL HEALTH ASSESSMENT NOTE - SUMMARY
The Pt is a 63yr old  male (Maltese speaking only), single, domiciled and unemployed, PPHx schizophrenia, multiple past psych admissions (including 5 [facility] admissions last admission in May, 2022, self reports remote hx of interrupted sa via hanging, in treatment with DR. Chino [phone], no  legal or substance hx, PMHx of HTN, DM and HLD.  Today, BIB family to worsening AH and si.    Patient presents today with  worsening of depressive symptoms and psychotic sx ,  worsening AH that are derogatory in nature.  Pt is not at his baseline and is not able to care for himself due to severity of sx ,  patient warrants inpatient admission for stabilization of his acute symptoms as these symptoms place him at an acutely elevated risk of harm.    Plan:  1. 9.13 completed 1:1 required  fall risk has been falling  2. Continue Klonopin 0.5mg  BID per Istop,  Effexor 150mg, Cogentin 2mg qam . stop Abilify causing akathesia   5. PRN Fluphenazine 5mg PO/IM q6h for agitation, Benadryl 50mg PO/IM q6h for agitation. Ativan 2mg IM for severe agitation  6. fall precaution , He is unsteady here as well, consider PT therapy   7. nicotine replacement   8. continue medical medications: Losartan 100mg qdaily, Hctz 12.5mg qdaily, Metoprolo er 50mg qdaily, sliding scale for DM

## 2023-10-30 NOTE — PROVIDER CONTACT NOTE (HYPOGLYCEMIA EVENT) - NS PROVIDER CONTACT BACKGROUND-HYPO
Age: 63y    Gender: Male    POCT Blood Glucose:  102 mg/dL (10-29-23 @ 23:28)      eMAR:  FS 86, given breakfast, will repeat FS in 15 min.

## 2023-10-30 NOTE — H&P ADULT - NSHPLABSRESULTS_GEN_ALL_CORE
.  LABS:                         12.9   10.06 )-----------( 258      ( 30 Oct 2023 01:03 )             39.4     10    141  |  103  |  12  ----------------------------<  87  4.1   |  27  |  1.36<H>    Ca    9.4      30 Oct 2023 01:03    TPro  6.8  /  Alb  4.3  /  TBili  <0.2  /  DBili  x   /  AST  19  /  ALT  17  /  AlkPhos  82  10      Urinalysis Basic - ( 30 Oct 2023 01:03 )    Color: Yellow / Appearance: Clear / S.010 / pH: x  Gluc: 87 mg/dL / Ketone: Negative mg/dL  / Bili: Negative / Urobili: 1.0 mg/dL   Blood: x / Protein: Negative mg/dL / Nitrite: Negative   Leuk Esterase: Negative / RBC: x / WBC x   Sq Epi: x / Non Sq Epi: x / Bacteria: x                RADIOLOGY, EKG & ADDITIONAL TESTS: Reviewed.

## 2023-10-30 NOTE — ED PROVIDER NOTE - PROGRESS NOTE DETAILS
MD Karlie (PGY-2) patient's labs and imaging reviewed.  No acute findings to explain patient's hallucinations.  Psych consulted.  To see patient. Patient holding watch, refusing to give to security along with other belongings, unable to verbally de-escalate patient as he is increasingly agitated, IM PRN medications given for agitation and watch secured. Will continue to observe while patient awaits placement. - Geo Garcia, PGY-3 Brother Deo called and updated regarding patient workup and pending admission for psych placement. - Geo Garcia, PGY-3 Linda Turner, PGY-2 DO:  Patient to be admitted to medicine for concerns of frequent falls and unsteady gait.

## 2023-10-30 NOTE — ED BEHAVIORAL HEALTH ASSESSMENT NOTE - DOMICILE TYPE
Private Residence Consent 2/Introductory Paragraph: Mohs surgery was explained to the patient and consent was obtained. The risks, benefits and alternatives to therapy were discussed in detail. Specifically, the risks of infection, scarring, bleeding, prolonged wound healing, incomplete removal, allergy to anesthesia, nerve injury and recurrence were addressed. Prior to the procedure, the treatment site was clearly identified and confirmed by the patient. All components of Universal Protocol/PAUSE Rule completed.

## 2023-10-30 NOTE — ED BEHAVIORAL HEALTH ASSESSMENT NOTE - CURRENT MEDICATION
Effexor 150mg qd, Benztropine  2 mg qdaily , Abilify 2mg , Clonazepam 0.5mg BID Effexor 150mg qd, Benztropine  2 mg qdaily , Abilify 2mg , Clonazepam 0.5mg BID  istop checked :  Reference #: [id number] prescribed Klonopin 0.5mg qdaily and Ativan 0.5mg qdaily

## 2023-10-30 NOTE — ED BEHAVIORAL HEALTH PROGRESS NOTE - CASE SUMMARY/FORMULATION (CLEARLY DOCUMENT RATIONALE FOR DISPOSITION CHANGE)
On evaluation, patient presents with worsening of depressive, anxiety and psychotic sx, worsening AH that are derogatory in nature as reported to initital provider.  Pt is not at his baseline and is not able to care for himself due to severity of sx ,  patient warrants inpatient admission for stabilization of his acute symptoms as these symptoms place him at an acutely elevated risk of harm.  At this time, given patient's fall history with unclear etiology, recommend admission to medic unit for further exploration of etiology with continued follow up by [facility] psychiatry team.      Plan:  1. admit to medica unit for further exploration of etiology of recent falls w/ [facility] psychiatry to follow   2. Continue Klonopin 0.5mg  BID per Istop,  Effexor 150mg, Cogentin 2mg qam . stop Abilify causing akathesia   5. PRN Fluphenazine 5mg PO/IM q6h for agitation, Benadryl 50mg PO/IM q6h for agitation. Ativan 2mg IM for severe agitation  6. continue 1:1 for fall precaution  7. nicotine replacement   8. continue medical medications: Losartan 100mg qdaily, Hctz 12.5mg qdaily, Metoprolo er 50mg qdaily, sliding scale for DM

## 2023-10-30 NOTE — H&P ADULT - ASSESSMENT
Pt is a 63yr old  male (Icelandic speaking only) PPHx schizophrenia, multiple past psych admissions (including 5 [facility] admissions last admission in May, 2022), PMHx of HTN, DM and HLD. Pt was brought in by family to worsening AH and SI. Pt admitted for further management.

## 2023-10-30 NOTE — ED BEHAVIORAL HEALTH ASSESSMENT NOTE - NSBHMSEINTELL_PSY_A_CORE
[facility] Rehabilitation Service    Outpatient Physical Therapy Discharge Note  Patient: Elizabeth Kelley  : 1946    Beginning/End Dates of Reporting Period:  22 to 23    Referring Provider: Gabriele Hernandez Diagnosis: Secondary lymphedema      Client Self Report: She is very achey today.    Objective Measurements:  See last treatment session    Outcome Measures (most recent score):  See initial evaluation    Goals:  See last treatment session    Plan:  Discharge from therapy.    Discharge:    Reason for Discharge: Patient has failed to schedule further appointments.    Equipment Issued: N/A    Discharge Plan: Patient to continue home program.    Peg Freeman, PT, DPT, CLT  4/3/2023    Average

## 2023-10-30 NOTE — ED BEHAVIORAL HEALTH ASSESSMENT NOTE - OTHER PAST PSYCHIATRIC HISTORY (INCLUDE DETAILS REGARDING ONSET, COURSE OF ILLNESS, INPATIENT/OUTPATIENT TREATMENT)
6 previous inpatient admissions, most recent two years ago. No documented hx SA or SIB. Follows outpatient with Dr. Josh Webb

## 2023-10-30 NOTE — ED PROVIDER NOTE - CLINICAL SUMMARY MEDICAL DECISION MAKING FREE TEXT BOX
63-year-old male with past medical history of hypertension, diabetes, schizophrenia, tardive dyskinesia, presents with worsening auditory hallucination and insomnia.  Hemodynamically stable.  Physical exam with heart regular rate and rhythm, lungs clear to auscultation, abdomen soft nontender.  Concern for worsening schizophrenia.  Will screen for other metabolic reasons for altered mental status, including infectious causes (pneumonia versus UTI), intracranial pathology (ICH versus mass), electrolyte derangements.  Will likely consult psychiatry once work-up is back for increasing aggression and hallucinations.

## 2023-10-30 NOTE — ED PROVIDER NOTE - OBJECTIVE STATEMENT
63-year-old male with past medical history of hypertension, diabetes, schizophrenia, tardive dyskinesia, presents with worsening auditory hallucination and insomnia.  Patient's brother is at bedside providing collateral.  Brother states that patient has been increasingly responding to auditory hallucinations.  Reporting increasing aggression as well.  Mother does state that patient has been having recurrent falls from his tardive dyskinesia.  Denies any head trauma.  Denies fever, chills, chest pain, shortness of breath, nausea vomiting, dysuria.  Reports medication compliance. 63-year-old male with past medical history of hypertension, diabetes, schizophrenia, tardive dyskinesia, presents with worsening auditory hallucination and insomnia.  Patient's brother is at bedside providing collateral.  Brother states that patient has been increasingly responding to auditory hallucinations.  Reporting increasing aggression as well.  Mother does state that patient has been having recurrent falls from his tardive dyskinesia.  Denies any head trauma.  Denies fever, chills, chest pain, shortness of breath, nausea vomiting, dysuria.  Reports medication compliance.  Brother also reports suicide ideation,   Denies a plan.

## 2023-10-30 NOTE — ED BEHAVIORAL HEALTH PROGRESS NOTE - DETAILS:
Patient is interviewed with [place] , [id number].     Patient reports that he his doing "bad." Reports some improvement in anxiety sx since presenting to the hospital but reports continued anxiety, restlessness, sleep difficulty, and continued endorsement of +AH.  Denies that they are command in nature; when asked to elaborate on the contents of AH, patient repeats "IM speaking you're quiet" multiple times when asked to elaborate multiple times. Denies SI/ HI at time of evaluation.  Reports understanding that he will be admitted to the medical floor due to fall history and is agreeable with same.

## 2023-10-30 NOTE — ED BEHAVIORAL HEALTH ASSESSMENT NOTE - DESCRIPTION
pt is restless , but not aggressive or agitated received Ativan 1mg IM for restlessness and anxiety.    Vital Signs Last 24 Hrs  T(C): 36.7 (30 Oct 2023 05:50), Max: 36.9 (29 Oct 2023 23:25)  T(F): 98 (30 Oct 2023 05:50), Max: 98.5 (29 Oct 2023 23:25)  HR: 70 (30 Oct 2023 05:50) (70 - 95)  BP: 170/82 (30 Oct 2023 05:50) (161/89 - 185/105)  BP(mean): --  RR: 17 (30 Oct 2023 05:50) (16 - 18)  SpO2: 100% (30 Oct 2023 05:50) (95% - 100%)    Parameters below as of 30 Oct 2023 05:50  Patient On (Oxygen Delivery Method): room air HTN, HLD, DM single, no children, lives with brother and brother's family

## 2023-10-30 NOTE — ED PROVIDER NOTE - PHYSICAL EXAMINATION
Const: not in acute distress,   Eyes: no conjunctival injection  HEENT: Head NCAT, Moist MM.  Neck: Trachea midline.   CVS: +S1/S2, Peripheral pulses 2+ and equal in all extremities.  RESP: Unlabored respiratory effort. Clear to auscultation bilaterally.  GI: Nontender/Nondistended, No CVA tenderness b/l.   MSK: Normocephalic/Atraumatic, No Lower Extremities edema b/l.   Skin: Intact.   Neuro: Motor & Sensation grossly intact.  Psych: Awake, Alert, & Cooperative

## 2023-10-30 NOTE — H&P ADULT - PROBLEM SELECTOR PLAN 3
On home metoprolol, hctz and losartan  - holding hctz and losartan for now in setting of elevated Cr.   - f/u repeat BMP and restart hctz and losartan when indicated vs switch to different anti-hypertensive

## 2023-10-30 NOTE — ED BEHAVIORAL HEALTH ASSESSMENT NOTE - DETAILS
spoke with brother boarding Klonopin per chart review caused memory concerns. hx of interrupted sa via hanging

## 2023-10-31 ENCOUNTER — TRANSCRIPTION ENCOUNTER (OUTPATIENT)
Age: 63
End: 2023-10-31

## 2023-10-31 DIAGNOSIS — M70.21 OLECRANON BURSITIS, RIGHT ELBOW: ICD-10-CM

## 2023-10-31 LAB
ANION GAP SERPL CALC-SCNC: 13 MMOL/L — SIGNIFICANT CHANGE UP (ref 7–14)
ANION GAP SERPL CALC-SCNC: 13 MMOL/L — SIGNIFICANT CHANGE UP (ref 7–14)
BASOPHILS # BLD AUTO: 0.08 K/UL — SIGNIFICANT CHANGE UP (ref 0–0.2)
BASOPHILS # BLD AUTO: 0.08 K/UL — SIGNIFICANT CHANGE UP (ref 0–0.2)
BASOPHILS NFR BLD AUTO: 0.8 % — SIGNIFICANT CHANGE UP (ref 0–2)
BASOPHILS NFR BLD AUTO: 0.8 % — SIGNIFICANT CHANGE UP (ref 0–2)
BUN SERPL-MCNC: 21 MG/DL — SIGNIFICANT CHANGE UP (ref 7–23)
BUN SERPL-MCNC: 21 MG/DL — SIGNIFICANT CHANGE UP (ref 7–23)
CALCIUM SERPL-MCNC: 9.6 MG/DL — SIGNIFICANT CHANGE UP (ref 8.4–10.5)
CALCIUM SERPL-MCNC: 9.6 MG/DL — SIGNIFICANT CHANGE UP (ref 8.4–10.5)
CHLORIDE SERPL-SCNC: 101 MMOL/L — SIGNIFICANT CHANGE UP (ref 98–107)
CHLORIDE SERPL-SCNC: 101 MMOL/L — SIGNIFICANT CHANGE UP (ref 98–107)
CO2 SERPL-SCNC: 26 MMOL/L — SIGNIFICANT CHANGE UP (ref 22–31)
CO2 SERPL-SCNC: 26 MMOL/L — SIGNIFICANT CHANGE UP (ref 22–31)
CREAT SERPL-MCNC: 1.08 MG/DL — SIGNIFICANT CHANGE UP (ref 0.5–1.3)
CREAT SERPL-MCNC: 1.08 MG/DL — SIGNIFICANT CHANGE UP (ref 0.5–1.3)
CULTURE RESULTS: SIGNIFICANT CHANGE UP
CULTURE RESULTS: SIGNIFICANT CHANGE UP
EGFR: 78 ML/MIN/1.73M2 — SIGNIFICANT CHANGE UP
EGFR: 78 ML/MIN/1.73M2 — SIGNIFICANT CHANGE UP
EOSINOPHIL # BLD AUTO: 0.6 K/UL — HIGH (ref 0–0.5)
EOSINOPHIL # BLD AUTO: 0.6 K/UL — HIGH (ref 0–0.5)
EOSINOPHIL NFR BLD AUTO: 6 % — SIGNIFICANT CHANGE UP (ref 0–6)
EOSINOPHIL NFR BLD AUTO: 6 % — SIGNIFICANT CHANGE UP (ref 0–6)
GLUCOSE BLDC GLUCOMTR-MCNC: 111 MG/DL — HIGH (ref 70–99)
GLUCOSE BLDC GLUCOMTR-MCNC: 111 MG/DL — HIGH (ref 70–99)
GLUCOSE BLDC GLUCOMTR-MCNC: 129 MG/DL — HIGH (ref 70–99)
GLUCOSE BLDC GLUCOMTR-MCNC: 129 MG/DL — HIGH (ref 70–99)
GLUCOSE BLDC GLUCOMTR-MCNC: 142 MG/DL — HIGH (ref 70–99)
GLUCOSE BLDC GLUCOMTR-MCNC: 142 MG/DL — HIGH (ref 70–99)
GLUCOSE BLDC GLUCOMTR-MCNC: 97 MG/DL — SIGNIFICANT CHANGE UP (ref 70–99)
GLUCOSE BLDC GLUCOMTR-MCNC: 97 MG/DL — SIGNIFICANT CHANGE UP (ref 70–99)
GLUCOSE SERPL-MCNC: 137 MG/DL — HIGH (ref 70–99)
GLUCOSE SERPL-MCNC: 137 MG/DL — HIGH (ref 70–99)
HCT VFR BLD CALC: 41.3 % — SIGNIFICANT CHANGE UP (ref 39–50)
HCT VFR BLD CALC: 41.3 % — SIGNIFICANT CHANGE UP (ref 39–50)
HGB BLD-MCNC: 13.6 G/DL — SIGNIFICANT CHANGE UP (ref 13–17)
HGB BLD-MCNC: 13.6 G/DL — SIGNIFICANT CHANGE UP (ref 13–17)
IANC: 5.7 K/UL — SIGNIFICANT CHANGE UP (ref 1.8–7.4)
IANC: 5.7 K/UL — SIGNIFICANT CHANGE UP (ref 1.8–7.4)
IMM GRANULOCYTES NFR BLD AUTO: 1.1 % — HIGH (ref 0–0.9)
IMM GRANULOCYTES NFR BLD AUTO: 1.1 % — HIGH (ref 0–0.9)
LYMPHOCYTES # BLD AUTO: 2.75 K/UL — SIGNIFICANT CHANGE UP (ref 1–3.3)
LYMPHOCYTES # BLD AUTO: 2.75 K/UL — SIGNIFICANT CHANGE UP (ref 1–3.3)
LYMPHOCYTES # BLD AUTO: 27.7 % — SIGNIFICANT CHANGE UP (ref 13–44)
LYMPHOCYTES # BLD AUTO: 27.7 % — SIGNIFICANT CHANGE UP (ref 13–44)
MAGNESIUM SERPL-MCNC: 2.3 MG/DL — SIGNIFICANT CHANGE UP (ref 1.6–2.6)
MAGNESIUM SERPL-MCNC: 2.3 MG/DL — SIGNIFICANT CHANGE UP (ref 1.6–2.6)
MCHC RBC-ENTMCNC: 29 PG — SIGNIFICANT CHANGE UP (ref 27–34)
MCHC RBC-ENTMCNC: 29 PG — SIGNIFICANT CHANGE UP (ref 27–34)
MCHC RBC-ENTMCNC: 32.9 GM/DL — SIGNIFICANT CHANGE UP (ref 32–36)
MCHC RBC-ENTMCNC: 32.9 GM/DL — SIGNIFICANT CHANGE UP (ref 32–36)
MCV RBC AUTO: 88.1 FL — SIGNIFICANT CHANGE UP (ref 80–100)
MCV RBC AUTO: 88.1 FL — SIGNIFICANT CHANGE UP (ref 80–100)
MONOCYTES # BLD AUTO: 0.68 K/UL — SIGNIFICANT CHANGE UP (ref 0–0.9)
MONOCYTES # BLD AUTO: 0.68 K/UL — SIGNIFICANT CHANGE UP (ref 0–0.9)
MONOCYTES NFR BLD AUTO: 6.9 % — SIGNIFICANT CHANGE UP (ref 2–14)
MONOCYTES NFR BLD AUTO: 6.9 % — SIGNIFICANT CHANGE UP (ref 2–14)
NEUTROPHILS # BLD AUTO: 5.7 K/UL — SIGNIFICANT CHANGE UP (ref 1.8–7.4)
NEUTROPHILS # BLD AUTO: 5.7 K/UL — SIGNIFICANT CHANGE UP (ref 1.8–7.4)
NEUTROPHILS NFR BLD AUTO: 57.5 % — SIGNIFICANT CHANGE UP (ref 43–77)
NEUTROPHILS NFR BLD AUTO: 57.5 % — SIGNIFICANT CHANGE UP (ref 43–77)
NRBC # BLD: 0 /100 WBCS — SIGNIFICANT CHANGE UP (ref 0–0)
NRBC # BLD: 0 /100 WBCS — SIGNIFICANT CHANGE UP (ref 0–0)
NRBC # FLD: 0 K/UL — SIGNIFICANT CHANGE UP (ref 0–0)
NRBC # FLD: 0 K/UL — SIGNIFICANT CHANGE UP (ref 0–0)
PHOSPHATE SERPL-MCNC: 3.8 MG/DL — SIGNIFICANT CHANGE UP (ref 2.5–4.5)
PHOSPHATE SERPL-MCNC: 3.8 MG/DL — SIGNIFICANT CHANGE UP (ref 2.5–4.5)
PLATELET # BLD AUTO: 250 K/UL — SIGNIFICANT CHANGE UP (ref 150–400)
PLATELET # BLD AUTO: 250 K/UL — SIGNIFICANT CHANGE UP (ref 150–400)
POTASSIUM SERPL-MCNC: 4 MMOL/L — SIGNIFICANT CHANGE UP (ref 3.5–5.3)
POTASSIUM SERPL-MCNC: 4 MMOL/L — SIGNIFICANT CHANGE UP (ref 3.5–5.3)
POTASSIUM SERPL-SCNC: 4 MMOL/L — SIGNIFICANT CHANGE UP (ref 3.5–5.3)
POTASSIUM SERPL-SCNC: 4 MMOL/L — SIGNIFICANT CHANGE UP (ref 3.5–5.3)
RBC # BLD: 4.69 M/UL — SIGNIFICANT CHANGE UP (ref 4.2–5.8)
RBC # BLD: 4.69 M/UL — SIGNIFICANT CHANGE UP (ref 4.2–5.8)
RBC # FLD: 13.2 % — SIGNIFICANT CHANGE UP (ref 10.3–14.5)
RBC # FLD: 13.2 % — SIGNIFICANT CHANGE UP (ref 10.3–14.5)
SODIUM SERPL-SCNC: 140 MMOL/L — SIGNIFICANT CHANGE UP (ref 135–145)
SODIUM SERPL-SCNC: 140 MMOL/L — SIGNIFICANT CHANGE UP (ref 135–145)
SPECIMEN SOURCE: SIGNIFICANT CHANGE UP
SPECIMEN SOURCE: SIGNIFICANT CHANGE UP
WBC # BLD: 9.92 K/UL — SIGNIFICANT CHANGE UP (ref 3.8–10.5)
WBC # BLD: 9.92 K/UL — SIGNIFICANT CHANGE UP (ref 3.8–10.5)
WBC # FLD AUTO: 9.92 K/UL — SIGNIFICANT CHANGE UP (ref 3.8–10.5)
WBC # FLD AUTO: 9.92 K/UL — SIGNIFICANT CHANGE UP (ref 3.8–10.5)

## 2023-10-31 PROCEDURE — 99233 SBSQ HOSP IP/OBS HIGH 50: CPT

## 2023-10-31 PROCEDURE — 99232 SBSQ HOSP IP/OBS MODERATE 35: CPT

## 2023-10-31 PROCEDURE — 73080 X-RAY EXAM OF ELBOW: CPT | Mod: 26,RT

## 2023-10-31 PROCEDURE — 93010 ELECTROCARDIOGRAM REPORT: CPT

## 2023-10-31 RX ORDER — DEXTROSE 50 % IN WATER 50 %
12.5 SYRINGE (ML) INTRAVENOUS ONCE
Refills: 0 | Status: DISCONTINUED | OUTPATIENT
Start: 2023-10-31 | End: 2023-10-31

## 2023-10-31 RX ORDER — ARIPIPRAZOLE 15 MG/1
2.5 TABLET ORAL DAILY
Refills: 0 | Status: DISCONTINUED | OUTPATIENT
Start: 2023-10-31 | End: 2023-11-01

## 2023-10-31 RX ORDER — SODIUM CHLORIDE 9 MG/ML
1000 INJECTION, SOLUTION INTRAVENOUS
Refills: 0 | Status: DISCONTINUED | OUTPATIENT
Start: 2023-10-31 | End: 2023-10-31

## 2023-10-31 RX ORDER — INSULIN LISPRO 100/ML
VIAL (ML) SUBCUTANEOUS AT BEDTIME
Refills: 0 | Status: DISCONTINUED | OUTPATIENT
Start: 2023-10-31 | End: 2023-11-06

## 2023-10-31 RX ORDER — DIPHENHYDRAMINE HCL 50 MG
50 CAPSULE ORAL ONCE
Refills: 0 | Status: COMPLETED | OUTPATIENT
Start: 2023-10-31 | End: 2023-10-31

## 2023-10-31 RX ORDER — DEXTROSE 50 % IN WATER 50 %
25 SYRINGE (ML) INTRAVENOUS ONCE
Refills: 0 | Status: DISCONTINUED | OUTPATIENT
Start: 2023-10-31 | End: 2023-10-31

## 2023-10-31 RX ORDER — INSULIN LISPRO 100/ML
VIAL (ML) SUBCUTANEOUS
Refills: 0 | Status: DISCONTINUED | OUTPATIENT
Start: 2023-10-31 | End: 2023-11-06

## 2023-10-31 RX ORDER — ENOXAPARIN SODIUM 100 MG/ML
40 INJECTION SUBCUTANEOUS EVERY 24 HOURS
Refills: 0 | Status: DISCONTINUED | OUTPATIENT
Start: 2023-10-31 | End: 2023-11-06

## 2023-10-31 RX ORDER — LOSARTAN POTASSIUM 100 MG/1
50 TABLET, FILM COATED ORAL DAILY
Refills: 0 | Status: DISCONTINUED | OUTPATIENT
Start: 2023-10-31 | End: 2023-11-03

## 2023-10-31 RX ORDER — NICOTINE POLACRILEX 2 MG
1 GUM BUCCAL ONCE
Refills: 0 | Status: DISCONTINUED | OUTPATIENT
Start: 2023-10-31 | End: 2023-10-31

## 2023-10-31 RX ORDER — DEXTROSE 50 % IN WATER 50 %
15 SYRINGE (ML) INTRAVENOUS ONCE
Refills: 0 | Status: DISCONTINUED | OUTPATIENT
Start: 2023-10-31 | End: 2023-10-31

## 2023-10-31 RX ORDER — NICOTINE POLACRILEX 2 MG
1 GUM BUCCAL ONCE
Refills: 0 | Status: DISCONTINUED | OUTPATIENT
Start: 2023-10-31 | End: 2023-11-06

## 2023-10-31 RX ORDER — GLUCAGON INJECTION, SOLUTION 0.5 MG/.1ML
1 INJECTION, SOLUTION SUBCUTANEOUS ONCE
Refills: 0 | Status: DISCONTINUED | OUTPATIENT
Start: 2023-10-31 | End: 2023-10-31

## 2023-10-31 RX ADMIN — Medication 650 MILLIGRAM(S): at 12:35

## 2023-10-31 RX ADMIN — Medication 2 MILLIGRAM(S): at 12:06

## 2023-10-31 RX ADMIN — ENOXAPARIN SODIUM 40 MILLIGRAM(S): 100 INJECTION SUBCUTANEOUS at 06:38

## 2023-10-31 RX ADMIN — Medication 50 MILLIGRAM(S): at 00:45

## 2023-10-31 RX ADMIN — Medication 3 MILLIGRAM(S): at 00:45

## 2023-10-31 RX ADMIN — Medication 50 MILLIGRAM(S): at 06:38

## 2023-10-31 RX ADMIN — Medication 650 MILLIGRAM(S): at 00:11

## 2023-10-31 RX ADMIN — Medication 0.5 MILLIGRAM(S): at 06:38

## 2023-10-31 RX ADMIN — ARIPIPRAZOLE 2.5 MILLIGRAM(S): 15 TABLET ORAL at 17:51

## 2023-10-31 RX ADMIN — LOSARTAN POTASSIUM 50 MILLIGRAM(S): 100 TABLET, FILM COATED ORAL at 17:14

## 2023-10-31 RX ADMIN — Medication 150 MILLIGRAM(S): at 12:06

## 2023-10-31 RX ADMIN — Medication 0.5 MILLIGRAM(S): at 17:14

## 2023-10-31 RX ADMIN — Medication 650 MILLIGRAM(S): at 12:05

## 2023-10-31 NOTE — DISCHARGE NOTE PROVIDER - PROVIDER TOKENS
FREE:[LAST:[Primary Care Doctor],PHONE:[(   )    -],FAX:[(   )    -]],PROVIDER:[TOKEN:[[id number]]]

## 2023-10-31 NOTE — PROGRESS NOTE ADULT - PROBLEM SELECTOR PLAN 1
worsening auditory hallucinations and SI  - appreciate  recs: Abilify held due to akathsia, c/w klonopin 0.5mg BID, effexor 150mg QD, cogentin 2mg QAM  - PRN: fluphenazine 5 mg q6h prn, benadryl 50 mg q6h prn and ativan 2 mg IM q6h prn for severe agitation  - continue constant observation, safety tray worsening auditory hallucinations and SI  - appreciate  recs: Abilify 2.5mg QD, c/w klonopin 0.5mg BID, effexor 150mg QD, cogentin 2mg QAM  - PRN: fluphenazine 5 mg q6h prn, benadryl 50 mg q6h prn and ativan 2 mg IM q6h prn for severe agitation  - continue constant observation, safety tray

## 2023-10-31 NOTE — PHYSICAL THERAPY INITIAL EVALUATION ADULT - ADDITIONAL COMMENTS
As per patient he lives in a private home with his brother, reports his bedroom is on the third floor. Reports being fully independent with functional mobility and ADL;s prior to admission, reports two falls recently and decreased ambulation due to depressive and suicidal thoughts.    Patient left sitting at edge of bed, NAD, with doctor and nurse at bedside, call bell within reach.

## 2023-10-31 NOTE — PHYSICAL THERAPY INITIAL EVALUATION ADULT - GENERAL OBSERVATIONS, REHAB EVAL
Patient found sitting at edge of bed, NAD, A&Ox4, translation services utilized throughout, on one to one constant observation, patient agreeable to participate in skilled PT evaluation, reports dizziness at rest /98 in sitting, 145/99 standing

## 2023-10-31 NOTE — PROGRESS NOTE ADULT - PROBLEM SELECTOR PLAN 4
right elbow swelling  - reports soft tissue swelling s/p fall 2 weeks ago  - follows with PCP Dr. Maurilio Puga ([phone]) - had drained twice  - follow up XRAY - patient scheduled to follow up with PCP for further management right elbow swelling  - reports soft tissue swelling s/p fall 2 weeks ago  - follows with PCP Dr. Maurilio Puga ([phone]) - had drained twice  - XRAY right elbow: Nonspecific focally bulging prominent soft tissue swelling over olecranon, no OM.  - outpatient followup with PCP / MRI for further eval right elbow swelling  - reports soft tissue swelling s/p fall 2 weeks ago  - follows with PCP Dr. Maurilio Puga ([phone]) - had drained twice  - XRAY R.elbow: Nonspecific focally bulging prominent soft tissue swelling over olecranon, no OM.  - outpatient followup with PCP / MRI for further eval

## 2023-10-31 NOTE — PHYSICAL THERAPY INITIAL EVALUATION ADULT - MANUAL MUSCLE TESTING RESULTS, REHAB EVAL
Patients bilateral upper and lower extremity strength grossly 4/5 upon MMT and functional assessments

## 2023-10-31 NOTE — DISCHARGE NOTE PROVIDER - HOSPITAL COURSE
63M (Uruguayan-speaking), with hx of schizophrenia requiring past psych hospitalizations (last May 2022), HTN, HLD, T2DM brought in by family for worsening auditory hallucinations and SI. Patient was seen by psychiatry - medications adjusted. On admission CHAPARRO - resolved. BP medications resumed as tolerated. On admission unable to go to [facility] due to fall risk - evaluated by PT - patient able to ambulate without assistance. Patient reports at times he feels weak and falls, no preceding symptoms, loss of consciousness, bowel/bladder incontinence, dizziness. Low suspicion for cardio/neurogenic syncope. Given unremarkable neuro exam, neg CTH/orthostats, able to ambulate with PT no further inpatient work up indicated. Medically optimized for dc to [facility].

## 2023-10-31 NOTE — DISCHARGE NOTE PROVIDER - NSDCMRMEDTOKEN_GEN_ALL_CORE_FT
ARIPiprazole 2 mg oral tablet: 1 tab(s) orally once a day  benztropine 2 mg oral tablet: 1 tab(s) orally once a day  clonazePAM 0.5 mg oral tablet: 1 tab(s) orally 2 times a day  hydroCHLOROthiazide 12.5 mg oral capsule: 1 cap(s) orally once a day  losartan 100 mg oral tablet: 1 tab(s) orally once a day  metoprolol succinate 50 mg oral tablet, extended release: 1 tab(s) orally once a day  venlafaxine 150 mg oral capsule, extended release: 1 cap(s) orally once a day   ARIPiprazole: 7.5 milligram(s) orally once a day Take 7.5mg orally once a day  clonazePAM 0.5 mg oral tablet: 1 tab(s) orally 2 times a day  losartan 25 mg oral tablet: 3 tab(s) orally once a day  melatonin 3 mg oral tablet: 2 tab(s) orally once a day (at bedtime)  metoprolol succinate 50 mg oral tablet, extended release: 1 tab(s) orally once a day  venlafaxine 150 mg oral capsule, extended release: 1 cap(s) orally once a day

## 2023-10-31 NOTE — PATIENT PROFILE ADULT - CENTRAL VENOUS CATHETER/PICC LINE
[id number] GIRL BORN VIA RPT C/S BY DR. GOODPASTURE AND DR. PEREZ. STRONG
CRY NOTED. CORD CLAMPED AND CUT BY PROVIDER. TAKEN TO WARMER, DRIED AND
STIMULATED. VSS. ASSESSMENTS COMPLETED, MEASUREMENTS OBTAINED,
MEDICATIONS ADMINISTERED, ID BANDS APPLIED X 2 TO BABY AND X 1 TO MOM AND
DAD. WRAPPED IN BLANKETS AND HANDED TO MOM AND DAD TO HOLD. APGARS 8,9,9.
TAKEN TO NURSERY TO CONT TO MONITOR UNTIL MOM MOVED TO RECOVER.
1230 INFANT SECURE IN CARSEAT CARRIED TO CAR IN [facility] BY
FATHER. MOTHER AMBULATED AND NURSE ESCORTED FAMILY OUT.
no

## 2023-10-31 NOTE — PHYSICAL THERAPY INITIAL EVALUATION ADULT - TRANSFER SAFETY CONCERNS NOTED: SIT/STAND, REHAB EVAL
(-) orthostatics with change in position, reports dizziness upon standing/decreased balance during turns

## 2023-10-31 NOTE — PHYSICAL THERAPY INITIAL EVALUATION ADULT - PERTINENT HX OF CURRENT PROBLEM, REHAB EVAL
Patient is a 63 year old male (Lithuanian-speaking), with history of schizophrenia requiring past psych hospitalizations (last May 2022), HTN, HLD, T2DM brought in by family for worsening auditory hallucinations and suicidal ideation. X-ray of right elbow: Nonspecific focally bulging prominent soft tissue swelling over olecranon, no osteomyelitis.

## 2023-10-31 NOTE — DISCHARGE NOTE PROVIDER - CARE PROVIDER_API CALL
Primary Care Doctor,   Phone: (   )    -  Fax: (   )    -  Follow Up Time:     Nate Lozano  Psychiatry  [address] [place] [id number]  Phone: [phone]  Fax: [phone]  Follow Up Time:

## 2023-10-31 NOTE — BH CONSULTATION LIAISON PROGRESS NOTE - NSBHASSESSMENTFT_PSY_ALL_CORE
The Pt is a 63yr old  male (Bulgarian speaking only), single, domiciled and unemployed, PPHx schizophrenia, multiple past psych admissions (including 5 [facility] admissions last admission in May, 2022, self reports remote hx of interrupted sa via hanging, in treatment with DR. Chino [phone], no  legal or substance hx, PMHx of HTN, DM and HLD.  Today, BIB family to worsening AH and si.    On initial evaluation, patient presents with worsening of depressive, anxiety and psychotic sx, worsening AH that are derogatory in nature as reported to initital provider.  Pt is not at his baseline and is not able to care for himself due to severity of sx ,  patient warrants inpatient admission for stabilization of his acute symptoms as these symptoms place him at an acutely elevated risk of harm.  At this time, given patient's fall history with unclear etiology, recommend admission to medic unit for further exploration of etiology with continued follow up by  psychiatry team.      10/31: Patient continues to endorse SI and AH. Engaged on interview, cooperative, calm. Adherent with standing medications. No PRNs required. Will CTM for akithisia given prior reports in chart.    Plan:  - Patient cannot leave AMA; plans for transfer to [facility] following medical clearance for fall risk  - Continue 1:1 for fall precaution and SI  - Continue Klonopin 0.5mg  BID per I-Stop,  Effexor 150mg, Cogentin 2mg qam  - INCREASE to Abilify 5mg  - PRNs: Fluphenazine 5mg PO/IM q6h for agitation, Benadryl 50mg PO/IM q6h for agitation. Ativan 2mg IM for severe agitation  - NRT PRN  - continue to monitor QTc, K, Mg; HOLD ALL ANTIPSYCHOTICS for QTc > 500   The Pt is a 63yr old  male (Canadian speaking only), single, domiciled and unemployed, PPHx schizophrenia, multiple past psych admissions (including 5 [facility] admissions last admission in May, 2022, self reports remote hx of interrupted sa via hanging, in treatment with DR. Chino [phone], no  legal or substance hx, PMHx of HTN, DM and HLD.  Today, BIB family to worsening AH and si.    On initial evaluation, patient presents with worsening of depressive, anxiety and psychotic sx, worsening AH that are derogatory in nature as reported to initital provider.  Pt is not at his baseline and is not able to care for himself due to severity of sx ,  patient warrants inpatient admission for stabilization of his acute symptoms as these symptoms place him at an acutely elevated risk of harm.  At this time, given patient's fall history with unclear etiology, recommend admission to medic unit for further exploration of etiology with continued follow up by  psychiatry team.      10/31: Patient continues to endorse SI and AH. Engaged on interview, cooperative, calm. Adherent with standing medications. No PRNs required. Will CTM for akithisia given prior reports in chart.    Plan:  - Patient cannot leave AMA; plans for transfer to [facility] following medical clearance for fall risk  - Continue 1:1 for fall precaution and SI  - Continue Klonopin 0.5mg  BID per I-Stop,  Effexor 150mg, Cogentin 2mg qam  - RESTART to Abilify 2.5mg; CTM for akithisia  - PRNs: Fluphenazine 5mg PO/IM q6h for agitation, Benadryl 50mg PO/IM q6h for agitation. Ativan 2mg IM for severe agitation  - NRT PRN  - continue to monitor QTc, K, Mg; HOLD ALL ANTIPSYCHOTICS for QTc > 500   The Pt is a 63yr old  male (Turkish speaking only), single, domiciled and unemployed, PPHx schizophrenia, multiple past psych admissions (including 5 [place] admissions last admission in May, 2022, self reports remote hx of interrupted sa via hanging, in treatment with DR. Chino [phone], no  legal or substance hx, PMHx of HTN, DM and HLD.  Today, BIB family to worsening AH and si.    On initial evaluation, patient presents with worsening of depressive, anxiety and psychotic sx, worsening AH that are derogatory in nature as reported to initital provider.  Pt is not at his baseline and is not able to care for himself due to severity of sx ,  patient warrants inpatient admission for stabilization of his acute symptoms as these symptoms place him at an acutely elevated risk of harm.  At this time, given patient's fall history with unclear etiology, recommend admission to medic unit for further exploration of etiology with continued follow up by  psychiatry team.      10/31: Patient continues to endorse SI and AH. Engaged on interview, cooperative, calm. Adherent with standing medications. No PRNs required. Will CTM for akithisia given prior reports in chart.    Plan:  - Patient cannot leave AMA; plans for transfer to [place] following medical clearance for fall risk  - Continue 1:1 for fall precaution and SI  - Continue Klonopin 0.5mg  BID per I-Stop,  Effexor 150mg, Cogentin 2mg qam  - RESTART to Abilify 2.5mg; CTM for akithisia  - PRNs: Fluphenazine 5mg PO/IM q6h for agitation, Benadryl 50mg PO/IM q6h for agitation. Ativan 2mg IM for severe agitation  - nicotine replacement patch + gum PRN  - continue to monitor QTc, K, Mg; HOLD ALL ANTIPSYCHOTICS for QTc > 500

## 2023-10-31 NOTE — DISCHARGE NOTE PROVIDER - NSDCCPCAREPLAN_GEN_ALL_CORE_FT
PRINCIPAL DISCHARGE DIAGNOSIS  Diagnosis: Auditory hallucinations  Assessment and Plan of Treatment: Please continue care at [facility] and take your medications as recommended.      SECONDARY DISCHARGE DIAGNOSES  Diagnosis: Olecranon bursitis, right elbow  Assessment and Plan of Treatment: Follow up with your primary care doctor after discharge for possible MRI of right elbow.    Diagnosis: HTN (hypertension)  Assessment and Plan of Treatment:      PRINCIPAL DISCHARGE DIAGNOSIS  Diagnosis: Auditory hallucinations  Assessment and Plan of Treatment: Please continue care at [facility] and take your medications as recommended. Have repeat blood work done in 1-2 weeks for additional follow up.      SECONDARY DISCHARGE DIAGNOSES  Diagnosis: Olecranon bursitis, right elbow  Assessment and Plan of Treatment: Follow up with your primary care doctor after discharge for possible MRI of right elbow.    Diagnosis: HTN (hypertension)  Assessment and Plan of Treatment:

## 2023-10-31 NOTE — PROGRESS NOTE ADULT - PROBLEM SELECTOR PLAN 6
DVT ppx: lovenox  DIET: DASH  DISPO: PT eval DVT ppx: lovenox  DIET: DASH  DISPO: [facility]    On admission unable to go to [facility] due to fall risk - evaluated by PT - patient able to ambulate without assistance. Upon further questioning patient reports at times he feels weak and falls, no preceding symptoms, loss of consciousness, bowel/bladder incontinence, dizziness. Low suspicion for cardio/neurogenic syncope. Given unremarkable neuro exam, neg CTH/orthostats, able to ambulate with PT no further inpatient work up indicated. Medically optimized for dc to [facility]. DVT ppx: lovenox  DIET: DASH  DISPO: [facility]    On admission unable to go to [facility] due to fall risk - evaluated by PT - patient able to ambulate without assistance. Patient reports at times he feels weak and falls, no preceding symptoms, loss of consciousness, bowel/bladder incontinence, dizziness. Low suspicion for cardio/neurogenic syncope. Given unremarkable neuro exam, neg CTH/orthostats, able to ambulate with PT no further inpatient work up indicated. Medically optimized for dc to [facility].

## 2023-10-31 NOTE — DISCHARGE NOTE PROVIDER - REASON FOR ADMISSION
cooperative and pleasant. Overweight. Gen. he appears healthy  HEAD:  NC/AT, no lesions. EYES:  SASHA, EOMI, No scleral icterus or conjunctival injection or discharge. Visual fields in tact to confrontation. Fundoscopic difficult due to constricted pupils  EARS:  EAC's clear, TM's normal.  NOSE:  Nasal cavity is clear. No mucosal congestion or discharge. Sinuses are nontender. MOUTH & THROAT:  Oral cavity is clear without mucosal lesions. Tongue is midline. Dentition is in good repair. No pharyngeal erythema or exudate. NECK:  Supple. Full ROM. Trachea is midline. No increased JVD. No thyromegaly or nodules. No masses  LYMPH: No C/SC/A/F nodes  CARDIAC:  S1S2 NL. Regular rhythm. No murmur/clicks/rubs. No ectopy. PMI is non-displaced. VASC:  Pedal pulses 2/4. Carotid upstrokes 2+. No bruits noted. PULM:  Lungs are CTA. Symmetric breath sounds noted. AP Diameter NL. GI:  Abdomen is soft and nontender. No distension. No organomegaly. No masses. No pulsatile masses. EXT:  No Cyanosis or clubbing. No edema. SKIN: Warm and dry, normal turgor, no rash or lesions of concern. NEURO:  Cranial nerves 2-12 are NL. Speech fluent and coherent. Strength is 5/5 in all muscle groups. No sensory deficits. No focal or lateralizing deficits. Gait is normal.  Right achilles reflex weaker than the left (NL). Patellar reflexes NL.    MS:  No C/T/L paraspinal tenderness. No scoliosis. No joint effusions. Full joint ROM. Right shoulder with some tenderness with abduction and ER.  + min TTP of the right anterior heel, plantar surface. No synovitis. PSYCH:  Mood and affect NL. Judgement and insight NL.      ASSESSMENT/PLAN:    1. Annual physical exam  All care gaps identified and addressed  She has declined flu vaccination today  Her colonoscopy is been updated and will repeat in 5 years  - EKG 12 Lead  - CBC Auto Differential; Future  - Comprehensive Metabolic Panel;  Future  - Lipid schizophrenia

## 2023-10-31 NOTE — PATIENT PROFILE ADULT - FUNCTIONAL ASSESSMENT - DAILY ACTIVITY 5.
Go for blood tests as directed. Your doctor will do lab tests at regular visits to monitor the effects of this medicine. Please follow up with your doctor and keep your health care provider appointments.
4 = No assist / stand by assistance

## 2023-11-01 DIAGNOSIS — D72.829 ELEVATED WHITE BLOOD CELL COUNT, UNSPECIFIED: ICD-10-CM

## 2023-11-01 LAB
ANION GAP SERPL CALC-SCNC: 12 MMOL/L — SIGNIFICANT CHANGE UP (ref 7–14)
ANION GAP SERPL CALC-SCNC: 12 MMOL/L — SIGNIFICANT CHANGE UP (ref 7–14)
BASOPHILS # BLD AUTO: 0.04 K/UL — SIGNIFICANT CHANGE UP (ref 0–0.2)
BASOPHILS # BLD AUTO: 0.04 K/UL — SIGNIFICANT CHANGE UP (ref 0–0.2)
BASOPHILS NFR BLD AUTO: 0.4 % — SIGNIFICANT CHANGE UP (ref 0–2)
BASOPHILS NFR BLD AUTO: 0.4 % — SIGNIFICANT CHANGE UP (ref 0–2)
BUN SERPL-MCNC: 17 MG/DL — SIGNIFICANT CHANGE UP (ref 7–23)
BUN SERPL-MCNC: 17 MG/DL — SIGNIFICANT CHANGE UP (ref 7–23)
CALCIUM SERPL-MCNC: 9.3 MG/DL — SIGNIFICANT CHANGE UP (ref 8.4–10.5)
CALCIUM SERPL-MCNC: 9.3 MG/DL — SIGNIFICANT CHANGE UP (ref 8.4–10.5)
CHLORIDE SERPL-SCNC: 101 MMOL/L — SIGNIFICANT CHANGE UP (ref 98–107)
CHLORIDE SERPL-SCNC: 101 MMOL/L — SIGNIFICANT CHANGE UP (ref 98–107)
CO2 SERPL-SCNC: 24 MMOL/L — SIGNIFICANT CHANGE UP (ref 22–31)
CO2 SERPL-SCNC: 24 MMOL/L — SIGNIFICANT CHANGE UP (ref 22–31)
CREAT SERPL-MCNC: 1.1 MG/DL — SIGNIFICANT CHANGE UP (ref 0.5–1.3)
CREAT SERPL-MCNC: 1.1 MG/DL — SIGNIFICANT CHANGE UP (ref 0.5–1.3)
EGFR: 76 ML/MIN/1.73M2 — SIGNIFICANT CHANGE UP
EGFR: 76 ML/MIN/1.73M2 — SIGNIFICANT CHANGE UP
EOSINOPHIL # BLD AUTO: 0.44 K/UL — SIGNIFICANT CHANGE UP (ref 0–0.5)
EOSINOPHIL # BLD AUTO: 0.44 K/UL — SIGNIFICANT CHANGE UP (ref 0–0.5)
EOSINOPHIL NFR BLD AUTO: 4.8 % — SIGNIFICANT CHANGE UP (ref 0–6)
EOSINOPHIL NFR BLD AUTO: 4.8 % — SIGNIFICANT CHANGE UP (ref 0–6)
FOLATE SERPL-MCNC: 4.5 NG/ML — SIGNIFICANT CHANGE UP (ref 3.1–17.5)
FOLATE SERPL-MCNC: 4.5 NG/ML — SIGNIFICANT CHANGE UP (ref 3.1–17.5)
GLUCOSE BLDC GLUCOMTR-MCNC: 100 MG/DL — HIGH (ref 70–99)
GLUCOSE BLDC GLUCOMTR-MCNC: 100 MG/DL — HIGH (ref 70–99)
GLUCOSE BLDC GLUCOMTR-MCNC: 115 MG/DL — HIGH (ref 70–99)
GLUCOSE BLDC GLUCOMTR-MCNC: 115 MG/DL — HIGH (ref 70–99)
GLUCOSE BLDC GLUCOMTR-MCNC: 89 MG/DL — SIGNIFICANT CHANGE UP (ref 70–99)
GLUCOSE BLDC GLUCOMTR-MCNC: 89 MG/DL — SIGNIFICANT CHANGE UP (ref 70–99)
GLUCOSE SERPL-MCNC: 114 MG/DL — HIGH (ref 70–99)
GLUCOSE SERPL-MCNC: 114 MG/DL — HIGH (ref 70–99)
HCT VFR BLD CALC: 36.8 % — LOW (ref 39–50)
HCT VFR BLD CALC: 36.8 % — LOW (ref 39–50)
HCT VFR BLD CALC: 38.7 % — LOW (ref 39–50)
HCT VFR BLD CALC: 38.7 % — LOW (ref 39–50)
HGB BLD-MCNC: 12.1 G/DL — LOW (ref 13–17)
HGB BLD-MCNC: 12.1 G/DL — LOW (ref 13–17)
HGB BLD-MCNC: 12.7 G/DL — LOW (ref 13–17)
HGB BLD-MCNC: 12.7 G/DL — LOW (ref 13–17)
IANC: 5.08 K/UL — SIGNIFICANT CHANGE UP (ref 1.8–7.4)
IANC: 5.08 K/UL — SIGNIFICANT CHANGE UP (ref 1.8–7.4)
IMM GRANULOCYTES NFR BLD AUTO: 1.1 % — HIGH (ref 0–0.9)
IMM GRANULOCYTES NFR BLD AUTO: 1.1 % — HIGH (ref 0–0.9)
LYMPHOCYTES # BLD AUTO: 2.96 K/UL — SIGNIFICANT CHANGE UP (ref 1–3.3)
LYMPHOCYTES # BLD AUTO: 2.96 K/UL — SIGNIFICANT CHANGE UP (ref 1–3.3)
LYMPHOCYTES # BLD AUTO: 32 % — SIGNIFICANT CHANGE UP (ref 13–44)
LYMPHOCYTES # BLD AUTO: 32 % — SIGNIFICANT CHANGE UP (ref 13–44)
MAGNESIUM SERPL-MCNC: 2.3 MG/DL — SIGNIFICANT CHANGE UP (ref 1.6–2.6)
MAGNESIUM SERPL-MCNC: 2.3 MG/DL — SIGNIFICANT CHANGE UP (ref 1.6–2.6)
MCHC RBC-ENTMCNC: 28.5 PG — SIGNIFICANT CHANGE UP (ref 27–34)
MCHC RBC-ENTMCNC: 28.5 PG — SIGNIFICANT CHANGE UP (ref 27–34)
MCHC RBC-ENTMCNC: 28.6 PG — SIGNIFICANT CHANGE UP (ref 27–34)
MCHC RBC-ENTMCNC: 28.6 PG — SIGNIFICANT CHANGE UP (ref 27–34)
MCHC RBC-ENTMCNC: 32.8 GM/DL — SIGNIFICANT CHANGE UP (ref 32–36)
MCHC RBC-ENTMCNC: 32.8 GM/DL — SIGNIFICANT CHANGE UP (ref 32–36)
MCHC RBC-ENTMCNC: 32.9 GM/DL — SIGNIFICANT CHANGE UP (ref 32–36)
MCHC RBC-ENTMCNC: 32.9 GM/DL — SIGNIFICANT CHANGE UP (ref 32–36)
MCV RBC AUTO: 86.6 FL — SIGNIFICANT CHANGE UP (ref 80–100)
MCV RBC AUTO: 86.6 FL — SIGNIFICANT CHANGE UP (ref 80–100)
MCV RBC AUTO: 87.2 FL — SIGNIFICANT CHANGE UP (ref 80–100)
MCV RBC AUTO: 87.2 FL — SIGNIFICANT CHANGE UP (ref 80–100)
MONOCYTES # BLD AUTO: 0.64 K/UL — SIGNIFICANT CHANGE UP (ref 0–0.9)
MONOCYTES # BLD AUTO: 0.64 K/UL — SIGNIFICANT CHANGE UP (ref 0–0.9)
MONOCYTES NFR BLD AUTO: 6.9 % — SIGNIFICANT CHANGE UP (ref 2–14)
MONOCYTES NFR BLD AUTO: 6.9 % — SIGNIFICANT CHANGE UP (ref 2–14)
NEUTROPHILS # BLD AUTO: 5.08 K/UL — SIGNIFICANT CHANGE UP (ref 1.8–7.4)
NEUTROPHILS # BLD AUTO: 5.08 K/UL — SIGNIFICANT CHANGE UP (ref 1.8–7.4)
NEUTROPHILS NFR BLD AUTO: 54.8 % — SIGNIFICANT CHANGE UP (ref 43–77)
NEUTROPHILS NFR BLD AUTO: 54.8 % — SIGNIFICANT CHANGE UP (ref 43–77)
NRBC # BLD: 0 /100 WBCS — SIGNIFICANT CHANGE UP (ref 0–0)
NRBC # FLD: 0 K/UL — SIGNIFICANT CHANGE UP (ref 0–0)
PHOSPHATE SERPL-MCNC: 3.6 MG/DL — SIGNIFICANT CHANGE UP (ref 2.5–4.5)
PHOSPHATE SERPL-MCNC: 3.6 MG/DL — SIGNIFICANT CHANGE UP (ref 2.5–4.5)
PLATELET # BLD AUTO: 228 K/UL — SIGNIFICANT CHANGE UP (ref 150–400)
PLATELET # BLD AUTO: 228 K/UL — SIGNIFICANT CHANGE UP (ref 150–400)
PLATELET # BLD AUTO: 251 K/UL — SIGNIFICANT CHANGE UP (ref 150–400)
PLATELET # BLD AUTO: 251 K/UL — SIGNIFICANT CHANGE UP (ref 150–400)
POTASSIUM SERPL-MCNC: 3.9 MMOL/L — SIGNIFICANT CHANGE UP (ref 3.5–5.3)
POTASSIUM SERPL-MCNC: 3.9 MMOL/L — SIGNIFICANT CHANGE UP (ref 3.5–5.3)
POTASSIUM SERPL-SCNC: 3.9 MMOL/L — SIGNIFICANT CHANGE UP (ref 3.5–5.3)
POTASSIUM SERPL-SCNC: 3.9 MMOL/L — SIGNIFICANT CHANGE UP (ref 3.5–5.3)
RBC # BLD: 4.25 M/UL — SIGNIFICANT CHANGE UP (ref 4.2–5.8)
RBC # BLD: 4.25 M/UL — SIGNIFICANT CHANGE UP (ref 4.2–5.8)
RBC # BLD: 4.44 M/UL — SIGNIFICANT CHANGE UP (ref 4.2–5.8)
RBC # BLD: 4.44 M/UL — SIGNIFICANT CHANGE UP (ref 4.2–5.8)
RBC # FLD: 13.4 % — SIGNIFICANT CHANGE UP (ref 10.3–14.5)
RBC # FLD: 13.4 % — SIGNIFICANT CHANGE UP (ref 10.3–14.5)
RBC # FLD: 13.6 % — SIGNIFICANT CHANGE UP (ref 10.3–14.5)
RBC # FLD: 13.6 % — SIGNIFICANT CHANGE UP (ref 10.3–14.5)
SARS-COV-2 RNA SPEC QL NAA+PROBE: SIGNIFICANT CHANGE UP
SARS-COV-2 RNA SPEC QL NAA+PROBE: SIGNIFICANT CHANGE UP
SODIUM SERPL-SCNC: 137 MMOL/L — SIGNIFICANT CHANGE UP (ref 135–145)
SODIUM SERPL-SCNC: 137 MMOL/L — SIGNIFICANT CHANGE UP (ref 135–145)
VIT B12 SERPL-MCNC: 620 PG/ML — SIGNIFICANT CHANGE UP (ref 200–900)
VIT B12 SERPL-MCNC: 620 PG/ML — SIGNIFICANT CHANGE UP (ref 200–900)
WBC # BLD: 11.37 K/UL — HIGH (ref 3.8–10.5)
WBC # BLD: 11.37 K/UL — HIGH (ref 3.8–10.5)
WBC # BLD: 9.26 K/UL — SIGNIFICANT CHANGE UP (ref 3.8–10.5)
WBC # BLD: 9.26 K/UL — SIGNIFICANT CHANGE UP (ref 3.8–10.5)
WBC # FLD AUTO: 11.37 K/UL — HIGH (ref 3.8–10.5)
WBC # FLD AUTO: 11.37 K/UL — HIGH (ref 3.8–10.5)
WBC # FLD AUTO: 9.26 K/UL — SIGNIFICANT CHANGE UP (ref 3.8–10.5)
WBC # FLD AUTO: 9.26 K/UL — SIGNIFICANT CHANGE UP (ref 3.8–10.5)

## 2023-11-01 PROCEDURE — 99232 SBSQ HOSP IP/OBS MODERATE 35: CPT

## 2023-11-01 RX ORDER — LANOLIN ALCOHOL/MO/W.PET/CERES
6 CREAM (GRAM) TOPICAL ONCE
Refills: 0 | Status: COMPLETED | OUTPATIENT
Start: 2023-11-01 | End: 2023-11-01

## 2023-11-01 RX ORDER — NICOTINE POLACRILEX 2 MG
1 GUM BUCCAL DAILY
Refills: 0 | Status: DISCONTINUED | OUTPATIENT
Start: 2023-11-01 | End: 2023-11-06

## 2023-11-01 RX ORDER — ARIPIPRAZOLE 15 MG/1
5 TABLET ORAL DAILY
Refills: 0 | Status: DISCONTINUED | OUTPATIENT
Start: 2023-11-01 | End: 2023-11-04

## 2023-11-01 RX ADMIN — Medication 0.5 MILLIGRAM(S): at 18:36

## 2023-11-01 RX ADMIN — Medication 6 MILLIGRAM(S): at 01:55

## 2023-11-01 RX ADMIN — Medication 2 MILLIGRAM(S): at 14:44

## 2023-11-01 RX ADMIN — ENOXAPARIN SODIUM 40 MILLIGRAM(S): 100 INJECTION SUBCUTANEOUS at 05:36

## 2023-11-01 RX ADMIN — Medication 50 MILLIGRAM(S): at 05:36

## 2023-11-01 RX ADMIN — Medication 0.5 MILLIGRAM(S): at 05:37

## 2023-11-01 RX ADMIN — Medication 3 MILLIGRAM(S): at 00:03

## 2023-11-01 RX ADMIN — Medication 650 MILLIGRAM(S): at 10:24

## 2023-11-01 RX ADMIN — Medication 650 MILLIGRAM(S): at 05:18

## 2023-11-01 RX ADMIN — Medication 650 MILLIGRAM(S): at 04:48

## 2023-11-01 RX ADMIN — LOSARTAN POTASSIUM 50 MILLIGRAM(S): 100 TABLET, FILM COATED ORAL at 05:37

## 2023-11-01 RX ADMIN — ARIPIPRAZOLE 2.5 MILLIGRAM(S): 15 TABLET ORAL at 14:12

## 2023-11-01 RX ADMIN — Medication 650 MILLIGRAM(S): at 11:24

## 2023-11-01 RX ADMIN — Medication 2 MILLIGRAM(S): at 14:12

## 2023-11-01 RX ADMIN — Medication 150 MILLIGRAM(S): at 15:10

## 2023-11-01 NOTE — PROGRESS NOTE ADULT - PROBLEM SELECTOR PLAN 7
DVT ppx: lovenox  DIET: DASH  DISPO: [facility]    On admission unable to go to [facility] due to fall risk - evaluated by PT - patient able to ambulate without assistance. Patient reports at times he feels weak and falls, no preceding symptoms, loss of consciousness, bowel/bladder incontinence, dizziness. Low suspicion for cardio/neurogenic syncope. Given unremarkable neuro exam, neg CTH/orthostats, able to ambulate with PT no further inpatient work up indicated. Medically optimized for dc to [facility].

## 2023-11-01 NOTE — PROGRESS NOTE ADULT - PROBLEM SELECTOR PLAN 2
WBC 11, otherwise afebrile/sirs negative  - patient non toxic appearing, no focal s/s infection  - low suspicion for infectious etiology   - will check COVID swab

## 2023-11-01 NOTE — PROGRESS NOTE ADULT - PROBLEM SELECTOR PLAN 4
BP stable  - losartan/hctz held on admission due to CHAPARRO - now resolved  - resume losartan at lower dose 50mg daily, c/w metoprolol, resume hctz as needed   - monitor vital signs

## 2023-11-01 NOTE — PROGRESS NOTE ADULT - PROBLEM SELECTOR PLAN 1
worsening auditory hallucinations and SI  - appreciate  recs: Abilify 2.5mg QD, c/w klonopin 0.5mg BID, effexor 150mg QD, cogentin 2mg QAM  - PRN: fluphenazine 5 mg q6h prn, benadryl 50 mg q6h prn and ativan 2 mg IM q6h prn for severe agitation  - continue constant observation, safety tray

## 2023-11-01 NOTE — BH CONSULTATION LIAISON PROGRESS NOTE - NSBHASSESSMENTFT_PSY_ALL_CORE
The Pt is a 63yr old  male (Guyanese speaking only), single, domiciled and unemployed, PPHx schizophrenia, multiple past psych admissions (including 5 [facility] admissions last admission in May, 2022, self reports remote hx of interrupted sa via hanging, in treatment with DR. Chino [phone], no  legal or substance hx, PMHx of HTN, DM and HLD.  Today, BIB family to worsening AH and si.    On initial evaluation, patient presents with worsening of depressive, anxiety and psychotic sx, worsening AH that are derogatory in nature as reported to initital provider.  Pt is not at his baseline and is not able to care for himself due to severity of sx ,  patient warrants inpatient admission for stabilization of his acute symptoms as these symptoms place him at an acutely elevated risk of harm.  At this time, given patient's fall history with unclear etiology, recommend admission to medic unit for further exploration of etiology with continued follow up by  psychiatry team.      10/31: Patient continues to endorse SI and AH. Engaged on interview, cooperative, calm. Adherent with standing medications. No PRNs required. Will CTM for akithisia given prior reports in chart.  11/1: tolerating med, improving, increase as below     Plan:  - Patient cannot leave AMA; plans for transfer to [facility] following medical clearance for fall risk  - Continue 1:1 for fall precaution and SI  - Continue Klonopin 0.5mg  BID per I-Stop,  Effexor 150mg, Cogentin 2mg qam  - Increase Abilify to 5mg; CTM for akithisia  - PRNs: Fluphenazine 5mg PO/IM q6h for agitation, Benadryl 50mg PO/IM q6h for agitation. Ativan 2mg IM for severe agitation  - nicotine replacement patch + gum PRN  - continue to monitor QTc, K, Mg; HOLD ALL ANTIPSYCHOTICS for QTc > 500

## 2023-11-02 LAB
ANION GAP SERPL CALC-SCNC: 11 MMOL/L — SIGNIFICANT CHANGE UP (ref 7–14)
ANION GAP SERPL CALC-SCNC: 11 MMOL/L — SIGNIFICANT CHANGE UP (ref 7–14)
BUN SERPL-MCNC: 15 MG/DL — SIGNIFICANT CHANGE UP (ref 7–23)
BUN SERPL-MCNC: 15 MG/DL — SIGNIFICANT CHANGE UP (ref 7–23)
CALCIUM SERPL-MCNC: 9.4 MG/DL — SIGNIFICANT CHANGE UP (ref 8.4–10.5)
CALCIUM SERPL-MCNC: 9.4 MG/DL — SIGNIFICANT CHANGE UP (ref 8.4–10.5)
CHLORIDE SERPL-SCNC: 101 MMOL/L — SIGNIFICANT CHANGE UP (ref 98–107)
CHLORIDE SERPL-SCNC: 101 MMOL/L — SIGNIFICANT CHANGE UP (ref 98–107)
CO2 SERPL-SCNC: 25 MMOL/L — SIGNIFICANT CHANGE UP (ref 22–31)
CO2 SERPL-SCNC: 25 MMOL/L — SIGNIFICANT CHANGE UP (ref 22–31)
CREAT SERPL-MCNC: 0.88 MG/DL — SIGNIFICANT CHANGE UP (ref 0.5–1.3)
CREAT SERPL-MCNC: 0.88 MG/DL — SIGNIFICANT CHANGE UP (ref 0.5–1.3)
EGFR: 97 ML/MIN/1.73M2 — SIGNIFICANT CHANGE UP
EGFR: 97 ML/MIN/1.73M2 — SIGNIFICANT CHANGE UP
GLUCOSE BLDC GLUCOMTR-MCNC: 127 MG/DL — HIGH (ref 70–99)
GLUCOSE BLDC GLUCOMTR-MCNC: 127 MG/DL — HIGH (ref 70–99)
GLUCOSE BLDC GLUCOMTR-MCNC: 133 MG/DL — HIGH (ref 70–99)
GLUCOSE BLDC GLUCOMTR-MCNC: 133 MG/DL — HIGH (ref 70–99)
GLUCOSE BLDC GLUCOMTR-MCNC: 78 MG/DL — SIGNIFICANT CHANGE UP (ref 70–99)
GLUCOSE BLDC GLUCOMTR-MCNC: 78 MG/DL — SIGNIFICANT CHANGE UP (ref 70–99)
GLUCOSE SERPL-MCNC: 93 MG/DL — SIGNIFICANT CHANGE UP (ref 70–99)
GLUCOSE SERPL-MCNC: 93 MG/DL — SIGNIFICANT CHANGE UP (ref 70–99)
HCT VFR BLD CALC: 38.3 % — LOW (ref 39–50)
HCT VFR BLD CALC: 38.3 % — LOW (ref 39–50)
HGB BLD-MCNC: 12.7 G/DL — LOW (ref 13–17)
HGB BLD-MCNC: 12.7 G/DL — LOW (ref 13–17)
MAGNESIUM SERPL-MCNC: 2.3 MG/DL — SIGNIFICANT CHANGE UP (ref 1.6–2.6)
MAGNESIUM SERPL-MCNC: 2.3 MG/DL — SIGNIFICANT CHANGE UP (ref 1.6–2.6)
MCHC RBC-ENTMCNC: 28.7 PG — SIGNIFICANT CHANGE UP (ref 27–34)
MCHC RBC-ENTMCNC: 28.7 PG — SIGNIFICANT CHANGE UP (ref 27–34)
MCHC RBC-ENTMCNC: 33.2 GM/DL — SIGNIFICANT CHANGE UP (ref 32–36)
MCHC RBC-ENTMCNC: 33.2 GM/DL — SIGNIFICANT CHANGE UP (ref 32–36)
MCV RBC AUTO: 86.5 FL — SIGNIFICANT CHANGE UP (ref 80–100)
MCV RBC AUTO: 86.5 FL — SIGNIFICANT CHANGE UP (ref 80–100)
NRBC # BLD: 0 /100 WBCS — SIGNIFICANT CHANGE UP (ref 0–0)
NRBC # BLD: 0 /100 WBCS — SIGNIFICANT CHANGE UP (ref 0–0)
NRBC # FLD: 0 K/UL — SIGNIFICANT CHANGE UP (ref 0–0)
NRBC # FLD: 0 K/UL — SIGNIFICANT CHANGE UP (ref 0–0)
PHOSPHATE SERPL-MCNC: 3.6 MG/DL — SIGNIFICANT CHANGE UP (ref 2.5–4.5)
PHOSPHATE SERPL-MCNC: 3.6 MG/DL — SIGNIFICANT CHANGE UP (ref 2.5–4.5)
PLATELET # BLD AUTO: 222 K/UL — SIGNIFICANT CHANGE UP (ref 150–400)
PLATELET # BLD AUTO: 222 K/UL — SIGNIFICANT CHANGE UP (ref 150–400)
POTASSIUM SERPL-MCNC: 4.1 MMOL/L — SIGNIFICANT CHANGE UP (ref 3.5–5.3)
POTASSIUM SERPL-MCNC: 4.1 MMOL/L — SIGNIFICANT CHANGE UP (ref 3.5–5.3)
POTASSIUM SERPL-SCNC: 4.1 MMOL/L — SIGNIFICANT CHANGE UP (ref 3.5–5.3)
POTASSIUM SERPL-SCNC: 4.1 MMOL/L — SIGNIFICANT CHANGE UP (ref 3.5–5.3)
RBC # BLD: 4.43 M/UL — SIGNIFICANT CHANGE UP (ref 4.2–5.8)
RBC # BLD: 4.43 M/UL — SIGNIFICANT CHANGE UP (ref 4.2–5.8)
RBC # FLD: 13.4 % — SIGNIFICANT CHANGE UP (ref 10.3–14.5)
RBC # FLD: 13.4 % — SIGNIFICANT CHANGE UP (ref 10.3–14.5)
SODIUM SERPL-SCNC: 137 MMOL/L — SIGNIFICANT CHANGE UP (ref 135–145)
SODIUM SERPL-SCNC: 137 MMOL/L — SIGNIFICANT CHANGE UP (ref 135–145)
WBC # BLD: 9.5 K/UL — SIGNIFICANT CHANGE UP (ref 3.8–10.5)
WBC # BLD: 9.5 K/UL — SIGNIFICANT CHANGE UP (ref 3.8–10.5)
WBC # FLD AUTO: 9.5 K/UL — SIGNIFICANT CHANGE UP (ref 3.8–10.5)
WBC # FLD AUTO: 9.5 K/UL — SIGNIFICANT CHANGE UP (ref 3.8–10.5)

## 2023-11-02 PROCEDURE — 99232 SBSQ HOSP IP/OBS MODERATE 35: CPT

## 2023-11-02 RX ORDER — CLONAZEPAM 1 MG
0.5 TABLET ORAL
Refills: 0 | Status: DISCONTINUED | OUTPATIENT
Start: 2023-11-02 | End: 2023-11-06

## 2023-11-02 RX ADMIN — Medication 650 MILLIGRAM(S): at 20:16

## 2023-11-02 RX ADMIN — Medication 2 MILLIGRAM(S): at 11:49

## 2023-11-02 RX ADMIN — Medication 0.5 MILLIGRAM(S): at 05:02

## 2023-11-02 RX ADMIN — Medication 650 MILLIGRAM(S): at 14:18

## 2023-11-02 RX ADMIN — Medication 650 MILLIGRAM(S): at 14:16

## 2023-11-02 RX ADMIN — Medication 650 MILLIGRAM(S): at 07:18

## 2023-11-02 RX ADMIN — Medication 650 MILLIGRAM(S): at 22:30

## 2023-11-02 RX ADMIN — Medication 0.5 MILLIGRAM(S): at 17:12

## 2023-11-02 RX ADMIN — Medication 400 MILLIGRAM(S): at 07:16

## 2023-11-02 RX ADMIN — ENOXAPARIN SODIUM 40 MILLIGRAM(S): 100 INJECTION SUBCUTANEOUS at 05:56

## 2023-11-02 RX ADMIN — Medication 975 MILLIGRAM(S): at 07:16

## 2023-11-02 RX ADMIN — Medication 150 MILLIGRAM(S): at 11:50

## 2023-11-02 RX ADMIN — ARIPIPRAZOLE 5 MILLIGRAM(S): 15 TABLET ORAL at 11:49

## 2023-11-02 RX ADMIN — LOSARTAN POTASSIUM 50 MILLIGRAM(S): 100 TABLET, FILM COATED ORAL at 05:02

## 2023-11-02 RX ADMIN — Medication 1 PATCH: at 18:58

## 2023-11-02 RX ADMIN — Medication 50 MILLIGRAM(S): at 05:02

## 2023-11-02 RX ADMIN — Medication 3 MILLIGRAM(S): at 23:07

## 2023-11-02 RX ADMIN — Medication 1 PATCH: at 11:49

## 2023-11-02 NOTE — PROGRESS NOTE ADULT - PROBLEM SELECTOR PLAN 1
worsening auditory hallucinations and SI  - appreciate  recs: Abilify 5mg QD, c/w klonopin 0.5mg BID, effexor 150mg QD, cogentin 2mg QAM  - PRN: fluphenazine 5 mg q6h prn, benadryl 50 mg q6h prn and ativan 2 mg IM q6h prn for severe agitation  - continue constant observation, safety tray

## 2023-11-02 NOTE — PROGRESS NOTE ADULT - PROBLEM SELECTOR PLAN 2
WBC 11, otherwise afebrile/sirs negative  - patient non toxic appearing, no focal s/s infection  - low suspicion for infectious etiology   - will check COVID swab Resolved  - patient non toxic appearing, no focal s/s infection  - low suspicion for infectious etiology   - will check COVID swab

## 2023-11-02 NOTE — CHART NOTE - NSCHARTNOTEFT_GEN_A_CORE
patient reports improvement in mood and appetite, he reports SI but not intent or plan and states that medication has been helping.    Plan:  - Patient cannot leave AMA; plans for transfer to [facility] following medical clearance for fall risk  - Continue 1:1 for fall precaution and SI  - Continue Klonopin 0.5mg  BID per I-Stop,  Effexor 150mg, Cogentin 2mg qam  - Continue Abilify to 5mg with the plan to monitor and make adjustment if needed in the weekend; CTM for akithisia  - PRNs: Fluphenazine 5mg PO/IM q6h for agitation, Benadryl 50mg PO/IM q6h for agitation. Ativan 2mg IM for severe agitation  - nicotine replacement patch + gum PRN  - continue to monitor QTc, K, Mg; HOLD ALL ANTIPSYCHOTICS for QTc > 500

## 2023-11-03 LAB
GLUCOSE BLDC GLUCOMTR-MCNC: 133 MG/DL — HIGH (ref 70–99)
GLUCOSE BLDC GLUCOMTR-MCNC: 133 MG/DL — HIGH (ref 70–99)
GLUCOSE BLDC GLUCOMTR-MCNC: 144 MG/DL — HIGH (ref 70–99)
GLUCOSE BLDC GLUCOMTR-MCNC: 144 MG/DL — HIGH (ref 70–99)
GLUCOSE BLDC GLUCOMTR-MCNC: 98 MG/DL — SIGNIFICANT CHANGE UP (ref 70–99)
GLUCOSE BLDC GLUCOMTR-MCNC: 98 MG/DL — SIGNIFICANT CHANGE UP (ref 70–99)

## 2023-11-03 PROCEDURE — 99232 SBSQ HOSP IP/OBS MODERATE 35: CPT

## 2023-11-03 RX ORDER — LOSARTAN POTASSIUM 100 MG/1
75 TABLET, FILM COATED ORAL DAILY
Refills: 0 | Status: DISCONTINUED | OUTPATIENT
Start: 2023-11-04 | End: 2023-11-06

## 2023-11-03 RX ADMIN — Medication 1 PATCH: at 12:18

## 2023-11-03 RX ADMIN — Medication 1 PATCH: at 11:05

## 2023-11-03 RX ADMIN — Medication 650 MILLIGRAM(S): at 18:49

## 2023-11-03 RX ADMIN — ARIPIPRAZOLE 5 MILLIGRAM(S): 15 TABLET ORAL at 12:18

## 2023-11-03 RX ADMIN — Medication 0.5 MILLIGRAM(S): at 17:33

## 2023-11-03 RX ADMIN — Medication 2 MILLIGRAM(S): at 21:31

## 2023-11-03 RX ADMIN — Medication 150 MILLIGRAM(S): at 12:18

## 2023-11-03 RX ADMIN — Medication 50 MILLIGRAM(S): at 06:16

## 2023-11-03 RX ADMIN — LOSARTAN POTASSIUM 50 MILLIGRAM(S): 100 TABLET, FILM COATED ORAL at 06:16

## 2023-11-03 RX ADMIN — Medication 0.5 MILLIGRAM(S): at 06:16

## 2023-11-03 RX ADMIN — Medication 1 PATCH: at 18:49

## 2023-11-03 RX ADMIN — Medication 1 PATCH: at 07:10

## 2023-11-03 RX ADMIN — Medication 2 MILLIGRAM(S): at 12:18

## 2023-11-03 RX ADMIN — Medication 3 MILLIGRAM(S): at 21:32

## 2023-11-03 RX ADMIN — ENOXAPARIN SODIUM 40 MILLIGRAM(S): 100 INJECTION SUBCUTANEOUS at 06:16

## 2023-11-03 NOTE — BH CONSULTATION LIAISON PROGRESS NOTE - NSBHASSESSMENTFT_PSY_ALL_CORE
The Pt is a 63yr old  male (Nepalese speaking only), single, domiciled and unemployed, PPHx schizophrenia, multiple past psych admissions (including 5 [facility] admissions last admission in May, 2022, self reports remote hx of interrupted sa via hanging, in treatment with DR. Chino [phone], no  legal or substance hx, PMHx of HTN, DM and HLD.  Today, BIB family to worsening AH and si.    On initial evaluation, patient presents with worsening of depressive, anxiety and psychotic sx, worsening AH that are derogatory in nature as reported to initital provider.  Pt is not at his baseline and is not able to care for himself due to severity of sx ,  patient warrants inpatient admission for stabilization of his acute symptoms as these symptoms place him at an acutely elevated risk of harm.  At this time, given patient's fall history with unclear etiology, recommend admission to medic unit for further exploration of etiology with continued follow up by  psychiatry team.      10/31: Patient continues to endorse SI and AH. Engaged on interview, cooperative, calm. Adherent with standing medications. No PRNs required. Will CTM for akithisia given prior reports in chart.  11/1: tolerating med, improving, increase as below   11/3: tolerating med, did not sleep last night would change meds as below     Plan:  - Patient cannot leave AMA; plans for transfer to [facility] following medical clearance for fall risk  - Continue 1:1 for fall precaution and SI  - Continue Klonopin 0.5mg  BID per I-Stop,  Effexor 150mg  - STOP cogentin   - BEGIN Melatonin 6mg qHS   - Increase Abilify to 7.5mg; CTM for akithisia  - PRNs: Fluphenazine 5mg PO/IM q6h for agitation, Benadryl 50mg PO/IM q6h for agitation. Ativan 2mg IM for severe agitation  - nicotine replacement patch + gum PRN  - continue to monitor QTc, K, Mg; HOLD ALL ANTIPSYCHOTICS for QTc > 500    Awaiting [facility] transfer

## 2023-11-03 NOTE — PROGRESS NOTE ADULT - PROBLEM SELECTOR PLAN 7
DVT ppx: lovenox  DIET: DASH  DISPO: [facility]    On admission unable to go to [facility] due to fall risk - evaluated by PT - patient able to ambulate without assistance. Patient reports at times he feels weak and falls, no preceding symptoms, loss of consciousness, bowel/bladder incontinence, dizziness. Low suspicion for cardio/neurogenic syncope. Given unremarkable neuro exam, neg CTH/orthostats, able to ambulate with PT no further inpatient work up indicated. Medically optimized for dc to [facility]. DVT ppx: lovenox  DIET: DASH  DISPO: [facility]. 37 mins spent dc planning.     On admission unable to go to [facility] due to fall risk - evaluated by PT - patient able to ambulate without assistance. Patient reports at home - intermittently he feels weak and falls, no preceding symptoms, loss of consciousness, bowel/bladder incontinence, dizziness. These symptoms have not recurred while admitted. Low suspicion for cardio/neurogenic syncope. Given unremarkable neuro exam, neg CTH/orthostats, able to ambulate with PT no further inpatient work up indicated. Medically optimized for dc to [facility].

## 2023-11-03 NOTE — PROGRESS NOTE ADULT - PROBLEM SELECTOR PLAN 2
Resolved  - patient non toxic appearing, no focal s/s infection  - low suspicion for infectious etiology   - will check COVID swab

## 2023-11-04 LAB
GLUCOSE BLDC GLUCOMTR-MCNC: 112 MG/DL — HIGH (ref 70–99)
GLUCOSE BLDC GLUCOMTR-MCNC: 112 MG/DL — HIGH (ref 70–99)
GLUCOSE BLDC GLUCOMTR-MCNC: 119 MG/DL — HIGH (ref 70–99)
GLUCOSE BLDC GLUCOMTR-MCNC: 119 MG/DL — HIGH (ref 70–99)
GLUCOSE BLDC GLUCOMTR-MCNC: 94 MG/DL — SIGNIFICANT CHANGE UP (ref 70–99)
GLUCOSE BLDC GLUCOMTR-MCNC: 94 MG/DL — SIGNIFICANT CHANGE UP (ref 70–99)
GLUCOSE BLDC GLUCOMTR-MCNC: 98 MG/DL — SIGNIFICANT CHANGE UP (ref 70–99)
GLUCOSE BLDC GLUCOMTR-MCNC: 98 MG/DL — SIGNIFICANT CHANGE UP (ref 70–99)
SARS-COV-2 RNA SPEC QL NAA+PROBE: SIGNIFICANT CHANGE UP
SARS-COV-2 RNA SPEC QL NAA+PROBE: SIGNIFICANT CHANGE UP

## 2023-11-04 PROCEDURE — 99232 SBSQ HOSP IP/OBS MODERATE 35: CPT

## 2023-11-04 RX ORDER — LANOLIN ALCOHOL/MO/W.PET/CERES
6 CREAM (GRAM) TOPICAL AT BEDTIME
Refills: 0 | Status: DISCONTINUED | OUTPATIENT
Start: 2023-11-04 | End: 2023-11-06

## 2023-11-04 RX ORDER — ARIPIPRAZOLE 15 MG/1
7.5 TABLET ORAL DAILY
Refills: 0 | Status: DISCONTINUED | OUTPATIENT
Start: 2023-11-04 | End: 2023-11-06

## 2023-11-04 RX ADMIN — Medication 1 PATCH: at 11:40

## 2023-11-04 RX ADMIN — Medication 0.5 MILLIGRAM(S): at 17:06

## 2023-11-04 RX ADMIN — Medication 6 MILLIGRAM(S): at 22:29

## 2023-11-04 RX ADMIN — Medication 650 MILLIGRAM(S): at 12:44

## 2023-11-04 RX ADMIN — Medication 150 MILLIGRAM(S): at 11:41

## 2023-11-04 RX ADMIN — Medication 50 MILLIGRAM(S): at 07:11

## 2023-11-04 RX ADMIN — LOSARTAN POTASSIUM 75 MILLIGRAM(S): 100 TABLET, FILM COATED ORAL at 07:11

## 2023-11-04 RX ADMIN — Medication 0.5 MILLIGRAM(S): at 07:11

## 2023-11-04 RX ADMIN — Medication 1 PATCH: at 09:14

## 2023-11-04 RX ADMIN — Medication 650 MILLIGRAM(S): at 18:44

## 2023-11-04 RX ADMIN — ARIPIPRAZOLE 7.5 MILLIGRAM(S): 15 TABLET ORAL at 11:40

## 2023-11-04 RX ADMIN — Medication 1 PATCH: at 18:03

## 2023-11-04 RX ADMIN — Medication 2 MILLIGRAM(S): at 22:29

## 2023-11-04 RX ADMIN — Medication 650 MILLIGRAM(S): at 11:42

## 2023-11-04 RX ADMIN — Medication 1 PATCH: at 12:44

## 2023-11-04 NOTE — PROGRESS NOTE ADULT - PROBLEM SELECTOR PLAN 4
BP stable  - losartan/hctz held on admission due to CHAPARRO - now resolved  - resume losartan at lower dose 75mg daily, c/w metoprolol, resume hctz as needed   - monitor vital signs

## 2023-11-04 NOTE — PROGRESS NOTE ADULT - PROBLEM SELECTOR PLAN 7
DVT ppx: lovenox  DIET: DASH  DISPO: [facility]. 37 mins spent dc planning.     On admission unable to go to [facility] due to fall risk - evaluated by PT - patient able to ambulate without assistance. Patient reports at home - intermittently he feels weak and falls, no preceding symptoms, loss of consciousness, bowel/bladder incontinence, dizziness. These symptoms have not recurred while admitted. Low suspicion for cardio/neurogenic syncope. Given unremarkable neuro exam, neg CTH/orthostats, able to ambulate with PT no further inpatient work up indicated. Medically optimized for dc to [facility].

## 2023-11-04 NOTE — PROGRESS NOTE ADULT - PROBLEM SELECTOR PLAN 1
worsening auditory hallucinations and SI  - appreciate  recs: Abilify 5mg QD, c/w klonopin 0.5mg BID, effexor 150mg QD, cogentin 2mg QAM  - PRN: fluphenazine 5 mg q6h prn, benadryl 50 mg q6h prn and ativan 2 mg IM q6h prn for severe agitation  - continue constant observation, safety tray worsening auditory hallucinations and SI  - appreciate  recs: inc to Abilify 7.5mg QD, c/w klonopin 0.5mg BID, effexor 150mg QD, dc cogentin   - PRN: fluphenazine 5 mg q6h prn, benadryl 50 mg q6h prn and ativan 2 mg IM q6h prn for severe agitation  - continue constant observation, safety tray

## 2023-11-05 LAB
GLUCOSE BLDC GLUCOMTR-MCNC: 113 MG/DL — HIGH (ref 70–99)
GLUCOSE BLDC GLUCOMTR-MCNC: 113 MG/DL — HIGH (ref 70–99)
GLUCOSE BLDC GLUCOMTR-MCNC: 123 MG/DL — HIGH (ref 70–99)
GLUCOSE BLDC GLUCOMTR-MCNC: 123 MG/DL — HIGH (ref 70–99)
GLUCOSE BLDC GLUCOMTR-MCNC: 129 MG/DL — HIGH (ref 70–99)
GLUCOSE BLDC GLUCOMTR-MCNC: 129 MG/DL — HIGH (ref 70–99)
GLUCOSE BLDC GLUCOMTR-MCNC: 90 MG/DL — SIGNIFICANT CHANGE UP (ref 70–99)
GLUCOSE BLDC GLUCOMTR-MCNC: 90 MG/DL — SIGNIFICANT CHANGE UP (ref 70–99)

## 2023-11-05 PROCEDURE — 99232 SBSQ HOSP IP/OBS MODERATE 35: CPT

## 2023-11-05 RX ORDER — KETOROLAC TROMETHAMINE 30 MG/ML
10 SYRINGE (ML) INJECTION ONCE
Refills: 0 | Status: DISCONTINUED | OUTPATIENT
Start: 2023-11-05 | End: 2023-11-05

## 2023-11-05 RX ADMIN — Medication 50 MILLIGRAM(S): at 06:01

## 2023-11-05 RX ADMIN — LOSARTAN POTASSIUM 75 MILLIGRAM(S): 100 TABLET, FILM COATED ORAL at 06:00

## 2023-11-05 RX ADMIN — Medication 1 PATCH: at 18:04

## 2023-11-05 RX ADMIN — Medication 650 MILLIGRAM(S): at 08:41

## 2023-11-05 RX ADMIN — Medication 650 MILLIGRAM(S): at 07:17

## 2023-11-05 RX ADMIN — Medication 10 MILLIGRAM(S): at 09:59

## 2023-11-05 RX ADMIN — Medication 650 MILLIGRAM(S): at 08:51

## 2023-11-05 RX ADMIN — Medication 1 PATCH: at 11:30

## 2023-11-05 RX ADMIN — Medication 0.5 MILLIGRAM(S): at 06:01

## 2023-11-05 RX ADMIN — Medication 150 MILLIGRAM(S): at 11:30

## 2023-11-05 RX ADMIN — Medication 1 PATCH: at 11:15

## 2023-11-05 RX ADMIN — Medication 10 MILLIGRAM(S): at 11:15

## 2023-11-05 RX ADMIN — Medication 0.5 MILLIGRAM(S): at 17:30

## 2023-11-05 RX ADMIN — ARIPIPRAZOLE 7.5 MILLIGRAM(S): 15 TABLET ORAL at 11:29

## 2023-11-05 RX ADMIN — Medication 6 MILLIGRAM(S): at 21:48

## 2023-11-05 RX ADMIN — Medication 1 PATCH: at 07:17

## 2023-11-05 NOTE — PROGRESS NOTE ADULT - PROBLEM SELECTOR PLAN 1
worsening auditory hallucinations and SI  - appreciate  recs: inc to Abilify 7.5mg QD, c/w klonopin 0.5mg BID, effexor 150mg QD, dc cogentin   - PRN: fluphenazine 5 mg q6h prn, benadryl 50 mg q6h prn and ativan 2 mg IM q6h prn for severe agitation  - continue constant observation, safety tray

## 2023-11-05 NOTE — PROGRESS NOTE ADULT - PROBLEM SELECTOR PLAN 7
DVT ppx: lovenox  DIET: DASH  DISPO: [facility]. 37 mins spent dc planning.     On admission unable to go to [facility] due to fall risk - evaluated by PT - patient able to ambulate without assistance. Patient reports at home - intermittently he feels weak and falls, no preceding symptoms, loss of consciousness, bowel/bladder incontinence, dizziness. These symptoms have not recurred while admitted. Low suspicion for cardio/neurogenic syncope. Given unremarkable neuro exam, neg CTH/orthostats, able to ambulate with PT no further inpatient work up indicated. Medically optimized for dc to [facility].

## 2023-11-06 ENCOUNTER — INPATIENT (INPATIENT)
Facility: HOSPITAL | Age: 63
LOS: 8 days | Discharge: HOME | End: 2023-11-15
Attending: PSYCHIATRY & NEUROLOGY | Admitting: PSYCHIATRY & NEUROLOGY
Payer: MEDICAID

## 2023-11-06 ENCOUNTER — TRANSCRIPTION ENCOUNTER (OUTPATIENT)
Age: 63
End: 2023-11-06

## 2023-11-06 VITALS
SYSTOLIC BLOOD PRESSURE: 190 MMHG | WEIGHT: 167.55 LBS | HEIGHT: 66 IN | HEART RATE: 88 BPM | TEMPERATURE: 98 F | DIASTOLIC BLOOD PRESSURE: 110 MMHG

## 2023-11-06 VITALS — WEIGHT: 172.4 LBS

## 2023-11-06 DIAGNOSIS — F20.9 SCHIZOPHRENIA, UNSPECIFIED: ICD-10-CM

## 2023-11-06 DIAGNOSIS — E11.9 TYPE 2 DIABETES MELLITUS WITHOUT COMPLICATIONS: ICD-10-CM

## 2023-11-06 DIAGNOSIS — F33.9 MAJOR DEPRESSIVE DISORDER, RECURRENT, UNSPECIFIED: ICD-10-CM

## 2023-11-06 DIAGNOSIS — E78.5 HYPERLIPIDEMIA, UNSPECIFIED: ICD-10-CM

## 2023-11-06 DIAGNOSIS — I10 ESSENTIAL (PRIMARY) HYPERTENSION: ICD-10-CM

## 2023-11-06 DIAGNOSIS — R73.03 PREDIABETES: ICD-10-CM

## 2023-11-06 LAB
GLUCOSE BLDC GLUCOMTR-MCNC: 112 MG/DL — HIGH (ref 70–99)
GLUCOSE BLDC GLUCOMTR-MCNC: 112 MG/DL — HIGH (ref 70–99)
GLUCOSE BLDC GLUCOMTR-MCNC: 95 MG/DL — SIGNIFICANT CHANGE UP (ref 70–99)
GLUCOSE BLDC GLUCOMTR-MCNC: 95 MG/DL — SIGNIFICANT CHANGE UP (ref 70–99)

## 2023-11-06 PROCEDURE — 99232 SBSQ HOSP IP/OBS MODERATE 35: CPT

## 2023-11-06 PROCEDURE — 99239 HOSP IP/OBS DSCHRG MGMT >30: CPT

## 2023-11-06 RX ORDER — CLONAZEPAM 1 MG
0.5 TABLET ORAL
Refills: 0 | Status: DISCONTINUED | OUTPATIENT
Start: 2023-11-06 | End: 2023-11-13

## 2023-11-06 RX ORDER — FLUPHENAZINE HYDROCHLORIDE 1 MG/1
5 TABLET, FILM COATED ORAL ONCE
Refills: 0 | Status: DISCONTINUED | OUTPATIENT
Start: 2023-11-06 | End: 2023-11-15

## 2023-11-06 RX ORDER — LOSARTAN POTASSIUM 100 MG/1
75 TABLET, FILM COATED ORAL DAILY
Refills: 0 | Status: DISCONTINUED | OUTPATIENT
Start: 2023-11-06 | End: 2023-11-07

## 2023-11-06 RX ORDER — LANOLIN ALCOHOL/MO/W.PET/CERES
6 CREAM (GRAM) TOPICAL AT BEDTIME
Refills: 0 | Status: DISCONTINUED | OUTPATIENT
Start: 2023-11-06 | End: 2023-11-15

## 2023-11-06 RX ORDER — LANOLIN ALCOHOL/MO/W.PET/CERES
2 CREAM (GRAM) TOPICAL
Qty: 0 | Refills: 0 | DISCHARGE
Start: 2023-11-06

## 2023-11-06 RX ORDER — LOSARTAN POTASSIUM 100 MG/1
3 TABLET, FILM COATED ORAL
Qty: 0 | Refills: 0 | DISCHARGE
Start: 2023-11-06

## 2023-11-06 RX ORDER — ARIPIPRAZOLE 15 MG/1
1 TABLET ORAL
Refills: 0 | DISCHARGE

## 2023-11-06 RX ORDER — CLONAZEPAM 1 MG
1 TABLET ORAL
Qty: 0 | Refills: 0 | DISCHARGE
Start: 2023-11-06

## 2023-11-06 RX ORDER — INFLUENZA VIRUS VACCINE 15; 15; 15; 15 UG/.5ML; UG/.5ML; UG/.5ML; UG/.5ML
0.5 SUSPENSION INTRAMUSCULAR ONCE
Refills: 0 | Status: DISCONTINUED | OUTPATIENT
Start: 2023-11-06 | End: 2023-11-15

## 2023-11-06 RX ORDER — ARIPIPRAZOLE 15 MG/1
10 TABLET ORAL DAILY
Refills: 0 | Status: DISCONTINUED | OUTPATIENT
Start: 2023-11-06 | End: 2023-11-15

## 2023-11-06 RX ORDER — LOSARTAN POTASSIUM 100 MG/1
1 TABLET, FILM COATED ORAL
Refills: 0 | DISCHARGE

## 2023-11-06 RX ORDER — METOPROLOL TARTRATE 50 MG
1 TABLET ORAL
Refills: 0 | DISCHARGE

## 2023-11-06 RX ORDER — ARIPIPRAZOLE 15 MG/1
7.5 TABLET ORAL
Qty: 0 | Refills: 0 | DISCHARGE
Start: 2023-11-06

## 2023-11-06 RX ORDER — VENLAFAXINE HCL 75 MG
150 CAPSULE, EXT RELEASE 24 HR ORAL DAILY
Refills: 0 | Status: DISCONTINUED | OUTPATIENT
Start: 2023-11-06 | End: 2023-11-15

## 2023-11-06 RX ORDER — ACETAMINOPHEN 500 MG
650 TABLET ORAL EVERY 6 HOURS
Refills: 0 | Status: DISCONTINUED | OUTPATIENT
Start: 2023-11-06 | End: 2023-11-15

## 2023-11-06 RX ORDER — POLYETHYLENE GLYCOL 3350 17 G/17G
17 POWDER, FOR SOLUTION ORAL DAILY
Refills: 0 | Status: DISCONTINUED | OUTPATIENT
Start: 2023-11-06 | End: 2023-11-15

## 2023-11-06 RX ORDER — LOSARTAN POTASSIUM 100 MG/1
75 TABLET, FILM COATED ORAL ONCE
Refills: 0 | Status: COMPLETED | OUTPATIENT
Start: 2023-11-06 | End: 2023-11-06

## 2023-11-06 RX ORDER — METOPROLOL TARTRATE 50 MG
1 TABLET ORAL
Qty: 0 | Refills: 0 | DISCHARGE
Start: 2023-11-06

## 2023-11-06 RX ORDER — VENLAFAXINE HCL 75 MG
1 CAPSULE, EXT RELEASE 24 HR ORAL
Qty: 0 | Refills: 0 | DISCHARGE
Start: 2023-11-06

## 2023-11-06 RX ORDER — METOPROLOL TARTRATE 50 MG
50 TABLET ORAL DAILY
Refills: 0 | Status: DISCONTINUED | OUTPATIENT
Start: 2023-11-06 | End: 2023-11-08

## 2023-11-06 RX ORDER — BENZTROPINE MESYLATE 1 MG
1 TABLET ORAL
Refills: 0 | DISCHARGE

## 2023-11-06 RX ORDER — NICOTINE POLACRILEX 2 MG
1 GUM BUCCAL DAILY
Refills: 0 | Status: DISCONTINUED | OUTPATIENT
Start: 2023-11-06 | End: 2023-11-15

## 2023-11-06 RX ORDER — DIPHENHYDRAMINE HCL 50 MG
50 CAPSULE ORAL ONCE
Refills: 0 | Status: DISCONTINUED | OUTPATIENT
Start: 2023-11-06 | End: 2023-11-15

## 2023-11-06 RX ORDER — CLONAZEPAM 1 MG
1 TABLET ORAL
Refills: 0 | DISCHARGE

## 2023-11-06 RX ORDER — FLUPHENAZINE HYDROCHLORIDE 1 MG/1
5 TABLET, FILM COATED ORAL EVERY 6 HOURS
Refills: 0 | Status: DISCONTINUED | OUTPATIENT
Start: 2023-11-06 | End: 2023-11-15

## 2023-11-06 RX ADMIN — Medication 650 MILLIGRAM(S): at 13:34

## 2023-11-06 RX ADMIN — Medication 650 MILLIGRAM(S): at 12:34

## 2023-11-06 RX ADMIN — ARIPIPRAZOLE 7.5 MILLIGRAM(S): 15 TABLET ORAL at 12:33

## 2023-11-06 RX ADMIN — Medication 0.5 MILLIGRAM(S): at 05:43

## 2023-11-06 RX ADMIN — ENOXAPARIN SODIUM 40 MILLIGRAM(S): 100 INJECTION SUBCUTANEOUS at 05:45

## 2023-11-06 RX ADMIN — Medication 650 MILLIGRAM(S): at 19:57

## 2023-11-06 RX ADMIN — Medication 50 MILLIGRAM(S): at 05:34

## 2023-11-06 RX ADMIN — LOSARTAN POTASSIUM 75 MILLIGRAM(S): 100 TABLET, FILM COATED ORAL at 15:49

## 2023-11-06 RX ADMIN — LOSARTAN POTASSIUM 75 MILLIGRAM(S): 100 TABLET, FILM COATED ORAL at 05:35

## 2023-11-06 RX ADMIN — Medication 150 MILLIGRAM(S): at 12:32

## 2023-11-06 RX ADMIN — Medication 1 PATCH: at 08:38

## 2023-11-06 RX ADMIN — Medication 0.5 MILLIGRAM(S): at 21:17

## 2023-11-06 RX ADMIN — Medication 650 MILLIGRAM(S): at 18:32

## 2023-11-06 RX ADMIN — Medication 6 MILLIGRAM(S): at 23:56

## 2023-11-06 NOTE — BH INPATIENT PSYCHIATRY ASSESSMENT NOTE - NSBHMSETHTASSOC_PSY_A_CORE
Birth control question  Caller would like to discuss an/a Return call. Writer advised caller of callback within 24-72 hours.    Patient Name: Oma Esteban  Caller Name: Oma  Name of Facility:   [facility] Response: N/A  Callback Number: [phone]  Best Availability: anytime  Can A Detailed Message Be left? yes  Fax Number:   Additional Info: Patient has some questions regarding her birth control that she would like to discuss.   Did you confirm the message with the caller?: yes    Thank you,  Liliana Romo     Normal Other

## 2023-11-06 NOTE — BH PATIENT PROFILE - HOME MEDICATIONS
ARIPiprazole , 7.5 milligram(s) orally once a day Take 7.5mg orally once a day  metoprolol succinate 50 mg oral tablet, extended release , 1 tab(s) orally once a day  melatonin 3 mg oral tablet , 2 tab(s) orally once a day (at bedtime)  clonazePAM 0.5 mg oral tablet , 1 tab(s) orally 2 times a day  venlafaxine 150 mg oral capsule, extended release , 1 cap(s) orally once a day  losartan 25 mg oral tablet , 3 tab(s) orally once a day

## 2023-11-06 NOTE — BH INPATIENT PSYCHIATRY ASSESSMENT NOTE - OTHER PAST PSYCHIATRIC HISTORY (INCLUDE DETAILS REGARDING ONSET, COURSE OF ILLNESS, INPATIENT/OUTPATIENT TREATMENT)
Diagnosed with schizophrenia. Has had 6 previous inpatient admissions, most recent 2022 at our facility. History of SA via hanging 33 years ago, history of preparatory acts 3 years ago. Follows outpatient with Dr. Josh Webb

## 2023-11-06 NOTE — BH INPATIENT PSYCHIATRY ASSESSMENT NOTE - NSBHATTESTCOMMENTATTENDFT_PSY_A_CORE
Patient presents focused on discharge, denying any symptoms. Thought process somewhat disorganized. Calm, mostly cooperative. No psychomotor agitation/retardation. Titrate abilify given some thought disorder but will watch carefully d/t h/o akathisia.

## 2023-11-06 NOTE — PROGRESS NOTE ADULT - ASSESSMENT
63M (American-speaking), with hx of schizophrenia requiring past psych hospitalizations (last May 2022), HTN, HLD, T2DM brought in by family for worsening auditory hallucinations and SI. 
63M (Italian-speaking), with hx of schizophrenia requiring past psych hospitalizations (last May 2022), HTN, HLD, T2DM brought in by family for worsening auditory hallucinations and SI. 
63M (North Korean-speaking), with hx of schizophrenia requiring past psych hospitalizations (last May 2022), HTN, HLD, T2DM brought in by family for worsening auditory hallucinations and SI. 
63M (Congolese-speaking), with hx of schizophrenia requiring past psych hospitalizations (last May 2022), HTN, HLD, T2DM brought in by family for worsening auditory hallucinations and SI.     Awaiting [facility].
63M (Kenyan-speaking), with hx of schizophrenia requiring past psych hospitalizations (last May 2022), HTN, HLD, T2DM brought in by family for worsening auditory hallucinations and SI. 
63M (Nigerien-speaking), with hx of schizophrenia requiring past psych hospitalizations (last May 2022), HTN, HLD, T2DM brought in by family for worsening auditory hallucinations and SI. 
63M (Malagasy-speaking), with hx of schizophrenia requiring past psych hospitalizations (last May 2022), HTN, HLD, T2DM brought in by family for worsening auditory hallucinations and SI.

## 2023-11-06 NOTE — BH CONSULTATION LIAISON PROGRESS NOTE - NSBHASSESSMENTFT_PSY_ALL_CORE
The Pt is a 63yr old  male (Mauritian speaking only), single, domiciled and unemployed, PPHx schizophrenia, multiple past psych admissions (including 5 [facility] admissions last admission in May, 2022, self reports remote hx of interrupted sa via hanging, in treatment with DR. Chino [phone], no  legal or substance hx, PMHx of HTN, DM and HLD.  Today, BIB family to worsening AH and si.    On initial evaluation, patient presents with worsening of depressive, anxiety and psychotic sx, worsening AH that are derogatory in nature as reported to initital provider.  Pt is not at his baseline and is not able to care for himself due to severity of sx ,  patient warrants inpatient admission for stabilization of his acute symptoms as these symptoms place him at an acutely elevated risk of harm.  At this time, given patient's fall history with unclear etiology, recommend admission to medic unit for further exploration of etiology with continued follow up by  psychiatry team.      10/31: Patient continues to endorse SI and AH. Engaged on interview, cooperative, calm. Adherent with standing medications. No PRNs required. Will CTM for akithisia given prior reports in chart.  11/1: tolerating med, improving, increase as below   11/3: tolerating med, did not sleep last night would change meds as below   11/6: Patient has a bed at [facility] to be transferred today.     Plan:  - Patient cannot leave AMA; plans for transfer to [facility] following medical clearance for fall risk ( Has been accepted at [facility] today) 2PC in the chart.  - Continue 1:1 for fall precaution and SI  - Continue Klonopin 0.5mg  BID per I-Stop,  Effexor 150mg  - STOP cogentin   - Continue Melatonin 6 mg HS   - Continue Abilify  7.5mg; CTM for akithisia  - PRNs: Fluphenazine 5mg PO/IM q6h for agitation, Benadryl 50mg PO/IM q6h for agitation. Ativan 2mg IM for severe agitation  - nicotine replacement patch + gum PRN  - continue to monitor QTc, K, Mg; HOLD ALL ANTIPSYCHOTICS for QTc > 500

## 2023-11-06 NOTE — DIETITIAN INITIAL EVALUATION ADULT - PERTINENT MEDS FT
MEDICATIONS  (STANDING):  ARIPiprazole 7.5 milliGRAM(s) Oral daily  clonazePAM  Tablet 0.5 milliGRAM(s) Oral two times a day  enoxaparin Injectable 40 milliGRAM(s) SubCutaneous every 24 hours  insulin lispro (ADMELOG) corrective regimen sliding scale   SubCutaneous three times a day before meals  insulin lispro (ADMELOG) corrective regimen sliding scale   SubCutaneous at bedtime  losartan 75 milliGRAM(s) Oral daily  melatonin 6 milliGRAM(s) Oral at bedtime  metoprolol succinate ER 50 milliGRAM(s) Oral daily  nicotine -  14 mG/24Hr(s) Patch 1 Patch Transdermal daily  nicotine - Inhaler 1 Each Inhalation once  venlafaxine XR. 150 milliGRAM(s) Oral daily    MEDICATIONS  (PRN):  acetaminophen     Tablet .. 650 milliGRAM(s) Oral every 6 hours PRN Temp greater or equal to 38C (100.4F), Mild Pain (1 - 3)  diphenhydrAMINE Injectable 50 milliGRAM(s) IntraMuscular every 4 hours PRN Rash and/or Itching  fluPHENAZine  Injectable 5 milliGRAM(s) IntraMuscular every 6 hours PRN Aggitiation  LORazepam   Injectable 2 milliGRAM(s) IntraMuscular every 6 hours PRN Combative behavior

## 2023-11-06 NOTE — DIETITIAN INITIAL EVALUATION ADULT - PERTINENT LABORATORY DATA
POCT Blood Glucose.: 112 mg/dL (11-06-23 @ 12:25)  A1C with Estimated Average Glucose Result: 5.9 % (10-30-23 @ 01:03)

## 2023-11-06 NOTE — BH INPATIENT PSYCHIATRY ASSESSMENT NOTE - PATIENT'S CHIEF COMPLAINT
denies all but was initially admitted under symptoms including the following: AH, insomnia, SI, increased depression, anxiety, and psychosis

## 2023-11-06 NOTE — PROGRESS NOTE ADULT - PROBLEM SELECTOR PROBLEM 3
Elevated serum creatinine
HTN (hypertension)
Elevated serum creatinine
Elevated serum creatinine

## 2023-11-06 NOTE — BH INPATIENT PSYCHIATRY ASSESSMENT NOTE - NSTXHYPERGOAL_PSY_ALL_CORE
Be able to self-monitor blood pressure using a BP machine with demonstration of the technique to the RN

## 2023-11-06 NOTE — DISCHARGE NOTE NURSING/CASE MANAGEMENT/SOCIAL WORK - NSDCPEFALRISK_GEN_ALL_CORE
For information on Fall & Injury Prevention, visit: [email or web address] OR  [email or web address] OR  https://www.cdc.gov/steadi/patient.html
Name band;

## 2023-11-06 NOTE — BH INPATIENT PSYCHIATRY ASSESSMENT NOTE - NSTREATMENTCERTY_PSY_ALL_CORE
O-T Advancement Flap Text: The defect edges were debeveled with a #15 scalpel blade.  Given the location of the defect, shape of the defect and the proximity to free margins an O-T advancement flap was deemed most appropriate.  Using a sterile surgical marker, an appropriate advancement flap was drawn incorporating the defect and placing the expected incisions within the relaxed skin tension lines where possible.    The area thus outlined was incised deep to adipose tissue with a #15 scalpel blade.  The skin margins were undermined to an appropriate distance in all directions utilizing iris scissors. That inpatient services furnished since the previous certification or recertification were, and continue to be required: for treatment that could reasonably be expected to improve the patient's condition; or, for diagnostic study;    That the hospital records show that the services furnished were: intensive treatment services; admission and related services necessary for diagnostic study or equivalent services;    That the patient continues to need, on a daily basis active inpatient treatment furnished directly by or requiring the supervision of inpatient psychiatric facility personnel.

## 2023-11-06 NOTE — DISCHARGE NOTE NURSING/CASE MANAGEMENT/SOCIAL WORK - PATIENT PORTAL LINK FT
You can access the FollowMyHealth Patient Portal offered by [facility] by registering at the following website: [email or web address]. By joining [employer]’s FollowMyHealth portal, you will also be able to view your health information using other applications (apps) compatible with our system.

## 2023-11-06 NOTE — BH PATIENT PROFILE - STATED REASON FOR ADMISSION
"I wanted to come to the hospital but they did not have any bed open so I went to the hospital next door"

## 2023-11-06 NOTE — DIETITIAN INITIAL EVALUATION ADULT - ORAL INTAKE PTA/DIET HISTORY
Pt on 1:1, speaks Zimbabwean and little English but refused language line. Collateral obtained from chart review and RN.

## 2023-11-06 NOTE — BH CONSULTATION LIAISON PROGRESS NOTE - NSBHTIMEACTIVITIESPERFORMED_PSY_A_CORE
d/w patient, reviewed chart, d/w staff
d/w patient, reviewed chart, d/w staff
d/w patient, staff, , low 5
d/w patient, reviewed chart, d/w staff

## 2023-11-06 NOTE — BH PATIENT PROFILE - FALL HARM RISK - UNIVERSAL INTERVENTIONS
Bed in lowest position, wheels locked, appropriate side rails in place/Call bell, personal items and telephone in reach/Instruct patient to call for assistance before getting out of bed or chair/Non-slip footwear when patient is out of bed/[place] to call system/Physically safe environment - no spills, clutter or unnecessary equipment/Purposeful Proactive Rounding/Room/bathroom lighting operational, light cord in reach

## 2023-11-06 NOTE — PROGRESS NOTE ADULT - SUBJECTIVE AND OBJECTIVE BOX
Roberta Oh MD  [facility] Division of Hospital Medicine  Pager [phone] (M-F 8AM-5PM)  Other Times: [id number]    Patient is a 63y old  Male who presents with a chief complaint of schizophrenia (04 Nov 2023 08:42)    SUBJECTIVE / OVERNIGHT EVENTS: no acute events overnight     MEDICATIONS  (STANDING):  ARIPiprazole 7.5 milliGRAM(s) Oral daily  clonazePAM  Tablet 0.5 milliGRAM(s) Oral two times a day  enoxaparin Injectable 40 milliGRAM(s) SubCutaneous every 24 hours  insulin lispro (ADMELOG) corrective regimen sliding scale   SubCutaneous three times a day before meals  insulin lispro (ADMELOG) corrective regimen sliding scale   SubCutaneous at bedtime  ketorolac 10 milliGRAM(s) Oral once  losartan 75 milliGRAM(s) Oral daily  melatonin 6 milliGRAM(s) Oral at bedtime  metoprolol succinate ER 50 milliGRAM(s) Oral daily  nicotine -  14 mG/24Hr(s) Patch 1 Patch Transdermal daily  nicotine - Inhaler 1 Each Inhalation once  venlafaxine XR. 150 milliGRAM(s) Oral daily    MEDICATIONS  (PRN):  acetaminophen     Tablet .. 650 milliGRAM(s) Oral every 6 hours PRN Temp greater or equal to 38C (100.4F), Mild Pain (1 - 3)  diphenhydrAMINE Injectable 50 milliGRAM(s) IntraMuscular every 4 hours PRN Rash and/or Itching  fluPHENAZine  Injectable 5 milliGRAM(s) IntraMuscular every 6 hours PRN Aggitiation  LORazepam   Injectable 2 milliGRAM(s) IntraMuscular every 6 hours PRN Combative behavior      PHYSICAL EXAM:  Vital Signs Last 24 Hrs  T(C): 36.3 (05 Nov 2023 06:01), Max: 36.7 (04 Nov 2023 12:23)  T(F): 97.3 (05 Nov 2023 06:01), Max: 98.1 (04 Nov 2023 12:23)  HR: 64 (05 Nov 2023 06:01) (64 - 77)  BP: 130/69 (05 Nov 2023 06:01) (130/69 - 148/97)  BP(mean): --  RR: 18 (05 Nov 2023 06:01) (18 - 19)  SpO2: 97% (05 Nov 2023 06:01) (97% - 99%)    Parameters below as of 05 Nov 2023 06:01  Patient On (Oxygen Delivery Method): room air        CONSTITUTIONAL: NAD, well-developed, well-groomed  RESPIRATORY: Normal respiratory effort; lungs are clear to auscultation bilaterally  CARDIOVASCULAR: Regular rate and rhythm, normal S1 and S2, no murmur/rub/gallop; No lower extremity edema  GASTROINTESTINAL: Nontender to palpation, normoactive bowel sounds, no rebound/guarding; No hepatosplenomegaly  MUSCULOSKELETAL:  no clubbing or cyanosis of digits; no joint swelling or tenderness to palpation  NEUROLOGY: non-focal; no gross sensory deficits   PSYCH: A+O to person, place, and time; affect appropriate  SKIN: No rashes; warm     LABS:                      RADIOLOGY & ADDITIONAL TESTS:  Results Reviewed:   Imaging Personally Reviewed:  Electrocardiogram Personally Reviewed:    COORDINATION OF CARE:  Care Discussed with Consultants/Other Providers [Y/N]:  Prior or Outpatient Records Reviewed [Y/N]:  
Roberta Oh MD  [facility] Division of Hospital Medicine  Pager [phone] (M-F 8AM-5PM)  Other Times: [id number]    Patient is a 63y old  Male who presents with a chief complaint of schizophrenia (02 Nov 2023 08:02)    SUBJECTIVE / OVERNIGHT EVENTS: no acute events overnight     MEDICATIONS  (STANDING):  ARIPiprazole 5 milliGRAM(s) Oral daily  benztropine 2 milliGRAM(s) Oral daily  clonazePAM  Tablet 0.5 milliGRAM(s) Oral two times a day  enoxaparin Injectable 40 milliGRAM(s) SubCutaneous every 24 hours  insulin lispro (ADMELOG) corrective regimen sliding scale   SubCutaneous three times a day before meals  insulin lispro (ADMELOG) corrective regimen sliding scale   SubCutaneous at bedtime  metoprolol succinate ER 50 milliGRAM(s) Oral daily  nicotine -  14 mG/24Hr(s) Patch 1 Patch Transdermal daily  nicotine - Inhaler 1 Each Inhalation once  venlafaxine XR. 150 milliGRAM(s) Oral daily    MEDICATIONS  (PRN):  acetaminophen     Tablet .. 650 milliGRAM(s) Oral every 6 hours PRN Temp greater or equal to 38C (100.4F), Mild Pain (1 - 3)  diphenhydrAMINE Injectable 50 milliGRAM(s) IntraMuscular every 4 hours PRN Rash and/or Itching  fluPHENAZine  Injectable 5 milliGRAM(s) IntraMuscular every 6 hours PRN Aggitiation  LORazepam   Injectable 2 milliGRAM(s) IntraMuscular every 6 hours PRN Combative behavior  melatonin 3 milliGRAM(s) Oral at bedtime PRN Insomnia      PHYSICAL EXAM:  Vital Signs Last 24 Hrs  T(C): 36.3 (03 Nov 2023 06:14), Max: 36.8 (02 Nov 2023 20:03)  T(F): 97.4 (03 Nov 2023 06:14), Max: 98.2 (02 Nov 2023 20:03)  HR: 70 (03 Nov 2023 06:14) (70 - 78)  BP: 168/106 (03 Nov 2023 06:14) (128/81 - 168/106)  BP(mean): --  RR: 18 (03 Nov 2023 06:14) (18 - 18)  SpO2: 98% (03 Nov 2023 06:14) (97% - 99%)    Parameters below as of 03 Nov 2023 06:14  Patient On (Oxygen Delivery Method): room air        CONSTITUTIONAL: NAD, well-developed, well-groomed  RESPIRATORY: Normal respiratory effort; lungs are clear to auscultation bilaterally  CARDIOVASCULAR: Regular rate and rhythm, normal S1 and S2, no murmur/rub/gallop; No lower extremity edema  GASTROINTESTINAL: Nontender to palpation, normoactive bowel sounds, no rebound/guarding; No hepatosplenomegaly  MUSCULOSKELETAL:  no clubbing or cyanosis of digits; no joint swelling or tenderness to palpation  NEUROLOGY: non-focal; no gross sensory deficits   PSYCH: A+O to person, place, and time; affect appropriate  SKIN: No rashes; warm     LABS:                        12.7   9.50  )-----------( 222      ( 02 Nov 2023 04:15 )             38.3     11-02    137  |  101  |  15  ----------------------------<  93  4.1   |  25  |  0.88    Ca    9.4      02 Nov 2023 04:15  Phos  3.6     11-02  Mg     2.30     11-02            Urinalysis Basic - ( 02 Nov 2023 04:15 )    Color: x / Appearance: x / SG: x / pH: x  Gluc: 93 mg/dL / Ketone: x  / Bili: x / Urobili: x   Blood: x / Protein: x / Nitrite: x   Leuk Esterase: x / RBC: x / WBC x   Sq Epi: x / Non Sq Epi: x / Bacteria: x          RADIOLOGY & ADDITIONAL TESTS:  Results Reviewed:   Imaging Personally Reviewed:  Electrocardiogram Personally Reviewed:    COORDINATION OF CARE:  Care Discussed with Consultants/Other Providers [Y/N]:  Prior or Outpatient Records Reviewed [Y/N]:  
Roberta Oh MD  [facility] Division of Hospital Medicine  Pager [phone] (M-F 8AM-5PM)  Other Times: [id number]    Patient is a 63y old  Male who presents with a chief complaint of schizophrenia (01 Nov 2023 11:54)    SUBJECTIVE / OVERNIGHT EVENTS: no acute events overnight     MEDICATIONS  (STANDING):  ARIPiprazole 5 milliGRAM(s) Oral daily  benztropine 2 milliGRAM(s) Oral daily  clonazePAM  Tablet 0.5 milliGRAM(s) Oral two times a day  enoxaparin Injectable 40 milliGRAM(s) SubCutaneous every 24 hours  insulin lispro (ADMELOG) corrective regimen sliding scale   SubCutaneous at bedtime  insulin lispro (ADMELOG) corrective regimen sliding scale   SubCutaneous three times a day before meals  losartan 50 milliGRAM(s) Oral daily  metoprolol succinate ER 50 milliGRAM(s) Oral daily  nicotine -  14 mG/24Hr(s) Patch 1 Patch Transdermal daily  nicotine - Inhaler 1 Each Inhalation once  venlafaxine XR. 150 milliGRAM(s) Oral daily    MEDICATIONS  (PRN):  acetaminophen     Tablet .. 650 milliGRAM(s) Oral every 6 hours PRN Temp greater or equal to 38C (100.4F), Mild Pain (1 - 3)  diphenhydrAMINE Injectable 50 milliGRAM(s) IntraMuscular every 4 hours PRN Rash and/or Itching  fluPHENAZine  Injectable 5 milliGRAM(s) IntraMuscular every 6 hours PRN Aggitiation  LORazepam   Injectable 2 milliGRAM(s) IntraMuscular every 6 hours PRN Combative behavior  melatonin 3 milliGRAM(s) Oral at bedtime PRN Insomnia      PHYSICAL EXAM:  Vital Signs Last 24 Hrs  T(C): 36.2 (02 Nov 2023 04:44), Max: 36.7 (01 Nov 2023 12:19)  T(F): 97.1 (02 Nov 2023 04:44), Max: 98.1 (01 Nov 2023 12:19)  HR: 73 (02 Nov 2023 04:44) (70 - 76)  BP: 145/79 (02 Nov 2023 04:44) (125/90 - 147/99)  RR: 18 (02 Nov 2023 04:44) (18 - 18)  SpO2: 97% (02 Nov 2023 04:44) (97% - 99%)    Parameters below as of 02 Nov 2023 04:44  Patient On (Oxygen Delivery Method): room air    CONSTITUTIONAL: NAD, well-developed, well-groomed  RESPIRATORY: Normal respiratory effort; lungs are clear to auscultation bilaterally  CARDIOVASCULAR: Regular rate and rhythm, normal S1 and S2, no murmur/rub/gallop; No lower extremity edema  GASTROINTESTINAL: Nontender to palpation, normoactive bowel sounds, no rebound/guarding; No hepatosplenomegaly  MUSCULOSKELETAL:  no clubbing or cyanosis of digits; no joint swelling or tenderness to palpation  NEUROLOGY: non-focal; no gross sensory deficits   PSYCH: A+O to person, place, and time; affect appropriate  SKIN: No rashes; warm     LABS:                        12.7   9.50  )-----------( 222      ( 02 Nov 2023 04:15 )             38.3     11-02    137  |  101  |  15  ----------------------------<  93  4.1   |  25  |  0.88    Ca    9.4      02 Nov 2023 04:15  Phos  3.6     11-02  Mg     2.30     11-02            Urinalysis Basic - ( 02 Nov 2023 04:15 )    Color: x / Appearance: x / SG: x / pH: x  Gluc: 93 mg/dL / Ketone: x  / Bili: x / Urobili: x   Blood: x / Protein: x / Nitrite: x   Leuk Esterase: x / RBC: x / WBC x   Sq Epi: x / Non Sq Epi: x / Bacteria: x          RADIOLOGY & ADDITIONAL TESTS:  Results Reviewed:   Imaging Personally Reviewed:  Electrocardiogram Personally Reviewed:    COORDINATION OF CARE:  Care Discussed with Consultants/Other Providers [Y/N]:  Prior or Outpatient Records Reviewed [Y/N]:  
Roberta Oh MD  [facility] Division of Hospital Medicine  Pager [phone] (M-F 8AM-5PM)  Other Times: [id number]    Patient is a 63y old  Male who presents with a chief complaint of schizophrenia (30 Oct 2023 20:33)    SUBJECTIVE / OVERNIGHT EVENTS: no acute events overnight     MEDICATIONS  (STANDING):  benztropine 2 milliGRAM(s) Oral daily  clonazePAM  Tablet 0.5 milliGRAM(s) Oral two times a day  enoxaparin Injectable 40 milliGRAM(s) SubCutaneous every 24 hours  insulin lispro (ADMELOG) corrective regimen sliding scale   SubCutaneous at bedtime  insulin lispro (ADMELOG) corrective regimen sliding scale   SubCutaneous three times a day before meals  metoprolol succinate ER 50 milliGRAM(s) Oral daily  nicotine - Inhaler 1 Each Inhalation once  venlafaxine XR. 150 milliGRAM(s) Oral daily    MEDICATIONS  (PRN):  acetaminophen     Tablet .. 650 milliGRAM(s) Oral every 6 hours PRN Temp greater or equal to 38C (100.4F), Mild Pain (1 - 3)  diphenhydrAMINE Injectable 50 milliGRAM(s) IntraMuscular every 4 hours PRN Rash and/or Itching  fluPHENAZine  Injectable 5 milliGRAM(s) IntraMuscular every 6 hours PRN Aggitiation  LORazepam   Injectable 2 milliGRAM(s) IntraMuscular every 6 hours PRN Combative behavior  melatonin 3 milliGRAM(s) Oral at bedtime PRN Insomnia      PHYSICAL EXAM:  Vital Signs Last 24 Hrs  T(C): 37.1 (31 Oct 2023 12:38), Max: 37.1 (31 Oct 2023 12:38)  T(F): 98.7 (31 Oct 2023 12:38), Max: 98.7 (31 Oct 2023 12:38)  HR: 74 (31 Oct 2023 12:38) (71 - 81)  BP: 150/99 (31 Oct 2023 12:38) (138/92 - 162/98)  BP(mean): --  RR: 18 (31 Oct 2023 12:38) (16 - 18)  SpO2: 98% (31 Oct 2023 12:38) (95% - 100%)    Parameters below as of 31 Oct 2023 12:38  Patient On (Oxygen Delivery Method): room air        CONSTITUTIONAL: NAD, well-developed, well-groomed  RESPIRATORY: Normal respiratory effort; lungs are clear to auscultation bilaterally  CARDIOVASCULAR: Regular rate and rhythm, normal S1 and S2, no murmur/rub/gallop; No lower extremity edema  GASTROINTESTINAL: Nontender to palpation, normoactive bowel sounds, no rebound/guarding; No hepatosplenomegaly  MUSCULOSKELETAL:  no clubbing or cyanosis of digits; no joint swelling or tenderness to palpation  NEUROLOGY: non-focal; no gross sensory deficits   PSYCH: A+O to person, place, and time; affect appropriate  SKIN: No rashes; warm     LABS:                        13.6   9.92  )-----------( 250      ( 31 Oct 2023 06:38 )             41.3     10-31    140  |  101  |  21  ----------------------------<  137<H>  4.0   |  26  |  1.08    Ca    9.6      31 Oct 2023 06:38  Phos  3.8     10-31  Mg     2.30     10-31    TPro  6.8  /  Alb  4.3  /  TBili  <0.2  /  DBili  x   /  AST  19  /  ALT  17  /  AlkPhos  82  10-30          Urinalysis Basic - ( 31 Oct 2023 06:38 )    Color: x / Appearance: x / SG: x / pH: x  Gluc: 137 mg/dL / Ketone: x  / Bili: x / Urobili: x   Blood: x / Protein: x / Nitrite: x   Leuk Esterase: x / RBC: x / WBC x   Sq Epi: x / Non Sq Epi: x / Bacteria: x        Culture - Urine (collected 30 Oct 2023 01:03)  Source: Clean Catch Clean Catch (Midstream)  Final Report (31 Oct 2023 07:12):    <10,000 CFU/mL Normal Urogenital Gloria        RADIOLOGY & ADDITIONAL TESTS:  Results Reviewed:   Imaging Personally Reviewed:  Electrocardiogram Personally Reviewed:    COORDINATION OF CARE:  Care Discussed with Consultants/Other Providers [Y/N]:  Prior or Outpatient Records Reviewed [Y/N]:  
Roberta Oh MD  [facility] Division of Hospital Medicine  Pager [phone] (M-F 8AM-5PM)  Other Times: [id number]    Patient is a 63y old  Male who presents with a chief complaint of schizophrenia (03 Nov 2023 10:07)    SUBJECTIVE / OVERNIGHT EVENTS: no acute events overnight     MEDICATIONS  (STANDING):  ARIPiprazole 5 milliGRAM(s) Oral daily  benztropine 2 milliGRAM(s) Oral daily  clonazePAM  Tablet 0.5 milliGRAM(s) Oral two times a day  enoxaparin Injectable 40 milliGRAM(s) SubCutaneous every 24 hours  insulin lispro (ADMELOG) corrective regimen sliding scale   SubCutaneous three times a day before meals  insulin lispro (ADMELOG) corrective regimen sliding scale   SubCutaneous at bedtime  losartan 75 milliGRAM(s) Oral daily  metoprolol succinate ER 50 milliGRAM(s) Oral daily  nicotine -  14 mG/24Hr(s) Patch 1 Patch Transdermal daily  nicotine - Inhaler 1 Each Inhalation once  venlafaxine XR. 150 milliGRAM(s) Oral daily    MEDICATIONS  (PRN):  acetaminophen     Tablet .. 650 milliGRAM(s) Oral every 6 hours PRN Temp greater or equal to 38C (100.4F), Mild Pain (1 - 3)  diphenhydrAMINE Injectable 50 milliGRAM(s) IntraMuscular every 4 hours PRN Rash and/or Itching  fluPHENAZine  Injectable 5 milliGRAM(s) IntraMuscular every 6 hours PRN Aggitiation  LORazepam   Injectable 2 milliGRAM(s) IntraMuscular every 6 hours PRN Combative behavior  melatonin 3 milliGRAM(s) Oral at bedtime PRN Insomnia      PHYSICAL EXAM:  Vital Signs Last 24 Hrs  T(C): 36.8 (04 Nov 2023 06:35), Max: 36.8 (04 Nov 2023 06:35)  T(F): 98.2 (04 Nov 2023 06:35), Max: 98.2 (04 Nov 2023 06:35)  HR: 72 (04 Nov 2023 06:35) (71 - 72)  BP: 157/93 (04 Nov 2023 06:35) (142/81 - 157/93)  BP(mean): --  RR: 19 (04 Nov 2023 06:35) (18 - 19)  SpO2: 98% (04 Nov 2023 06:35) (98% - 99%)    Parameters below as of 03 Nov 2023 12:23  Patient On (Oxygen Delivery Method): room air        CONSTITUTIONAL: NAD, well-developed, well-groomed  RESPIRATORY: Normal respiratory effort; lungs are clear to auscultation bilaterally  CARDIOVASCULAR: Regular rate and rhythm, normal S1 and S2, no murmur/rub/gallop; No lower extremity edema  GASTROINTESTINAL: Nontender to palpation, normoactive bowel sounds, no rebound/guarding; No hepatosplenomegaly  MUSCULOSKELETAL:  no clubbing or cyanosis of digits; no joint swelling or tenderness to palpation  NEUROLOGY: non-focal; no gross sensory deficits   PSYCH: A+O to person, place, and time; affect appropriate  SKIN: No rashes; warm     LABS:                      RADIOLOGY & ADDITIONAL TESTS:  Results Reviewed:   Imaging Personally Reviewed:  Electrocardiogram Personally Reviewed:    COORDINATION OF CARE:  Care Discussed with Consultants/Other Providers [Y/N]:  Prior or Outpatient Records Reviewed [Y/N]:  
Roberta Oh MD  [facility] Division of Hospital Medicine  Pager [phone] (M-F 8AM-5PM)  Other Times: [id number]    Patient is a 63y old  Male who presents with a chief complaint of schizophrenia (31 Oct 2023 13:31)    SUBJECTIVE / OVERNIGHT EVENTS: no acute events overnight     MEDICATIONS  (STANDING):  ARIPiprazole 2.5 milliGRAM(s) Oral daily  benztropine 2 milliGRAM(s) Oral daily  clonazePAM  Tablet 0.5 milliGRAM(s) Oral two times a day  enoxaparin Injectable 40 milliGRAM(s) SubCutaneous every 24 hours  insulin lispro (ADMELOG) corrective regimen sliding scale   SubCutaneous at bedtime  insulin lispro (ADMELOG) corrective regimen sliding scale   SubCutaneous three times a day before meals  losartan 50 milliGRAM(s) Oral daily  metoprolol succinate ER 50 milliGRAM(s) Oral daily  nicotine - Inhaler 1 Each Inhalation once  venlafaxine XR. 150 milliGRAM(s) Oral daily    MEDICATIONS  (PRN):  acetaminophen     Tablet .. 650 milliGRAM(s) Oral every 6 hours PRN Temp greater or equal to 38C (100.4F), Mild Pain (1 - 3)  diphenhydrAMINE Injectable 50 milliGRAM(s) IntraMuscular every 4 hours PRN Rash and/or Itching  fluPHENAZine  Injectable 5 milliGRAM(s) IntraMuscular every 6 hours PRN Aggitiation  LORazepam   Injectable 2 milliGRAM(s) IntraMuscular every 6 hours PRN Combative behavior  melatonin 3 milliGRAM(s) Oral at bedtime PRN Insomnia      PHYSICAL EXAM:  Vital Signs Last 24 Hrs  T(C): 36.6 (01 Nov 2023 05:37), Max: 37.1 (31 Oct 2023 12:38)  T(F): 97.8 (01 Nov 2023 05:37), Max: 98.7 (31 Oct 2023 12:38)  HR: 82 (01 Nov 2023 05:37) (74 - 82)  BP: 141/96 (01 Nov 2023 05:37) (141/96 - 152/91)  BP(mean): --  RR: 18 (01 Nov 2023 05:37) (18 - 18)  SpO2: 97% (01 Nov 2023 05:37) (97% - 98%)    Parameters below as of 01 Nov 2023 05:37  Patient On (Oxygen Delivery Method): room air        CONSTITUTIONAL: NAD, well-developed, well-groomed  RESPIRATORY: Normal respiratory effort; lungs are clear to auscultation bilaterally  CARDIOVASCULAR: Regular rate and rhythm, normal S1 and S2, no murmur/rub/gallop; No lower extremity edema  GASTROINTESTINAL: Nontender to palpation, normoactive bowel sounds, no rebound/guarding; No hepatosplenomegaly  MUSCULOSKELETAL:  no clubbing or cyanosis of digits; no joint swelling or tenderness to palpation  NEUROLOGY: non-focal; no gross sensory deficits   PSYCH: A+O to person, place, and time; affect appropriate  SKIN: No rashes; warm     LABS:                        12.7   11.37 )-----------( 251      ( 01 Nov 2023 05:48 )             38.7     11-01    137  |  101  |  17  ----------------------------<  114<H>  3.9   |  24  |  1.10    Ca    9.3      01 Nov 2023 05:48  Phos  3.6     11-01  Mg     2.30     11-01            Urinalysis Basic - ( 01 Nov 2023 05:48 )    Color: x / Appearance: x / SG: x / pH: x  Gluc: 114 mg/dL / Ketone: x  / Bili: x / Urobili: x   Blood: x / Protein: x / Nitrite: x   Leuk Esterase: x / RBC: x / WBC x   Sq Epi: x / Non Sq Epi: x / Bacteria: x        Culture - Urine (collected 30 Oct 2023 01:03)  Source: Clean Catch Clean Catch (Midstream)  Final Report (31 Oct 2023 07:12):    <10,000 CFU/mL Normal Urogenital Gloria        RADIOLOGY & ADDITIONAL TESTS:  Results Reviewed:   Imaging Personally Reviewed:  Electrocardiogram Personally Reviewed:    COORDINATION OF CARE:  Care Discussed with Consultants/Other Providers [Y/N]:  Prior or Outpatient Records Reviewed [Y/N]:  
[facility] Division of Hospital Medicine  Ashlyn Nair MD  Pager (M-F, 8A-5P): [phone]  Other Times:  [phone]      SUBJECTIVE / OVERNIGHT EVENTS: NAEON, afebrile.    MEDICATIONS  (STANDING):  ARIPiprazole 7.5 milliGRAM(s) Oral daily  clonazePAM  Tablet 0.5 milliGRAM(s) Oral two times a day  enoxaparin Injectable 40 milliGRAM(s) SubCutaneous every 24 hours  insulin lispro (ADMELOG) corrective regimen sliding scale   SubCutaneous three times a day before meals  insulin lispro (ADMELOG) corrective regimen sliding scale   SubCutaneous at bedtime  losartan 75 milliGRAM(s) Oral daily  melatonin 6 milliGRAM(s) Oral at bedtime  metoprolol succinate ER 50 milliGRAM(s) Oral daily  nicotine -  14 mG/24Hr(s) Patch 1 Patch Transdermal daily  nicotine - Inhaler 1 Each Inhalation once  venlafaxine XR. 150 milliGRAM(s) Oral daily    MEDICATIONS  (PRN):  acetaminophen     Tablet .. 650 milliGRAM(s) Oral every 6 hours PRN Temp greater or equal to 38C (100.4F), Mild Pain (1 - 3)  diphenhydrAMINE Injectable 50 milliGRAM(s) IntraMuscular every 4 hours PRN Rash and/or Itching  fluPHENAZine  Injectable 5 milliGRAM(s) IntraMuscular every 6 hours PRN Aggitiation  LORazepam   Injectable 2 milliGRAM(s) IntraMuscular every 6 hours PRN Combative behavior      I&O's Summary      PHYSICAL EXAM:  Vital Signs Last 24 Hrs  T(C): 36.4 (06 Nov 2023 05:07), Max: 36.9 (05 Nov 2023 21:30)  T(F): 97.6 (06 Nov 2023 05:07), Max: 98.4 (05 Nov 2023 21:30)  HR: 70 (06 Nov 2023 05:07) (70 - 88)  BP: 149/82 (06 Nov 2023 05:07) (149/82 - 172/96)  BP(mean): --  RR: 17 (06 Nov 2023 05:07) (17 - 18)  SpO2: 97% (06 Nov 2023 05:07) (97% - 99%)    Parameters below as of 06 Nov 2023 05:07  Patient On (Oxygen Delivery Method): room air      CONSTITUTIONAL: NAD  EYES: PERRLA; conjunctiva and sclera clear  ENMT: Moist oral mucosa, no pharyngeal injection or exudates; normal dentition  RESPIRATORY: Normal respiratory effort; lungs are clear to auscultation bilaterally  CARDIOVASCULAR: Regular rate and rhythm, normal S1 and S2, no murmur/rub/gallop  ABDOMEN: Nontender to palpation, normoactive bowel sounds, no rebound/guarding  PSYCH: A+O to person, place, and time; affect appropriate  SKIN: No rashes; no palpable lesions    LABS:                      RADIOLOGY & ADDITIONAL TESTS:  Results Reviewed:   Imaging Personally Reviewed:  Electrocardiogram Personally Reviewed:    COORDINATION OF CARE:  Care Discussed with Consultants/Other Providers [Y/N]:   Prior or Outpatient Records Reviewed [Y/N]:

## 2023-11-06 NOTE — BH INPATIENT PSYCHIATRY ASSESSMENT NOTE - NSICDXBHSECONDARYDX_PSY_ALL_CORE
Hypertension   I10  Hyperlipidemia   E78.5  Prediabetes   R73.03  Recurrent depressive disorder   F33.9

## 2023-11-06 NOTE — CHART NOTE - NSCHARTNOTEFT_GEN_A_CORE
Hypertensive urgency noted on arrival to 5.  patient reportedly asymptomatic.  Received losartan 75 and toprol 50 at 5:30 AM.  Advised a stat dose of losartan 75 now, monitor for symptoms (vision changes, confusion, headache).  In absence of symptoms patient may remain on unit as long as BP does not continue to rise.  If symptoms develop would transfer to ED for evaluation and monitoring.

## 2023-11-06 NOTE — BH CONSULTATION LIAISON PROGRESS NOTE - OTHER
as per staff and collateral at home pt is unsteady on his feet and witnessed by writer as well 

## 2023-11-06 NOTE — BH INPATIENT PSYCHIATRY ASSESSMENT NOTE - NSBHASSESSSUMMFT_PSY_ALL_CORE
63yr old  male (Colombian speaking only), single, domiciled and unemployed, PPHx schizophrenia, akathisia, multiple past psych admissions (including 5 [facility] admissions last admission in May, 2022, self reports remote hx of interrupted sa via hanging, in treatment with DR. Chino [phone], no  legal or substance hx, PMHx of HTN, DM and HLD, who was BIB family on 10/29/23 due to worsening AH and si. On initial CL evaluation, patient presented with worsening of depressive, anxiety and psychotic sx, worsening AH that are derogatory in nature as reported to initital provider. Patient is a limited historian. On chart review, patient was taking abilify - it seems CL team restarted and titrated to  7.5mg po qhs and continued klonopin 0.5mg po bid, effexor XR 150mg po daily, melatonin 6mg po qhs, and cogentin stopped. Patient now denying all symptoms and requesting discharge.     Plan:  - Legal status: 2PC  - safety: routine checks  - Schizophrenia: increase abilify to 10mg po daily  - depressed mood: continue effexor 150mg po daily  - anxiety: continue klonopin 0.5mg po bid  - insomnia: melatonin 6mg po qhs prn  - unsteady gait - PT consult  - HTN: continue losartan 75mg po daily, metoprolol 50mg po daily; received one time dose of losartan 75mg on admission due to admission BP in 190's systolic (manual), discussed care with hospitalist that patient was asymptomatic, no emergent treatment was recommended  - PRNs: Fluphenazine 5mg PO/IM q6h for agitation, Benadryl 50mg PO/IM q6h for agitation. Ativan 2mg IM for severe agitation  - nicotine replacement patch + gum PRN

## 2023-11-06 NOTE — BH CONSULTATION LIAISON PROGRESS NOTE - NSBHCHARTREVIEWVS_PSY_A_CORE FT
Vital Signs Last 24 Hrs  T(C): 36.4 (06 Nov 2023 05:07), Max: 36.9 (05 Nov 2023 21:30)  T(F): 97.6 (06 Nov 2023 05:07), Max: 98.4 (05 Nov 2023 21:30)  HR: 70 (06 Nov 2023 05:07) (67 - 88)  BP: 149/82 (06 Nov 2023 05:07) (127/68 - 172/96)  BP(mean): --  RR: 17 (06 Nov 2023 05:07) (17 - 18)  SpO2: 97% (06 Nov 2023 05:07) (97% - 99%)    Parameters below as of 06 Nov 2023 05:07  Patient On (Oxygen Delivery Method): room air    
Vital Signs Last 24 Hrs  T(C): 36.7 (01 Nov 2023 12:19), Max: 36.7 (01 Nov 2023 12:19)  T(F): 98.1 (01 Nov 2023 12:19), Max: 98.1 (01 Nov 2023 12:19)  HR: 76 (01 Nov 2023 12:19) (75 - 82)  BP: 125/90 (01 Nov 2023 12:19) (125/90 - 152/91)  BP(mean): --  RR: 18 (01 Nov 2023 12:19) (18 - 18)  SpO2: 97% (01 Nov 2023 12:19) (97% - 97%)    Parameters below as of 01 Nov 2023 12:19  Patient On (Oxygen Delivery Method): room air    
Vital Signs Last 24 Hrs  T(C): 36.8 (31 Oct 2023 06:32), Max: 36.8 (31 Oct 2023 06:32)  T(F): 98.3 (31 Oct 2023 06:32), Max: 98.3 (31 Oct 2023 06:32)  HR: 80 (31 Oct 2023 06:32) (71 - 81)  BP: 138/92 (31 Oct 2023 06:32) (138/92 - 162/98)  BP(mean): --  RR: 18 (31 Oct 2023 06:32) (16 - 18)  SpO2: 97% (31 Oct 2023 06:32) (95% - 100%)    Parameters below as of 31 Oct 2023 06:32  Patient On (Oxygen Delivery Method): room air    
Vital Signs Last 24 Hrs  T(C): 36.3 (03 Nov 2023 06:14), Max: 36.8 (02 Nov 2023 20:03)  T(F): 97.4 (03 Nov 2023 06:14), Max: 98.2 (02 Nov 2023 20:03)  HR: 70 (03 Nov 2023 06:14) (70 - 78)  BP: 168/106 (03 Nov 2023 06:14) (168/95 - 168/106)  BP(mean): --  RR: 18 (03 Nov 2023 06:14) (18 - 18)  SpO2: 98% (03 Nov 2023 06:14) (97% - 98%)    Parameters below as of 03 Nov 2023 06:14  Patient On (Oxygen Delivery Method): room air

## 2023-11-06 NOTE — BH PATIENT PROFILE - NSDASANEGATIVE_PSY_ALL_CORE
No Split-Thickness Skin Graft Text: The defect edges were debeveled with a #15 scalpel blade. Given the location of the defect, shape of the defect and the proximity to free margins a split thickness skin graft was deemed most appropriate. Using a sterile surgical marker, the primary defect shape was transferred to the donor site. The split thickness graft was then harvested.  The skin graft was then placed in the primary defect and oriented appropriately.

## 2023-11-06 NOTE — BH INPATIENT PSYCHIATRY ASSESSMENT NOTE - PAST PSYCHOTROPIC MEDICATION
Prolixin 5mg bid  Geodon 20mg qhs  Cogentin 1mg qd  Venlafaxine 150mg qd Prolixin  Geodon  Risperidone

## 2023-11-06 NOTE — BH INPATIENT PSYCHIATRY ASSESSMENT NOTE - HPI (INCLUDE ILLNESS QUALITY, SEVERITY, DURATION, TIMING, CONTEXT, MODIFYING FACTORS, ASSOCIATED SIGNS AND SYMPTOMS)
The Pt is a 63yr old  male ([place] speaking only), single, domiciled and unemployed, PPHx schizophrenia, multiple past psych admissions (including 5 [facility] admissions last admission in May, 2022, self reports remote hx of interrupted sa via hanging, in treatment with Dr. Chino [phone], no  legal or substance hx, PMHx of HTN, DM and HLD. BIB family to worsening AH and SI.    Upon evaluation patient was talking loudly explaining that he thought he was going to be discharged from the medical floor, where he has been for the past week to his home. Per chart review he was admitted to medicine as he had a fall and has becoming unsteady on his feet.  He lives with his brother and granted permission to speak with him. Confronted with the knowledge that he was experiencing suicidal ideation, auditory hallucinations, he denied this and said he has never experienced this and someone made it up. Continued to ask when he will be discharged, specifically requesting today or tomorrow. Hospital treatment course was reviewed with him including appropriate behavior on the unit, cordial interaction with staff and peers, not experiencing any intrusive or negative thoughts suggestive of increasing psychopathology, discharge planning can then be discussed. The Pt is a 63yr old  male (East [place]orese speaking only), single, domiciled and unemployed, PPHx schizophrenia, multiple past psych admissions (including 5 [facility] admissions last admission in May, 2022, self reports remote hx of interrupted sa via hanging, in treatment with Dr. Chino [phone], no  legal or substance hx, PMHx of HTN, DM and HLD. BIB family to worsening AH and SI.    Upon evaluation patient was talking loudly explaining that he thought he was going to be discharged from the medical floor, where he has been for the past week to his home. Per chart review he was admitted to medicine as he had a fall and has becoming unsteady on his feet.  He lives with his brother and granted permission to speak with him. Confronted with the knowledge that he was experiencing suicidal ideation, auditory hallucinations, he denied this and said he has never experienced this and someone made it up. Continued to ask when he will be discharged, specifically requesting today or tomorrow. Criteria for discharge was reviewed with him including appropriate behavior on the unit, cordial interaction with staff and peers, not experiencing any intrusive or negative thoughts suggestive of increasing psychopathology, discharge planning can then be discussed.

## 2023-11-06 NOTE — DIETITIAN INITIAL EVALUATION ADULT - PROBLEM SELECTOR PLAN 1
Pt with worsening AH and SI. Seen by psych and recommending inpatient admission however unable to go to [facility] due to fall risk.   - abilify held due to akathsia   - c/w klonopin 0.5 mg BID, effexor 150 mg QD, cogentin 2 mg QAM  - fluphenazine 5 mg q6h prn, benadryl 50 mg q6h prn and ativan 2 mg IM q6h prn for severe agitation  - c/w 1:1  - f/u psych recs

## 2023-11-06 NOTE — BH INPATIENT PSYCHIATRY ASSESSMENT NOTE - NSBHMEDSOTHERFT_PSY_A_CORE
Effexor 150mg daily, Benztropine  2 mg daily , Abilify 2mg , Clonazepam 0.5mg BID  istop checked :  Reference #: [id number] prescribed Klonopin 0.5mg qdaily and Ativan 0.5mg qdaily

## 2023-11-06 NOTE — PROGRESS NOTE ADULT - PROBLEM SELECTOR PROBLEM 4
HTN (hypertension)
Olecranon bursitis, right elbow
HTN (hypertension)

## 2023-11-06 NOTE — BH INPATIENT PSYCHIATRY ASSESSMENT NOTE - CURRENT MEDICATION
MEDICATIONS  (STANDING):    MEDICATIONS  (PRN):   MEDICATIONS  (STANDING):  ARIPiprazole 10 milliGRAM(s) Oral daily  clonazePAM  Tablet 0.5 milliGRAM(s) Oral two times a day  influenza   Vaccine 0.5 milliLiter(s) IntraMuscular once  losartan 75 milliGRAM(s) Oral daily  metoprolol succinate ER 50 milliGRAM(s) Oral daily  nicotine - 21 mG/24Hr(s) Patch 1 Patch Transdermal daily  venlafaxine  milliGRAM(s) Oral daily    MEDICATIONS  (PRN):  acetaminophen     Tablet .. 650 milliGRAM(s) Oral every 6 hours PRN Temp greater or equal to 38C (100.4F), Mild Pain (1 - 3)  diphenhydrAMINE Injectable 50 milliGRAM(s) IntraMuscular once PRN agitation  fluPHENAZine 5 milliGRAM(s) Oral every 6 hours PRN agitation  fluPHENAZine  Injectable 5 milliGRAM(s) IntraMuscular once PRN agitation  LORazepam   Injectable 2 milliGRAM(s) IntraMuscular once PRN agitation  melatonin. 6 milliGRAM(s) Oral at bedtime PRN Insomnia  polyethylene glycol 3350 17 Gram(s) Oral daily PRN constipation

## 2023-11-06 NOTE — PROGRESS NOTE ADULT - PROBLEM SELECTOR PLAN 7
DVT ppx: lovenox  DIET: DASH  DISPO: [facility]. 37 mins spent dc planning.     On admission unable to go to [facility] due to fall risk - evaluated by PT - patient able to ambulate without assistance. Patient reports at home - intermittently he feels weak and falls, no preceding symptoms, loss of consciousness, bowel/bladder incontinence, dizziness. These symptoms have not recurred while admitted. Low suspicion for cardio/neurogenic syncope. Given unremarkable neuro exam, neg CTH/orthostats, able to ambulate with PT no further inpatient work up indicated. Medically optimized for dc to [facility].

## 2023-11-06 NOTE — BH PATIENT PROFILE - FUNCTIONAL ASSESSMENT - DAILY ACTIVITY 2.
Mr. Herman is a 60 year old male who presents to clinic for follow-up.  Recall, last visit 4/27/21 for f/u of his Crohn's disease.  He was first symptomatic in 2018 but not formally diagnosed until 2019.  We did get a release of information for his records from Dr. Maynard. He presented with rectal bleeding diarrhea and abdominal pain.  He had an initial colonoscopy March 2018.  The IC valve showed a polypoid masslike appearance.  Biopsy showed focal ulceration.  Internal hemorrhoids were also noted.  IBD serology in March 2018 showed a positive Saccharomyces consistent with Crohn's.  He had an upper GI series with small bowel follow-through in March 2018 that showed some esophageal dysmotility but the rest of his bowels were okay.  Repeat colonoscopy in October 2019 showed thickening of the IC valve and stricturing.  He was then started on Humira in January 2020.  He had low levels of antibody to Humira in August 2020 but adequate trough level.  We checked labs with a prior visit which showed no antibodies to Humira and a trough level of 8.  Patient continues to do very well on Humira as well as Imodium daily.  There is no rectal bleeding.  He has no abdominal pain.  He denies any nausea or vomiting.  He denies any extraintestinal manifestations such as mouth ulcers or sores rash or visual changes.  No perianal inflammation or discomfort like what he had at the end of last year either.  No change in GI complaints with occasional borborygmi. No diarrhea, constipation or rectal bleeding.  Believes his clothes are fitting a little bit more loose but in the last month or so he has a eliminated refined sugars from his diet.  This was due to him being told that his glucose was elevated.  I did review blood work from end of March where he was noted to have essentially normal CBC, LFTs and thyroid function. He does believe that his occasional anxiety due to prior difficulty with IBD causes his GI symptoms.  He returns to the office today with no significant GI complaints in regards to his Crohn's disease.  He is due for Humira injection on tomorrow.  Compliant with mesalamine 1.2g. However, he remains concerned with his ongoing weight loss despite otherwise stable diet.  Since his last visit with us in April, he has lost 5 additional pounds.  He has reportedly had blood work through his primary care physician in the last month or so. 3 = A little assistance

## 2023-11-06 NOTE — BH INPATIENT PSYCHIATRY ASSESSMENT NOTE - VIOLENCE PROTECTIVE FACTORS:
Condition:: Plastic surgery consult for surgical repair of mohs defect
Please Describe Your Condition:: Referred by Rubin Danielle D.O. for surgical consultation of open wound s/p mohs surgery, same day removal of  basal cell carcinoma of the right temple
Residential stability/Engagement in treatment

## 2023-11-06 NOTE — PROGRESS NOTE ADULT - PROBLEM SELECTOR PLAN 5
right elbow swelling  - reports soft tissue swelling s/p fall 2 weeks ago  - follows with PCP Dr. Maurilio Puga ([phone]) - had drained twice  - XRAY R.elbow: Nonspecific focally bulging prominent soft tissue swelling over olecranon, no OM.  - outpatient followup with PCP / MRI for further eval
right elbow swelling  - reports soft tissue swelling s/p fall 2 weeks ago  - follows with PCP Dr. Maurilio Puga ([phone]) - had drained twice  - XRAY R.elbow: Nonspecific focally bulging prominent soft tissue swelling over olecranon, no OM.  - outpatient followup with PCP / MRI for further eval
right elbow swelling  - reports soft tissue swelling s/p fall 2 weeks ago  - follows with PCP Dr. Maurilio Puga ([phone]) - had drained twice  - XRAY R.elbow: Nonspecific focally bulging prominent soft tissue swelling over olecranon, no OM.  - outpatient followup with PCP / MRI for further eval
right elbow swelling  - reports soft tissue swelling s/p fall 2 weeks ago  - follows with PCP Dr. Maurilio Puga ([phone]) - had drained twice  - XRAY R.elbow: Nonspecific focally bulging prominent soft tissue swelling over olecranon, no OM.  - outpatient followup with PCP / MRI for further eval
A1C 5.9% well controlled  - not on home medications  - FS/SSI qac and hs
right elbow swelling  - reports soft tissue swelling s/p fall 2 weeks ago  - follows with PCP Dr. Maurilio Puga ([phone]) - had drained twice  - XRAY R.elbow: Nonspecific focally bulging prominent soft tissue swelling over olecranon, no OM.  - outpatient followup with PCP / MRI for further eval
right elbow swelling  - reports soft tissue swelling s/p fall 2 weeks ago  - follows with PCP Dr. Maurilio Puga ([phone]) - had drained twice  - XRAY R.elbow: Nonspecific focally bulging prominent soft tissue swelling over olecranon, no OM.  - outpatient followup with PCP / MRI for further eval

## 2023-11-06 NOTE — BH CONSULTATION LIAISON PROGRESS NOTE - CURRENT MEDICATION
MEDICATIONS  (STANDING):  ARIPiprazole 2.5 milliGRAM(s) Oral daily  benztropine 2 milliGRAM(s) Oral daily  clonazePAM  Tablet 0.5 milliGRAM(s) Oral two times a day  enoxaparin Injectable 40 milliGRAM(s) SubCutaneous every 24 hours  insulin lispro (ADMELOG) corrective regimen sliding scale   SubCutaneous three times a day before meals  insulin lispro (ADMELOG) corrective regimen sliding scale   SubCutaneous at bedtime  losartan 50 milliGRAM(s) Oral daily  metoprolol succinate ER 50 milliGRAM(s) Oral daily  nicotine - Inhaler 1 Each Inhalation once  venlafaxine XR. 150 milliGRAM(s) Oral daily    MEDICATIONS  (PRN):  acetaminophen     Tablet .. 650 milliGRAM(s) Oral every 6 hours PRN Temp greater or equal to 38C (100.4F), Mild Pain (1 - 3)  diphenhydrAMINE Injectable 50 milliGRAM(s) IntraMuscular every 4 hours PRN Rash and/or Itching  fluPHENAZine  Injectable 5 milliGRAM(s) IntraMuscular every 6 hours PRN Aggitiation  LORazepam   Injectable 2 milliGRAM(s) IntraMuscular every 6 hours PRN Combative behavior  melatonin 3 milliGRAM(s) Oral at bedtime PRN Insomnia  
MEDICATIONS  (STANDING):  benztropine 2 milliGRAM(s) Oral daily  clonazePAM  Tablet 0.5 milliGRAM(s) Oral two times a day  enoxaparin Injectable 40 milliGRAM(s) SubCutaneous every 24 hours  insulin lispro (ADMELOG) corrective regimen sliding scale   SubCutaneous three times a day before meals  insulin lispro (ADMELOG) corrective regimen sliding scale   SubCutaneous at bedtime  metoprolol succinate ER 50 milliGRAM(s) Oral daily  nicotine - Inhaler 1 Each Inhalation once  venlafaxine XR. 150 milliGRAM(s) Oral daily    MEDICATIONS  (PRN):  acetaminophen     Tablet .. 650 milliGRAM(s) Oral every 6 hours PRN Temp greater or equal to 38C (100.4F), Mild Pain (1 - 3)  diphenhydrAMINE Injectable 50 milliGRAM(s) IntraMuscular every 4 hours PRN Rash and/or Itching  fluPHENAZine  Injectable 5 milliGRAM(s) IntraMuscular every 6 hours PRN Aggitiation  LORazepam   Injectable 2 milliGRAM(s) IntraMuscular every 6 hours PRN Combative behavior  melatonin 3 milliGRAM(s) Oral at bedtime PRN Insomnia  
MEDICATIONS  (STANDING):  ARIPiprazole 5 milliGRAM(s) Oral daily  benztropine 2 milliGRAM(s) Oral daily  clonazePAM  Tablet 0.5 milliGRAM(s) Oral two times a day  enoxaparin Injectable 40 milliGRAM(s) SubCutaneous every 24 hours  insulin lispro (ADMELOG) corrective regimen sliding scale   SubCutaneous at bedtime  insulin lispro (ADMELOG) corrective regimen sliding scale   SubCutaneous three times a day before meals  metoprolol succinate ER 50 milliGRAM(s) Oral daily  nicotine -  14 mG/24Hr(s) Patch 1 Patch Transdermal daily  nicotine - Inhaler 1 Each Inhalation once  venlafaxine XR. 150 milliGRAM(s) Oral daily    MEDICATIONS  (PRN):  acetaminophen     Tablet .. 650 milliGRAM(s) Oral every 6 hours PRN Temp greater or equal to 38C (100.4F), Mild Pain (1 - 3)  diphenhydrAMINE Injectable 50 milliGRAM(s) IntraMuscular every 4 hours PRN Rash and/or Itching  fluPHENAZine  Injectable 5 milliGRAM(s) IntraMuscular every 6 hours PRN Aggitiation  LORazepam   Injectable 2 milliGRAM(s) IntraMuscular every 6 hours PRN Combative behavior  melatonin 3 milliGRAM(s) Oral at bedtime PRN Insomnia  
MEDICATIONS  (STANDING):  ARIPiprazole 7.5 milliGRAM(s) Oral daily  clonazePAM  Tablet 0.5 milliGRAM(s) Oral two times a day  enoxaparin Injectable 40 milliGRAM(s) SubCutaneous every 24 hours  insulin lispro (ADMELOG) corrective regimen sliding scale   SubCutaneous three times a day before meals  insulin lispro (ADMELOG) corrective regimen sliding scale   SubCutaneous at bedtime  losartan 75 milliGRAM(s) Oral daily  melatonin 6 milliGRAM(s) Oral at bedtime  metoprolol succinate ER 50 milliGRAM(s) Oral daily  nicotine -  14 mG/24Hr(s) Patch 1 Patch Transdermal daily  nicotine - Inhaler 1 Each Inhalation once  venlafaxine XR. 150 milliGRAM(s) Oral daily    MEDICATIONS  (PRN):  acetaminophen     Tablet .. 650 milliGRAM(s) Oral every 6 hours PRN Temp greater or equal to 38C (100.4F), Mild Pain (1 - 3)  diphenhydrAMINE Injectable 50 milliGRAM(s) IntraMuscular every 4 hours PRN Rash and/or Itching  fluPHENAZine  Injectable 5 milliGRAM(s) IntraMuscular every 6 hours PRN Aggitiation  LORazepam   Injectable 2 milliGRAM(s) IntraMuscular every 6 hours PRN Combative behavior

## 2023-11-06 NOTE — BH INPATIENT PSYCHIATRY ASSESSMENT NOTE - NSBHMETABOLIC_PSY_ALL_CORE_FT
BMI: BMI (kg/m2): 24.7 (10-30-23 @ 21:30)  HbA1c: A1C with Estimated Average Glucose Result: 5.9 % (10-30-23 @ 01:03)    Glucose: POCT Blood Glucose.: 112 mg/dL (11-06-23 @ 12:25)    BP: 190/110 (11-06-23 @ 15:34) (190/110 - 190/110)  Lipid Panel:  BMI: BMI (kg/m2): 27.1 (11-06-23 @ 15:34)  HbA1c: A1C with Estimated Average Glucose Result: 5.9 % (10-30-23 @ 01:03)    Glucose: POCT Blood Glucose.: 112 mg/dL (11-06-23 @ 12:25)    BP: 160/70 (11-06-23 @ 16:59) (160/70 - 190/110)  Lipid Panel:

## 2023-11-06 NOTE — DIETITIAN INITIAL EVALUATION ADULT - NS FNS DIET ORDER
Diet, DASH/TLC:   Sodium & Cholesterol Restricted  Consistent Carbohydrate {Evening Snack} (CSTCHOSN) (10-31-23 @ 00:19)

## 2023-11-06 NOTE — DIETITIAN INITIAL EVALUATION ADULT - OTHER INFO
63M (Honduran-speaking), with hx of schizophrenia requiring past psych hospitalizations (last May 2022), HTN, HLD, T2DM brought in by family for worsening auditory hallucinations and SI.     Pt seen and reports 75% intake of meals. No GI distress (nausea/vomiting/diarrhea/constipation.) at present. Noted skin ecchymosis, no edema per nursing flow sheet. Labs reviewed. A1c- 5.9% (10/30/23).

## 2023-11-06 NOTE — PROGRESS NOTE ADULT - PROBLEM SELECTOR PROBLEM 6
Diabetes mellitus
Diabetes mellitus
Prophylactic measure
Diabetes mellitus

## 2023-11-06 NOTE — PROGRESS NOTE ADULT - PROBLEM SELECTOR PROBLEM 2
Leukocytosis
Elevated serum creatinine
Leukocytosis

## 2023-11-06 NOTE — PROGRESS NOTE ADULT - PROBLEM SELECTOR PLAN 3
Resolved  - resumed losartan, continue to hold hctz  - trend BMP
Resolved  - resumed losartan, continue to hold hctz  - trend BMP
Resolved  - resumed losartan at 50mg daily, continue to hold hctz  - trend BMP
BP stable  - losartan/hctz held on admission due to CHAPARRO - now resolved  - resume losartan at lower dose 50mg daily, c/w metoprolol, resume hctz as needed   - monitor vital signs
Resolved  - resumed losartan at 50mg daily, continue to hold hctz  - trend BMP
Resolved  - resumed losartan at 50mg daily, continue to hold hctz  - trend BMP
Resolved  - resumed losartan, continue to hold hctz  - trend BMP

## 2023-11-06 NOTE — BH INPATIENT PSYCHIATRY ASSESSMENT NOTE - NSBHCHARTREVIEWVS_PSY_A_CORE FT
Vital Signs Last 24 Hrs  T(C): 36.4 (11-06-23 @ 15:34), Max: 36.9 (11-05-23 @ 21:30)  T(F): 97.5 (11-06-23 @ 15:34), Max: 98.4 (11-05-23 @ 21:30)  HR: 88 (11-06-23 @ 15:34) (70 - 88)  BP: 190/110 (11-06-23 @ 15:34) (149/82 - 190/110)  BP(mean): --  RR: 17 (11-06-23 @ 05:07) (17 - 18)  SpO2: 97% (11-06-23 @ 05:07) (97% - 99%)     Vital Signs Last 24 Hrs  T(C): 36.4 (11-06-23 @ 15:34), Max: 36.9 (11-05-23 @ 21:30)  T(F): 97.5 (11-06-23 @ 15:34), Max: 98.4 (11-05-23 @ 21:30)  HR: 60 (11-06-23 @ 16:59) (60 - 88)  BP: 160/70 (11-06-23 @ 16:59) (149/82 - 190/110)  BP(mean): --  RR: 17 (11-06-23 @ 05:07) (17 - 18)  SpO2: 97% (11-06-23 @ 05:07) (97% - 99%)

## 2023-11-06 NOTE — BH CONSULTATION LIAISON PROGRESS NOTE - NSBHATTESTCOMMENTATTENDFT_PSY_A_CORE
agree with above, patient upset about needing psych hospitalization, no insight into illness and recent SI, current disorganization, 2PC in chart, handoff provided to Low 5
agree with above - patient still exhibits AH and SI, no EPS on exam, still needs 1:1 there was some concern for akathisia from new med (has only been on abilify for short while), may be experiencing worsening psychosis in context of loss of effect of prior prolixin and delay to treatment effect from new med, will increase abilify slowly while here. Admit to [facility] once medically cleared.

## 2023-11-06 NOTE — PROGRESS NOTE ADULT - PROBLEM SELECTOR PROBLEM 5
Olecranon bursitis, right elbow
Diabetes mellitus
Olecranon bursitis, right elbow
Olecranon bursitis, right elbow

## 2023-11-06 NOTE — BH CONSULTATION LIAISON PROGRESS NOTE - NSBHATTESTBILLING_PSY_A_CORE
98972-Dcjzfecpjg OBS or IP - moderate complexity OR 35-49 mins
94930-Hpskxhayvz OBS or IP - moderate complexity OR 35-49 mins
25005-Iyeelucuda OBS or IP - high complexity OR 50-79 mins
56420-Gdikttdzzj OBS or IP - moderate complexity OR 35-49 mins

## 2023-11-06 NOTE — BH CONSULTATION LIAISON PROGRESS NOTE - NSBHFUPINTERVALHXFT_PSY_A_CORE
[place]  #[id number].    Patient states his brother brought him to the hospital because patient was endorsing SI in context of AH, decreased mobility, poor appetite, anhedonia. Patient says he was started on Abilify 5 days ago, and has been hearing voices for 35+ years. 5prior psychiatric admissions AH, insomnia, anhedonia. 1 prior SA. Patient continues to endorse passive SI and AH. Denies h/o manic symptoms including decreased need for sleep and increased goal-directed activity.    No EPS elicited on exam; no cogwheeling or rigidity.
patient did not sleep last night, feels sleepy today, does not want to talk much not engaging with exam
Patient was seen for Schizophrenia and SI. Chart reviewed, no PRNs needed. He reports that his mood is "so and so" and that he slept good last night. He reports SI but no plan or intent. Patient has a bed available at [facility] today. Patient is aware of the plan. 
patient feels mood is "better" and feels AH/VH are less intense, denies SI today, feels abilify is helping  no EPS

## 2023-11-07 LAB
CHOLEST SERPL-MCNC: 196 MG/DL — SIGNIFICANT CHANGE UP
CHOLEST SERPL-MCNC: 196 MG/DL — SIGNIFICANT CHANGE UP
HDLC SERPL-MCNC: 38 MG/DL — LOW
HDLC SERPL-MCNC: 38 MG/DL — LOW
LIPID PNL WITH DIRECT LDL SERPL: 116 MG/DL — HIGH
LIPID PNL WITH DIRECT LDL SERPL: 116 MG/DL — HIGH
NON HDL CHOLESTEROL: 158 MG/DL — HIGH
NON HDL CHOLESTEROL: 158 MG/DL — HIGH
TRIGL SERPL-MCNC: 210 MG/DL — HIGH
TRIGL SERPL-MCNC: 210 MG/DL — HIGH

## 2023-11-07 PROCEDURE — 99223 1ST HOSP IP/OBS HIGH 75: CPT

## 2023-11-07 PROCEDURE — 99232 SBSQ HOSP IP/OBS MODERATE 35: CPT | Mod: GC

## 2023-11-07 RX ORDER — LOSARTAN POTASSIUM 100 MG/1
25 TABLET, FILM COATED ORAL ONCE
Refills: 0 | Status: COMPLETED | OUTPATIENT
Start: 2023-11-07 | End: 2023-11-07

## 2023-11-07 RX ORDER — LOSARTAN POTASSIUM 100 MG/1
50 TABLET, FILM COATED ORAL
Refills: 0 | Status: DISCONTINUED | OUTPATIENT
Start: 2023-11-08 | End: 2023-11-08

## 2023-11-07 RX ADMIN — Medication 0.5 MILLIGRAM(S): at 20:14

## 2023-11-07 RX ADMIN — LOSARTAN POTASSIUM 25 MILLIGRAM(S): 100 TABLET, FILM COATED ORAL at 13:01

## 2023-11-07 RX ADMIN — Medication 650 MILLIGRAM(S): at 20:29

## 2023-11-07 RX ADMIN — Medication 50 MILLIGRAM(S): at 08:57

## 2023-11-07 RX ADMIN — LOSARTAN POTASSIUM 75 MILLIGRAM(S): 100 TABLET, FILM COATED ORAL at 08:56

## 2023-11-07 RX ADMIN — Medication 650 MILLIGRAM(S): at 13:01

## 2023-11-07 RX ADMIN — Medication 650 MILLIGRAM(S): at 06:01

## 2023-11-07 RX ADMIN — Medication 650 MILLIGRAM(S): at 21:29

## 2023-11-07 RX ADMIN — Medication 650 MILLIGRAM(S): at 07:00

## 2023-11-07 RX ADMIN — Medication 650 MILLIGRAM(S): at 14:01

## 2023-11-07 RX ADMIN — Medication 150 MILLIGRAM(S): at 08:57

## 2023-11-07 RX ADMIN — ARIPIPRAZOLE 10 MILLIGRAM(S): 15 TABLET ORAL at 08:57

## 2023-11-07 RX ADMIN — Medication 0.5 MILLIGRAM(S): at 08:57

## 2023-11-07 NOTE — BH INPATIENT PSYCHIATRY PROGRESS NOTE - NSBHASSESSSUMMFT_PSY_ALL_CORE
63yr old  male ([place] speaking only), single, domiciled and unemployed, PPHx schizophrenia, akathisia, multiple past psych admissions (including 5 [facility] admissions last admission in May, 2022, self reports remote hx of interrupted sa via hanging, in treatment with DR. Chino [phone], no  legal or substance hx, PMHx of HTN, DM and HLD, who was BIB family on 10/29/23 due to worsening AH and si. On initial CL evaluation, patient presented with worsening of depressive, anxiety and psychotic sx, worsening AH that are derogatory in nature as reported to initital provider. Patient is a limited historian. On chart review, patient was taking abilify - it seems CL team restarted and titrated to  7.5mg po qhs and continued klonopin 0.5mg po bid, effexor XR 150mg po daily, melatonin 6mg po qhs, and cogentin stopped. Patient now denying all symptoms and requesting discharge.     63yr old  male ([place] speaking only), single, domiciled and unemployed, PPHx schizophrenia, akathisia, multiple past psych admissions (including 5 [facility] admissions last admission in May, 2022, self reports remote hx of interrupted sa via hanging, in treatment with DR. Chino [phone], no  legal or substance hx, PMHx of HTN, DM and HLD, who was BIB family on 10/29/23 due to worsening AH and si. On initial CL evaluation, patient presented with worsening of depressive, anxiety and psychotic sx, worsening AH that are derogatory in nature as reported to initital provider. Patient is a limited historian. On chart review, patient was taking abilify - it seems CL team restarted and titrated to  7.5mg po qhs and continued klonopin 0.5mg po bid, effexor XR 150mg po daily, melatonin 6mg po qhs, and cogentin stopped. Patient now denying all symptoms and requesting discharge.     11/7: PT consult placed, lipid panel returned WNL, patient is focused on discharge, outpatient provider called - awaiting response, left voice message to Dr. Webb [phone]  Plan:    1. Schizophrenia: continue with Abilify 10mg po daily    2. Depressed mood: continue Effexor 150mg po daily    3. Anxiety: continue klonopin 0.5mg po bid    4. Insomnia: melatonin 6mg po qhs prn    5. unsteady gait - PT consult placed 11/7    6. HTN: continue losartan 75mg po daily and metoprolol 50mg po daily    7. PRNs: Fluphenazine 5mg PO/IM q6h for agitation, Benadryl 50mg PO/IM q6h for agitation. Ativan 2mg IM for severe agitation    8. Nicotine dependence: nicotine replacement patch + gum PRN    Resolution of symptomatology   63yr old  male (Canadian speaking only), single, domiciled and unemployed, PPHx schizophrenia, akathisia, multiple past psych admissions (including 5 [facility] admissions last admission in May, 2022, self reports remote hx of interrupted sa via hanging, in treatment with DR. Chino [phone], no  legal or substance hx, PMHx of HTN, DM and HLD, who was BIB family on 10/29/23 due to worsening AH and si. On initial CL evaluation, patient presented with worsening of depressive, anxiety and psychotic sx, worsening AH that are derogatory in nature as reported to initital provider. Patient is a limited historian. On chart review, patient was taking abilify - it seems CL team restarted and titrated to  7.5mg po qhs and continued klonopin 0.5mg po bid, effexor XR 150mg po daily, melatonin 6mg po qhs, and cogentin stopped. Patient now denying all symptoms and requesting discharge.     63yr old  male (Canadian speaking only), single, domiciled and unemployed, PPHx schizophrenia, akathisia, multiple past psych admissions (including 5 [facility] admissions last admission in May, 2022, self reports remote hx of interrupted sa via hanging, in treatment with DR. Chino [phone], no  legal or substance hx, PMHx of HTN, DM and HLD, who was BIB family on 10/29/23 due to worsening AH and si. On initial CL evaluation, patient presented with worsening of depressive, anxiety and psychotic sx, worsening AH that are derogatory in nature as reported to initital provider. Patient is a limited historian. On chart review, patient was taking abilify - it seems CL team restarted and titrated to  7.5mg po qhs and continued klonopin 0.5mg po bid, effexor XR 150mg po daily, melatonin 6mg po qhs, and cogentin stopped. Patient now denying all symptoms and requesting discharge.     11/7: PT consult placed, lipid panel returned WNL, Medicine consult appreciated, patient is focused on discharge, outpatient provider called - awaiting response, left voice message to Dr. Webb [phone]  Plan:    1. Schizophrenia: continue with Abilify 10mg po daily    2. Depressed mood: continue Effexor 150mg po daily    3. Anxiety: continue klonopin 0.5mg po bid    4. Insomnia: melatonin 6mg po qhs prn    5. unsteady gait - PT consult placed 11/7    6. HTN: continue losartan 75mg po daily and increase metoprolol 50mg po bid    7. PRNs: Fluphenazine 5mg PO/IM q6h for agitation, Benadryl 50mg PO/IM q6h for agitation. Ativan 2mg IM for severe agitation    8. Nicotine dependence: nicotine replacement patch + gum PRN    Resolution of symptomatology   63yr old  male (Niuean speaking only), single, domiciled and unemployed, PPHx schizophrenia, akathisia, multiple past psych admissions (including 5 [facility] admissions last admission in May, 2022, self reports remote hx of interrupted sa via hanging, in treatment with DR. Chino [phone], no  legal or substance hx, PMHx of HTN, DM and HLD, who was BIB family on 10/29/23 due to worsening AH and si. On initial CL evaluation, patient presented with worsening of depressive, anxiety and psychotic sx, worsening AH that are derogatory in nature as reported to initital provider. Patient is a limited historian. On chart review, patient was taking abilify - it seems CL team restarted and titrated to  7.5mg po qhs and continued klonopin 0.5mg po bid, effexor XR 150mg po daily, melatonin 6mg po qhs, and cogentin stopped. Patient now denying all symptoms and requesting discharge.     63yr old  male (Niuean speaking only), single, domiciled and unemployed, PPHx schizophrenia, akathisia, multiple past psych admissions (including 5 [facility] admissions last admission in May, 2022, self reports remote hx of interrupted sa via hanging, in treatment with DR. Chino [phone], no  legal or substance hx, PMHx of HTN, DM and HLD, who was BIB family on 10/29/23 due to worsening AH and si. On initial CL evaluation, patient presented with worsening of depressive, anxiety and psychotic sx, worsening AH that are derogatory in nature as reported to initital provider. Patient is a limited historian. On chart review, patient was taking abilify - it seems CL team restarted and titrated to  7.5mg po qhs and continued klonopin 0.5mg po bid, effexor XR 150mg po daily, melatonin 6mg po qhs, and cogentin stopped. Patient now denying all symptoms and requesting discharge.     11/7: PT consult placed, lipid panel returned WNL, Medicine consult appreciated, patient is focused on discharge, outpatient provider called - awaiting response, left voice message to Dr. Webb [phone]  Plan:    1. Schizophrenia: continue with Abilify 10mg po daily    2. Depressed mood: continue Effexor 150mg po daily    3. Anxiety: continue klonopin 0.5mg po bid    4. Insomnia: melatonin 6mg po qhs prn    5. unsteady gait - PT consult placed 11/7    6. HTN: continue losartan 75mg po daily and increase metoprolol 50mg po bid    7. PRNs: Fluphenazine 5mg PO/IM q6h for agitation, Benadryl 50mg PO/IM q6h for agitation. Ativan 2mg IM for severe agitation    8. Nicotine dependence: nicotine replacement patch + gum PRN    9. Pre-diabetes: FS qd    Resolution of symptomatology   63yr old  male (Qatari speaking only), single, domiciled and unemployed, PPHx schizophrenia, akathisia, multiple past psych admissions (including 5 [facility] admissions last admission in May, 2022, self reports remote hx of interrupted sa via hanging, in treatment with DR. Chino [phone], no  legal or substance hx, PMHx of HTN, DM and HLD, who was BIB family on 10/29/23 due to worsening AH and si. On initial CL evaluation, patient presented with worsening of depressive, anxiety and psychotic sx, worsening AH that are derogatory in nature as reported to initital provider. Patient is a limited historian. On chart review, patient was taking abilify - it seems CL team restarted and titrated to  7.5mg po qhs and continued klonopin 0.5mg po bid, effexor XR 150mg po daily, melatonin 6mg po qhs, and cogentin stopped. Patient now denying all symptoms and requesting discharge.     63yr old  male (Qatari speaking only), single, domiciled and unemployed, PPHx schizophrenia, akathisia, multiple past psych admissions (including 5 [facility] admissions last admission in May, 2022, self reports remote hx of interrupted sa via hanging, in treatment with DR. Chino [phone], no  legal or substance hx, PMHx of HTN, DM and HLD, who was BIB family on 10/29/23 due to worsening AH and si. On initial CL evaluation, patient presented with worsening of depressive, anxiety and psychotic sx, worsening AH that are derogatory in nature as reported to initital provider. Patient is a limited historian. On chart review, patient was taking abilify - it seems CL team restarted and titrated to  7.5mg po qhs and continued klonopin 0.5mg po bid, effexor XR 150mg po daily, melatonin 6mg po qhs, and cogentin stopped. Patient now denying all symptoms and requesting discharge.     11/7: PT consult placed, lipid panel returned WNL, Medicine consult appreciated, patient is focused on discharge, outpatient provider called - awaiting response, left voice message to Dr. Webb [phone]  Plan:    1. Schizophrenia: continue with Abilify 10mg po daily    2. Depressed mood: continue Effexor 150mg po daily    3. Anxiety: continue klonopin 0.5mg po bid    4. Insomnia: melatonin 6mg po qhs prn    5. unsteady gait - PT consult placed 11/7    6. HTN: continue losartan 75mg po daily and increase metoprolol 50mg po bid    7. PRNs: Fluphenazine 5mg PO/IM q6h for agitation, Benadryl 50mg PO/IM q6h for agitation. Ativan 2mg IM for severe agitation    8. Nicotine dependence: nicotine replacement patch + gum PRN     63yr old  male ([place] Islander speaking only), single, domiciled and unemployed, PPHx schizophrenia, akathisia, multiple past psych admissions (including 5 [facility] admissions last admission in May, 2022, self reports remote hx of interrupted sa via hanging, in treatment with DR. Chino [phone], no  legal or substance hx, PMHx of HTN, DM and HLD, who was BIB family on 10/29/23 due to worsening AH and si. On initial CL evaluation, patient presented with worsening of depressive, anxiety and psychotic sx, worsening AH that are derogatory in nature as reported to initital provider. Patient is a limited historian. On chart review, patient was taking abilify - it seems CL team restarted and titrated to  7.5mg po qhs and continued klonopin 0.5mg po bid, effexor XR 150mg po daily, melatonin 6mg po qhs, and cogentin stopped. Patient now denying all symptoms and requesting discharge.     11/7: PT consult placed, lipid panel returned WNL, Medicine consult appreciated, patient is focused on discharge, outpatient provider called - awaiting response, left voice message to Dr. Webb [phone]  Plan:    1. Schizophrenia: continue with Abilify 10mg po daily    2. Depressed mood: continue Effexor 150mg po daily    3. Anxiety: continue klonopin 0.5mg po bid    4. Insomnia: melatonin 6mg po qhs prn    5. unsteady gait - PT consult placed 11/7    6. HTN: seen by hospitalist, increase losartan to 50mg po bid and continue metoprolol 50mg po daily    7. PRNs: Fluphenazine 5mg PO/IM q6h for agitation, Benadryl 50mg PO/IM q6h for agitation. Ativan 2mg IM for severe agitation    8. Nicotine dependence: nicotine replacement patch + gum PRN

## 2023-11-07 NOTE — BH SOCIAL WORK INITIAL PSYCHOSOCIAL EVALUATION - NSBHEDULITERACY_PSY_ALL_CORE
Pt. can read and write in [place]. Pt is from [facility] and came to the  in 1994./Able to only read/write in language other than English (specify)

## 2023-11-07 NOTE — ED ADULT NURSE REASSESSMENT NOTE - NS ED NURSE REASSESS COMMENT FT1
Pt awake verbally responsive NAD admitted continue monitoring
1800 pm - report given to nurse room 521 c ,pts send in stable condition ,ate food from home - tolerated well , med as ordered .
done

## 2023-11-07 NOTE — PSYCHIATRIC REHAB INITIAL EVALUATION - NSBHPRRECOMMEND_PSY_ALL_CORE
Writer met with patient to orient to unit and introduce self, psychiatric rehabilitation staff and department functions. In response to the COVID-19 outbreak, there has been a shift in hospital wide policies and protocols. As a result, it should be noted that unit programming will be re-evaluated on a consistent basis in effort to maintain safety guidelines. Writer encouraged patient to attend psychiatric rehabilitation groups and engage in treatment. The patient appeared receptive and engaged in the admission interview. The patient appeared forthcoming with personal data and information surrounding admitting circumstances. The patient reported that he had difficulty sleeping and engaging in ADLS. The patient reported experiencing auditory hallucinations when he was first was admitted but reported that he does not currently hear voices. The patient denied SI. Writer and patient were able to establish a collaborative psychiatric rehabilitation goal. Psychiatric Rehabilitation staff will continue to engage patient daily in order to develop therapeutic rapport.

## 2023-11-07 NOTE — PHYSICAL THERAPY INITIAL EVALUATION ADULT - PERTINENT HX OF CURRENT PROBLEM, REHAB EVAL
Patient is a 63yr old  male (Surinamese speaking only), single, domiciled and unemployed, PPHx schizophrenia, multiple past psych admissions (including 5 [facility] admissions last admission in May, 2022, self reports remote hx of interrupted sa via hanging, in treatment with Dr. Chino [phone], no  legal or substance hx, PMHx of HTN, DM and HLD. BIB family to worsening AH and SI. Patient referred to physical therapy for fall.

## 2023-11-07 NOTE — BH INPATIENT PSYCHIATRY PROGRESS NOTE - CURRENT MEDICATION
MEDICATIONS  (STANDING):  ARIPiprazole 10 milliGRAM(s) Oral daily  clonazePAM  Tablet 0.5 milliGRAM(s) Oral two times a day  influenza   Vaccine 0.5 milliLiter(s) IntraMuscular once  losartan 75 milliGRAM(s) Oral daily  metoprolol succinate ER 50 milliGRAM(s) Oral daily  nicotine - 21 mG/24Hr(s) Patch 1 Patch Transdermal daily  venlafaxine  milliGRAM(s) Oral daily    MEDICATIONS  (PRN):  acetaminophen     Tablet .. 650 milliGRAM(s) Oral every 6 hours PRN Temp greater or equal to 38C (100.4F), Mild Pain (1 - 3)  diphenhydrAMINE Injectable 50 milliGRAM(s) IntraMuscular once PRN agitation  fluPHENAZine 5 milliGRAM(s) Oral every 6 hours PRN agitation  fluPHENAZine  Injectable 5 milliGRAM(s) IntraMuscular once PRN agitation  LORazepam   Injectable 2 milliGRAM(s) IntraMuscular once PRN agitation  melatonin. 6 milliGRAM(s) Oral at bedtime PRN Insomnia  polyethylene glycol 3350 17 Gram(s) Oral daily PRN constipation   MEDICATIONS  (STANDING):  ARIPiprazole 10 milliGRAM(s) Oral daily  clonazePAM  Tablet 0.5 milliGRAM(s) Oral two times a day  influenza   Vaccine 0.5 milliLiter(s) IntraMuscular once  losartan 25 milliGRAM(s) Oral once  metoprolol succinate ER 50 milliGRAM(s) Oral daily  nicotine - 21 mG/24Hr(s) Patch 1 Patch Transdermal daily  venlafaxine  milliGRAM(s) Oral daily    MEDICATIONS  (PRN):  acetaminophen     Tablet .. 650 milliGRAM(s) Oral every 6 hours PRN Temp greater or equal to 38C (100.4F), Mild Pain (1 - 3)  diphenhydrAMINE Injectable 50 milliGRAM(s) IntraMuscular once PRN agitation  fluPHENAZine 5 milliGRAM(s) Oral every 6 hours PRN agitation  fluPHENAZine  Injectable 5 milliGRAM(s) IntraMuscular once PRN agitation  LORazepam   Injectable 2 milliGRAM(s) IntraMuscular once PRN agitation  melatonin. 6 milliGRAM(s) Oral at bedtime PRN Insomnia  polyethylene glycol 3350 17 Gram(s) Oral daily PRN constipation   MEDICATIONS  (STANDING):  ARIPiprazole 10 milliGRAM(s) Oral daily  clonazePAM  Tablet 0.5 milliGRAM(s) Oral two times a day  influenza   Vaccine 0.5 milliLiter(s) IntraMuscular once  metoprolol succinate ER 50 milliGRAM(s) Oral daily  nicotine - 21 mG/24Hr(s) Patch 1 Patch Transdermal daily  venlafaxine  milliGRAM(s) Oral daily    MEDICATIONS  (PRN):  acetaminophen     Tablet .. 650 milliGRAM(s) Oral every 6 hours PRN Temp greater or equal to 38C (100.4F), Mild Pain (1 - 3)  diphenhydrAMINE Injectable 50 milliGRAM(s) IntraMuscular once PRN agitation  fluPHENAZine 5 milliGRAM(s) Oral every 6 hours PRN agitation  fluPHENAZine  Injectable 5 milliGRAM(s) IntraMuscular once PRN agitation  LORazepam   Injectable 2 milliGRAM(s) IntraMuscular once PRN agitation  melatonin. 6 milliGRAM(s) Oral at bedtime PRN Insomnia  polyethylene glycol 3350 17 Gram(s) Oral daily PRN constipation

## 2023-11-07 NOTE — PSYCHIATRIC REHAB INITIAL EVALUATION - PRIMARY SOURCE OF SUPPORT/COMFORT
Encounter Date: 7/6/2022    SCRIBE #1 NOTE: I, Glenford Rolf, am scribing for, and in the presence of,  Marine Flores MD. I have scribed the entire note.       History     Chief Complaint   Patient presents with    Altered Mental Status     Patient is a 37 y.o. female who presents to the emergency department via EMS due to substance abuse. Upon exam patient reports that she is unsure of why they brought her in. She admits to oxycodone, marijuana, and cocaine use yesterday evening. Patient denies alcohol use. Patient denies any previously known health issues.     The history is provided by the patient. No  was used.     Review of patient's allergies indicates:   Allergen Reactions    Sulfa (sulfonamide antibiotics)      History reviewed. No pertinent past medical history.  History reviewed. No pertinent surgical history.  History reviewed. No pertinent family history.  Social History     Tobacco Use    Smoking status: Current Every Day Smoker     Types: Cigarettes    Smokeless tobacco: Never Used   Substance Use Topics    Alcohol use: Yes    Drug use: Yes     Types: Cocaine, Marijuana     Review of Systems   Constitutional: Negative.    HENT: Negative.    Eyes: Negative.    Respiratory: Negative.    Cardiovascular: Negative.    Gastrointestinal: Negative.    Endocrine: Negative.    Genitourinary: Negative.    Musculoskeletal: Negative.    Skin: Negative.    Neurological: Negative.    Hematological: Negative.    Psychiatric/Behavioral: Negative.    All other systems reviewed and are negative.      Physical Exam     Initial Vitals [07/06/22 1445]   BP Pulse Resp Temp SpO2   134/84 (!) 119 (!) 24 99 °F (37.2 °C) (!) 93 %      MAP       --         Physical Exam    Vitals reviewed.  Constitutional: She appears well-developed and well-nourished. No distress.   HENT:   Head: Normocephalic.   Eyes: Pupils are equal, round, and reactive to light.   1.5mm pupils   Cardiovascular: Normal rate,  regular rhythm, normal heart sounds and intact distal pulses.   Pulmonary/Chest: Breath sounds normal.   Abdominal: Abdomen is soft. Bowel sounds are normal.     Neurological: She is alert and oriented to person, place, and time.   Skin: Skin is warm and dry.   Psychiatric: She has a normal mood and affect.         ED Course   Procedures  Labs Reviewed   COMPREHENSIVE METABOLIC PANEL - Abnormal; Notable for the following components:       Result Value    Chloride 108 (*)     Creatinine 1.09 (*)     Calcium 8.4 (*)     Albumin 3.3 (*)     Globulin 4.1 (*)     All other components within normal limits   DRUG SCREEN, URINE (BEAKER) - Abnormal; Notable for the following components:    Benzodiazepine, Urine Positive (*)     Opiates, Urine Positive (*)     Cannabinoid, Urine Positive (*)     Cocaine, Urine Positive (*)     All other components within normal limits    Narrative:     The results of screening tests should be considered presumptive. Confirmatory testing is available upon request.    Cutoff Points:  PCP:         25ng/mL  AMPH:        500ng/mL  JADEN:        200ng/mL  JONNIE:        200ng/mL  THC:         50ng/mL  TONIE:         300ng/mL  OPI:         2000ng/mL   URINALYSIS, REFLEX TO URINE CULTURE - Abnormal; Notable for the following components:    Protein, UA 30  (*)     All other components within normal limits   CBC WITH DIFFERENTIAL - Abnormal; Notable for the following components:    WBC 11.35 (*)     Hematocrit 37.8 (*)     RDW 14.8 (*)     MPV 9.3 (*)     Neutrophils % 80.7 (*)     Lymphocytes % 12.1 (*)     Monocytes % 6.6 (*)     Eosinophils % 0.0 (*)     Neutrophils, Abs 9.16 (*)     Immature Granulocytes, Absolute 0.05 (*)     All other components within normal limits   URINALYSIS, MICROSCOPIC - Abnormal; Notable for the following components:    Squamous Epithelial Cells, UA Few (*)     Mucus, UA Rare (*)     Hyaline Casts, UA 0-2 (*)     Fine Granular Casts, UA 0-2 (*)     All other components within  "normal limits   CBC W/ AUTO DIFFERENTIAL    Narrative:     The following orders were created for panel order CBC auto differential.  Procedure                               Abnormality         Status                     ---------                               -----------         ------                     CBC with Differential[[id number]]        Abnormal            Final result                 Please view results for these tests on the individual orders.   POCT URINE PREGNANCY   POCT GLUCOSE MONITORING CONTINUOUS          Imaging Results    None          Medications - No data to display  Medical Decision Making:   ED Management:  17:30: Pt is awake and alert. She is able to answer all questions appropriately. She again admits to using multiple substances and reports she got "hot" today. She says she is ready to go home now. I offered her resources for substance abuse.             Attending Attestation:           Physician Attestation for Scribe:  Physician Attestation Statement for Scribe #1: I, Marine Flores MD, reviewed documentation, as scribed by Mimi Bansal in my presence, and it is both accurate and complete.                      Clinical Impression:   Final diagnoses:  [R40.4] Transient alteration of awareness (Primary)  [F19.10] Substance abuse          ED Disposition Condition    Discharge Stable        ED Prescriptions     None        Follow-up Information    None          Marine Lombardi MD  07/06/22 2211    " sibling(s)

## 2023-11-07 NOTE — BH INPATIENT PSYCHIATRY PROGRESS NOTE - NSBHFUPINTERVALHXFT_PSY_A_CORE
Upon current evaluation continues to ask about discharge and is visible in the day room mainly. At times intrusive to staff. Overnight appeared anxious and could not sleep. Upon current evaluation continues to ask about discharge and is visible in the day room mainly. At times intrusive to staff. Overnight appeared anxious and could not sleep.    Collateral from brother (Deo): He explained that Abo has been non-compliant with his medications for 2-3 months, first with Effexor, which is around the time he started becoming angry and aggressive. He also discontinued his Prolixin and was observed to be talking to himself and responding to internal stimuli. He has been on Prolixin for 20-30 years, but it was noticed that he was experiencing severe akathisia and tardive dyskinesia. Additionally as a result was experiencing unsteadiness on his feet, swing side-to-side attributed to medications. He speaks with Dr. Webb every 2-3 weeks. Cogentin, initially 2mg bid, reduced to 2mg qam was instituted. Patient cannot sleep at night, sleeps at irregular interval of only 3 hours at a time, or will stay up for a few days before sleeping for 24 hours. He lives with his brother who is able to observe and confirm this behavior. In the past he has tried Seroquel 25mg, which did not work. He tried Abilify in the past before it was restarted over the past 10 days, initially as 2mg -> 7.5mg. He has also been on Ativan 0.5mg bid prn. As far as medical medications the optimal combination for him at home was Losartan 100mg qd and HCTZ 12.5mg qd, although at times he complained of the side effect of increased urination. About a year ago metformin was discontinued. Previously was on an additional 75mg of Effexor at night that was not favorable. Family requests that sleeping issue be addressed.

## 2023-11-07 NOTE — BH SOCIAL WORK INITIAL PSYCHOSOCIAL EVALUATION - OTHER PAST PSYCHIATRIC HISTORY (INCLUDE DETAILS REGARDING ONSET, COURSE OF ILLNESS, INPATIENT/OUTPATIENT TREATMENT)
Updated entry for Nov 6 2023 admission to [facility].  Pt was transferred to [facility] from [facility].  Pt is a 63 radha old male , single, resides with his brothers - dx schizophrenia. Multiple past psy admission  including 5 at [facility]  - most recently May 2022 - see below)   Pt in treatment at [facility] for Psychotherapy.  Brought in by family for worsening AH and SI. Upon interview today 11/7, pt denied AH and SI.       Prior Psychosocial entry: As per the ED Behavioral Health Assessment Note completed on May 8th 2022: "The Pt is a 61 yr old  male (Canadian speaking only), single, domiciled and unemployed, PPHx schizophrenia, multiple past psych admissions (including 5 [facility] admissions last discharged in summer 2020, no known past SA, legal or substance hx, PMHx of HTN, DM and HLD.  Today, BIB family to worsening AH. On interview, patient seen with Canadian-fluent attending, he feels that his symptoms have been worsening for one month. He reports depressed mood, "yelling at my family", hearing voices that tell him "look, other people are doing things and you're no good". He endorses having SI with plan to hang himself, denies any preparatory actions or any attempts. Reports poor sleep, low energy, poor appetite as well. Denies CAH. Otherwise endorses 1.5 year history of dizziness that his PCP wanted patient to see Neurologist for, which he has not yet done. Endorses current dizziness as well. Otherwise denies active SI, and feels safe now in the hospital. Expresses his desire to get help and asks to be admitted. Patient completes voluntary paperwork in the room with team."

## 2023-11-07 NOTE — BH INPATIENT PSYCHIATRY PROGRESS NOTE - NSBHCHARTREVIEWVS_PSY_A_CORE FT
Vital Signs Last 24 Hrs  T(C): 36.4 (11-06-23 @ 15:34), Max: 36.4 (11-06-23 @ 15:34)  T(F): 97.5 (11-06-23 @ 15:34), Max: 97.5 (11-06-23 @ 15:34)  HR: 74 (11-07-23 @ 05:38) (60 - 88)  BP: 164/92 (11-07-23 @ 05:38) (160/70 - 190/110)  BP(mean): --  RR: --  SpO2: 97% (11-07-23 @ 05:38) (97% - 97%)     Vital Signs Last 24 Hrs  T(C): 36.4 (11-06-23 @ 15:34), Max: 36.4 (11-06-23 @ 15:34)  T(F): 97.5 (11-06-23 @ 15:34), Max: 97.5 (11-06-23 @ 15:34)  HR: 84 (11-07-23 @ 12:52) (60 - 88)  BP: 161/95 (11-07-23 @ 12:52) (160/70 - 190/110)  BP(mean): --  RR: 18 (11-07-23 @ 12:52) (18 - 18)  SpO2: 98% (11-07-23 @ 12:52) (97% - 98%)     Vital Signs Last 24 Hrs  T(C): --  T(F): --  HR: 84 (11-07-23 @ 12:52) (60 - 84)  BP: 161/95 (11-07-23 @ 12:52) (160/70 - 164/92)  BP(mean): --  RR: 18 (11-07-23 @ 12:52) (18 - 18)  SpO2: 98% (11-07-23 @ 12:52) (97% - 98%)     Vital Signs Last 24 Hrs  T(C): --  T(F): --  HR: 84 (11-07-23 @ 12:52) (74 - 84)  BP: 161/95 (11-07-23 @ 12:52) (161/95 - 164/92)  BP(mean): --  RR: 18 (11-07-23 @ 12:52) (18 - 18)  SpO2: 98% (11-07-23 @ 12:52) (97% - 98%)

## 2023-11-07 NOTE — PHYSICAL THERAPY INITIAL EVALUATION ADULT - PATIENT PROFILE REVIEW, REHAB EVAL
yes ALAINA VILLEDA ; 10/21/2021 ; SHIR[address] ALAINA VILLEDA ; 10/21/2021 ; NPP [place] [address]  ALAINA VILLEDA ; 11/18/2021 ; NPP [place] [facility]

## 2023-11-07 NOTE — BH SOCIAL WORK INITIAL PSYCHOSOCIAL EVALUATION - NSBHTREATHXNAMEFT_PSY_ALL_CORE
[facility]  [place]- Josh Webb MD / Radha Emery, Psychotherapist [phone]

## 2023-11-07 NOTE — CONSULT NOTE ADULT - ASSESSMENT
63 year old male with Schizophrenia now with exacerbation with auditory hallucinations admitted to [facility] 10/30/2023 now transferred to [facility].  1.  HTN:  PT ON LOSARTAN 75 MG QD AND METOPROLOL SUCC 50 MG QD.  BP STILL BORDERLINE.  WILL INCREASE LOSARTAN TO 75 MG BID.  FOLLOW VS  2.  +DMT2 DIET CONTROLLED.  HGAIC 5.9 SUGGESTS GOOD CONTROL.  FOLLOW QD FS. SLIDING ADMELOG SCALE  3.  OLECRONON BURSITIS:  DRAINED TWICE IN PAST.  F/U WITH ORTHO AFTER DISCHARGE.  4.  COVID PCR NEG  5.  PSYCH: AS PER ATTENDING 63 year old male with Schizophrenia now with exacerbation with auditory hallucinations admitted to [facility] 10/30/2023 now transferred to [facility].  1.  HTN:  PT ON LOSARTAN 75 MG QD AND METOPROLOL SUCC 50 MG QD.  BP STILL BORDERLINE.  WILL INCREASE LOSARTAN TO 50 MG BID.  FOLLOW VS  2.  +DMT2 DIET CONTROLLED.  HGAIC 5.9 SUGGESTS GOOD CONTROL.  FOLLOW QD FS. SLIDING ADMELOG SCALE  3.  OLECRONON BURSITIS:  DRAINED TWICE IN PAST.  F/U WITH ORTHO AFTER DISCHARGE.  4.  COVID PCR NEG  5.  PSYCH: AS PER ATTENDING

## 2023-11-07 NOTE — BH INPATIENT PSYCHIATRY PROGRESS NOTE - NSBHMETABOLIC_PSY_ALL_CORE_FT
BMI: BMI (kg/m2): 27.1 (11-06-23 @ 15:34)  HbA1c: A1C with Estimated Average Glucose Result: 5.9 % (10-30-23 @ 01:03)    Glucose: POCT Blood Glucose.: 112 mg/dL (11-06-23 @ 12:25)    BP: 164/92 (11-07-23 @ 05:38) (160/70 - 190/110)  Lipid Panel: Date/Time: 11-07-23 @ 08:23  Cholesterol, Serum: 196  Direct LDL: --  HDL Cholesterol, Serum: 38  Total Cholesterol/HDL Ration Measurement: --  Triglycerides, Serum: 210   BMI: BMI (kg/m2): 27.1 (11-06-23 @ 15:34)  HbA1c: A1C with Estimated Average Glucose Result: 5.9 % (10-30-23 @ 01:03)    Glucose: POCT Blood Glucose.: 112 mg/dL (11-06-23 @ 12:25)    BP: 161/95 (11-07-23 @ 12:52) (160/70 - 190/110)  Lipid Panel: Date/Time: 11-07-23 @ 08:23  Cholesterol, Serum: 196  Direct LDL: --  HDL Cholesterol, Serum: 38  Total Cholesterol/HDL Ration Measurement: --  Triglycerides, Serum: 210

## 2023-11-07 NOTE — CONSULT NOTE ADULT - CONSULT REASON
Asked by attending to see this 63 year old male with Schizophrenia now with exacerbation of auditory hallucinations admitted to [facility] 10/30/2023.  +HTH on Losartan 75 mg qd and Metoprolol 50 mg qd with borderline BP control.   +DMT2 diet controlled.    Pt now transferred to [facility].

## 2023-11-07 NOTE — CONSULT NOTE ADULT - SUBJECTIVE AND OBJECTIVE BOX
HPI:   63 year old male with Schizophrenia now with exacerbation of auditory hallucinations admitted to [facility] 10/30/2023 now transferred to [facility].    Pt with poorly controlled HTN on Losartan 75 mg qd and Metoprolol 50 mg qd.  BP still borderline.    +DMT2 diet controlled with HGAIC 5.9.    PAST MEDICAL & SURGICAL HISTORY:  HTN (hypertension)      HLD (hyperlipidemia)      Prediabetes      No significant past surgical history          Review of Systems:   CONSTITUTIONAL: No fever, weight loss, or fatigue  EYES: No eye pain, visual disturbances, or discharge  ENMT:  No difficulty hearing, tinnitus, vertigo; No sinus or throat pain  NECK: No pain or stiffness  RESPIRATORY: No cough, wheezing, chills or hemoptysis; No shortness of breath  CARDIOVASCULAR: No chest pain, palpitations, dizziness, or leg swelling  GASTROINTESTINAL: No abdominal or epigastric pain. No nausea, vomiting, or hematemesis; No diarrhea or constipation. No melena or hematochezia.  GENITOURINARY: No dysuria, frequency, hematuria, or incontinence  NEUROLOGICAL: No headaches, memory loss, loss of strength, numbness, or tremors  SKIN: No itching, burning, rashes, or lesions   LYMPH NODES: No enlarged glands  ENDOCRINE: No heat or cold intolerance; No hair loss  MUSCULOSKELETAL: No joint pain or swelling; No muscle, back, or extremity pain  HEME/LYMPH: No easy bruising, or bleeding gums  ALLERY AND IMMUNOLOGIC: No hives or eczema    Allergies    No Known Allergies    Social History:   +CIGS, -ETOH, -DRUGS    FAMILY HISTORY:  NO SIGNIFICANT MED HX IN FIRST DEGREE RELATIVES    MEDICATIONS  (STANDING):  ARIPiprazole 10 milliGRAM(s) Oral daily  clonazePAM  Tablet 0.5 milliGRAM(s) Oral two times a day  influenza   Vaccine 0.5 milliLiter(s) IntraMuscular once  losartan 75 milliGRAM(s) Oral daily  metoprolol succinate ER 50 milliGRAM(s) Oral daily  nicotine - 21 mG/24Hr(s) Patch 1 Patch Transdermal daily  venlafaxine  milliGRAM(s) Oral daily    MEDICATIONS  (PRN):  acetaminophen     Tablet .. 650 milliGRAM(s) Oral every 6 hours PRN Temp greater or equal to 38C (100.4F), Mild Pain (1 - 3)  diphenhydrAMINE Injectable 50 milliGRAM(s) IntraMuscular once PRN agitation  fluPHENAZine 5 milliGRAM(s) Oral every 6 hours PRN agitation  fluPHENAZine  Injectable 5 milliGRAM(s) IntraMuscular once PRN agitation  LORazepam   Injectable 2 milliGRAM(s) IntraMuscular once PRN agitation  melatonin. 6 milliGRAM(s) Oral at bedtime PRN Insomnia  polyethylene glycol 3350 17 Gram(s) Oral daily PRN constipation        CAPILLARY BLOOD GLUCOSE      POCT Blood Glucose.: 112 mg/dL (06 Nov 2023 12:25)    VS:  164/92 74      PHYSICAL EXAM:  GENERAL: NAD, well-developed  HEAD:  Atraumatic, Normocephalic  EYES: EOMI, PERRLA, conjunctiva and sclera clear  NECK: Supple, No JVD  CHEST/LUNG: Clear to auscultation bilaterally; No wheeze  HEART: Regular rate and rhythm; No murmurs, rubs, or gallops  ABDOMEN: Soft, Nontender, Nondistended; Bowel sounds present  EXTREMITIES:  2+ Peripheral Pulses, No clubbing, cyanosis, or edema  PSYCH: AAOx3  NEUROLOGY: non-focal  SKIN: No rashes or lesions    LABS:                    EKG:    RADIOLOGY & ADDITIONAL TESTS:    Imaging Personally Reviewed:    Consultant(s) Notes Reviewed:      Care Discussed with Consultants/Other Providers:   HPI:   63 year old male with Schizophrenia now with exacerbation of auditory hallucinations admitted to [facility] 10/30/2023 now transferred to [facility].    Pt with poorly controlled HTN on Losartan 75 mg qd and Metoprolol 50 mg qd.  BP still borderline.    +DMT2 diet controlled with HGAIC 5.9.    PAST MEDICAL & SURGICAL HISTORY:  HTN (hypertension)      HLD (hyperlipidemia)      Prediabetes      No significant past surgical history          Review of Systems:   CONSTITUTIONAL: No fever, weight loss, or fatigue  EYES: No eye pain, visual disturbances, or discharge  ENMT:  No difficulty hearing, tinnitus, vertigo; No sinus or throat pain  NECK: No pain or stiffness  RESPIRATORY: No cough, wheezing, chills or hemoptysis; No shortness of breath  CARDIOVASCULAR: No chest pain, palpitations, dizziness, or leg swelling  GASTROINTESTINAL: No abdominal or epigastric pain. No nausea, vomiting, or hematemesis; No diarrhea or constipation. No melena or hematochezia.  GENITOURINARY: No dysuria, frequency, hematuria, or incontinence  NEUROLOGICAL: No headaches, memory loss, loss of strength, numbness, or tremors  SKIN: No itching, burning, rashes, or lesions   LYMPH NODES: No enlarged glands  ENDOCRINE: No heat or cold intolerance; No hair loss  MUSCULOSKELETAL: No joint pain or swelling; No muscle, back, or extremity pain  HEME/LYMPH: No easy bruising, or bleeding gums  ALLERY AND IMMUNOLOGIC: No hives or eczema    Allergies    No Known Allergies    Social History:   +CIGS, -ETOH, -DRUGS    FAMILY HISTORY:  NO SIGNIFICANT MED HX IN FIRST DEGREE RELATIVES    MEDICATIONS  (STANDING):  ARIPiprazole 10 milliGRAM(s) Oral daily  clonazePAM  Tablet 0.5 milliGRAM(s) Oral two times a day  influenza   Vaccine 0.5 milliLiter(s) IntraMuscular once  losartan 75 milliGRAM(s) Oral daily  metoprolol succinate ER 50 milliGRAM(s) Oral daily  nicotine - 21 mG/24Hr(s) Patch 1 Patch Transdermal daily  venlafaxine  milliGRAM(s) Oral daily    MEDICATIONS  (PRN):  acetaminophen     Tablet .. 650 milliGRAM(s) Oral every 6 hours PRN Temp greater or equal to 38C (100.4F), Mild Pain (1 - 3)  diphenhydrAMINE Injectable 50 milliGRAM(s) IntraMuscular once PRN agitation  fluPHENAZine 5 milliGRAM(s) Oral every 6 hours PRN agitation  fluPHENAZine  Injectable 5 milliGRAM(s) IntraMuscular once PRN agitation  LORazepam   Injectable 2 milliGRAM(s) IntraMuscular once PRN agitation  melatonin. 6 milliGRAM(s) Oral at bedtime PRN Insomnia  polyethylene glycol 3350 17 Gram(s) Oral daily PRN constipation        CAPILLARY BLOOD GLUCOSE      POCT Blood Glucose.: 112 mg/dL (06 Nov 2023 12:25)    VS:  164/92 74      PHYSICAL EXAM:  GENERAL: NAD, well-developed  HEAD:  Atraumatic, Normocephalic  EYES: EOMI,  conjunctiva and sclera clear  NECK: Supple, No JVD  CHEST/LUNG: Clear to auscultation bilaterally; No wheeze  HEART: Regular rate and rhythm; No murmurs, rubs, or gallops  ABDOMEN: Soft, Nontender, Nondistended; Bowel sounds present  EXTREMITIES:  No clubbing, cyanosis, or edema  NEUROLOGY: non-focal  SKIN: No rashes or lesions    LABS:                    EKG:    RADIOLOGY & ADDITIONAL TESTS:    Imaging Personally Reviewed:    Consultant(s) Notes Reviewed:      Care Discussed with Consultants/Other Providers:   HPI:   63 year old male with Schizophrenia now with exacerbation of auditory hallucinations admitted to [facility] 10/30/2023 now transferred to [facility].    Pt with poorly controlled HTN on Losartan 75 mg qd and Metoprolol 50 mg qd.  BP still borderline.    +DMT2 diet controlled with HGAIC 5.9.    PAST MEDICAL & SURGICAL HISTORY:  HTN (hypertension)      HLD (hyperlipidemia)      Prediabetes      No significant past surgical history          Review of Systems:   CONSTITUTIONAL: No fever, weight loss, or fatigue  EYES: No eye pain, visual disturbances, or discharge  ENMT:  No difficulty hearing, tinnitus, vertigo; No sinus or throat pain  NECK: No pain or stiffness  RESPIRATORY: No cough, wheezing, chills or hemoptysis; No shortness of breath  CARDIOVASCULAR: No chest pain, palpitations, dizziness, or leg swelling  GASTROINTESTINAL: No abdominal or epigastric pain. No nausea, vomiting, or hematemesis; No diarrhea or constipation. No melena or hematochezia.  GENITOURINARY: No dysuria, frequency, hematuria, or incontinence  NEUROLOGICAL: No headaches, memory loss, loss of strength, numbness, or tremors  SKIN: No itching, burning, rashes, or lesions   LYMPH NODES: No enlarged glands  ENDOCRINE: No heat or cold intolerance; No hair loss  MUSCULOSKELETAL: No joint pain or swelling; No muscle, back, or extremity pain  HEME/LYMPH: No easy bruising, or bleeding gums  ALLERY AND IMMUNOLOGIC: No hives or eczema    Allergies    No Known Allergies    Social History:   +CIGS, -ETOH, -DRUGS    FAMILY HISTORY:  NO SIGNIFICANT MED HX IN FIRST DEGREE RELATIVES    MEDICATIONS  (STANDING):  ARIPiprazole 10 milliGRAM(s) Oral daily  clonazePAM  Tablet 0.5 milliGRAM(s) Oral two times a day  influenza   Vaccine 0.5 milliLiter(s) IntraMuscular once  losartan 75 milliGRAM(s) Oral daily  metoprolol succinate ER 50 milliGRAM(s) Oral daily  nicotine - 21 mG/24Hr(s) Patch 1 Patch Transdermal daily  venlafaxine  milliGRAM(s) Oral daily    MEDICATIONS  (PRN):  acetaminophen     Tablet .. 650 milliGRAM(s) Oral every 6 hours PRN Temp greater or equal to 38C (100.4F), Mild Pain (1 - 3)  diphenhydrAMINE Injectable 50 milliGRAM(s) IntraMuscular once PRN agitation  fluPHENAZine 5 milliGRAM(s) Oral every 6 hours PRN agitation  fluPHENAZine  Injectable 5 milliGRAM(s) IntraMuscular once PRN agitation  LORazepam   Injectable 2 milliGRAM(s) IntraMuscular once PRN agitation  melatonin. 6 milliGRAM(s) Oral at bedtime PRN Insomnia  polyethylene glycol 3350 17 Gram(s) Oral daily PRN constipation        CAPILLARY BLOOD GLUCOSE      POCT Blood Glucose.: 112 mg/dL (06 Nov 2023 12:25)    VS:  164/92 74      PHYSICAL EXAM:  GENERAL: NAD, well-developed  HEAD:  Atraumatic, Normocephalic  EYES: EOMI,  conjunctiva and sclera clear  NECK: Supple, No JVD  CHEST/LUNG: Clear to auscultation bilaterally; No wheeze  HEART: Regular rate and rhythm; No murmurs, rubs, or gallops  ABDOMEN: Soft, Nontender, Nondistended; Bowel sounds present  EXTREMITIES:  No clubbing, cyanosis, or edema  NEUROLOGY: non-focal  SKIN: No rashes or lesions    LABS:  11/04/2023  COVID PCR NEG    11/02/2023  WBC 9.5 H/H 12.7/38.3 PLAT 222   K 4.1  CO2 25 GLUCL 93 BUN 15 CR 0.88    11/01/2023  B12 620 FOLATE 4.5    10/30/203  PROT 6.8 ALB 4.3 ALK PHOS 82 SGOT 19 SGPT 17 TSH 1.99 HGAIC 5.9      EKG:  10/31/2023  EKG  NSR WNL QTC .442    RADIOLOGY & ADDITIONAL TESTS:  10/30/2023  CXR CLEAR LUNGS  10/30/2023 CT HEAD:  NO ACUTE INTRACRANIAL ABNORMALITIES  10/31/2023  SOFT TISSUE SWELLING OVER OLECRANON OTHERWISE NEG    Consultant(s) Notes Reviewed:  NOTES REVIEWED    Care Discussed with Consultants/Other Providers:  ATTENDING AND STAFF

## 2023-11-07 NOTE — BH INPATIENT PSYCHIATRY PROGRESS NOTE - NSBHATTESTCOMMENTATTENDFT_PSY_A_CORE
Patient has had sustained improvement. Patient seems to have improved since medications titrated by CL team while in medicine for a week. Will plan for discharge.

## 2023-11-08 PROCEDURE — 99232 SBSQ HOSP IP/OBS MODERATE 35: CPT | Mod: GC

## 2023-11-08 RX ORDER — LOSARTAN POTASSIUM 100 MG/1
50 TABLET, FILM COATED ORAL
Refills: 0 | Status: DISCONTINUED | OUTPATIENT
Start: 2023-11-08 | End: 2023-11-15

## 2023-11-08 RX ORDER — METOPROLOL TARTRATE 50 MG
50 TABLET ORAL DAILY
Refills: 0 | Status: DISCONTINUED | OUTPATIENT
Start: 2023-11-08 | End: 2023-11-15

## 2023-11-08 RX ADMIN — Medication 650 MILLIGRAM(S): at 20:52

## 2023-11-08 RX ADMIN — Medication 650 MILLIGRAM(S): at 15:10

## 2023-11-08 RX ADMIN — Medication 650 MILLIGRAM(S): at 08:57

## 2023-11-08 RX ADMIN — FLUPHENAZINE HYDROCHLORIDE 5 MILLIGRAM(S): 1 TABLET, FILM COATED ORAL at 03:45

## 2023-11-08 RX ADMIN — ARIPIPRAZOLE 10 MILLIGRAM(S): 15 TABLET ORAL at 08:22

## 2023-11-08 RX ADMIN — Medication 650 MILLIGRAM(S): at 09:57

## 2023-11-08 RX ADMIN — Medication 650 MILLIGRAM(S): at 16:00

## 2023-11-08 RX ADMIN — Medication 150 MILLIGRAM(S): at 08:22

## 2023-11-08 RX ADMIN — LOSARTAN POTASSIUM 50 MILLIGRAM(S): 100 TABLET, FILM COATED ORAL at 20:20

## 2023-11-08 RX ADMIN — Medication 50 MILLIGRAM(S): at 08:22

## 2023-11-08 RX ADMIN — Medication 0.5 MILLIGRAM(S): at 08:21

## 2023-11-08 RX ADMIN — Medication 650 MILLIGRAM(S): at 21:34

## 2023-11-08 RX ADMIN — LOSARTAN POTASSIUM 50 MILLIGRAM(S): 100 TABLET, FILM COATED ORAL at 08:21

## 2023-11-08 RX ADMIN — Medication 0.5 MILLIGRAM(S): at 20:20

## 2023-11-08 NOTE — BH INPATIENT PSYCHIATRY PROGRESS NOTE - NSBHFUPINTERVALHXFT_PSY_A_CORE
Upon current evaluation patient was calm, behaviorally controlled answering questions, denying AH or negative and intrusive thoughts. He stated he slept well last night after receiving melatonin. It appears during his week on CL service he stabilized enough that he does not require as much time on inpatient psychiatric service.     Upon current evaluation patient was calm, behaviorally controlled answering questions, denying AH or negative and intrusive thoughts. He stated he slept well last night after receiving melatonin. It appears during his week on CL service he stabilized enough that he does not require additional days under inpatient psychiatric service.     Upon current evaluation patient was calm, behaviorally controlled answering questions, denying AH or negative and intrusive thoughts. He stated he slept well last night after receiving melatonin. It appears during his week on CL service he stabilized enough that he does not require additional days under inpatient psychiatric service.    Spoke with patients outpatient psychiatrist Dr. Webb @ 3:00 p.m. The last time he physically saw patient in person was about 1-month ago (most interactions are via telephone) and observed tardive dyskinesia. He lowered the Prolixin dose to 2.5mg and gave him an in-office sample of a new anti-eps medication. Abilify 2mg was started at that time, but from patients past history with the medication, was hesitant and did not think he would continue with the medication. He suggested that he remain on the same Effexor dose, but had plans to decrease it as well. When he speaks on the phone with patient he does not endorse any complaints, including sleep, which he denies an issue with. In fact he states to the psychiatrist he sleeps too much and is unable to participate in any activities outside the home environment and feels contained. Patients nephew is a pharmacist and brother is supportive, they are knowledgeable of his medications, administration and side effects. Psychiatrist stated he is pleased that he received hospitalization and in order to devise a more appropriate medication regimen that will be effective and patient compliant long-term.  Upon current evaluation patient was calm, behaviorally controlled answering questions, denying AH or negative and intrusive thoughts. He stated he slept well last night after receiving melatonin. It appears during his week on CL service he stabilized enough that he does not require additional days under inpatient psychiatric service.    Spoke with patients outpatient psychiatrist Dr. Webb @ 3:00 p.m. The last time he physically saw patient in person was about 1-month ago (most interactions are via telephone) and observed tardive dyskinesia. He lowered the Prolixin dose to 2.5mg and gave him an in-office sample of a new anti-eps medication. Abilify 2mg daily was started at that time, but from patients past history with the medication, was hesitant and did not think he would continue with the medication. He suggested that he remain on the same Effexor dose, but had plans to decrease it as well. When he speaks on the phone with patient he does not endorse any complaints, including sleep, which he denies an issue with. In fact he states to the psychiatrist he sleeps too much and is unable to participate in any activities outside the home environment and feels contained. Patients nephew is a pharmacist and brother is supportive, they are knowledgeable of his medications, administration and side effects. Psychiatrist stated he is pleased that he received hospitalization and in order to devise a more appropriate medication regimen that will be effective and patient compliant long-term.  Upon current evaluation patient was calm, behaviorally controlled answering questions, denying AH or negative and intrusive thoughts. He stated he slept well last night after receiving melatonin.     Spoke with patients outpatient psychiatrist Dr. Webb @ 3:00 p.m. The last time he physically saw patient in person was about 1-month ago (most interactions are via telephone) and observed tardive dyskinesia. He lowered the Prolixin dose to 2.5mg and gave him an in-office sample of a new anti-eps medication. Abilify 2mg daily was started at that time, but from patients past history with the medication, was hesitant and did not think he would continue with the medication. He suggested that he remain on the same Effexor dose, but had plans to decrease it as well. When he speaks on the phone with patient he does not endorse any complaints, including sleep, which he denies an issue with. In fact he states to the psychiatrist he sleeps too much and is unable to participate in any activities outside the home environment and feels contained. Patients nephew is a pharmacist and brother is supportive, they are knowledgeable of his medications, administration and side effects. Psychiatrist stated he is pleased that he received hospitalization and in order to devise a more appropriate medication regimen that will be effective and patient compliant long-term.

## 2023-11-08 NOTE — BH INPATIENT PSYCHIATRY PROGRESS NOTE - NSBHASSESSSUMMFT_PSY_ALL_CORE
63yr old  male (Cymraes speaking only), single, domiciled and unemployed, PPHx schizophrenia, akathisia, multiple past psych admissions (including 5 [facility] admissions last admission in May, 2022, self reports remote hx of interrupted sa via hanging, in treatment with DR. Chino [phone], no  legal or substance hx, PMHx of HTN, DM and HLD, who was BIB family on 10/29/23 due to worsening AH and si. On initial CL evaluation, patient presented with worsening of depressive, anxiety and psychotic sx, worsening AH that are derogatory in nature as reported to initital provider. Patient is a limited historian. On chart review, patient was taking abilify - it seems CL team restarted and titrated to  7.5mg po qhs and continued klonopin 0.5mg po bid, effexor XR 150mg po daily, melatonin 6mg po qhs, and cogentin stopped. Patient now denying all symptoms and requesting discharge.     11/7: PT consult placed, lipid panel returned WNL, Medicine consult appreciated, patient is focused on discharge, outpatient provider called - awaiting response, left voice message to Dr. Webb [phone]  11/8: Patient is not a behavioral issue on unit, he is seen in day throughout the day    Plan:    1. Schizophrenia: continue with Abilify 10mg po daily    2. Depressed mood: continue Effexor 150mg po daily    3. Anxiety: continue klonopin 0.5mg po bid    4. Insomnia: melatonin 6mg po qhs prn    5. unsteady gait - PT consult placed 11/7    6. HTN: seen by hospitalist, increase losartan to 50mg po bid and continue metoprolol 50mg po daily    7. PRNs: Fluphenazine 5mg PO/IM q6h for agitation, Benadryl 50mg PO/IM q6h for agitation. Ativan 2mg IM for severe agitation    8. Nicotine dependence: nicotine replacement patch + gum PRN     63yr old  male (Mosotho speaking only), single, domiciled and unemployed, PPHx schizophrenia, akathisia, multiple past psych admissions (including 5 [facility] admissions last admission in May, 2022, self reports remote hx of interrupted sa via hanging, in treatment with DR. Chino [phone], no  legal or substance hx, PMHx of HTN, DM and HLD, who was BIB family on 10/29/23 due to worsening AH and si. On initial CL evaluation, patient presented with worsening of depressive, anxiety and psychotic sx, worsening AH that are derogatory in nature as reported to initital provider. Patient is a limited historian. On chart review, patient was taking abilify - it seems CL team restarted and titrated to  7.5mg po qhs and continued klonopin 0.5mg po bid, effexor XR 150mg po daily, melatonin 6mg po qhs, and cogentin stopped. Patient now denying all symptoms and requesting discharge.     11/7: PT consult placed, lipid panel returned WNL, Medicine consult appreciated, patient is focused on discharge, outpatient provider called - awaiting response, left voice message to Dr. Webb [phone]  11/8: Patient is not a behavioral issue on unit, he is seen in day room mainly    Plan:    1. Schizophrenia: continue with Abilify 10mg po daily    2. Depressed mood: continue Effexor 150mg po daily    3. Anxiety: continue klonopin 0.5mg po bid    4. Insomnia: melatonin 6mg po qhs prn    5. unsteady gait - PT consult placed 11/7    6. HTN: seen by hospitalistSUSAN losartan 50mg po bid and metoprolol 50mg po daily. Resume home medication regimen of Losartan 100mg qd and HCTZ 12.5 qd.    7. PRNs: Fluphenazine 5mg PO/IM q6h for agitation, Benadryl 50mg PO/IM q6h for agitation. Ativan 2mg IM for severe agitation    8. Nicotine dependence: nicotine replacement patch + gum PRN     63yr old  male (Singaporean speaking only), single, domiciled and unemployed, PPHx schizophrenia, akathisia, multiple past psych admissions (including 5 [facility] admissions last admission in May, 2022, self reports remote hx of interrupted sa via hanging, in treatment with DR. Chino [phone], no  legal or substance hx, PMHx of HTN, DM and HLD, who was BIB family on 10/29/23 due to worsening AH and si. On initial CL evaluation, patient presented with worsening of depressive, anxiety and psychotic sx, worsening AH that are derogatory in nature as reported to initital provider. Patient is a limited historian. On chart review, patient was taking abilify - it seems CL team restarted and titrated to  7.5mg po qhs and continued klonopin 0.5mg po bid, effexor XR 150mg po daily, melatonin 6mg po qhs, and cogentin stopped. Patient now denying all symptoms and requesting discharge.     11/7: PT consult placed, lipid panel returned WNL, Medicine consult appreciated, patient is focused on discharge, outpatient provider called - awaiting response, left voice message to Dr. Webb [phone]  11/8: Patient is not a behavioral issue on unit, he is seen in day room mainly    Plan:    1. Schizophrenia: continue with Abilify 10mg po daily    2. Depressed mood: continue Effexor 150mg po daily    3. Anxiety: continue klonopin 0.5mg po bid    4. Insomnia: melatonin 6mg po qhs prn    5. unsteady gait - PT consult placed 11/7    6. HTN: seen by hospitalist, increased losartan to 50mg po bid and continued metoprolol 50mg po daily. BP remains high today - can add home HCTZ 12.5 qd. Discussed with hospitalist.    7. PRNs: Fluphenazine 5mg PO/IM q6h for agitation, Benadryl 50mg PO/IM q6h for agitation. Ativan 2mg IM for severe agitation    8. Nicotine dependence: nicotine replacement patch + gum PRN

## 2023-11-08 NOTE — BH INPATIENT PSYCHIATRY PROGRESS NOTE - CURRENT MEDICATION
MEDICATIONS  (STANDING):  ARIPiprazole 10 milliGRAM(s) Oral daily  clonazePAM  Tablet 0.5 milliGRAM(s) Oral two times a day  influenza   Vaccine 0.5 milliLiter(s) IntraMuscular once  losartan 50 milliGRAM(s) Oral two times a day  metoprolol succinate ER 50 milliGRAM(s) Oral daily  nicotine - 21 mG/24Hr(s) Patch 1 Patch Transdermal daily  venlafaxine  milliGRAM(s) Oral daily    MEDICATIONS  (PRN):  acetaminophen     Tablet .. 650 milliGRAM(s) Oral every 6 hours PRN Temp greater or equal to 38C (100.4F), Mild Pain (1 - 3)  diphenhydrAMINE Injectable 50 milliGRAM(s) IntraMuscular once PRN agitation  fluPHENAZine 5 milliGRAM(s) Oral every 6 hours PRN agitation  fluPHENAZine  Injectable 5 milliGRAM(s) IntraMuscular once PRN agitation  LORazepam   Injectable 2 milliGRAM(s) IntraMuscular once PRN agitation  melatonin. 6 milliGRAM(s) Oral at bedtime PRN Insomnia  polyethylene glycol 3350 17 Gram(s) Oral daily PRN constipation   MEDICATIONS  (STANDING):  ARIPiprazole 10 milliGRAM(s) Oral daily  clonazePAM  Tablet 0.5 milliGRAM(s) Oral two times a day  influenza   Vaccine 0.5 milliLiter(s) IntraMuscular once  nicotine - 21 mG/24Hr(s) Patch 1 Patch Transdermal daily  venlafaxine  milliGRAM(s) Oral daily    MEDICATIONS  (PRN):  acetaminophen     Tablet .. 650 milliGRAM(s) Oral every 6 hours PRN Temp greater or equal to 38C (100.4F), Mild Pain (1 - 3)  diphenhydrAMINE Injectable 50 milliGRAM(s) IntraMuscular once PRN agitation  fluPHENAZine 5 milliGRAM(s) Oral every 6 hours PRN agitation  fluPHENAZine  Injectable 5 milliGRAM(s) IntraMuscular once PRN agitation  LORazepam   Injectable 2 milliGRAM(s) IntraMuscular once PRN agitation  melatonin. 6 milliGRAM(s) Oral at bedtime PRN Insomnia  polyethylene glycol 3350 17 Gram(s) Oral daily PRN constipation

## 2023-11-08 NOTE — BH INPATIENT PSYCHIATRY PROGRESS NOTE - NSBHATTESTCOMMENTATTENDFT_PSY_A_CORE
Patient has had sustained improvement. Patient seems to have improved since medications titrated by CL team while in medicine for a week. Will plan for discharge.  Patient denies SI or AH. He also states he's sleeping well but check sheet record suggests otherwise and this was a significant concern to family due to night time behavior. Will continue to monitor. Consider adding HS meds tomorrow if insomnia is consistent.

## 2023-11-08 NOTE — BH INPATIENT PSYCHIATRY PROGRESS NOTE - NSBHCHARTREVIEWVS_PSY_A_CORE FT
Vital Signs Last 24 Hrs  T(C): --  T(F): --  HR: 84 (11-08-23 @ 05:46) (81 - 84)  BP: 168/94 (11-08-23 @ 05:46) (156/75 - 168/94)  BP(mean): --  RR: 18 (11-07-23 @ 12:52) (18 - 18)  SpO2: 98% (11-07-23 @ 12:52) (98% - 98%)    Orthostatic VS  11-07-23 @ 19:57  Lying BP: --/-- HR: --  Sitting BP: 178/90 HR: 75  Standing BP: --/-- HR: --  Site: --  Mode: --   Vital Signs Last 24 Hrs  T(C): --  T(F): --  HR: 84 (11-08-23 @ 05:46) (81 - 84)  BP: 168/94 (11-08-23 @ 05:46) (156/75 - 168/94)  BP(mean): --  RR: --  SpO2: --    Orthostatic VS  11-07-23 @ 19:57  Lying BP: --/-- HR: --  Sitting BP: 178/90 HR: 75  Standing BP: --/-- HR: --  Site: --  Mode: --

## 2023-11-08 NOTE — BH INPATIENT PSYCHIATRY PROGRESS NOTE - NSBHMETABOLIC_PSY_ALL_CORE_FT
BMI: BMI (kg/m2): 27.1 (11-06-23 @ 15:34)  HbA1c: A1C with Estimated Average Glucose Result: 5.9 % (10-30-23 @ 01:03)    Glucose: POCT Blood Glucose.: 112 mg/dL (11-06-23 @ 12:25)    BP: 168/94 (11-08-23 @ 05:46) (156/75 - 190/110)  Lipid Panel: Date/Time: 11-07-23 @ 08:23  Cholesterol, Serum: 196  Direct LDL: --  HDL Cholesterol, Serum: 38  Total Cholesterol/HDL Ration Measurement: --  Triglycerides, Serum: 210

## 2023-11-09 PROCEDURE — 99232 SBSQ HOSP IP/OBS MODERATE 35: CPT | Mod: GC

## 2023-11-09 RX ADMIN — Medication 0.5 MILLIGRAM(S): at 08:56

## 2023-11-09 RX ADMIN — LOSARTAN POTASSIUM 50 MILLIGRAM(S): 100 TABLET, FILM COATED ORAL at 20:37

## 2023-11-09 RX ADMIN — Medication 650 MILLIGRAM(S): at 20:35

## 2023-11-09 RX ADMIN — Medication 650 MILLIGRAM(S): at 15:30

## 2023-11-09 RX ADMIN — Medication 0.5 MILLIGRAM(S): at 20:43

## 2023-11-09 RX ADMIN — LOSARTAN POTASSIUM 50 MILLIGRAM(S): 100 TABLET, FILM COATED ORAL at 08:57

## 2023-11-09 RX ADMIN — Medication 650 MILLIGRAM(S): at 15:00

## 2023-11-09 RX ADMIN — Medication 150 MILLIGRAM(S): at 08:56

## 2023-11-09 RX ADMIN — Medication 50 MILLIGRAM(S): at 08:57

## 2023-11-09 RX ADMIN — ARIPIPRAZOLE 10 MILLIGRAM(S): 15 TABLET ORAL at 08:56

## 2023-11-09 RX ADMIN — Medication 650 MILLIGRAM(S): at 08:57

## 2023-11-09 RX ADMIN — Medication 650 MILLIGRAM(S): at 21:15

## 2023-11-09 NOTE — BH INPATIENT PSYCHIATRY PROGRESS NOTE - NSBHFUPINTERVALHXFT_PSY_A_CORE
Upon current evaluation patient was calm, behaviorally controlled answering questions, denying AH or negative and intrusive thoughts. He stated he slept well last night, but per log is still sleeping poorly.      Upon current evaluation patient was calm, behaviorally controlled answering questions, denying AH or negative and intrusive thoughts. He stated he slept well last night, but per sleeping log is still sleeping poorly.      Upon current evaluation patient was calm, behaviorally controlled answering questions, denying AH or negative and intrusive thoughts. He stated he slept well last night again, this time corroborated by staff.

## 2023-11-09 NOTE — BH INPATIENT PSYCHIATRY PROGRESS NOTE - NSBHMETABOLIC_PSY_ALL_CORE_FT
BMI: BMI (kg/m2): 27.1 (11-06-23 @ 15:34)  HbA1c: A1C with Estimated Average Glucose Result: 5.9 % (10-30-23 @ 01:03)    Glucose: POCT Blood Glucose.: 112 mg/dL (11-06-23 @ 12:25)    BP: 168/94 (11-08-23 @ 05:46) (156/75 - 190/110)  Lipid Panel: Date/Time: 11-07-23 @ 08:23  Cholesterol, Serum: 196  Direct LDL: --  HDL Cholesterol, Serum: 38  Total Cholesterol/HDL Ration Measurement: --  Triglycerides, Serum: 210   BMI: BMI (kg/m2): 27.1 (11-06-23 @ 15:34)  HbA1c: A1C with Estimated Average Glucose Result: 5.9 % (10-30-23 @ 01:03)    Glucose: POCT Blood Glucose.: 112 mg/dL (11-06-23 @ 12:25)    BP: 168/94 (11-08-23 @ 05:46) (156/75 - 168/94)  Lipid Panel: Date/Time: 11-07-23 @ 08:23  Cholesterol, Serum: 196  Direct LDL: --  HDL Cholesterol, Serum: 38  Total Cholesterol/HDL Ration Measurement: --  Triglycerides, Serum: 210

## 2023-11-09 NOTE — BH TREATMENT PLAN - NSTXHYPERINTERRN_PSY_ALL_CORE
Encourage pt to comply with BP medications. Educate pt on the importance of complying with BP meds. Assess pt's knowledge of HTN. Provide HTN education as needed.

## 2023-11-09 NOTE — BH TREATMENT PLAN - NSTXHYPERINTERMD_PSY_ALL_CORE
Adhere to medicine recommendations of Cozaar 50mg bid and Toprol XL 50mg qd
Adhere to medicine recommendations of Cozaar 50mg bid and Toprol XL 50mg qd

## 2023-11-09 NOTE — BH TREATMENT PLAN - NSTXCOPEINTERRN_PSY_ALL_CORE
Encourage patient to express thoughts/feelings/concerns to staff. Provide support and reassurance. Encourage use of coping skills. Provide therapeutic communication.

## 2023-11-09 NOTE — BH INPATIENT PSYCHIATRY PROGRESS NOTE - NSBHATTESTCOMMENTATTENDFT_PSY_A_CORE
Patient denies SI or AH. He also states he's sleeping well but check sheet record suggests otherwise and this was a significant concern to family due to night time behavior. Will continue to monitor. Consider adding HS meds tomorrow if insomnia is consistent.   Patient denies SI or AH. Is focused on discharge. Family expressed concerns about poor sleep and resultant disorganized night time behavior. Will monitor sleep and d/c sometime next week. BP improved.

## 2023-11-09 NOTE — BH INPATIENT PSYCHIATRY PROGRESS NOTE - CURRENT MEDICATION
MEDICATIONS  (STANDING):  ARIPiprazole 10 milliGRAM(s) Oral daily  clonazePAM  Tablet 0.5 milliGRAM(s) Oral two times a day  hydrochlorothiazide 12.5 milliGRAM(s) Oral daily  influenza   Vaccine 0.5 milliLiter(s) IntraMuscular once  losartan 50 milliGRAM(s) Oral two times a day  metoprolol succinate ER 50 milliGRAM(s) Oral daily  nicotine - 21 mG/24Hr(s) Patch 1 Patch Transdermal daily  venlafaxine  milliGRAM(s) Oral daily    MEDICATIONS  (PRN):  acetaminophen     Tablet .. 650 milliGRAM(s) Oral every 6 hours PRN Temp greater or equal to 38C (100.4F), Mild Pain (1 - 3)  diphenhydrAMINE Injectable 50 milliGRAM(s) IntraMuscular once PRN agitation  fluPHENAZine 5 milliGRAM(s) Oral every 6 hours PRN agitation  fluPHENAZine  Injectable 5 milliGRAM(s) IntraMuscular once PRN agitation  LORazepam   Injectable 2 milliGRAM(s) IntraMuscular once PRN agitation  melatonin. 6 milliGRAM(s) Oral at bedtime PRN Insomnia  polyethylene glycol 3350 17 Gram(s) Oral daily PRN constipation

## 2023-11-09 NOTE — BH TREATMENT PLAN - NSTXANXINTERMD_PSY_ALL_CORE
Patient will remain compliant with medication regimen set forth by treatment team.
Patient will remain compliant with medication regimen set forth by treatment team.

## 2023-11-09 NOTE — BH TREATMENT PLAN - NSTXPLANTHERAPYSESSIONSFT_PSY_ALL_CORE
11-08-23  Type of therapy: Other, Physical therapy  Type of session: Individual  Level of patient participation: Participates  --  Therapy conducted by: Other (specify), Physical therapy  Therapy Summary: Patient engaged in back exercises and ambulation within unit

## 2023-11-09 NOTE — BH TREATMENT PLAN - NSTXDCOTHRINTERSW_PSY_ALL_CORE
pt is focused on dc and return to prior provider but willing to engage with team.  pt signed releases of info naming prior provider Dr Chino at [facility] and brother Deo.

## 2023-11-09 NOTE — BH TREATMENT PLAN - NSTXPSYCHOINTERMD_PSY_ALL_CORE
Continue to take Abilify 10mg daily as recommended by treatment team.
Continue to take Abilify 10mg daily as recommended by treatment team.

## 2023-11-09 NOTE — BH TREATMENT PLAN - ANXIETY/PANIC/FEAR NURSING INTERVENTIONS/RECOMMENDATIONS
Encourage patient to express thoughts/feelings/concerns to staff. Offer PRN medication as needed. Encourage use of coping skills.

## 2023-11-09 NOTE — BH TREATMENT PLAN - NSTXCOPEINTERPR_PSY_ALL_CORE
Psychiatric rehabilitation recommends patient attends 2-3 groups per day, engages in individual sessions and participates in activities on the milieu in order to mitigate symptoms

## 2023-11-09 NOTE — BH TREATMENT PLAN - NSTXDIABINTERRN_PSY_ALL_CORE
Encourage pt to eat a healthy diet. Assess pt's knowledge of diabetes. Provide diabetes education as needed.

## 2023-11-09 NOTE — BH INPATIENT PSYCHIATRY PROGRESS NOTE - NSBHCHARTREVIEWVS_PSY_A_CORE FT
Vital Signs Last 24 Hrs  T(C): 36.3 (11-09-23 @ 05:52), Max: 36.3 (11-09-23 @ 05:52)  T(F): 97.4 (11-09-23 @ 05:52), Max: 97.4 (11-09-23 @ 05:52)  HR: --  BP: --  BP(mean): --  RR: --  SpO2: 95% (11-09-23 @ 05:52) (95% - 95%)    Orthostatic VS  11-09-23 @ 05:52  Lying BP: 135/84 HR: 68  Sitting BP: 126/91 HR: 75  Standing BP: --/-- HR: --  Site: --  Mode: --  Orthostatic VS  11-08-23 @ 19:40  Lying BP: --/-- HR: --  Sitting BP: 167/104 HR: 80  Standing BP: --/-- HR: --  Site: --  Mode: --  Orthostatic VS  11-07-23 @ 19:57  Lying BP: --/-- HR: --  Sitting BP: 178/90 HR: 75  Standing BP: --/-- HR: --  Site: --  Mode: --   Vital Signs Last 24 Hrs  T(C): 36.7 (11-09-23 @ 20:17), Max: 36.7 (11-09-23 @ 20:17)  T(F): 98.1 (11-09-23 @ 20:17), Max: 98.1 (11-09-23 @ 20:17)  HR: --  BP: --  BP(mean): --  RR: --  SpO2: 97% (11-09-23 @ 20:17) (95% - 97%)      Orthostatic VS  11-09-23 @ 05:52  Lying BP: 135/84 HR: 68  Sitting BP: 126/91 HR: 75  Standing BP: --/-- HR: --  Site: --  Mode: --  Orthostatic VS  11-08-23 @ 19:40  Lying BP: --/-- HR: --  Sitting BP: 167/104 HR: 80  Standing BP: --/-- HR: --  Site: --  Mode: --

## 2023-11-09 NOTE — BH INPATIENT PSYCHIATRY PROGRESS NOTE - NSBHASSESSSUMMFT_PSY_ALL_CORE
63yr old  male (Egyptian speaking only), single, domiciled and unemployed, PPHx schizophrenia, akathisia, multiple past psych admissions (including 5 [facility] admissions last admission in May, 2022, self reports remote hx of interrupted sa via hanging, in treatment with DR. Chino [phone], no  legal or substance hx, PMHx of HTN, DM and HLD, who was BIB family on 10/29/23 due to worsening AH and si. On initial CL evaluation, patient presented with worsening of depressive, anxiety and psychotic sx, worsening AH that are derogatory in nature as reported to initital provider. Patient is a limited historian. On chart review, patient was taking abilify - it seems CL team restarted and titrated to  7.5mg po qhs and continued klonopin 0.5mg po bid, effexor XR 150mg po daily, melatonin 6mg po qhs, and cogentin stopped. Patient now denying all symptoms and requesting discharge.     11/7: PT consult placed, lipid panel returned WNL, Medicine consult appreciated, patient is focused on discharge, outpatient provider called - awaiting response, left voice message to Dr. Webb [phone]  11/8: Patient is not a behavioral issue on unit, he is seen in day room mainly    Plan:    1. Schizophrenia: continue with Abilify 10mg po daily    2. Depressed mood: continue Effexor 150mg po daily    3. Anxiety: continue klonopin 0.5mg po bid    4. Insomnia: melatonin 6mg po qhs prn    5. unsteady gait - PT consult placed 11/7    6. HTN: seen by hospitalist, increased losartan to 50mg po bid and continued metoprolol 50mg po daily. BP remains high today - can add home HCTZ 12.5 qd. Discussed with hospitalist.    7. PRNs: Fluphenazine 5mg PO/IM q6h for agitation, Benadryl 50mg PO/IM q6h for agitation. Ativan 2mg IM for severe agitation    8. Nicotine dependence: nicotine replacement patch + gum PRN     63yr old  male (Danish speaking only), single, domiciled and unemployed, PPHx schizophrenia, akathisia, multiple past psych admissions (including 5 [facility] admissions last admission in May, 2022, self reports remote hx of interrupted sa via hanging, in treatment with DR. Chino [phone], no legal or substance hx, PMHx of HTN, DM and HLD, who was BIB family on 10/29/23 due to worsening AH and si. On initial CL evaluation, patient presented with worsening of depressive, anxiety and psychotic sx, worsening AH that are derogatory in nature as reported to initital provider. Patient is a limited historian. On chart review, patient was taking Abilify - it seems CL team restarted and titrated to  7.5mg po qhs and continued klonopin 0.5mg po bid, effexor XR 150mg po daily, melatonin 6mg po qhs, and cogentin stopped. Patient now denying all symptoms and requesting discharge.     11/7: PT consult placed, lipid panel returned WNL, Medicine consult appreciated, patient is focused on discharge, outpatient provider called - awaiting response, left voice message to Dr. Webb [phone]  11/8: Patient is not a behavioral issue on unit, he is seen in day room mainly, Dr. Webb returned call  11/9: No behavioral issues overnight, attends groups    Plan:    1. Schizophrenia: continue with Abilify 10mg po daily    2. Depressed mood: continue Effexor 150mg po daily    3. Anxiety: continue Klonopin 0.5mg po bid    4. Insomnia: melatonin 6mg po qhs prn    5. unsteady gait - PT consult placed 11/7    6. HTN: seen by hospitalist, increased losartan to 50mg po bid and continued metoprolol 50mg po daily. BP remains high today - can add home HCTZ 12.5 qd. Discussed with hospitalist.    7. PRNs: Fluphenazine 5mg PO/IM q6h for agitation, Benadryl 50mg PO/IM q6h for agitation. Ativan 2mg IM for severe agitation    8. Nicotine dependence: nicotine replacement patch + gum PRN

## 2023-11-09 NOTE — BH TREATMENT PLAN - NSTXHYPERGOAL_PSY_ALL_CORE
Be able to self-monitor blood pressure using a BP machine with demonstration of the technique to the RN
Be able to self-monitor blood pressure using a BP machine with demonstration of the technique to the RN

## 2023-11-10 PROCEDURE — 99232 SBSQ HOSP IP/OBS MODERATE 35: CPT | Mod: GC

## 2023-11-10 RX ORDER — TRAZODONE HCL 50 MG
25 TABLET ORAL AT BEDTIME
Refills: 0 | Status: DISCONTINUED | OUTPATIENT
Start: 2023-11-10 | End: 2023-11-13

## 2023-11-10 RX ADMIN — Medication 650 MILLIGRAM(S): at 13:17

## 2023-11-10 RX ADMIN — Medication 0.5 MILLIGRAM(S): at 20:34

## 2023-11-10 RX ADMIN — Medication 50 MILLIGRAM(S): at 09:08

## 2023-11-10 RX ADMIN — Medication 0.5 MILLIGRAM(S): at 09:08

## 2023-11-10 RX ADMIN — LOSARTAN POTASSIUM 50 MILLIGRAM(S): 100 TABLET, FILM COATED ORAL at 20:34

## 2023-11-10 RX ADMIN — ARIPIPRAZOLE 10 MILLIGRAM(S): 15 TABLET ORAL at 09:07

## 2023-11-10 RX ADMIN — LOSARTAN POTASSIUM 50 MILLIGRAM(S): 100 TABLET, FILM COATED ORAL at 09:08

## 2023-11-10 RX ADMIN — Medication 150 MILLIGRAM(S): at 09:08

## 2023-11-10 RX ADMIN — Medication 25 MILLIGRAM(S): at 20:34

## 2023-11-10 NOTE — BH INPATIENT PSYCHIATRY PROGRESS NOTE - NSBHASSESSSUMMFT_PSY_ALL_CORE
63yr old  male (Iranian speaking only), single, domiciled and unemployed, PPHx schizophrenia, akathisia, multiple past psych admissions (including 5 [facility] admissions last admission in May, 2022, self reports remote hx of interrupted sa via hanging, in treatment with DR. Chino [phone], no legal or substance hx, PMHx of HTN, DM and HLD, who was BIB family on 10/29/23 due to worsening AH and si. On initial CL evaluation, patient presented with worsening of depressive, anxiety and psychotic sx, worsening AH that are derogatory in nature as reported to initital provider. Patient is a limited historian. On chart review, patient was taking Abilify - it seems CL team restarted and titrated to  7.5mg po qhs and continued klonopin 0.5mg po bid, effexor XR 150mg po daily, melatonin 6mg po qhs, and cogentin stopped. Patient now denying all symptoms and requesting discharge.     11/7: PT consult placed, lipid panel returned WNL, Medicine consult appreciated, patient is focused on discharge, outpatient provider called - awaiting response, left voice message to Dr. Webb [phone]  11/8: Patient is not a behavioral issue on unit, he is seen in day room mainly, Dr. Webb returned call  11/9: No behavioral issues overnight, attends groups    Plan:    1. Schizophrenia: continue with Abilify 10mg po daily    2. Depressed mood: continue Effexor 150mg po daily    3. Anxiety: continue Klonopin 0.5mg po bid    4. Insomnia: melatonin 6mg po qhs prn    5. unsteady gait - PT consult placed 11/7    6. HTN: seen by hospitalist, increased losartan to 50mg po bid and continued metoprolol 50mg po daily. BP remains high today - can add home HCTZ 12.5 qd. Discussed with hospitalist.    7. PRNs: Fluphenazine 5mg PO/IM q6h for agitation, Benadryl 50mg PO/IM q6h for agitation. Ativan 2mg IM for severe agitation    8. Nicotine dependence: nicotine replacement patch + gum PRN     63yr old  male (Slovenian speaking only), single, domiciled and unemployed, PPHx schizophrenia, akathisia, multiple past psych admissions (including 5 [facility] admissions last admission in May, 2022, self reports remote hx of interrupted sa via hanging, in treatment with DR. Chino [phone], no legal or substance hx, PMHx of HTN, DM and HLD, who was BIB family on 10/29/23 due to worsening AH and si. On initial CL evaluation, patient presented with worsening of depressive, anxiety and psychotic sx, worsening AH that are derogatory in nature as reported to initital provider. Patient is a limited historian. On chart review, patient was taking Abilify - it seems CL team restarted and titrated to  7.5mg po qhs and continued klonopin 0.5mg po bid, effexor XR 150mg po daily, melatonin 6mg po qhs, and cogentin stopped. Patient now denying all symptoms and requesting discharge.     11/7: PT consult placed, lipid panel returned WNL, Medicine consult appreciated, patient is focused on discharge, outpatient provider called - awaiting response, left voice message to Dr. Webb [phone]  11/8: Patient is not a behavioral issue on unit, he is seen in day room mainly, Dr. Webb returned call  11/9: No behavioral issues overnight, attends groups  11/10: No issues overnight, calm, pleasant, and behaviorally controlled    Plan:    1. Schizophrenia: continue with Abilify 10mg po daily    2. Depressed mood: continue Effexor 150mg po daily    3. Anxiety: continue Klonopin 0.5mg po bid    4. Insomnia: melatonin 6mg po qhs prn    5. unsteady gait - PT consult placed 11/7    6. HTN: seen by hospitalist, increased losartan to 50mg po bid and continued metoprolol 50mg po daily. BP remains high today - can add home HCTZ 12.5 qd. Discussed with hospitalist.    7. PRNs: Fluphenazine 5mg PO/IM q6h for agitation, Benadryl 50mg PO/IM q6h for agitation. Ativan 2mg IM for severe agitation    8. Nicotine dependence: nicotine replacement patch + gum PRN     63yr old  male (Romanian speaking only), single, domiciled and unemployed, PPHx schizophrenia, akathisia, multiple past psych admissions (including 5 [facility] admissions last admission in May, 2022, self reports remote hx of interrupted sa via hanging, in treatment with DR. Chino [phone], no legal or substance hx, PMHx of HTN, DM and HLD, who was BIB family on 10/29/23 due to worsening AH and si. On initial CL evaluation, patient presented with worsening of depressive, anxiety and psychotic sx, worsening AH that are derogatory in nature as reported to initital provider. Patient is a limited historian. On chart review, patient was taking Abilify - it seems CL team restarted and titrated to  7.5mg po qhs and continued klonopin 0.5mg po bid, effexor XR 150mg po daily, melatonin 6mg po qhs, and cogentin stopped. Patient now denying all symptoms and requesting discharge.     11/7: PT consult placed, lipid panel returned WNL, Medicine consult appreciated, patient is focused on discharge, outpatient provider called - awaiting response, left voice message to Dr. Webb [phone]  11/8: Patient is not a behavioral issue on unit, he is seen in day room mainly, Dr. Webb returned call  11/9: No behavioral issues overnight, attends groups  11/10: No issues overnight, calm, pleasant, and behaviorally controlled    Plan:    1. Schizophrenia: continue with Abilify 10mg po daily    2. Depressed mood: continue Effexor 150mg po daily    3. Anxiety: continue Klonopin 0.5mg po bid    4. Insomnia: continue melatonin 6mg po qhs prn and start standing order of Trazodone 25mg qhs    5. unsteady gait - PT consult placed 11/7    6. HTN: seen by hospitalist, increased losartan to 50mg po bid and continued metoprolol 50mg po daily. BP remains high today - can add home HCTZ 12.5 qd. Discussed with hospitalist.    7. PRNs: Fluphenazine 5mg PO/IM q6h for agitation, Benadryl 50mg PO/IM q6h for agitation. Ativan 2mg IM for severe agitation    8. Nicotine dependence: nicotine replacement patch + gum PRN

## 2023-11-10 NOTE — BH INPATIENT PSYCHIATRY PROGRESS NOTE - CURRENT MEDICATION
MEDICATIONS  (STANDING):  ARIPiprazole 10 milliGRAM(s) Oral daily  clonazePAM  Tablet 0.5 milliGRAM(s) Oral two times a day  hydrochlorothiazide 12.5 milliGRAM(s) Oral daily  influenza   Vaccine 0.5 milliLiter(s) IntraMuscular once  losartan 50 milliGRAM(s) Oral two times a day  metoprolol succinate ER 50 milliGRAM(s) Oral daily  nicotine - 21 mG/24Hr(s) Patch 1 Patch Transdermal daily  venlafaxine  milliGRAM(s) Oral daily    MEDICATIONS  (PRN):  acetaminophen     Tablet .. 650 milliGRAM(s) Oral every 6 hours PRN Temp greater or equal to 38C (100.4F), Mild Pain (1 - 3)  diphenhydrAMINE Injectable 50 milliGRAM(s) IntraMuscular once PRN agitation  fluPHENAZine 5 milliGRAM(s) Oral every 6 hours PRN agitation  fluPHENAZine  Injectable 5 milliGRAM(s) IntraMuscular once PRN agitation  LORazepam   Injectable 2 milliGRAM(s) IntraMuscular once PRN agitation  melatonin. 6 milliGRAM(s) Oral at bedtime PRN Insomnia  polyethylene glycol 3350 17 Gram(s) Oral daily PRN constipation   MEDICATIONS  (STANDING):  ARIPiprazole 10 milliGRAM(s) Oral daily  clonazePAM  Tablet 0.5 milliGRAM(s) Oral two times a day  hydrochlorothiazide 12.5 milliGRAM(s) Oral daily  influenza   Vaccine 0.5 milliLiter(s) IntraMuscular once  losartan 50 milliGRAM(s) Oral two times a day  metoprolol succinate ER 50 milliGRAM(s) Oral daily  nicotine - 21 mG/24Hr(s) Patch 1 Patch Transdermal daily  traZODone 25 milliGRAM(s) Oral at bedtime  venlafaxine  milliGRAM(s) Oral daily    MEDICATIONS  (PRN):  acetaminophen     Tablet .. 650 milliGRAM(s) Oral every 6 hours PRN Temp greater or equal to 38C (100.4F), Mild Pain (1 - 3)  diphenhydrAMINE Injectable 50 milliGRAM(s) IntraMuscular once PRN agitation  fluPHENAZine 5 milliGRAM(s) Oral every 6 hours PRN agitation  fluPHENAZine  Injectable 5 milliGRAM(s) IntraMuscular once PRN agitation  LORazepam   Injectable 2 milliGRAM(s) IntraMuscular once PRN agitation  melatonin. 6 milliGRAM(s) Oral at bedtime PRN Insomnia  polyethylene glycol 3350 17 Gram(s) Oral daily PRN constipation

## 2023-11-10 NOTE — BH INPATIENT PSYCHIATRY PROGRESS NOTE - NSBHMETABOLIC_PSY_ALL_CORE_FT
BMI: BMI (kg/m2): 27.1 (11-06-23 @ 15:34)  HbA1c: A1C with Estimated Average Glucose Result: 5.9 % (10-30-23 @ 01:03)    Glucose: POCT Blood Glucose.: 112 mg/dL (11-06-23 @ 12:25)    BP: 168/94 (11-08-23 @ 05:46) (156/75 - 168/94)  Lipid Panel: Date/Time: 11-07-23 @ 08:23  Cholesterol, Serum: 196  Direct LDL: --  HDL Cholesterol, Serum: 38  Total Cholesterol/HDL Ration Measurement: --  Triglycerides, Serum: 210

## 2023-11-10 NOTE — BH INPATIENT PSYCHIATRY PROGRESS NOTE - OTHER
Can attend adequate in conversation, interrupts writer at times but likely baseline fair improved Mild anxious, there is frequent checking behavior perseverative

## 2023-11-10 NOTE — BH INPATIENT PSYCHIATRY PROGRESS NOTE - NSBHATTESTCOMMENTATTENDFT_PSY_A_CORE
Patient denies SI or AH. Is focused on discharge. Family expressed concerns about poor sleep and resultant disorganized night time behavior. Will monitor sleep and d/c sometime next week. BP improved.  Patient denies SI or AH. Remains focused on discharge. He is largely stable and aftercare planning can start. Did not sleep well overnight but declined melatonin. Patient unable to provide reason. Will continue to work on sleep until discharge but as much as family expressed concerns about poor sleep on admission, he has not been agitated during interval so poor sleep and this is not a reason to continue inpatient care. Start trazodone 25mg po qhs.   Patient denies SI or AH. Remains focused on discharge. He is largely stable and aftercare planning can start. Did not sleep well overnight but declined melatonin. Patient unable to provide reason. Will continue to work on sleep until discharge but as much as family expressed concerns about poor sleep on admission, he has not been agitated during interval so poor sleep and this is not a reason to continue inpatient care. Start trazodone 25mg po qhs. States this has helped him in the past.

## 2023-11-10 NOTE — BH INPATIENT PSYCHIATRY PROGRESS NOTE - NSBHCHARTREVIEWVS_PSY_A_CORE FT
Vital Signs Last 24 Hrs  T(C): 36.6 (11-10-23 @ 06:10), Max: 36.7 (11-09-23 @ 20:17)  T(F): 97.8 (11-10-23 @ 06:10), Max: 98.1 (11-09-23 @ 20:17)  HR: --  BP: --  BP(mean): --  RR: 19 (11-10-23 @ 06:10) (19 - 19)  SpO2: 98% (11-10-23 @ 06:10) (97% - 98%)    Orthostatic VS  11-10-23 @ 06:10  Lying BP: --/-- HR: --  Sitting BP: 156/82 HR: 68  Standing BP: 158/90 HR: 74  Site: upper right arm  Mode: electronic  Orthostatic VS  11-09-23 @ 20:17  Lying BP: --/-- HR: --  Sitting BP: 165/97 HR: 73  Standing BP: --/-- HR: --  Site: --  Mode: --  Orthostatic VS  11-09-23 @ 05:52  Lying BP: 135/84 HR: 68  Sitting BP: 126/91 HR: 75  Standing BP: --/-- HR: --  Site: --  Mode: --  Orthostatic VS  11-08-23 @ 19:40  Lying BP: --/-- HR: --  Sitting BP: 167/104 HR: 80  Standing BP: --/-- HR: --  Site: --  Mode: --

## 2023-11-10 NOTE — BH INPATIENT PSYCHIATRY PROGRESS NOTE - NSBHFUPINTERVALHXFT_PSY_A_CORE
Upon current evaluation patient was calm, behaviorally controlled sitting in the day room, drinking coffee. He answering questions, greeting staff with good morning, stable, denying AH or negative and intrusive thoughts.      Upon current evaluation patient was calm, behaviorally controlled sitting in the day room, taking his breakfast. He is answering questions, greeting staff with good morning, stable, denying AH or negative and intrusive thoughts.      Upon current evaluation patient was calm, behaviorally controlled sitting in the day room, taking his breakfast. He is answering questions, greeting staff with good morning, stable, denying AH or negative and intrusive thoughts. Has been pleasant throughout entire inpatient stay thus far.

## 2023-11-11 RX ADMIN — Medication 50 MILLIGRAM(S): at 08:44

## 2023-11-11 RX ADMIN — LOSARTAN POTASSIUM 50 MILLIGRAM(S): 100 TABLET, FILM COATED ORAL at 20:50

## 2023-11-11 RX ADMIN — ARIPIPRAZOLE 10 MILLIGRAM(S): 15 TABLET ORAL at 08:45

## 2023-11-11 RX ADMIN — LOSARTAN POTASSIUM 50 MILLIGRAM(S): 100 TABLET, FILM COATED ORAL at 08:45

## 2023-11-11 RX ADMIN — Medication 0.5 MILLIGRAM(S): at 20:50

## 2023-11-11 RX ADMIN — Medication 0.5 MILLIGRAM(S): at 12:01

## 2023-11-11 RX ADMIN — Medication 150 MILLIGRAM(S): at 08:44

## 2023-11-11 RX ADMIN — Medication 25 MILLIGRAM(S): at 20:50

## 2023-11-12 RX ADMIN — Medication 650 MILLIGRAM(S): at 18:05

## 2023-11-12 RX ADMIN — Medication 1 PATCH: at 09:36

## 2023-11-12 RX ADMIN — Medication 0.5 MILLIGRAM(S): at 20:02

## 2023-11-12 RX ADMIN — Medication 150 MILLIGRAM(S): at 09:35

## 2023-11-12 RX ADMIN — ARIPIPRAZOLE 10 MILLIGRAM(S): 15 TABLET ORAL at 09:35

## 2023-11-12 RX ADMIN — LOSARTAN POTASSIUM 50 MILLIGRAM(S): 100 TABLET, FILM COATED ORAL at 20:02

## 2023-11-12 RX ADMIN — Medication 650 MILLIGRAM(S): at 05:21

## 2023-11-12 RX ADMIN — Medication 0.5 MILLIGRAM(S): at 09:35

## 2023-11-12 RX ADMIN — Medication 650 MILLIGRAM(S): at 11:35

## 2023-11-12 RX ADMIN — Medication 50 MILLIGRAM(S): at 09:35

## 2023-11-12 RX ADMIN — Medication 1 PATCH: at 17:00

## 2023-11-12 RX ADMIN — LOSARTAN POTASSIUM 50 MILLIGRAM(S): 100 TABLET, FILM COATED ORAL at 05:21

## 2023-11-12 RX ADMIN — Medication 25 MILLIGRAM(S): at 20:02

## 2023-11-12 RX ADMIN — Medication 650 MILLIGRAM(S): at 12:05

## 2023-11-12 RX ADMIN — Medication 650 MILLIGRAM(S): at 17:35

## 2023-11-13 RX ORDER — CLONAZEPAM 1 MG
0.5 TABLET ORAL
Refills: 0 | Status: DISCONTINUED | OUTPATIENT
Start: 2023-11-13 | End: 2023-11-15

## 2023-11-13 RX ORDER — TRAZODONE HCL 50 MG
50 TABLET ORAL AT BEDTIME
Refills: 0 | Status: DISCONTINUED | OUTPATIENT
Start: 2023-11-13 | End: 2023-11-15

## 2023-11-13 RX ADMIN — Medication 0.5 MILLIGRAM(S): at 09:50

## 2023-11-13 RX ADMIN — LOSARTAN POTASSIUM 50 MILLIGRAM(S): 100 TABLET, FILM COATED ORAL at 09:50

## 2023-11-13 RX ADMIN — Medication 1 PATCH: at 09:49

## 2023-11-13 RX ADMIN — Medication 150 MILLIGRAM(S): at 09:49

## 2023-11-13 RX ADMIN — Medication 50 MILLIGRAM(S): at 20:41

## 2023-11-13 RX ADMIN — LOSARTAN POTASSIUM 50 MILLIGRAM(S): 100 TABLET, FILM COATED ORAL at 20:40

## 2023-11-13 RX ADMIN — ARIPIPRAZOLE 10 MILLIGRAM(S): 15 TABLET ORAL at 09:50

## 2023-11-13 RX ADMIN — Medication 0.5 MILLIGRAM(S): at 20:40

## 2023-11-13 RX ADMIN — Medication 50 MILLIGRAM(S): at 09:50

## 2023-11-13 RX ADMIN — Medication 1 PATCH: at 19:20

## 2023-11-13 RX ADMIN — Medication 1 PATCH: at 12:51

## 2023-11-13 NOTE — BH INPATIENT PSYCHIATRY PROGRESS NOTE - NSBHCHARTREVIEWVS_PSY_A_CORE FT
Vital Signs Last 24 Hrs  T(C): 36.8 (11-13-23 @ 05:32), Max: 36.8 (11-13-23 @ 05:32)  T(F): 98.3 (11-13-23 @ 05:32), Max: 98.3 (11-13-23 @ 05:32)  HR: --  BP: --  BP(mean): --  RR: --  SpO2: 96% (11-13-23 @ 05:32) (96% - 96%)    Orthostatic VS  11-13-23 @ 05:32  Lying BP: 148/92 HR: 100  Sitting BP: 155/84 HR: 104  Standing BP: --/-- HR: --  Site: --  Mode: --  Orthostatic VS  11-12-23 @ 20:21  Lying BP: --/-- HR: --  Sitting BP: 170/89 HR: 80  Standing BP: --/-- HR: --  Site: --  Mode: --  Orthostatic VS  11-12-23 @ 08:24  Lying BP: 152/96 HR: 72  Sitting BP: 155/95 HR: 79  Standing BP: --/-- HR: --  Site: --  Mode: --  Orthostatic VS  11-12-23 @ 06:00  Lying BP: --/-- HR: --  Sitting BP: --/-- HR: --  Standing BP: --/-- HR: --  Site: upper left arm  Mode: electronic  Orthostatic VS  11-11-23 @ 20:59  Lying BP: --/-- HR: --  Sitting BP: 166/96 HR: 115  Standing BP: --/-- HR: --  Site: --  Mode: --

## 2023-11-13 NOTE — BH INPATIENT PSYCHIATRY PROGRESS NOTE - NSBHATTESTCOMMENTATTENDFT_PSY_A_CORE
Patient denies SI or AH. Remains focused on discharge. He is largely stable and aftercare planning can start. Did not sleep well overnight but declined melatonin. Patient unable to provide reason. Will continue to work on sleep until discharge but as much as family expressed concerns about poor sleep on admission, he has not been agitated during interval so poor sleep and this is not a reason to continue inpatient care. Start trazodone 25mg po qhs. States this has helped him in the past.  Has had sustained improvement, no acute needs. Sleep still limited. Will increase trazodone to 50mg po qhs. Tolerated this well in the past per patient. Discharge pending aftercare planning.

## 2023-11-13 NOTE — BH INPATIENT PSYCHIATRY PROGRESS NOTE - OTHER
fair improved perseverative Can attend adequate in conversation, interrupts writer at times but likely baseline Mild anxious, there is frequent checking behavior

## 2023-11-13 NOTE — BH INPATIENT PSYCHIATRY PROGRESS NOTE - NSBHASSESSSUMMFT_PSY_ALL_CORE
63yr old  male ([place]ine[place] speaking only), single, domiciled and unemployed, PPHx schizophrenia, akathisia, multiple past psych admissions (including 5 [facility] admissions last admission in May, 2022, self reports remote hx of interrupted sa via hanging, in treatment with DR. Chino [phone], no legal or substance hx, PMHx of HTN, DM and HLD, who was BIB family on 10/29/23 due to worsening AH and si. On initial CL evaluation, patient presented with worsening of depressive, anxiety and psychotic sx, worsening AH that are derogatory in nature as reported to initital provider. Patient is a limited historian. On chart review, patient was taking Abilify - it seems CL team restarted and titrated to  7.5mg po qhs and continued klonopin 0.5mg po bid, effexor XR 150mg po daily, melatonin 6mg po qhs, and cogentin stopped. Patient now denying all symptoms and requesting discharge.     11/7 PT consult placed, lipid panel returned WNL, Medicine consult appreciated, patient is focused on discharge, outpatient provider called - awaiting response, left voice message to Dr. Webb [phone]  11/8 Patient is not a behavioral issue on unit, he is seen in day room mainly, Dr. Webb returned call  11/9 No behavioral issues overnight, attends groups  11/10 No issues overnight, calm, pleasant, and behaviorally controlled  11/13 No issues over the weekend, continues to be compliant with medications, offering no complaints    Plan:    1. Schizophrenia: continue with Abilify 10mg po daily    2. Depressed mood: continue Effexor 150mg po daily    3. Anxiety: continue Klonopin 0.5mg po bid    4. Insomnia: continue melatonin 6mg po qhs prn and start standing order of Trazodone 25mg qhs    5. unsteady gait - PT consult placed 11/7    6. HTN: seen by hospitalist, increased losartan to 50mg po bid and continued metoprolol 50mg po daily. BP remains high today - can add home HCTZ 12.5 qd. Discussed with hospitalist.    7. PRNs: Fluphenazine 5mg PO/IM q6h for agitation, Benadryl 50mg PO/IM q6h for agitation. Ativan 2mg IM for severe agitation    8. Nicotine dependence: nicotine replacement patch + gum PRN     63yr old  male (Palestinian speaking only), single, domiciled and unemployed, PPHx schizophrenia, akathisia, multiple past psych admissions (including 5 [facility] admissions last admission in May, 2022, self reports remote hx of interrupted sa via hanging, in treatment with DR. Chino [phone], no legal or substance hx, PMHx of HTN, DM and HLD, who was BIB family on 10/29/23 due to worsening AH and si. On initial CL evaluation, patient presented with worsening of depressive, anxiety and psychotic sx, worsening AH that are derogatory in nature as reported to initital provider. Patient is a limited historian. On chart review, patient was taking Abilify - it seems CL team restarted and titrated to  7.5mg po qhs and continued klonopin 0.5mg po bid, effexor XR 150mg po daily, melatonin 6mg po qhs, and cogentin stopped. Patient now denying all symptoms and requesting discharge.     11/7 PT consult placed, lipid panel returned WNL, Medicine consult appreciated, patient is focused on discharge, outpatient provider called - awaiting response, left voice message to Dr. Webb [phone]  11/8 Patient is not a behavioral issue on unit, he is seen in day room mainly, Dr. Webb returned call  11/9 No behavioral issues overnight, attends groups  11/10 No issues overnight, calm, pleasant, and behaviorally controlled  11/13 No issues over the weekend, continues to be compliant with medications, offering no complaints    Plan:    1. Schizophrenia: continue with Abilify 10mg po daily    2. Depressed mood: continue Effexor 150mg po daily    3. Anxiety: continue Klonopin 0.5mg po bid    4. Insomnia: continue melatonin 6mg po qhs prn and continue standing order of Trazodone 25mg qhs    5. unsteady gait - PT consult placed 11/7    6. HTN: seen by hospitalist, increased losartan to 50mg po bid and continued metoprolol 50mg po daily. BP remains high today - can add home HCTZ 12.5 qd. Discussed with hospitalist.    7. PRNs: Fluphenazine 5mg PO/IM q6h for agitation, Benadryl 50mg PO/IM q6h for agitation. Ativan 2mg IM for severe agitation    8. Nicotine dependence: nicotine replacement patch + gum PRN     63yr old  male (Barbadian speaking only), single, domiciled and unemployed, PPHx schizophrenia, akathisia, multiple past psych admissions (including 5 [facility] admissions last admission in May, 2022, self reports remote hx of interrupted sa via hanging, in treatment with DR. Chino [phone], no legal or substance hx, PMHx of HTN, DM and HLD, who was BIB family on 10/29/23 due to worsening AH and si. On initial CL evaluation, patient presented with worsening of depressive, anxiety and psychotic sx, worsening AH that are derogatory in nature as reported to initital provider. Patient is a limited historian. On chart review, patient was taking Abilify - it seems CL team restarted and titrated to  7.5mg po qhs and continued klonopin 0.5mg po bid, effexor XR 150mg po daily, melatonin 6mg po qhs, and cogentin stopped. Patient now denying all symptoms and requesting discharge.     11/7 PT consult placed, lipid panel returned WNL, Medicine consult appreciated, patient is focused on discharge, outpatient provider called - awaiting response, left voice message to Dr. Webb [phone]  11/8 Patient is not a behavioral issue on unit, he is seen in day room mainly, Dr. Webb returned call  11/9 No behavioral issues overnight, attends groups  11/10 No issues overnight, calm, pleasant, and behaviorally controlled  11/13 No issues over the weekend, continues to be compliant with medications, offering no complaints    Plan:    1. Schizophrenia: continue with Abilify 10mg po daily    2. Depressed mood: continue Effexor 150mg po daily    3. Anxiety: continue Klonopin 0.5mg po bid    4. Insomnia: continue melatonin 6mg po qhs prn and increase standing order of Trazodone 25mg qhs to 50mg qhs    5. unsteady gait - PT consult placed 11/7 and appreciated    6. HTN: seen by hospitalist, increased losartan to 50mg po bid and continued metoprolol 50mg po daily. BP remains high today - can add home HCTZ 12.5 qd. Discussed with hospitalist.    7. PRNs: Fluphenazine 5mg PO/IM q6h for agitation, Benadryl 50mg PO/IM q6h for agitation. Ativan 2mg IM for severe agitation    8. Nicotine dependence: nicotine replacement patch + gum PRN

## 2023-11-13 NOTE — BH INPATIENT PSYCHIATRY PROGRESS NOTE - NSBHFUPINTERVALHXFT_PSY_A_CORE
Upon current evaluation patient was calm, behaviorally controlled, no issues over the weekend, participates in groups, denying AH or negative and intrusive thoughts. Has been pleasant throughout entire inpatient stay.     Upon current evaluation patient was calm, behaviorally controlled, no issues over the weekend, participates in groups, denying AH or negative and intrusive thoughts. Has been pleasant throughout entire inpatient stay, endorsing good sleep since starting trazodone.     Upon current evaluation patient was calm, behaviorally controlled, no issues over the weekend, participates in groups, denying AH or negative and intrusive thoughts. Has been pleasant throughout entire inpatient stay, endorsing good sleep since starting trazodone. /93 and 155/84.     Upon current evaluation patient was calm, behaviorally controlled, no issues over the weekend, participates in groups, denying AH or negative and intrusive thoughts. Has been pleasant throughout entire inpatient stay, endorsing good sleep. /93 and 155/84.     Upon current evaluation patient was calm, behaviorally controlled, no issues over the weekend, participates in groups, denying AH or negative and intrusive thoughts. Has been pleasant throughout entire inpatient stay, endorsing good sleep. /93 and 155/84.    Spoke with patients brother at 4:15 p.m. who requested updates as to when the patient will be discharged. He was informed we are waiting for an outpatient appointment and outside facilities have been contacted. He was assured he will be alerted at the earliest convenience to schedule enough time to pick brother up.

## 2023-11-13 NOTE — BH INPATIENT PSYCHIATRY PROGRESS NOTE - NSBHMETABOLIC_PSY_ALL_CORE_FT
BMI: BMI (kg/m2): 27.1 (11-06-23 @ 15:34)  HbA1c: A1C with Estimated Average Glucose Result: 5.9 % (10-30-23 @ 01:03)    Glucose: POCT Blood Glucose.: 112 mg/dL (11-06-23 @ 12:25)    BP: --  Lipid Panel: Date/Time: 11-07-23 @ 08:23  Cholesterol, Serum: 196  Direct LDL: --  HDL Cholesterol, Serum: 38  Total Cholesterol/HDL Ration Measurement: --  Triglycerides, Serum: 210

## 2023-11-13 NOTE — BH INPATIENT PSYCHIATRY PROGRESS NOTE - CURRENT MEDICATION
MEDICATIONS  (STANDING):  ARIPiprazole 10 milliGRAM(s) Oral daily  clonazePAM  Tablet 0.5 milliGRAM(s) Oral two times a day  hydrochlorothiazide 12.5 milliGRAM(s) Oral daily  influenza   Vaccine 0.5 milliLiter(s) IntraMuscular once  losartan 50 milliGRAM(s) Oral two times a day  metoprolol succinate ER 50 milliGRAM(s) Oral daily  nicotine - 21 mG/24Hr(s) Patch 1 Patch Transdermal daily  traZODone 25 milliGRAM(s) Oral at bedtime  venlafaxine  milliGRAM(s) Oral daily    MEDICATIONS  (PRN):  acetaminophen     Tablet .. 650 milliGRAM(s) Oral every 6 hours PRN Temp greater or equal to 38C (100.4F), Mild Pain (1 - 3)  diphenhydrAMINE Injectable 50 milliGRAM(s) IntraMuscular once PRN agitation  fluPHENAZine 5 milliGRAM(s) Oral every 6 hours PRN agitation  fluPHENAZine  Injectable 5 milliGRAM(s) IntraMuscular once PRN agitation  LORazepam   Injectable 2 milliGRAM(s) IntraMuscular once PRN agitation  melatonin. 6 milliGRAM(s) Oral at bedtime PRN Insomnia  polyethylene glycol 3350 17 Gram(s) Oral daily PRN constipation   MEDICATIONS  (STANDING):  ARIPiprazole 10 milliGRAM(s) Oral daily  clonazePAM  Tablet 0.5 milliGRAM(s) Oral two times a day  hydrochlorothiazide 12.5 milliGRAM(s) Oral daily  influenza   Vaccine 0.5 milliLiter(s) IntraMuscular once  losartan 50 milliGRAM(s) Oral two times a day  metoprolol succinate ER 50 milliGRAM(s) Oral daily  nicotine - 21 mG/24Hr(s) Patch 1 Patch Transdermal daily  traZODone 50 milliGRAM(s) Oral at bedtime  venlafaxine  milliGRAM(s) Oral daily    MEDICATIONS  (PRN):  acetaminophen     Tablet .. 650 milliGRAM(s) Oral every 6 hours PRN Temp greater or equal to 38C (100.4F), Mild Pain (1 - 3)  diphenhydrAMINE Injectable 50 milliGRAM(s) IntraMuscular once PRN agitation  fluPHENAZine 5 milliGRAM(s) Oral every 6 hours PRN agitation  fluPHENAZine  Injectable 5 milliGRAM(s) IntraMuscular once PRN agitation  LORazepam   Injectable 2 milliGRAM(s) IntraMuscular once PRN agitation  melatonin. 6 milliGRAM(s) Oral at bedtime PRN Insomnia  polyethylene glycol 3350 17 Gram(s) Oral daily PRN constipation

## 2023-11-14 PROCEDURE — 99232 SBSQ HOSP IP/OBS MODERATE 35: CPT | Mod: GC

## 2023-11-14 RX ADMIN — Medication 50 MILLIGRAM(S): at 08:23

## 2023-11-14 RX ADMIN — Medication 0.5 MILLIGRAM(S): at 20:50

## 2023-11-14 RX ADMIN — Medication 0.5 MILLIGRAM(S): at 08:22

## 2023-11-14 RX ADMIN — ARIPIPRAZOLE 10 MILLIGRAM(S): 15 TABLET ORAL at 08:23

## 2023-11-14 RX ADMIN — Medication 50 MILLIGRAM(S): at 20:50

## 2023-11-14 RX ADMIN — Medication 150 MILLIGRAM(S): at 08:23

## 2023-11-14 RX ADMIN — Medication 1 PATCH: at 08:25

## 2023-11-14 RX ADMIN — LOSARTAN POTASSIUM 50 MILLIGRAM(S): 100 TABLET, FILM COATED ORAL at 08:22

## 2023-11-14 RX ADMIN — Medication 1 PATCH: at 08:00

## 2023-11-14 RX ADMIN — LOSARTAN POTASSIUM 50 MILLIGRAM(S): 100 TABLET, FILM COATED ORAL at 20:50

## 2023-11-14 NOTE — BH INPATIENT PSYCHIATRY PROGRESS NOTE - NSTXHYPERINTERMD_PSY_ALL_CORE
Adhere to medicine recommendations of Cozaar 50mg bid and Toprol XL 50mg qd

## 2023-11-14 NOTE — BH INPATIENT PSYCHIATRY PROGRESS NOTE - NSBHATTESTCOMMENTATTENDFT_PSY_A_CORE
Has had sustained improvement, no acute needs. Sleep still limited. Will increase trazodone to 50mg po qhs. Tolerated this well in the past per patient. Discharge pending aftercare planning.  Continue current treatment. Still limited sleep but not agitated. Will need to address this as an outpatient.

## 2023-11-14 NOTE — BH INPATIENT PSYCHIATRY PROGRESS NOTE - NSTXDCOTHRGOAL_PSY_ALL_CORE
pt will comply with treatment in order to effectively engage with dc planning.

## 2023-11-14 NOTE — BH DISCHARGE NOTE NURSING/SOCIAL WORK/PSYCH REHAB - NSCDUDCCRISIS_PSY_A_CORE
[facility]  1 (411) [facility]WELL ([phone])  Text "WELL" to [phone]  Website: www.[employer]/.[facility] 1 (377) 621-EAZQ (7308) Website: www.safehorizon.org/.National Suicide Prevention Lifeline 3 (041) 513-2996/.  [facility]  1 (193) LIF[facility] (439-7641)/.  [facility]  [phone] [place] [phone]   Hours:  Monday through Friday from 9 AM to 3 PM/988 Suicide and Crisis Lifeline

## 2023-11-14 NOTE — BH INPATIENT PSYCHIATRY PROGRESS NOTE - NSBHCHARTREVIEWVS_PSY_A_CORE FT
Vital Signs Last 24 Hrs  T(C): 36.3 (11-14-23 @ 08:08), Max: 36.8 (11-13-23 @ 20:44)  T(F): 97.3 (11-14-23 @ 08:08), Max: 98.2 (11-13-23 @ 20:44)  HR: --  BP: --  BP(mean): --  RR: --  SpO2: 100% (11-14-23 @ 08:08) (97% - 100%)    Orthostatic VS  11-14-23 @ 08:08  Lying BP: --/-- HR: --  Sitting BP: 152/97 HR: 73  Standing BP: 151/97 HR: 77  Site: --  Mode: --  Orthostatic VS  11-13-23 @ 20:44  Lying BP: --/-- HR: --  Sitting BP: 156/89 HR: 86  Standing BP: 152/90 HR: 94  Site: upper left arm  Mode: electronic  Orthostatic VS  11-13-23 @ 05:32  Lying BP: 148/92 HR: 100  Sitting BP: 155/84 HR: 104  Standing BP: --/-- HR: --  Site: --  Mode: --  Orthostatic VS  11-12-23 @ 20:21  Lying BP: --/-- HR: --  Sitting BP: 170/89 HR: 80  Standing BP: --/-- HR: --  Site: --  Mode: --

## 2023-11-14 NOTE — BH INPATIENT PSYCHIATRY PROGRESS NOTE - NSICDXBHPRIMARYDX_PSY_ALL_CORE
Schizophrenia, unspecified   F20.9  

## 2023-11-14 NOTE — BH DISCHARGE NOTE NURSING/SOCIAL WORK/PSYCH REHAB - NSDCPRGOAL_PSY_ALL_CORE
Writer met with patient to discuss patient’s discharge and progress towards rehabilitation goal. Patient was pleasant and cooperative to meet with writer. Patient appeared engaged in safety planning and as given a survey prior to discharge. Patient was admitted to [facility] on 11/06/2023 due to auditory hallucinations, and suicidal thoughts. Patient appeared to complete his psychiatric rehabilitation goal of identifying two coping skills related to meet his needs. Patient reported enjoying having Shabbos with his nephews. Patient reported medication compliance is helpful to mitigate his symptoms. Due to the COVID-19 pandemic, unit structure and activities are re-evaluated on a consistent basis in effort to maintain the safety of patients and staff. Patient appeared to minimally engage and participate in psychiatric rehabilitation groups. Patient remained in good behavioral control.

## 2023-11-14 NOTE — BH INPATIENT PSYCHIATRY PROGRESS NOTE - NSBHFUPINTERVALHXFT_PSY_A_CORE
Upon current evaluation patient was calm, behaviorally controlled, as he has been throughout entire inpatient stay. Offered no complaints, greeting staff, compliant with medication. Denying AH or negative and intrusive thoughts.

## 2023-11-14 NOTE — BH INPATIENT PSYCHIATRY PROGRESS NOTE - NSTXANXINTERMD_PSY_ALL_CORE
Patient will remain compliant with medication regimen set forth by treatment team.

## 2023-11-14 NOTE — BH INPATIENT PSYCHIATRY PROGRESS NOTE - NSBHATTESTBILLING_PSY_A_CORE
81502-Oavfswwtid OBS or IP - moderate complexity OR 35-49 mins
17372-Tclbsfblcj OBS or IP - moderate complexity OR 35-49 mins
99096-Ulcblhjkuu OBS or IP - moderate complexity OR 35-49 mins
77465-Akrysizfwj OBS or IP - moderate complexity OR 35-49 mins
17102-Ddjvhiiqhf OBS or IP - moderate complexity OR 35-49 mins
75084-Ypjlpejiki OBS or IP - moderate complexity OR 35-49 mins

## 2023-11-14 NOTE — BH INPATIENT PSYCHIATRY PROGRESS NOTE - CURRENT MEDICATION
MEDICATIONS  (STANDING):  ARIPiprazole 10 milliGRAM(s) Oral daily  clonazePAM  Tablet 0.5 milliGRAM(s) Oral two times a day  hydrochlorothiazide 12.5 milliGRAM(s) Oral daily  influenza   Vaccine 0.5 milliLiter(s) IntraMuscular once  losartan 50 milliGRAM(s) Oral two times a day  metoprolol succinate ER 50 milliGRAM(s) Oral daily  nicotine - 21 mG/24Hr(s) Patch 1 Patch Transdermal daily  traZODone 50 milliGRAM(s) Oral at bedtime  venlafaxine  milliGRAM(s) Oral daily    MEDICATIONS  (PRN):  acetaminophen     Tablet .. 650 milliGRAM(s) Oral every 6 hours PRN Temp greater or equal to 38C (100.4F), Mild Pain (1 - 3)  diphenhydrAMINE Injectable 50 milliGRAM(s) IntraMuscular once PRN agitation  fluPHENAZine 5 milliGRAM(s) Oral every 6 hours PRN agitation  fluPHENAZine  Injectable 5 milliGRAM(s) IntraMuscular once PRN agitation  melatonin. 6 milliGRAM(s) Oral at bedtime PRN Insomnia  polyethylene glycol 3350 17 Gram(s) Oral daily PRN constipation

## 2023-11-14 NOTE — BH DISCHARGE NOTE NURSING/SOCIAL WORK/PSYCH REHAB - NSDPDISTO_PSY_ALL_CORE
You will continue to reside with your family at [address] [place] [address].  your landline phone number is /Home

## 2023-11-14 NOTE — BH INPATIENT PSYCHIATRY PROGRESS NOTE - NSTXPSYCHOINTERMD_PSY_ALL_CORE
Continue to take Abilify 10mg daily as recommended by treatment team.

## 2023-11-14 NOTE — BH INPATIENT PSYCHIATRY PROGRESS NOTE - PRN MEDS
MEDICATIONS  (PRN):  acetaminophen     Tablet .. 650 milliGRAM(s) Oral every 6 hours PRN Temp greater or equal to 38C (100.4F), Mild Pain (1 - 3)  diphenhydrAMINE Injectable 50 milliGRAM(s) IntraMuscular once PRN agitation  fluPHENAZine 5 milliGRAM(s) Oral every 6 hours PRN agitation  fluPHENAZine  Injectable 5 milliGRAM(s) IntraMuscular once PRN agitation  LORazepam   Injectable 2 milliGRAM(s) IntraMuscular once PRN agitation  melatonin. 6 milliGRAM(s) Oral at bedtime PRN Insomnia  polyethylene glycol 3350 17 Gram(s) Oral daily PRN constipation  
MEDICATIONS  (PRN):  acetaminophen     Tablet .. 650 milliGRAM(s) Oral every 6 hours PRN Temp greater or equal to 38C (100.4F), Mild Pain (1 - 3)  diphenhydrAMINE Injectable 50 milliGRAM(s) IntraMuscular once PRN agitation  fluPHENAZine 5 milliGRAM(s) Oral every 6 hours PRN agitation  fluPHENAZine  Injectable 5 milliGRAM(s) IntraMuscular once PRN agitation  melatonin. 6 milliGRAM(s) Oral at bedtime PRN Insomnia  polyethylene glycol 3350 17 Gram(s) Oral daily PRN constipation  
MEDICATIONS  (PRN):  acetaminophen     Tablet .. 650 milliGRAM(s) Oral every 6 hours PRN Temp greater or equal to 38C (100.4F), Mild Pain (1 - 3)  diphenhydrAMINE Injectable 50 milliGRAM(s) IntraMuscular once PRN agitation  fluPHENAZine 5 milliGRAM(s) Oral every 6 hours PRN agitation  fluPHENAZine  Injectable 5 milliGRAM(s) IntraMuscular once PRN agitation  LORazepam   Injectable 2 milliGRAM(s) IntraMuscular once PRN agitation  melatonin. 6 milliGRAM(s) Oral at bedtime PRN Insomnia  polyethylene glycol 3350 17 Gram(s) Oral daily PRN constipation

## 2023-11-14 NOTE — BH INPATIENT PSYCHIATRY PROGRESS NOTE - OTHER
Can attend adequate in conversation, interrupts writer at times but likely baseline Mild anxious, there is frequent checking behavior improved fair perseverative

## 2023-11-14 NOTE — BH INPATIENT PSYCHIATRY PROGRESS NOTE - NSBHASSESSSUMMFT_PSY_ALL_CORE
63yr old  male (Palauan speaking only), single, domiciled and unemployed, PPHx schizophrenia, akathisia, multiple past psych admissions (including 5 [facility] admissions last admission in May, 2022, self reports remote hx of interrupted sa via hanging, in treatment with DR. Chino [phone], no legal or substance hx, PMHx of HTN, DM and HLD, who was BIB family on 10/29/23 due to worsening AH and si. On initial CL evaluation, patient presented with worsening of depressive, anxiety and psychotic sx, worsening AH that are derogatory in nature as reported to initital provider. Patient is a limited historian. On chart review, patient was taking Abilify - it seems CL team restarted and titrated to  7.5mg po qhs and continued klonopin 0.5mg po bid, effexor XR 150mg po daily, melatonin 6mg po qhs, and cogentin stopped. Patient now denying all symptoms and requesting discharge.     11/7 PT consult placed, lipid panel returned WNL, Medicine consult appreciated, patient is focused on discharge, outpatient provider called - awaiting response, left voice message to Dr. Webb [phone]  11/8 Patient is not a behavioral issue on unit, he is seen in day room mainly, Dr. Webb returned call  11/9 No behavioral issues overnight, attends groups  11/10 No issues overnight, calm, pleasant, and behaviorally controlled  11/13 No issues over the weekend, continues to be compliant with medications, offering no complaints    Plan:    1. Schizophrenia: continue with Abilify 10mg po daily    2. Depressed mood: continue Effexor 150mg po daily    3. Anxiety: continue Klonopin 0.5mg po bid    4. Insomnia: continue melatonin 6mg po qhs prn and increase standing order of Trazodone 25mg qhs to 50mg qhs    5. unsteady gait - PT consult placed 11/7 and appreciated    6. HTN: seen by hospitalist, increased losartan to 50mg po bid and continued metoprolol 50mg po daily. BP remains high today - can add home HCTZ 12.5 qd. Discussed with hospitalist.    7. PRNs: Fluphenazine 5mg PO/IM q6h for agitation, Benadryl 50mg PO/IM q6h for agitation. Ativan 2mg IM for severe agitation    8. Nicotine dependence: nicotine replacement patch + gum PRN     63yr old  male (Ghanaian speaking only), single, domiciled and unemployed, PPHx schizophrenia, akathisia, multiple past psych admissions (including 5 [facility] admissions last admission in May, 2022, self reports remote hx of interrupted sa via hanging, in treatment with DR. Chino [phone], no legal or substance hx, PMHx of HTN, DM and HLD, who was BIB family on 10/29/23 due to worsening AH and si. On initial CL evaluation, patient presented with worsening of depressive, anxiety and psychotic sx, worsening AH that are derogatory in nature as reported to initital provider. Patient is a limited historian. On chart review, patient was taking Abilify - it seems CL team restarted and titrated to  7.5mg po qhs and continued klonopin 0.5mg po bid, effexor XR 150mg po daily, melatonin 6mg po qhs, and cogentin stopped. Patient now denying all symptoms and requesting discharge.     11/7 PT consult placed, lipid panel returned WNL, Medicine consult appreciated, patient is focused on discharge, outpatient provider called - awaiting response, left voice message to Dr. Webb [phone]  11/8 Patient is not a behavioral issue on unit, he is seen in day room mainly, Dr. Webb returned call  11/9 No behavioral issues overnight, attends groups  11/10 No issues overnight, calm, pleasant, and behaviorally controlled  11/13 No issues over the weekend, continues to be compliant with medications, offering no complaints  11/14 Will be discharged tomorrow    Plan:    1. Schizophrenia: continue with Abilify 10mg po daily    2. Depressed mood: continue Effexor 150mg po daily    3. Anxiety: continue Klonopin 0.5mg po bid    4. Insomnia: continue melatonin 6mg po qhs prn and increase standing order of Trazodone 25mg qhs to 50mg qhs    5. unsteady gait - PT consult placed 11/7 and appreciated    6. HTN: seen by hospitalist, increased losartan to 50mg po bid and continued metoprolol 50mg po daily. BP remains high today - can add home HCTZ 12.5 qd. Discussed with hospitalist.    7. PRNs: Fluphenazine 5mg PO/IM q6h for agitation, Benadryl 50mg PO/IM q6h for agitation. Ativan 2mg IM for severe agitation    8. Nicotine dependence: nicotine replacement patch + gum PRN

## 2023-11-14 NOTE — BH DISCHARGE NOTE NURSING/SOCIAL WORK/PSYCH REHAB - NSDCPRRECOMMEND_PSY_ALL_CORE
Psychiatric Rehabilitation staff recommends that patient returns to treatment by following up with outpatient care to continue medication management, support, and psychotherapy. In addition, psych rehab recommends patient continue to demonstrate identify and utilizing coping skills.

## 2023-11-14 NOTE — BH INPATIENT PSYCHIATRY PROGRESS NOTE - NSBHMSESPEECH_PSY_A_CORE
Normal volume, rate, productivity, spontaneity and articulation
Abnormal as indicated, otherwise normal...
Normal volume, rate, productivity, spontaneity and articulation
Normal volume, rate, productivity, spontaneity and articulation

## 2023-11-14 NOTE — BH INPATIENT PSYCHIATRY PROGRESS NOTE - NSICDXBHSECONDARYDX_PSY_ALL_CORE
Hypertension   I10  Hyperlipidemia   E78.5  

## 2023-11-14 NOTE — BH INPATIENT PSYCHIATRY PROGRESS NOTE - NSTXPSYCHOGOAL_PSY_ALL_CORE
Will be able to report experiencing hallucinations to staff

## 2023-11-14 NOTE — BH INPATIENT PSYCHIATRY PROGRESS NOTE - NSTXPROBDIAB_PSY_ALL_CORE
DIABETES MANAGEMENT

## 2023-11-14 NOTE — BH INPATIENT PSYCHIATRY PROGRESS NOTE - NSBHMSELANG_PSY_A_CORE
Unable to assess
No abnormalities noted

## 2023-11-14 NOTE — BH DISCHARGE NOTE NURSING/SOCIAL WORK/PSYCH REHAB - PATIENT PORTAL LINK FT
You can access the FollowMyHealth Patient Portal offered by [facility] by registering at the following website: [email or web address]. By joining [facility]’s FollowMyHealth portal, you will also be able to view your health information using other applications (apps) compatible with our system.

## 2023-11-14 NOTE — BH DISCHARGE NOTE NURSING/SOCIAL WORK/PSYCH REHAB - NSPROPASSIVESMOKEEXPOSURE_GEN_A_NUR
How Severe Is Your Skin Lesion?: mild Has Your Skin Lesion Been Treated?: not been treated Is This A New Presentation, Or A Follow-Up?: Skin Lesion Unknown

## 2023-11-14 NOTE — BH INPATIENT PSYCHIATRY PROGRESS NOTE - NSTXDIABINTERMD_PSY_ALL_CORE
Continue to take medications and adhere to diet

## 2023-11-14 NOTE — BH INPATIENT PSYCHIATRY PROGRESS NOTE - NSTXDIABGOAL_PSY_ALL_CORE
Will be able to able to describe prescribed diabetic medications and how to properly take these medications

## 2023-11-15 VITALS — TEMPERATURE: 98 F | RESPIRATION RATE: 18 BRPM | OXYGEN SATURATION: 98 %

## 2023-11-15 PROCEDURE — 99238 HOSP IP/OBS DSCHRG MGMT 30/<: CPT | Mod: GC

## 2023-11-15 RX ORDER — CLONAZEPAM 1 MG
1 TABLET ORAL
Refills: 0 | DISCHARGE

## 2023-11-15 RX ORDER — CLONAZEPAM 1 MG
1 TABLET ORAL
Qty: 28 | Refills: 0
Start: 2023-11-15 | End: 2023-11-28

## 2023-11-15 RX ORDER — LOSARTAN POTASSIUM 100 MG/1
1 TABLET, FILM COATED ORAL
Qty: 0 | Refills: 0 | DISCHARGE
Start: 2023-11-15

## 2023-11-15 RX ORDER — HYDROCHLOROTHIAZIDE 25 MG
1 TABLET ORAL
Qty: 0 | Refills: 0 | DISCHARGE
Start: 2023-11-15

## 2023-11-15 RX ORDER — CLONAZEPAM 1 MG
1 TABLET ORAL
Qty: 0 | Refills: 0 | DISCHARGE
Start: 2023-11-15

## 2023-11-15 RX ORDER — ARIPIPRAZOLE 15 MG/1
1 TABLET ORAL
Qty: 7 | Refills: 0
Start: 2023-11-15 | End: 2023-11-21

## 2023-11-15 RX ORDER — VENLAFAXINE HCL 75 MG
1 CAPSULE, EXT RELEASE 24 HR ORAL
Qty: 0 | Refills: 0 | DISCHARGE
Start: 2023-11-15

## 2023-11-15 RX ADMIN — LOSARTAN POTASSIUM 50 MILLIGRAM(S): 100 TABLET, FILM COATED ORAL at 08:59

## 2023-11-15 RX ADMIN — Medication 650 MILLIGRAM(S): at 10:50

## 2023-11-15 RX ADMIN — Medication 1 PATCH: at 07:30

## 2023-11-15 RX ADMIN — Medication 1 PATCH: at 09:00

## 2023-11-15 RX ADMIN — Medication 1 PATCH: at 09:02

## 2023-11-15 RX ADMIN — Medication 150 MILLIGRAM(S): at 08:59

## 2023-11-15 RX ADMIN — ARIPIPRAZOLE 10 MILLIGRAM(S): 15 TABLET ORAL at 08:59

## 2023-11-15 RX ADMIN — Medication 0.5 MILLIGRAM(S): at 08:59

## 2023-11-15 RX ADMIN — Medication 650 MILLIGRAM(S): at 09:56

## 2023-11-15 RX ADMIN — Medication 50 MILLIGRAM(S): at 08:59

## 2023-11-15 NOTE — BH INPATIENT PSYCHIATRY DISCHARGE NOTE - HPI (INCLUDE ILLNESS QUALITY, SEVERITY, DURATION, TIMING, CONTEXT, MODIFYING FACTORS, ASSOCIATED SIGNS AND SYMPTOMS)
The Pt is a 63yr old  male (Barbadian speaking only), single, domiciled and unemployed, PPHx schizophrenia, multiple past psych admissions (including 5 [facility] admissions last admission in May, 2022, self reports remote hx of interrupted sa via hanging, in treatment with Dr. Chino [phone], no  legal or substance hx, PMHx of HTN, DM and HLD. BIB family to worsening AH and SI.    Upon evaluation patient was talking loudly explaining that he thought he was going to be discharged from the medical floor, where he has been for the past week to his home. Per chart review he was admitted to medicine as he had a fall and has becoming unsteady on his feet.  He lives with his brother and granted permission to speak with him. Confronted with the knowledge that he was experiencing suicidal ideation, auditory hallucinations, he denied this and said he has never experienced this and someone made it up. Continued to ask when he will be discharged, specifically requesting today or tomorrow. Criteria for discharge was reviewed with him including appropriate behavior on the unit, cordial interaction with staff and peers, not experiencing any intrusive or negative thoughts suggestive of increasing psychopathology, discharge planning can then be discussed.

## 2023-11-15 NOTE — BH INPATIENT PSYCHIATRY DISCHARGE NOTE - NSBHFUPINTERVALHXFT_PSY_A_CORE
Upon current evaluation patient was visible on the unit, in day room as he has been throughout inpatient stay. Compliant with medications, no behavioral issues, cooperative with staff and peers alike. Denies SIIP/HIIP, AVH, no delusions present.

## 2023-11-15 NOTE — BH INPATIENT PSYCHIATRY DISCHARGE NOTE - ATTENDING DISCHARGE PHYSICAL EXAMINATION:
Patient is calm, cooperative and well related. No abnormal involuntary movements noted. Stable gait. Speech wnl. Mood euthymic. Affect neutral, stable, reactive. Thought process somewhat perseverative but otherwise wnl. Denies SI/HI. No delusions elicited. Denies ah/VH. Adequately oriented. Partial insight. Fair judgement.

## 2023-11-15 NOTE — BH INPATIENT PSYCHIATRY DISCHARGE NOTE - NSBHMETABOLIC_PSY_ALL_CORE_FT
BMI: BMI (kg/m2): 27.1 (11-06-23 @ 15:34)  HbA1c: A1C with Estimated Average Glucose Result: 5.9 % (10-30-23 @ 01:03)    Glucose: POCT Blood Glucose.: 112 mg/dL (11-06-23 @ 12:25)    BP: 158/91 (11-14-23 @ 20:21) (158/91 - 158/91)  Lipid Panel: Date/Time: 11-07-23 @ 08:23  Cholesterol, Serum: 196  Direct LDL: --  HDL Cholesterol, Serum: 38  Total Cholesterol/HDL Ration Measurement: --  Triglycerides, Serum: 210

## 2023-11-15 NOTE — BH INPATIENT PSYCHIATRY DISCHARGE NOTE - REASON FOR ADMISSION
63yr old  male, single, domiciled and unemployed, PPHx schizophrenia, akathisia, multiple past psych admissions (including 5 [facility] admissions last admission in May, 2022, self reports remote hx of interrupted sa via hanging, in treatment with DR. Chino [phone], no legal or substance hx, PMHx of HTN, DM and HLD, who was BIB family on 10/29/23 due to worsening AH and SI. 63yr old  male, single, domiciled and unemployed, PPHx schizophrenia, akathisia, multiple past psych admissions (including 5 [facility] admissions last admission in May, 2022, self reports remote hx of interrupted sa via hanging, in treatment with Dr. Chino [phone], no legal or substance hx, PMHx of HTN, DM and HLD, who was BIB family on 10/29/23 due to worsening AH and SI. suicidal thoughts, auditory hallucinations

## 2023-11-15 NOTE — BH INPATIENT PSYCHIATRY DISCHARGE NOTE - HOSPITAL COURSE
Patient was a transfer from  service where he received the majority of stabilization before being admitted to [place] [address].    On admission interview patient presented with the following signs and symptoms: tangential, hyperactive, focusing on discharge as he thought he was going home after inpatient stay at [facility]. He did not require any PRNs throughout this admission. He denied command auditory hallucinations, initially what he was brought in for evaluation. He was admitted to medicine with  follow as a result of unsteadiness on feet per brother whom he resides with.    Patient was adherent to medications, both medical and psychiatric regimen. Abilify was increased from 7.5mg qd to 10mg qd between  and [facility]. Patient was a transfer from  service where he received the majority of stabilization before being admitted to [place] [address].    On admission interview patient presented with the following signs and symptoms: tangential, hyperactive, focusing on discharge as he thought he was going home after inpatient stay at [facility]. He did not require any PRNs throughout this admission. He denied command auditory hallucinations, initially what he was brought in for evaluation. He was admitted to medicine with  follow as a result of unsteadiness on feet per brother whom he resides with.    Patient was adherent to medications, both medical and psychiatric regimen. Abilify was increased from 7.5mg qd to 10mg qd between  and [facility] for management of psychosis associated with AH. Patient demonstrated continued stabilization of resolution of symptoms. Throughout entire hospital course he was actively engaged in treatment and participated in groups. He was always visible on the unit observed to have cooperative interaction with peers and staff alike.     On the day of discharge, patient received maximum stabilization and therapeutic support and no longer requires inpatient treatment and care. Patient denies all suicidal and aggressive ideation, intent, and plan. Patient displays no psychotic symptoms. Patient is not judged to be an acute danger to self or others at this time. He is stable for transition to outpatient level of care. Patient was a transfer from  service where he received the majority of stabilization before being admitted to [facility]. Abilify was increased to 7.5mg po daily by  team. Patient was taking ativan 0.5mg po daily and klonopin 0.5mg po daily -  consolidated it to klonopin 0.5mg po bid.     On admission interview patient presented with the following signs and symptoms: tangential, hyperactive, focusing on discharge as he thought he was going home after inpatient stay at [facility]. He did not require any PRNs throughout this admission. On admission he denied SI or auditory hallucinations, initially what he was brought in for evaluation. He was admitted to medicine with  follow as a result of unsteadiness on feet per brother whom he resides with.    Patient was adherent to medications, both medical and psychiatric regimen. Abilify was increased from 7.5mg qd to 10mg qd between  and [facility] for management of psychosis associated with AH. Effexor was continued at 150mg po daily and klonopin at 0.5mg po bid. Trazodone 50mg tried for insomnia but ineffective. Patient demonstrated continued stabilization of resolution of symptoms. Throughout entire hospital course he was actively engaged in treatment and participated in groups. Affect was neutral to bright. Had adequate frustration tolerance and impulse control. He was always visible on the unit observed to have cooperative interaction with peers and staff alike. Tolerated medications well. No akathisia reported or noted.     On the day of discharge, patient received maximum stabilization and therapeutic support and no longer requires inpatient treatment and care. Patient denies all suicidal and aggressive ideation, intent, and plan. Patient displays no psychotic symptoms. Patient is not judged to be an acute danger to self or others at this time. He is stable for transition to outpatient level of care.

## 2023-11-15 NOTE — BH INPATIENT PSYCHIATRY DISCHARGE NOTE - OTHER PAST PSYCHIATRIC HISTORY (INCLUDE DETAILS REGARDING ONSET, COURSE OF ILLNESS, INPATIENT/OUTPATIENT TREATMENT)
Updated entry for Nov 6 2023 admission to [facility].  Pt was transferred to [facility] from [facility].  Pt is a 63 radha old male , single, resides with his brothers - dx schizophrenia. Multiple past psy admission  including 5 at [facility]  - most recently May 2022 - see below)   Pt in treatment at [facility] for Psychotherapy.  Brought in by family for worsening AH and SI. Upon interview today 11/7, pt denied AH and SI.       Prior Psychosocial entry: As per the ED Behavioral Health Assessment Note completed on May 8th 2022: "The Pt is a 61 yr old  male (Burkinan speaking only), single, domiciled and unemployed, PPHx schizophrenia, multiple past psych admissions (including 5 [facility] admissions last discharged in summer 2020, no known past SA, legal or substance hx, PMHx of HTN, DM and HLD.  Today, BIB family to worsening AH. On interview, patient seen with Burkinan-fluent attending, he feels that his symptoms have been worsening for one month. He reports depressed mood, "yelling at my family", hearing voices that tell him "look, other people are doing things and you're no good". He endorses having SI with plan to hang himself, denies any preparatory actions or any attempts. Reports poor sleep, low energy, poor appetite as well. Denies CAH. Otherwise endorses 1.5 year history of dizziness that his PCP wanted patient to see Neurologist for, which he has not yet done. Endorses current dizziness as well. Otherwise denies active SI, and feels safe now in the hospital. Expresses his desire to get help and asks to be admitted. Patient completes voluntary paperwork in the room with team."

## 2023-11-15 NOTE — BH INPATIENT PSYCHIATRY DISCHARGE NOTE - NSDCMRMEDTOKEN_GEN_ALL_CORE_FT
ARIPiprazole 10 mg oral tablet: 1 tab(s) orally once a day  Effexor  mg oral capsule, extended release: 1 cap(s) orally once a day  hydroCHLOROthiazide 12.5 mg oral capsule: 1 cap(s) orally once a day  KlonoPIN 0.5 mg oral tablet: 1 tab(s) orally 2 times a day  losartan 50 mg oral tablet: 1 tab(s) orally 2 times a day   ARIPiprazole 10 mg oral tablet: 1 tab(s) orally once a day  clonazePAM 0.5 mg oral tablet: 1 tab(s) orally 2 times a day MDD: 1mg  Effexor  mg oral capsule, extended release: 1 cap(s) orally once a day  hydroCHLOROthiazide 12.5 mg oral capsule: 1 cap(s) orally once a day  losartan 50 mg oral tablet: 1 tab(s) orally 2 times a day

## 2023-11-15 NOTE — BH INPATIENT PSYCHIATRY DISCHARGE NOTE - NSBHMSEBEHAV_PSY_A_CORE
Need referral to oncology and thoracic surgery . ASAP.   Has referral for pulmonary .     Cooperative

## 2023-11-15 NOTE — BH INPATIENT PSYCHIATRY DISCHARGE NOTE - NSBHASSESSSUMMFT_PSY_ALL_CORE
63yr old  male (Luxembourger speaking only), single, domiciled and unemployed, PPHx schizophrenia, akathisia, multiple past psych admissions (including 5 [facility] admissions last admission in May, 2022, self reports remote hx of interrupted sa via hanging, in treatment with DR. Chino [phone], no legal or substance hx, PMHx of HTN, DM and HLD, who was BIB family on 10/29/23 due to worsening AH and si. On initial CL evaluation, patient presented with worsening of depressive, anxiety and psychotic sx, worsening AH that are derogatory in nature as reported to initital provider. Patient is a limited historian. On chart review, patient was taking Abilify - it seems CL team restarted and titrated to  7.5mg po qhs and continued klonopin 0.5mg po bid, effexor XR 150mg po daily, melatonin 6mg po qhs, and cogentin stopped.

## 2023-12-15 NOTE — DIETITIAN INITIAL EVALUATION ADULT - CONTINUE CURRENT NUTRITION CARE PLAN
f/u by:    5- as Well Adult  Date of Last CBC/D, CMP, TSH:   2-  Current control is:    Adequate  Discontinue prescription if thoughts of harming oneself, harming others, suicide, or homicide.  Encouraged to follow with psychology.   yes

## 2024-01-28 NOTE — ED ADULT NURSE NOTE - NS ED BHA MSE SPEECH VOLUME
LABS and ADDITIONAL STUDIES:                        11.7   4.26  )-----------( 81       ( 2024 19:11 )             35.8   Complete Blood Count + Automated Diff (24 @ 19:11)   Auto Neutrophil #: 3.56 K/uL  Auto Neutrophil %: 80.0 %  Manual Differential (24 @ 19:11)   Band Neutrophils %: 3.5 %    139  |  104  |  15  ----------------------------<  88       3.7   |  25  |  0.63    Ca    8.2<L>      2024 19:11    TPro  6.5  /  Alb  3.7  /  TBili  0.8  /  DBili  x   /  AST  20  /  ALT  12  /  AlkPhos  97      PT/INR: 12.6/1.13 (24 @ 19:11)  PTT: 29.8 (24 @ 19:11)    19:11 - VBG - pH: 7.38  | pCO2: 45    | pO2: 26    | Lactate: 1.5      Urinalysis Basic - ( 2024 20:22 )  Color: Yellow / Appearance: Clear / S.022 / pH: 6.5  Gluc: Negative mg/dL / Ketone: 40 mg/dL  / Bili: Negative / Urobili: 1.0 mg/dL   Blood: Negative / Protein: Trace mg/dL / Nitrite: Negative   Leuk Esterase: Negative / RBC: x / WBC x   Sq Epi: x / Non Sq Epi: x / Bacteria: x    RVP - negative    < from: Xray Chest 1 View- PORTABLE-Urgent (24 @ 18:42) >    FINDINGS:    Right-sided medication port terminates in the SVC.  No focal consolidation.  Left costophrenic angle is partially collimated off field of view.  The lungs are clear.  The heart is normal in size  The visualized osseous structures demonstrate no acute pathology.    IMPRESSION:  Clear lungs.        ******PRELIMINARY REPORT******      < end of copied text >    < from: CT Abdomen and Pelvis w/ IV Cont (24 @ 22:27) >    FINDINGS:  LOWER CHEST: Bibasilar dependent atelectasis. Coronary artery   calcifications. Cardiomegaly.    LIVER: Within normal limits.  BILE DUCTS: Choledochojejunostomy. No intrahepatic duct dilatation.  GALLBLADDER: Within normal limits.  SPLEEN: Prominent spleen.  PANCREAS: Status post Whipple. Residual pancreas is of normal morphology.   No ductal dilatation. Stable trace soft tissue infiltration along the   posterior aspect of the superior mesenteric vein and artery. Stable   portacaval lymph node measuring 1.4 cm in short axis.  ADRENALS: Within normal limits.  KIDNEYS/URETERS: Stable septated left renal cysts measures 3.3 cm.   Additional simple left renal cyst. Symmetric renal enhancement without   hydronephrosis.    BLADDER: Within normal limits.  REPRODUCTIVE ORGANS: Prostate is enlarged.    BOWEL: Long segment thickening extending from transverse colon to the   rectum. No bowel obstruction. Appendix is normal.  PERITONEUM: No ascites.  VESSELS: Stable narrowing of the extrahepatic main portal vein just past   the keily-mesenteric confluence. Patent portal, superior mesenteric, and   splenic veins.  RETROPERITONEUM/LYMPH NODES: No lymphadenopathy.  ABDOMINAL WALL: Small fat-containing left inguinal hernia.  BONES: Degenerative changes.    IMPRESSION:  Long segment colitis.    Status post Whipple with stable soft tissue infiltration surrounding the   superior mesenteric artery and vein.        --- End of Report ---    < end of copied text > Normal

## 2024-07-01 NOTE — BH INPATIENT PSYCHIATRY ASSESSMENT NOTE - NSBHMSEBODY_PSY_A_CORE
[facility] - Surgery ([facility])  Brief Operative Note    SUMMARY     Surgery Date: 7/1/2024     Surgeons and Role:     * DOE Arce III, MD - Primary     * Eliezer Infante MD - Fellow    Assisting Surgeon: None    Pre-op Diagnosis:  ESRD (end stage renal disease) on dialysis [N18.6, Z99.2]    Post-op Diagnosis:  Post-Op Diagnosis Codes:     * ESRD (end stage renal disease) on dialysis [N18.6, Z99.2]    PROCEDURE:  L upper arm reverse loop AVG (4-7 taper ACCUSEAL)    Anesthesia: Regional    Implants:  Implant Name Type Inv. Item Serial No.  Lot No. LRB No. Used Action   GRAFT ACUSEAL CARDIO 45CM - [id number]  GRAFT ACUSEAL CARDIO 45CM [id number] W.L. GORE  Left 1 Implanted       Operative Findings: small brachial artery and vein, soft thrill; strong biphasic ulnar, reasonable L radial signals with mold augmentation with AVG occlusion    MAY begin cannulation with SMALL needles on Wed 7/3/2024    Estimated Blood Loss: 15cc             Specimens:   Specimen (24h ago, onward)      None            [id number]       Overweight

## 2024-07-15 PROBLEM — R73.03 PREDIABETES: Chronic | Status: ACTIVE | Noted: 2023-11-06

## 2024-08-05 NOTE — ED ADULT TRIAGE NOTE - INTERPRETATION SERVICES DECLINED
[FreeTextEntry1] : She was instructed on continued hand therapy, range of motion exercises and scar massage and desensitization.    With regard to her right distal radius fracture, she will continue occupational therapy.  I did discuss with her the position of the plate and the slightly too prominent screws.  I did tell her that at some point in the future, I would recommend removal of the plate, to prevent irritation of the flexor tendons.  I would not recommend that at this point in time, but likely within the next 2 to 3 months.  With regard to the numbness along the ulnar nerve distribution, she has developed cubital tunnel syndrome.  I recommend she obtain an EMG. The appropriate referral was provided today. She will follow-up after her EMG to discuss the results.   Finally, she has developed a right ring finger trigger finger.  With regard to her right ring finger trigger finger, I recommended a cortisone injection. She opted to proceed with a right ring finger trigger finger injection today.  She wishes to continue to work at 75% duty.  She was given an updated note.
[FreeTextEntry1] : She was instructed on continued hand therapy, range of motion exercises and scar massage and desensitization.    With regard to her right distal radius fracture, she will continue occupational therapy.  I did discuss with her the position of the plate and the slightly too prominent screws.  I did tell her that at some point in the future, I would recommend removal of the plate, to prevent irritation of the flexor tendons.  I would not recommend that at this point in time, but likely within the next 2 to 3 months.  With regard to the numbness along the ulnar nerve distribution, she has developed cubital tunnel syndrome.  I recommend she obtain an EMG. The appropriate referral was provided today. She will follow-up after her EMG to discuss the results.   Finally, she has developed a right ring finger trigger finger.  With regard to her right ring finger trigger finger, I recommended a cortisone injection. She opted to proceed with a right ring finger trigger finger injection today.  She wishes to continue to work at 75% duty.  She was given an updated note.
Patient/Caregiver requests family/friend to interpret.

## 2024-08-09 NOTE — BH INPATIENT PSYCHIATRY ASSESSMENT NOTE - NSBHMSEEYE_PSY_A_CORE
Follow up with SHAHNAZ Contreras.  Please schedule on the way out today.    You are due to check testosterone, PSA and hemoglobin blood work.  You may go to any [facility] lab, no fasting, and avoid sexual activities 3 days prior to blood draw.  Please also get labs done at midcycle of your testosterone injections.    If you have any questions or concerns regarding your appointment today or your future appointment please contact our office at [phone] (Monday-Friday 8am-5pm)     After hours for emergency please contact [phone] and ask to have on call urologist paged.     Fair

## 2024-08-13 ENCOUNTER — APPOINTMENT (OUTPATIENT)
Dept: OPHTHALMOLOGY | Facility: CLINIC | Age: 64
End: 2024-08-13
Payer: MEDICAID

## 2024-08-13 ENCOUNTER — NON-APPOINTMENT (OUTPATIENT)
Age: 64
End: 2024-08-13

## 2024-08-13 PROCEDURE — 92025 CPTRIZED CORNEAL TOPOGRAPHY: CPT

## 2024-08-13 PROCEDURE — 92286 ANT SGM IMG I&R SPECLR MIC: CPT

## 2024-08-13 PROCEDURE — 92201 OPSCPY EXTND RTA DRAW UNI/BI: CPT

## 2024-08-13 PROCEDURE — 92004 COMPRE OPH EXAM NEW PT 1/>: CPT

## 2024-10-29 NOTE — ED PROVIDER NOTE - OBJECTIVE STATEMENT
Remains in abstinence.   This is a  59 yr old M, pmh schizophrenia, htn, hld, dm with SI/HI and aggression. Pt [place] speaking pacific  service used - ID [id number] [place]. Pt states couple of month ago the voices were telling him to kill his mother, who he lives with. He states the voices were commanding him to hang himself today. When his brother come to the house, he told him and asked him to bring him to the hospital, he needs help and wants to be admitted. Pt endorsees he was dx at age 27. He currently not complaint with his medication because it makes him feel bad. He feels his head on fire from clozaril, so he is not taking it for the last couple of month. He states his other medications make him tired as well, he stopped them approximately 5 days ago. Pt endorses he feels, weak, less energy, stays in the bed,  less appetite. He endorses feels dizzy and lightheaded many time.

## 2024-11-03 ENCOUNTER — INPATIENT (INPATIENT)
Facility: HOSPITAL | Age: 64
LOS: 23 days | Discharge: HOME | End: 2024-11-27
Attending: STUDENT IN AN ORGANIZED HEALTH CARE EDUCATION/TRAINING PROGRAM | Admitting: PSYCHIATRY & NEUROLOGY
Payer: MEDICAID

## 2024-11-03 VITALS
TEMPERATURE: 98 F | DIASTOLIC BLOOD PRESSURE: 93 MMHG | OXYGEN SATURATION: 98 % | SYSTOLIC BLOOD PRESSURE: 158 MMHG | HEART RATE: 74 BPM | RESPIRATION RATE: 18 BRPM

## 2024-11-03 DIAGNOSIS — F20.9 SCHIZOPHRENIA, UNSPECIFIED: ICD-10-CM

## 2024-11-03 LAB
ADD ON TEST-SPECIMEN IN LAB: SIGNIFICANT CHANGE UP
ALBUMIN SERPL ELPH-MCNC: 4.5 G/DL — SIGNIFICANT CHANGE UP (ref 3.3–5)
ALP SERPL-CCNC: 86 U/L — SIGNIFICANT CHANGE UP (ref 40–120)
ALT FLD-CCNC: 30 U/L — SIGNIFICANT CHANGE UP (ref 4–41)
AMPHET UR-MCNC: NEGATIVE — SIGNIFICANT CHANGE UP
ANION GAP SERPL CALC-SCNC: 13 MMOL/L — SIGNIFICANT CHANGE UP (ref 7–14)
APAP SERPL-MCNC: <10 UG/ML — LOW (ref 15–25)
APPEARANCE UR: CLEAR — SIGNIFICANT CHANGE UP
AST SERPL-CCNC: 33 U/L — SIGNIFICANT CHANGE UP (ref 4–40)
BACTERIA # UR AUTO: NEGATIVE /HPF — SIGNIFICANT CHANGE UP
BARBITURATES UR SCN-MCNC: NEGATIVE — SIGNIFICANT CHANGE UP
BASOPHILS # BLD AUTO: 0.05 K/UL — SIGNIFICANT CHANGE UP (ref 0–0.2)
BASOPHILS NFR BLD AUTO: 0.5 % — SIGNIFICANT CHANGE UP (ref 0–2)
BENZODIAZ UR-MCNC: NEGATIVE — SIGNIFICANT CHANGE UP
BILIRUB DIRECT SERPL-MCNC: <0.2 MG/DL — SIGNIFICANT CHANGE UP (ref 0–0.3)
BILIRUB INDIRECT FLD-MCNC: >0.2 MG/DL — SIGNIFICANT CHANGE UP (ref 0–1)
BILIRUB SERPL-MCNC: 0.4 MG/DL — SIGNIFICANT CHANGE UP (ref 0.2–1.2)
BILIRUB UR-MCNC: NEGATIVE — SIGNIFICANT CHANGE UP
BUN SERPL-MCNC: 14 MG/DL — SIGNIFICANT CHANGE UP (ref 7–23)
CALCIUM SERPL-MCNC: 9.9 MG/DL — SIGNIFICANT CHANGE UP (ref 8.4–10.5)
CAST: 0 /LPF — SIGNIFICANT CHANGE UP (ref 0–4)
CHLORIDE SERPL-SCNC: 99 MMOL/L — SIGNIFICANT CHANGE UP (ref 98–107)
CO2 SERPL-SCNC: 26 MMOL/L — SIGNIFICANT CHANGE UP (ref 22–31)
COCAINE METAB.OTHER UR-MCNC: NEGATIVE — SIGNIFICANT CHANGE UP
COLOR SPEC: YELLOW — SIGNIFICANT CHANGE UP
CREAT SERPL-MCNC: 0.78 MG/DL — SIGNIFICANT CHANGE UP (ref 0.5–1.3)
CREATININE URINE RESULT, DAU: 53 MG/DL — SIGNIFICANT CHANGE UP
DIFF PNL FLD: NEGATIVE — SIGNIFICANT CHANGE UP
EGFR: 100 ML/MIN/1.73M2 — SIGNIFICANT CHANGE UP
EOSINOPHIL # BLD AUTO: 0.29 K/UL — SIGNIFICANT CHANGE UP (ref 0–0.5)
EOSINOPHIL NFR BLD AUTO: 2.8 % — SIGNIFICANT CHANGE UP (ref 0–6)
ETHANOL SERPL-MCNC: <10 MG/DL — SIGNIFICANT CHANGE UP
FENTANYL UR QL SCN: NEGATIVE — SIGNIFICANT CHANGE UP
FLUAV AG NPH QL: SIGNIFICANT CHANGE UP
FLUBV AG NPH QL: SIGNIFICANT CHANGE UP
GLUCOSE BLDC GLUCOMTR-MCNC: 66 MG/DL — LOW (ref 70–99)
GLUCOSE BLDC GLUCOMTR-MCNC: 86 MG/DL — SIGNIFICANT CHANGE UP (ref 70–99)
GLUCOSE SERPL-MCNC: 177 MG/DL — HIGH (ref 70–99)
GLUCOSE UR QL: 250 MG/DL
HCT VFR BLD CALC: 45.4 % — SIGNIFICANT CHANGE UP (ref 39–50)
HGB BLD-MCNC: 15 G/DL — SIGNIFICANT CHANGE UP (ref 13–17)
IANC: 7.45 K/UL — HIGH (ref 1.8–7.4)
IMM GRANULOCYTES NFR BLD AUTO: 0.3 % — SIGNIFICANT CHANGE UP (ref 0–0.9)
KETONES UR-MCNC: NEGATIVE MG/DL — SIGNIFICANT CHANGE UP
LEUKOCYTE ESTERASE UR-ACNC: NEGATIVE — SIGNIFICANT CHANGE UP
LYMPHOCYTES # BLD AUTO: 19.1 % — SIGNIFICANT CHANGE UP (ref 13–44)
LYMPHOCYTES # BLD AUTO: 2 K/UL — SIGNIFICANT CHANGE UP (ref 1–3.3)
MCHC RBC-ENTMCNC: 28.2 PG — SIGNIFICANT CHANGE UP (ref 27–34)
MCHC RBC-ENTMCNC: 33 G/DL — SIGNIFICANT CHANGE UP (ref 32–36)
MCV RBC AUTO: 85.5 FL — SIGNIFICANT CHANGE UP (ref 80–100)
METHADONE UR-MCNC: NEGATIVE — SIGNIFICANT CHANGE UP
MONOCYTES # BLD AUTO: 0.67 K/UL — SIGNIFICANT CHANGE UP (ref 0–0.9)
MONOCYTES NFR BLD AUTO: 6.4 % — SIGNIFICANT CHANGE UP (ref 2–14)
NEUTROPHILS # BLD AUTO: 7.45 K/UL — HIGH (ref 1.8–7.4)
NEUTROPHILS NFR BLD AUTO: 70.9 % — SIGNIFICANT CHANGE UP (ref 43–77)
NITRITE UR-MCNC: NEGATIVE — SIGNIFICANT CHANGE UP
NRBC # BLD: 0 /100 WBCS — SIGNIFICANT CHANGE UP (ref 0–0)
NRBC # FLD: 0 K/UL — SIGNIFICANT CHANGE UP (ref 0–0)
OPIATES UR-MCNC: NEGATIVE — SIGNIFICANT CHANGE UP
OXYCODONE UR-MCNC: NEGATIVE — SIGNIFICANT CHANGE UP
PCP SPEC-MCNC: SIGNIFICANT CHANGE UP
PCP UR-MCNC: NEGATIVE — SIGNIFICANT CHANGE UP
PH UR: 6 — SIGNIFICANT CHANGE UP (ref 5–8)
PLATELET # BLD AUTO: 254 K/UL — SIGNIFICANT CHANGE UP (ref 150–400)
POTASSIUM SERPL-MCNC: 3.4 MMOL/L — LOW (ref 3.5–5.3)
POTASSIUM SERPL-SCNC: 3.4 MMOL/L — LOW (ref 3.5–5.3)
PROT SERPL-MCNC: 8.1 G/DL — SIGNIFICANT CHANGE UP (ref 6–8.3)
PROT UR-MCNC: 30 MG/DL
RBC # BLD: 5.31 M/UL — SIGNIFICANT CHANGE UP (ref 4.2–5.8)
RBC # FLD: 13.6 % — SIGNIFICANT CHANGE UP (ref 10.3–14.5)
RBC CASTS # UR COMP ASSIST: 0 /HPF — SIGNIFICANT CHANGE UP (ref 0–4)
RSV RNA NPH QL NAA+NON-PROBE: SIGNIFICANT CHANGE UP
SALICYLATES SERPL-MCNC: <0.3 MG/DL — LOW (ref 15–30)
SARS-COV-2 RNA SPEC QL NAA+PROBE: SIGNIFICANT CHANGE UP
SODIUM SERPL-SCNC: 138 MMOL/L — SIGNIFICANT CHANGE UP (ref 135–145)
SP GR SPEC: 1.01 — SIGNIFICANT CHANGE UP (ref 1–1.03)
SQUAMOUS # UR AUTO: 0 /HPF — SIGNIFICANT CHANGE UP (ref 0–5)
THC UR QL: NEGATIVE — SIGNIFICANT CHANGE UP
UROBILINOGEN FLD QL: 1 MG/DL — SIGNIFICANT CHANGE UP (ref 0.2–1)
WBC # BLD: 10.49 K/UL — SIGNIFICANT CHANGE UP (ref 3.8–10.5)
WBC # FLD AUTO: 10.49 K/UL — SIGNIFICANT CHANGE UP (ref 3.8–10.5)
WBC UR QL: 0 /HPF — SIGNIFICANT CHANGE UP (ref 0–5)

## 2024-11-03 PROCEDURE — 99285 EMERGENCY DEPT VISIT HI MDM: CPT | Mod: GC

## 2024-11-03 PROCEDURE — 99285 EMERGENCY DEPT VISIT HI MDM: CPT

## 2024-11-03 RX ORDER — VENLAFAXINE HYDROCHLORIDE 75 MG/1
150 CAPSULE, EXTENDED RELEASE ORAL DAILY
Refills: 0 | Status: DISCONTINUED | OUTPATIENT
Start: 2024-11-03 | End: 2024-11-27

## 2024-11-03 RX ORDER — FLUPHENAZINE HCL 2.5 MG
2.5 TABLET ORAL EVERY 6 HOURS
Refills: 0 | Status: DISCONTINUED | OUTPATIENT
Start: 2024-11-03 | End: 2024-11-27

## 2024-11-03 RX ORDER — IBUPROFEN 200 MG
400 TABLET ORAL EVERY 6 HOURS
Refills: 0 | Status: DISCONTINUED | OUTPATIENT
Start: 2024-11-03 | End: 2024-11-18

## 2024-11-03 RX ORDER — GLUCAGON INJECTION, SOLUTION 0.5 MG/.1ML
1 INJECTION, SOLUTION SUBCUTANEOUS ONCE
Refills: 0 | Status: DISCONTINUED | OUTPATIENT
Start: 2024-11-03 | End: 2024-11-27

## 2024-11-03 RX ORDER — AMLODIPINE BESYLATE 10 MG/1
5 TABLET ORAL DAILY
Refills: 0 | Status: DISCONTINUED | OUTPATIENT
Start: 2024-11-04 | End: 2024-11-19

## 2024-11-03 RX ORDER — FLUPHENAZINE HCL 2.5 MG
2.5 TABLET ORAL ONCE
Refills: 0 | Status: DISCONTINUED | OUTPATIENT
Start: 2024-11-03 | End: 2024-11-27

## 2024-11-03 RX ORDER — FLUPHENAZINE HCL 2.5 MG
5 TABLET ORAL ONCE
Refills: 0 | Status: COMPLETED | OUTPATIENT
Start: 2024-11-03 | End: 2024-11-03

## 2024-11-03 RX ORDER — INFLUENZA VIRUS VACCINE 15; 15; 15; 15 UG/.5ML; UG/.5ML; UG/.5ML; UG/.5ML
0.5 SUSPENSION INTRAMUSCULAR ONCE
Refills: 0 | Status: COMPLETED | OUTPATIENT
Start: 2024-11-03 | End: 2024-11-03

## 2024-11-03 RX ORDER — 0.9 % SODIUM CHLORIDE 0.9 %
1000 INTRAVENOUS SOLUTION INTRAVENOUS
Refills: 0 | Status: DISCONTINUED | OUTPATIENT
Start: 2024-11-03 | End: 2024-11-19

## 2024-11-03 RX ORDER — BENZTROPINE MESYLATE 2 MG
1 TABLET ORAL
Refills: 0 | Status: DISCONTINUED | OUTPATIENT
Start: 2024-11-03 | End: 2024-11-27

## 2024-11-03 RX ORDER — ACETAMINOPHEN, DIPHENHYDRAMINE HCL, PHENYLEPHRINE HCL 325; 25; 5 MG/1; MG/1; MG/1
3 TABLET ORAL AT BEDTIME
Refills: 0 | Status: DISCONTINUED | OUTPATIENT
Start: 2024-11-03 | End: 2024-11-27

## 2024-11-03 RX ORDER — POLYETHYLENE GLYCOL 3350 17 G/17G
17 POWDER, FOR SOLUTION ORAL DAILY
Refills: 0 | Status: DISCONTINUED | OUTPATIENT
Start: 2024-11-03 | End: 2024-11-27

## 2024-11-03 RX ORDER — IBUPROFEN 200 MG
600 TABLET ORAL ONCE
Refills: 0 | Status: COMPLETED | OUTPATIENT
Start: 2024-11-03 | End: 2024-11-03

## 2024-11-03 RX ORDER — AMLODIPINE BESYLATE 10 MG/1
5 TABLET ORAL ONCE
Refills: 0 | Status: COMPLETED | OUTPATIENT
Start: 2024-11-03 | End: 2024-11-03

## 2024-11-03 RX ORDER — NICOTINE 21 MG/24H
4 PATCH, EXTENDED RELEASE TRANSDERMAL
Refills: 0 | Status: DISCONTINUED | OUTPATIENT
Start: 2024-11-03 | End: 2024-11-27

## 2024-11-03 RX ORDER — NICOTINE 21 MG/24H
1 PATCH, EXTENDED RELEASE TRANSDERMAL DAILY
Refills: 0 | Status: DISCONTINUED | OUTPATIENT
Start: 2024-11-03 | End: 2024-11-27

## 2024-11-03 RX ADMIN — Medication 5 MILLIGRAM(S): at 17:14

## 2024-11-03 RX ADMIN — Medication 600 MILLIGRAM(S): at 13:58

## 2024-11-03 RX ADMIN — AMLODIPINE BESYLATE 5 MILLIGRAM(S): 10 TABLET ORAL at 17:14

## 2024-11-03 RX ADMIN — Medication 600 MILLIGRAM(S): at 15:03

## 2024-11-03 RX ADMIN — Medication 400 MILLIGRAM(S): at 18:06

## 2024-11-03 RX ADMIN — Medication 1 MILLIGRAM(S): at 20:36

## 2024-11-03 RX ADMIN — ACETAMINOPHEN, DIPHENHYDRAMINE HCL, PHENYLEPHRINE HCL 3 MILLIGRAM(S): 325; 25; 5 TABLET ORAL at 20:36

## 2024-11-03 NOTE — ED PROVIDER NOTE - DISPOSITION TYPE
Spoke to mother. Discussed MD's message. Mom verbalizes understanding and agreeable. She will reduce morning dose of depakote to three tablets as instructed starting tomorrow. She will continue ton titration of topiramate as directed. Mom will call in one week with progress report or sooner as needed.    ADMIT

## 2024-11-03 NOTE — BH PATIENT PROFILE - HOME MEDICATIONS
clonazePAM 0.5 mg oral tablet , 1 tab(s) orally 2 times a day MDD: 1mg  hydroCHLOROthiazide 12.5 mg oral capsule , 1 cap(s) orally once a day  ARIPiprazole 10 mg oral tablet , 1 tab(s) orally once a day  losartan 50 mg oral tablet , 1 tab(s) orally 2 times a day  Effexor  mg oral capsule, extended release , 1 cap(s) orally once a day

## 2024-11-03 NOTE — ED BEHAVIORAL HEALTH ASSESSMENT NOTE - NSBHINFOSOURCE_PSY_ALL_CORE
Patient/Collateral... Infliximab Counseling:  I discussed with the patient the risks of infliximab including but not limited to myelosuppression, immunosuppression, autoimmune hepatitis, demyelinating diseases, lymphoma, and serious infections.  The patient understands that monitoring is required including a PPD at baseline and must alert us or the primary physician if symptoms of infection or other concerning signs are noted.

## 2024-11-03 NOTE — ED BEHAVIORAL HEALTH ASSESSMENT NOTE - SUMMARY
This is a 63y/o man with a past medical history of hypertension, hyperlipidemia, and diabetes mellitus, and a past psychiatric history of schizophrenia, one prior suicide attempt (via hanging at least 26 years ago), and multiple previous psychiatric admissions (last [facility]5 [id number]) brought in by brother for worsening mood and auditory hallucinations.     Patient presenting with worsening mood, global insomnia, decreased appetite and weight loss, chronic passive suicidality, mild thought disorganization, and auditory hallucinations resulting in interpersonal conflict with family. He denies active SIIP, NSSIB, HIIP, and CAH. He notes that the chronic passive suicidality has been increasing in intensity over the last two months. He has been inconsistently adherent to his psychiatric medications. He is requesting voluntary psychiatric admission for acute stabilization and medication optimization, including patient-requested transition to [facility].

## 2024-11-03 NOTE — ED BEHAVIORAL HEALTH ASSESSMENT NOTE - RISK ASSESSMENT
Acute Risk Factors --- current depressive episode; recent auditory hallucinations (non-command in nature)    Chronic Risk Factors --- single; older man; history of aborted suicide attempt (remote); chronic passive suicidality; multiple prior inpatient psychiatric hospitalizations    Protective Factors --- help seeking; future oriented; supportive family involved in care; positive therapeutic relationship with outpatient psychiatrist; able to voice needs and concerns; residential stability; financial stability; lack of access to lethal means    Given the above, the patient presents a moderate acute risk of suicide/harm to self and an elevated chronic risk of suicide. He presents a low acute and chronic risk of harm to others. As such, he requires inpatient psychiatric care for acute stabilization and medication optimization. He is requesting voluntary admission.

## 2024-11-03 NOTE — ED BEHAVIORAL HEALTH ASSESSMENT NOTE - MEDICAL ISSUES AND PLAN (INCLUDE STANDING AND PRN MEDICATION)
- continue outpatient amlodipine 5mg and nebivolol 5mg **placed on sliding scale insulin - continue outpatient amlodipine 5mg and nebivolol 5mg (non-formulary faxed to pharmacy) **placed on sliding scale insulin - continue outpatient amlodipine 5mg and nebivolol 5mg (non-formulary faxed to pharmacy) **placed on sliding scale insulin; ambulated with assistance, f/u PT eval for gait abnormality

## 2024-11-03 NOTE — BH PATIENT PROFILE - NSVRISKABUSE_PSY_ALL_CORE
[facility] [place]  Brief Operative Note     SUMMARY     Surgery Date: 9/21/2020     Surgeon(s) and Role:     * Brad Bahena MD - Primary    Assisting Surgeon: None    Pre-op Diagnosis:  Coronary artery disease of native artery of native heart with stable angina pectoris [I25.118]    Post-op Diagnosis:  Post-Op Diagnosis Codes:     * Coronary artery disease of native artery of native heart with stable angina pectoris [I25.118]    Procedure(s) (LRB):  Left heart cath 730am start, R rad access (Left)  Fractional Flow [place] (FFR), Coronary    Anesthesia: RN IV Sedation    Description of the findings of the procedure: Uneventful LHC/cor angio, iFR RCA/RPDA via R radial    Findings/Key Components:  LVEDP: 7mmHg  LVEF: 65% by echo    Dominance: Right  LM: normal  LAD: MLI, mid stent patent (2013)  Ramus: MLI, prox 30%  LCx: MLI, ost 40%  RCA: MLI, dist eccentric 40-50%   RPDA: ost 50%, iFR 0.97   RPLB mid stent patent (9/20/19 2.5x18 Resolute [place] SILVER)    Hemostasis:  R Radial band    Impression:  Recurrent CP on mult meds  2V CAD, normal LV fxn  Patent mid LAD and mid RPLB stents  iFR dist RCA/ost RPDA neg  R rad vasband for hemostasis  Successful pre-treatment of iodine allergy)    Plan:  Cont med rx  Cont ASA/Plavix/Statin/BBL/Imdur  Plan long term DAPT given diffuse CAD and prior MV PCI  Home today  Follow up with Dr. Bahena as planned  Outpatient GI/Pulm evals    Estimated Blood Loss: <50cc         Specimens: None       No

## 2024-11-03 NOTE — ED BEHAVIORAL HEALTH ASSESSMENT NOTE - DETAILS
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

## 2024-11-03 NOTE — ED BEHAVIORAL HEALTH ASSESSMENT NOTE - NSBHATTESTCOMMENTATTENDFT_PSY_A_CORE
Patient is a 63y/o man with a past medical history of hypertension, hyperlipidemia, and diabetes mellitus, and a past psychiatric history of schizophrenia, one prior suicide attempt (via hanging at least 26 years ago), and multiple previous psychiatric admissions ([facility]-[id number]) brought in by brother for worsening mood and auditory hallucinations.     On evaluation, patient presents with worsening wood, anhedonia, poor sleep patters, decreased appetite w/ weight loss, passive SI and mood congruent auditory hallucinations, w/ sx appearing to contribute to psychosocial stressors w/ family.  Patient is requesting voluntary inpatient psychiatric       Patient presenting with worsening mood, global insomnia, decreased appetite and weight loss, chronic passive suicidality, mild thought disorganization, and auditory hallucinations resulting in interpersonal conflict with family. He denies active SIIP, NSSIB, HIIP, and CAH. He notes that the chronic passive suicidality has been increasing in intensity over the last two months. He has been inconsistently adherent to his psychiatric medications. He is requesting voluntary psychiatric admission for acute stabilization and medication optimization, including patient-requested transition to CARTER. Patient is a 65y/o man with a past medical history of hypertension, hyperlipidemia, and diabetes mellitus, and a past psychiatric history of schizophrenia, one prior suicide attempt (via hanging at least 26 years ago), and multiple previous psychiatric admissions (last [facility] 30Oct-[id number]) brought in by brother for worsening mood and auditory hallucinations.     On evaluation, patient presents with worsening wood, anhedonia, poor sleep patterns, decreased appetite w/ weight loss, passive SI and mood congruent auditory hallucinations, w/ sx appearing to contribute to psychosocial stressors w/ family.  Patient is requesting voluntary inpatient psychiatric for medication optimization and sx stabilization and meets criteria for same.  Remainder of recommendations as per above.       Patient presenting with worsening mood, global insomnia, decreased appetite and weight loss, chronic passive suicidality, mild thought disorganization, and auditory hallucinations resulting in interpersonal conflict with family. He denies active SIIP, NSSIB, HIIP, and CAH. He notes that the chronic passive suicidality has been increasing in intensity over the last two months. He has been inconsistently adherent to his psychiatric medications. He is requesting voluntary psychiatric admission for acute stabilization and medication optimization, including patient-requested transition to CARTER.

## 2024-11-03 NOTE — ED BEHAVIORAL HEALTH ASSESSMENT NOTE - UNABLE TO CARE FOR SELF DETAILS
chronic passive suicidality, worsening mood, thought disorganization, and auditory hallucinations resulting in decreased ability to care for self

## 2024-11-03 NOTE — ED BEHAVIORAL HEALTH ASSESSMENT NOTE - NS ED BHA PLAN REASON FOR OVERRIDING OBJECTION FT
brother requesting geriatric unit placement; however, patient is accepting of non-geriatric unit. This writer had extensive conversations with the patient and his brother regarding risks and benefits of unit placement. Psychoeducation provided and understanding expressed. brother requesting geriatric unit placement; however, patient himself is accepting of non-geriatric unit. This writer had extensive conversations with the patient and his brother regarding risks and benefits of unit placement. Psychoeducation provided and understanding expressed.

## 2024-11-03 NOTE — BH PATIENT PROFILE - FALL HARM RISK - RISK INTERVENTIONS
Assistance OOB with selected safe patient handling equipment/Assistance with ambulation/Communicate Fall Risk and Risk Factors to all staff, patient, and family/Discuss with provider need for PT consult/Monitor gait and stability/Reinforce activity limits and safety measures with patient and family/Visual Cue: Yellow wristband/Bed in lowest position, wheels locked, appropriate side rails in place/Call bell, personal items and telephone in reach/Instruct patient to call for assistance before getting out of bed or chair/Non-slip footwear when patient is out of bed/[place] to call system/Physically safe environment - no spills, clutter or unnecessary equipment/Purposeful Proactive Rounding/Room/bathroom lighting operational, light cord in reach

## 2024-11-03 NOTE — ED BEHAVIORAL HEALTH ASSESSMENT NOTE - DESCRIPTION
calm and cooperative    Vital Signs Last 24 Hrs  T(C): 36.8 (03 Nov 2024 09:25), Max: 36.8 (03 Nov 2024 09:25)  T(F): 98.3 (03 Nov 2024 09:25), Max: 98.3 (03 Nov 2024 09:25)  HR: 74 (03 Nov 2024 09:25) (74 - 74)  BP: 158/93 (03 Nov 2024 09:25) (158/93 - 158/93)  BP(mean): --  RR: 18 (03 Nov 2024 09:25) (18 - 18)  SpO2: 98% (03 Nov 2024 09:25) (98% - 98%)    Parameters below as of 03 Nov 2024 09:25  Patient On (Oxygen Delivery Method): room air single, no children, lives with brother and brother's family HTN, HLD, DM During course of ED visit patient was calm and cooperative. Patient was not aggressive or violent and did not require PRN medications.    Vital Signs Last 24 Hrs  T(C): 36.8 (03 Nov 2024 09:25), Max: 36.8 (03 Nov 2024 09:25)  T(F): 98.3 (03 Nov 2024 09:25), Max: 98.3 (03 Nov 2024 09:25)  HR: 74 (03 Nov 2024 09:25) (74 - 74)  BP: 158/93 (03 Nov 2024 09:25) (158/93 - 158/93)  BP(mean): --  RR: 18 (03 Nov 2024 09:25) (18 - 18)  SpO2: 98% (03 Nov 2024 09:25) (98% - 98%)    Parameters below as of 03 Nov 2024 09:25  Patient On (Oxygen Delivery Method): room air

## 2024-11-03 NOTE — ED BEHAVIORAL HEALTH ASSESSMENT NOTE - NSBHMSEPERCEPT_PSY_A_CORE
denies current hallucinations but endorses recent auditory hallucinations of voices saying negative things about his speech/Auditory hallucinations

## 2024-11-03 NOTE — ED BEHAVIORAL HEALTH ASSESSMENT NOTE - CURRENT MEDICATION
fluphenazine 5mg   venlafaxine 150mg  benztropine 1 mg BID  clonazepam 0.5mg BID    amlodipine 5mg  nebivolol 5mg    [place] [facility] (ID [id number]) prescribed clonazepam 0.5mg BID

## 2024-11-03 NOTE — ED BEHAVIORAL HEALTH NOTE - BEHAVIORAL HEALTH NOTE
SW requested to obtain collateral from pt brother MICHELLE requested to obtain collateral from pt brother Deo # [phone].    MICHELLE spoke with pt brother Deo and he provided the following information:    Deo stated that ot resides with him and his wife. He stated that pt has residing with him since their mother  3-4 years ago. Deo reported that pt is her at ED today because pt behavior over the past 1-2 months has been worsening; this morning around 3-4 am pt told Deo that "I don't want to live this life anymore". He stated that pt is not taking his medications and 3-4 days ago found pt medications in the garbage. Deo stated that for the last month pt has been talking to himself, responding to the voice he is hearing; Deo stated that he has pt on video talking to himself on the ring camera. Deo stated that when he ask pt what the voices are saying pt become verbally aggressive.     Deo stated that over the past 1-2 weeks pt aggressiveness increased; pt has been starting arguments with Deo wife stating that she does not want pt to stay there when this is untrue. Deo stated that pt is becoming more paranoid of people and isolates himself. Deo stated that pt has recently started smoking and smokes cigarettes non stop (5-6 packs a day) one after another and this is usual behavior for pt. Deo stated that pt will sleep 12-14 hours and then up for 3-4 days. He stated that pt is eating however did not eat yesterday or today. He stated that pt is drinking lots of water about 6-7 bottles a day. Deo stated that pt no longer takes care of his hygiene; Deo stated that he has to prompt pt to care for himself.       Deo stated hat he observes pt talking to himself all the time now and this was a behavior pt display before being hospitalized the last time. Deo stated that pt last hospitalization was 1 year ago at [facility]; Deo is asking that pt be placed on a kelly unit because last time pt was around younger patients and it was not good for pt.     Deo stated that pt has dealt with mental illness for over 30 years; pt current psychiatrist is Dr. Josh Maldonado #  [phone] and therapist is Ms. Finley # [phone]. Deo stated that pt has phone contact with psychiatrist every 3 weeks and unsure when pt meets with therapist. Deo stated that the psychiatrist was made aware of pt recent behavior.   Deo reported that pt has been diagnosis with High Cholesterol and given medication; stated that cholesterol is better no.    Current Medications all by mouth, no injections:  Prolixin 5mg   Effexor 150mg  Cogentin 1 mg  Clonazepam 0.5mg    Norvasc 5mg  Nebivolol 5mg    MICHELLE spoke with pt nephewJosh # [phone] over the phone as Deo was speaking with him; he reported the same concerns regarding pt behavior and reiterated how pt aggressiveness is of great concern; pt curses at everyone, stay up all night banging and slamming doors in the home.    Deo expressed that his hope is for pt to be admitted and asking that pt is place on a kelly floor at [facility] is possible. Deo remain in waiting area to receive status of pt care.    MICHELLE shared update with provider. MICHELLE requested to obtain collateral from pt brother Deo # [phone].    MICHELLE spoke with pt brother Deo and he provided the following information:    Deo stated that ot resides with him and his wife. He stated that pt has residing with him since their mother  3-4 years ago. Deo reported that pt is her at ED today because pt behavior over the past 1-2 months has been worsening; this morning around 3-4 am pt told Deo that "I don't want to live this life anymore". He stated that pt is not taking his medications and 3-4 days ago found pt medications in the garbage. Deo stated that for the last month pt has been talking to himself, responding to the voice he is hearing; Deo stated that he has pt on video talking to himself on the ring camera. Deo stated that when he ask pt what the voices are saying pt become verbally aggressive.     Deo stated that over the past 1-2 weeks pt aggressiveness increased; pt has been starting arguments with Deo wife stating that she does not want pt to stay there when this is untrue' wife is now sacred of pt. Deo stated that pt is becoming more paranoid of people and isolates himself. Deo stated that pt has recently started smoking and smokes cigarettes non stop (5-6 packs a day) one after another and this is usual behavior for pt. Deo stated that pt will sleep 12-14 hours and then up for 3-4 days. He stated that pt is eating however did not eat yesterday or today. He stated that pt is drinking lots of water about 6-7 bottles a day. Deo stated that pt no longer takes care of his hygiene; Deo stated that he has to prompt pt to care for himself.       Deo stated hat he observes pt talking to himself all the time now and this was a behavior pt display before being hospitalized the last time. Deo stated that pt last hospitalization was 1 year ago at [facility]; Deo is asking that pt be placed on a kelly unit because last time pt was around younger patients and it was not good for pt.     Deo stated that pt has dealt with mental illness for over 30 years; pt current psychiatrist is Dr. Josh Maldonado #  [phone] and therapist is Ms. Finley # [phone]. Deo stated that pt has phone contact with psychiatrist every 3 weeks and unsure when pt meets with therapist. Deo stated that the psychiatrist was made aware of pt recent behavior.   Deo reported that pt has been diagnosis with High Cholesterol and given medication; stated that cholesterol is better no.    Current Medications all by mouth, no injections:  Prolixin 5mg   Effexor 150mg  Cogentin 1 mg  Clonazepam 0.5mg    Norvasc 5mg  Nebivolol 5mg    MICHLELE spoke with pt nephewJosh # [phone] over the phone as Deo was speaking with him; he reported the same concerns regarding pt behavior and reiterated how pt aggressiveness is of great concern; pt curses at everyone, stay up all night banging and slamming doors in the home. Josh stated that pt has been requesting medication changes; pt will tell the doctor 10mg isn't working and medication will get changed to 5mg and pt will say again that 5mg is not working. Josh express that pt initiates the up and down and then non compliant with medications at the same time.    Deo expressed that his hope is for pt to be admitted and asking that pt is place on a kelly floor at [facility] is possible. Deo remain in waiting area to receive status of pt care.    MICHELLE shared update with provider.

## 2024-11-03 NOTE — ED ADULT NURSE NOTE - CHIEF COMPLAINT QUOTE
Pt brought in by brother c/o auditory hallucinations, throwing medications in the garbage. Pt not sleeping as per brother. Pt telling family he does not want to live anymore. PHx HTN, schizophrenia Brother [facility] [phone]

## 2024-11-03 NOTE — ED ADULT NURSE NOTE - NS ED NOTE ABUSE SUSPICION NEGLECT YN
attending Juve: 75yF ROBB from home after cardiac arrest with ROSC. Pt with LVAD, as per family, pt unresponsive at home, received CPR with EMS with 2 rounds epi, one amp D50, one amp sodium bicarb and 1gram calcium. EMS unable to intubate, Combitube in place on arrival. CT surgery at bedside on arrival. Pt intubated via glidescope on first attempt, Central and arterial lines placed. LVAD device manipulated by CT surgery with success. Labs and CTs ordered, pt to be admitted to CT surgery service. No

## 2024-11-03 NOTE — ED PROVIDER NOTE - OBJECTIVE STATEMENT
This is a 65 y/o M PMHx HTN, HLD, DM, schizophrenia, akathisia, multiple past psych admissions (including 5 [facility] admissions last admission in May, 2022, self reports remote hx of interrupted sa via hanging), brought in by his brother for auditory hallucinations and SI per brother. Reports has been throwing medications in the garbage and has not been sleeping well. Offered free  but requesting brother to help with translation. Endorses occasionally lower back pain that can spasm causing difficulty walking but denies any headache, dizziness, fevers, chills, chest pain, SOB, abdominal pain, nausea, vomiting, urinary complaints or lower extremity edema.

## 2024-11-03 NOTE — ED BEHAVIORAL HEALTH ASSESSMENT NOTE - HPI (INCLUDE ILLNESS QUALITY, SEVERITY, DURATION, TIMING, CONTEXT, MODIFYING FACTORS, ASSOCIATED SIGNS AND SYMPTOMS)
This is a 63y/o man with a past medical history of hypertension, hyperlipidemia, and diabetes mellitus, and a past psychiatric history of schizophrenia, one prior suicide attempt (via hanging at least 26 years ago), and multiple previous psychiatric admissions (last [facility]5 30Oct-[id number]) brought in by brother for worsening mood and auditory hallucinations.     Patient seen alongside [place] interpreters Carolyn [phone]; Barbara [phone]; Etelvina [phone]. He says that he has been feeling down over the last two months, reporting difficulty doing activities he enjoys. He reports that he's been having more auditory hallucinations that say negative things about the way he talks. He denies visual hallucinations. Abo states that his difficulties speaking are mainly from how distracted his thoughts are. He also endorses vague paranoia that people are more aware of how his speech sounds. He endorses difficulties falling and staying asleep over this time period. He reports concern that his medications are not working as well and wants to go back on an CARTER. He states that this has been leading to verbal arguments at home about taking his medications. He at first reported consistent adherence but then noted that he has been inconsistently taking his medications. He denies physical aggression and says that he feels safe at home. He reports increased tobacco use over the last two months but denies all other substances. He reports that he has been eating inconsistently and has lost 15-20lbs in the last three months. He denies difficulties showering/bathing. He has a home health aide for 5 hours a day who helps with iADLs.    Patient is in outpatient psychiatric treatment with Dr. Webb ([phone]). The patient notes a good relationship with his psychiatrist. This writer called but was unable to leave voicemail.    Please see ED Behavioral Health Note for collateral from the patient's brother Deo. This is a 65y/o man with a past medical history of hypertension, hyperlipidemia, and diabetes mellitus, and a past psychiatric history of schizophrenia, one prior suicide attempt (via hanging at least 26 years ago), and multiple previous psychiatric admissions (last [facility] ML5 30Oct-[id number]) brought in by brother for worsening mood and auditory hallucinations.     Patient seen alongside [place] interpreters Carolyn [phone]; Barbara [phone]; Etelvina [phone]. He says that he has been feeling down over the last two months, reporting difficulty doing activities he enjoys. He reports that he's been having more auditory hallucinations that say negative things about the way he talks. He denies visual hallucinations. States that his difficulties speaking are mainly from how distracted his thoughts are. He also endorses vague paranoia that people are more aware of how his speech sounds. He endorses difficulties falling and staying asleep over this time period. He reports concern that his medications are not working as well and wants to go back on an CARTER. He states that this has been leading to verbal arguments at home about taking his medications. He at first reported consistent adherence but then noted that he has been inconsistently taking his medications. He denies physical aggression and says that he feels safe at home. He reports increased tobacco use over the last two months but denies all other substances. He reports that he has been eating inconsistently and has lost 15-20lbs in the last three months. He denies difficulties showering/bathing. He has a home health aide for 5 hours a day who helps with iADLs.  Requests voluntary inpatient psychiatric admission for safety, stabilization and medication optimization.     Patient is in outpatient psychiatric treatment with Dr. Webb ([phone]). The patient notes a good relationship with his psychiatrist. This writer called but was unable to leave voicemail.    Please see ED Behavioral Health Note for collateral from the patient's brother Deo.

## 2024-11-03 NOTE — ED BEHAVIORAL HEALTH ASSESSMENT NOTE - PSYCHIATRIC ISSUES AND PLAN (INCLUDE STANDING AND PRN MEDICATION)
- continue home psychiatric medications with intention to transition to CARTER formulation of fluphenazine or another antipsychotic---- fluphenazine 5mg. venlafaxine 150mg, benztropine 1mg BID, clonazepam 0.5mg BID

## 2024-11-03 NOTE — ED PROVIDER NOTE - CLINICAL SUMMARY MEDICAL DECISION MAKING FREE TEXT BOX
63 y/o M PMHx HTN, HLD, DM, schizophrenia, akathisia, multiple past psych admissions (including 5 [facility] admissions last admission in May, 2022, self reports remote hx of interrupted sa via hanging), brought in by his brother for auditory hallucinations and SI per brother. No medical complaints at this time.  -Labs including EtOH/ASA/Tylenol  -Psych consult for dispo 65 y/o M PMHx HTN, HLD, DM, schizophrenia, akathisia, multiple past psych admissions (including 5 [facility] admissions last admission in May, 2022, self reports remote hx of interrupted sa via hanging), brought in by his brother for auditory hallucinations and SI per brother. No medical complaints at this time. Brother ([facility]: [phone]).  -Labs including EtOH/ASA/Tylenol  -UA  -Psych consult for dispo

## 2024-11-03 NOTE — ED BEHAVIORAL HEALTH ASSESSMENT NOTE - OTHER PAST PSYCHIATRIC HISTORY (INCLUDE DETAILS REGARDING ONSET, COURSE OF ILLNESS, INPATIENT/OUTPATIENT TREATMENT)
7 previous inpatient admissions, most recent in 2023 at [facility]. Follows outpatient with Dr. Josh Webb

## 2024-11-03 NOTE — ED PROVIDER NOTE - NSFOLLOWUPINSTRUCTIONS_ED_ALL_ED_FT
Follow up with your PMD within 48-72 hrs. Show copies of your reports given to you.   Worsening, continued or new concerning symptoms return to the emergency department.    You have been given information necessary to follow up with the  [facility] ([facility]) Crisis center & other outpatient  psychiatric clinics within your community    • [facility] walk in Crisis centre  [address] [place] [address]  [phone] [email or web address]  Hours of operation:  Day	                                        Hours  Sunday                                  Closed  Monday                                9am - 3pm  Tuesday                                9am - 3pm  Wednesday                          9am - 3pm  Thursday                               9am - 3pm  Friday                                    9am - 3pm  Saturday                                Closed

## 2024-11-03 NOTE — ED ADULT NURSE NOTE - OBJECTIVE STATEMENT
Pt brought in by brother c/o auditory hallucinations, throwing medications in the garbage. Pt not sleeping as per brother. Pt telling family he does not want to live anymore  Patient brought to  area for above complaints. Patient evaluated by medical provider. Labs ordered and waiting for results. Patient also waiting for psych evaluation. Patient is calm and cooperative.  ZIA Bennett

## 2024-11-03 NOTE — ED BEHAVIORAL HEALTH ASSESSMENT NOTE - PATIENT'S CHIEF COMPLAINT
BRIONNA Pelayo aware, no additional fluid at this time. Will continue to monitor. "I don't feel like myself"

## 2024-11-03 NOTE — ED ADULT TRIAGE NOTE - CHIEF COMPLAINT QUOTE
Pt brought in by brother c/o auditory hallucinations, throwing medications in the garbage. Pt not sleeping as per brother. Pt telling family he does not want to live anymore. Brother [facility] [phone] Pt brought in by brother c/o auditory hallucinations, throwing medications in the garbage. Pt not sleeping as per brother. Pt telling family he does not want to live anymore. PHx HTN, schizophrenia Brother [facility] [phone]

## 2024-11-04 LAB
A1C WITH ESTIMATED AVERAGE GLUCOSE RESULT: 6.2 % — HIGH (ref 4–5.6)
ESTIMATED AVERAGE GLUCOSE: 131 — SIGNIFICANT CHANGE UP
GLUCOSE BLDC GLUCOMTR-MCNC: 100 MG/DL — HIGH (ref 70–99)
GLUCOSE BLDC GLUCOMTR-MCNC: 108 MG/DL — HIGH (ref 70–99)
GLUCOSE BLDC GLUCOMTR-MCNC: 91 MG/DL — SIGNIFICANT CHANGE UP (ref 70–99)

## 2024-11-04 PROCEDURE — 99223 1ST HOSP IP/OBS HIGH 75: CPT

## 2024-11-04 RX ORDER — LIDOCAINE 40 MG/G
1 CREAM TOPICAL DAILY
Refills: 0 | Status: DISCONTINUED | OUTPATIENT
Start: 2024-11-04 | End: 2024-11-27

## 2024-11-04 RX ORDER — NEBIVOLOL HYDROCHLORIDE 10 MG/1
5 TABLET ORAL DAILY
Refills: 0 | Status: DISCONTINUED | OUTPATIENT
Start: 2024-11-04 | End: 2024-11-27

## 2024-11-04 RX ORDER — ACETAMINOPHEN 500MG 500 MG/1
650 TABLET, COATED ORAL EVERY 6 HOURS
Refills: 0 | Status: DISCONTINUED | OUTPATIENT
Start: 2024-11-04 | End: 2024-11-27

## 2024-11-04 RX ADMIN — AMLODIPINE BESYLATE 5 MILLIGRAM(S): 10 TABLET ORAL at 08:58

## 2024-11-04 RX ADMIN — VENLAFAXINE HYDROCHLORIDE 150 MILLIGRAM(S): 75 CAPSULE, EXTENDED RELEASE ORAL at 08:58

## 2024-11-04 RX ADMIN — Medication 400 MILLIGRAM(S): at 17:46

## 2024-11-04 RX ADMIN — Medication 400 MILLIGRAM(S): at 09:27

## 2024-11-04 RX ADMIN — Medication 400 MILLIGRAM(S): at 06:38

## 2024-11-04 RX ADMIN — LIDOCAINE 1 PATCH: 40 CREAM TOPICAL at 13:41

## 2024-11-04 RX ADMIN — ACETAMINOPHEN 500MG 650 MILLIGRAM(S): 500 TABLET, COATED ORAL at 13:41

## 2024-11-04 RX ADMIN — Medication 1 MILLIGRAM(S): at 20:16

## 2024-11-04 RX ADMIN — NICOTINE 1 PATCH: 21 PATCH, EXTENDED RELEASE TRANSDERMAL at 08:57

## 2024-11-04 RX ADMIN — NEBIVOLOL HYDROCHLORIDE 5 MILLIGRAM(S): 10 TABLET ORAL at 08:58

## 2024-11-04 RX ADMIN — Medication 400 MILLIGRAM(S): at 23:46

## 2024-11-04 RX ADMIN — ACETAMINOPHEN, DIPHENHYDRAMINE HCL, PHENYLEPHRINE HCL 3 MILLIGRAM(S): 325; 25; 5 TABLET ORAL at 23:31

## 2024-11-04 RX ADMIN — ACETAMINOPHEN 500MG 650 MILLIGRAM(S): 500 TABLET, COATED ORAL at 14:41

## 2024-11-04 RX ADMIN — Medication 400 MILLIGRAM(S): at 02:28

## 2024-11-04 RX ADMIN — ACETAMINOPHEN 500MG 650 MILLIGRAM(S): 500 TABLET, COATED ORAL at 21:25

## 2024-11-04 RX ADMIN — Medication 400 MILLIGRAM(S): at 08:57

## 2024-11-04 RX ADMIN — Medication 1 MILLIGRAM(S): at 08:58

## 2024-11-04 RX ADMIN — ACETAMINOPHEN 500MG 650 MILLIGRAM(S): 500 TABLET, COATED ORAL at 22:47

## 2024-11-04 RX ADMIN — Medication 400 MILLIGRAM(S): at 18:46

## 2024-11-04 NOTE — BH INPATIENT PSYCHIATRY ASSESSMENT NOTE - NSBHCHARTREVIEWVS_PSY_A_CORE FT
Vital Signs Last 24 Hrs  T(C): 36.9 (11-04-24 @ 09:00), Max: 36.9 (11-04-24 @ 09:00)  T(F): 98.4 (11-04-24 @ 09:00), Max: 98.4 (11-04-24 @ 09:00)  HR: 90 (11-04-24 @ 09:00) (90 - 90)  BP: 152/87 (11-04-24 @ 09:00) (152/87 - 152/87)  BP(mean): --  RR: --  SpO2: --    Orthostatic VS  11-03-24 @ 16:56  Lying BP: --/-- HR: --  Sitting BP: 178/106 HR: 73  Standing BP: 153/102 HR: 76  Site: --  Mode: --

## 2024-11-04 NOTE — BH INPATIENT PSYCHIATRY ASSESSMENT NOTE - HPI (INCLUDE ILLNESS QUALITY, SEVERITY, DURATION, TIMING, CONTEXT, MODIFYING FACTORS, ASSOCIATED SIGNS AND SYMPTOMS)
This is a 65y/o man with a past medical history of hypertension, hyperlipidemia, and diabetes mellitus, and a past psychiatric history of schizophrenia, one prior suicide attempt (via hanging at least 26 years ago), and multiple previous psychiatric admissions (last [facility] [id number]) brought in by brother for worsening mood and auditory hallucinations.     Patient seen in conjunction with Camb[place]ian  from [facility], ID# [id number].  Patient states he has been "ill" since age 27 when he had a suicide attempt, was depressed, and was hearing voices.  He states he has been on this unit before including under the care of Dr. Flores whom he remembers communicating with in Cambodian.  Notes being stable on Haldol dec for a time and then switching to Invega Sustenna which he stopped 7 years ago for headaches.  Cannot recall what treatment he is given now.  He denies AH which he had endorsed in the ED but states he has been more depressed.  States he prefers to be around people and so feels better on the unit.  Denies current SI and says he has not had SI since his SA at age 27.   notes he is difficult to understand and the patient states he is missing his upper dentures which make it difficult for him to enunciate.  He complains of back pain chronically and accepts Tylenol and lidocaine patch.    Confirmed the following findings of ED assessment:   <<Patient seen alongside Cambodian interpreters Carolyn [phone]; Barbara [id number]; Etelvina [id number]. He says that he has been feeling down over the last two months, reporting difficulty doing activities he enjoys. He reports that he's been having more auditory hallucinations that say negative things about the way he talks. He denies visual hallucinations. States that his difficulties speaking are mainly from how distracted his thoughts are. He also endorses vague paranoia that people are more aware of how his speech sounds. He endorses difficulties falling and staying asleep over this time period. He reports concern that his medications are not working as well and wants to go back on an CARTER. He states that this has been leading to verbal arguments at home about taking his medications. He at first reported consistent adherence but then noted that he has been inconsistently taking his medications. He denies physical aggression and says that he feels safe at home. He reports increased tobacco use over the last two months but denies all other substances. He reports that he has been eating inconsistently and has lost 15-20lbs in the last three months. He denies difficulties showering/bathing. He has a home health aide for 5 hours a day who helps with iADLs.  Requests voluntary inpatient psychiatric admission for safety, stabilization and medication optimization.   Patient is in outpatient psychiatric treatment with Dr. Webb ([phone]). The patient notes a good relationship with his psychiatrist. This writer called but was unable to leave voicemail.  Please see ED Behavioral Health Note for collateral from the patient's brother Deo.>>

## 2024-11-04 NOTE — BH INPATIENT PSYCHIATRY ASSESSMENT NOTE - NSBHMETABOLIC_PSY_ALL_CORE_FT
BMI: BMI (kg/m2): 29.4 (11-03-24 @ 16:56)  HbA1c: A1C with Estimated Average Glucose Result: 6.2 % (11-04-24 @ 08:22)    Glucose: POCT Blood Glucose.: 91 mg/dL (11-04-24 @ 11:50)    BP: 152/87 (11-04-24 @ 09:00) (152/87 - 158/93)Vital Signs Last 24 Hrs  T(C): 36.9 (11-04-24 @ 09:00), Max: 36.9 (11-04-24 @ 09:00)  T(F): 98.4 (11-04-24 @ 09:00), Max: 98.4 (11-04-24 @ 09:00)  HR: 90 (11-04-24 @ 09:00) (90 - 90)  BP: 152/87 (11-04-24 @ 09:00) (152/87 - 152/87)  BP(mean): --  RR: --  SpO2: --    Orthostatic VS  11-03-24 @ 16:56  Lying BP: --/-- HR: --  Sitting BP: 178/106 HR: 73  Standing BP: 153/102 HR: 76  Site: --  Mode: --    Lipid Panel: Date/Time: 11-07-23 @ 08:23  Cholesterol, Serum: 196  LDL Cholesterol Calculated: 116  HDL Cholesterol, Serum: 38  Total Cholesterol/HDL Ration Measurement: --  Triglycerides, Serum: 210

## 2024-11-04 NOTE — PSYCHIATRIC REHAB INITIAL EVALUATION - NSBHPRRECOMMEND_PSY_ALL_CORE
Writer met with patient in order to orient patient to unit, and introduce patient to psychiatric staff and department functions. Pt’s primary language is Cape Verdean.  service was utilized for the meeting.  ID: [id number]. Patient was somewhat guarded upon approach. Patient was dressed in his own clothes and presented with fair appearance and ADLs. Patient was minimizing his sxs and denies any psychiatric history during the meeting. Writer was unable to collaborate with patient to establish a psych rehab goal. Therefore, pt’s psych rehab goal was selected by writer.  Psychiatric rehabilitation staff will continue to engage patient daily in order to develop therapeutic rapport.

## 2024-11-04 NOTE — BH INPATIENT PSYCHIATRY ASSESSMENT NOTE - NSCOMMENTSUICRISKFACT_PSY_ALL_CORE
elevated risk due to history of high lethality attempt and SMS.  However, denies SI currently, has good social supports.

## 2024-11-04 NOTE — BH INPATIENT PSYCHIATRY ASSESSMENT NOTE - NSBHASSESSSUMMFT_PSY_ALL_CORE
64M with history of schizophrenia, reporting on admission interview depression in the setting of social isolation in contrast to chief complaint of worsening AH in the ED.  Worsening AH are still suspected.  Collateral from outpatient psychiatrist needed.      Admitted 9.13 status.  No CO needed, denies SI  Lidocaine patch, tylenol, ibuprofen for back pain  continue home effexor 150mg  Patient on Cogentin, denies being on CARTER now, will obtain collateral

## 2024-11-04 NOTE — PSYCHIATRIC REHAB INITIAL EVALUATION - NSBHALCSUBTREAT_PSY_ALL_CORE
Pt receives outpatient treatment with Dr. BALL/Outpatient clinic (specify) Pt receives outpatient treatment with Dr. Webb./Outpatient clinic (specify)

## 2024-11-04 NOTE — BH INPATIENT PSYCHIATRY ASSESSMENT NOTE - CURRENT MEDICATION
MEDICATIONS  (STANDING):  amLODIPine   Tablet 5 milliGRAM(s) Oral daily  benztropine 1 milliGRAM(s) Oral two times a day  dextrose 5%. 1000 milliLiter(s) (100 mL/Hr) IV Continuous <Continuous>  dextrose 5%. 1000 milliLiter(s) (50 mL/Hr) IV Continuous <Continuous>  dextrose 50% Injectable 25 Gram(s) IV Push once  dextrose 50% Injectable 12.5 Gram(s) IV Push once  dextrose 50% Injectable 25 Gram(s) IV Push once  glucagon  Injectable 1 milliGRAM(s) IntraMuscular once  insulin lispro (ADMELOG) corrective regimen sliding scale   SubCutaneous three times a day before meals  lidocaine   4% Patch 1 Patch Transdermal daily  nebivolol 5 milliGRAM(s) Oral daily  nicotine -  14 mG/24Hr(s) Patch 1 Patch Transdermal daily  venlafaxine XR. 150 milliGRAM(s) Oral daily    MEDICATIONS  (PRN):  acetaminophen     Tablet .. 650 milliGRAM(s) Oral every 6 hours PRN Mild Pain (1 - 3), Moderate Pain (4 - 6), Severe Pain (7 - 10)  dextrose Oral Gel 15 Gram(s) Oral once PRN Blood Glucose LESS THAN 70 milliGRAM(s)/deciliter  fluPHENAZine 2.5 milliGRAM(s) Oral every 6 hours PRN agitation secondary to psychosis  fluPHENAZine  Injectable 2.5 milliGRAM(s) IntraMuscular Once PRN severe agitation secondary to psychosis  ibuprofen  Tablet. 400 milliGRAM(s) Oral every 6 hours PRN Mild Pain (1 - 3), Moderate Pain (4 - 6)  melatonin 3 milliGRAM(s) Oral at bedtime PRN Insomnia  nicotine  Polacrilex Gum 4 milliGRAM(s) Oral every 3 hours PRN Smoking Cessation  polyethylene glycol 3350 17 Gram(s) Oral daily PRN Constipation

## 2024-11-04 NOTE — PHYSICAL THERAPY INITIAL EVALUATION ADULT - LEVEL OF INDEPENDENCE: SIT/STAND, REHAB EVAL
From: Deborah Maurer  To: Lazara Odom  Sent: 4/29/2024 8:46 AM EDT  Subject: Sinus Respiratory infection     Sumanth Haile,    I have not been able to shake the congestion, cough, runny nose with allergy medicine and taking Mucinex. I believe I have developed an upper respiratory infection, since I am having a hard time catching my breath and the mucus is now the nasty yellow and green when I cough it up. Would you be able to call in an antibiotic or would I need to schedule an appointment? I don't have insurance benefits until 5/1, but don't think I should or can wait since this isn't clearing with allergy meds and taking Mucinex for over 2 weeks now.   
independent

## 2024-11-04 NOTE — BH INPATIENT PSYCHIATRY ASSESSMENT NOTE - OTHER PAST PSYCHIATRIC HISTORY (INCLUDE DETAILS REGARDING ONSET, COURSE OF ILLNESS, INPATIENT/OUTPATIENT TREATMENT)
7 previous inpatient admissions, most recent in 2023 at [facility]. Follows outpatient with Dr. Josh Webb

## 2024-11-04 NOTE — BH INPATIENT PSYCHIATRY ASSESSMENT NOTE - MSE UNSTRUCTURED FT
Appearance: appears stated age, somewhat formal appearing clothing; good hygiene and grooming  Behavior: cooperative, polite, Well related  Eye contact: fair  Motor: No abnormal movements, no psychomotor slowing or activation.  Speech: fluent in Kazakh but dysarthric due to lack of upper dentures, normal rate, volume, prosody  Mood: "okay"   Affect: neutral, full, reactive appropriately  Thought Process: linear  Thought Content: with paranoid delusions  Perceptual disturbance: denies AH  Insight: fair  Judgment: emerging  Impulse control:  appropriate  Cognition grossly intact.   Language: Fluent.  Gait: Intact.

## 2024-11-04 NOTE — PHYSICAL THERAPY INITIAL EVALUATION ADULT - PERTINENT HX OF CURRENT PROBLEM, REHAB EVAL
This is a 65y/o man with a past medical history of hypertension, hyperlipidemia, and diabetes mellitus, and a past psychiatric history of schizophrenia, one prior suicide attempt (via hanging at least 26 years ago), and multiple previous psychiatric admissions (last [facility] [id number]) brought in by brother for worsening mood and auditory hallucinations. Patient referred to Physical therapy secondary to unsteady gait.

## 2024-11-04 NOTE — BH INPATIENT PSYCHIATRY ASSESSMENT NOTE - DETAILS
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

## 2024-11-04 NOTE — PHYSICAL THERAPY INITIAL EVALUATION ADULT - PLANNED THERAPY INTERVENTIONS, PT EVAL
gait training/joint mobilization/lumbar stabilization/manual therapy techniques/neuromuscular re-education/strengthening

## 2024-11-04 NOTE — BH SOCIAL WORK INITIAL PSYCHOSOCIAL EVALUATION - OTHER PAST PSYCHIATRIC HISTORY (INCLUDE DETAILS REGARDING ONSET, COURSE OF ILLNESS, INPATIENT/OUTPATIENT TREATMENT)
Pt is a 65 yo single male from [facility], resides with brother, his wife and their 15 yo daughter, came to [place] with his brother in 1980s, on antipsychotics while living in the [place] starting in his 20s, with a history of schizophrenia, MDD, with one prior suicide attempt via hanging at least 26 years ago, with multiple previous psychiatric hospitalizations, who was brought in by his brother for worsening mood (irritability, verbal aggression, depression), auditory hallucinations and paranoia, and inability to sleep for the past 12 days.  Pt is Ugandan-speaking, requiring  services. According to pt's nephew's report, pt has been either noncompliant or partially compliant with medications, has tried numerous antipsychotics, but claims he doesn't feel well on them.  He has taken Prolixin for years, takes Cogentin for EPS, so psychiatrist was trying second generation antipsychotics. According to pt's nephew, pt had an accident  while in 8th grade during which he fell 20-30 feet in gym class and landed on his head.

## 2024-11-05 LAB
GLUCOSE BLDC GLUCOMTR-MCNC: 112 MG/DL — HIGH (ref 70–99)
GLUCOSE BLDC GLUCOMTR-MCNC: 88 MG/DL — SIGNIFICANT CHANGE UP (ref 70–99)
GLUCOSE BLDC GLUCOMTR-MCNC: 99 MG/DL — SIGNIFICANT CHANGE UP (ref 70–99)

## 2024-11-05 PROCEDURE — 99231 SBSQ HOSP IP/OBS SF/LOW 25: CPT

## 2024-11-05 RX ORDER — FLUPHENAZINE HCL 2.5 MG
2.5 TABLET ORAL AT BEDTIME
Refills: 0 | Status: DISCONTINUED | OUTPATIENT
Start: 2024-11-05 | End: 2024-11-07

## 2024-11-05 RX ADMIN — Medication 400 MILLIGRAM(S): at 00:40

## 2024-11-05 RX ADMIN — LIDOCAINE 1 PATCH: 40 CREAM TOPICAL at 08:39

## 2024-11-05 RX ADMIN — ACETAMINOPHEN 500MG 650 MILLIGRAM(S): 500 TABLET, COATED ORAL at 03:39

## 2024-11-05 RX ADMIN — NICOTINE 1 PATCH: 21 PATCH, EXTENDED RELEASE TRANSDERMAL at 08:40

## 2024-11-05 RX ADMIN — Medication 25 MILLIGRAM(S): at 20:50

## 2024-11-05 RX ADMIN — VENLAFAXINE HYDROCHLORIDE 150 MILLIGRAM(S): 75 CAPSULE, EXTENDED RELEASE ORAL at 08:39

## 2024-11-05 RX ADMIN — Medication 400 MILLIGRAM(S): at 17:31

## 2024-11-05 RX ADMIN — Medication 400 MILLIGRAM(S): at 06:45

## 2024-11-05 RX ADMIN — NEBIVOLOL HYDROCHLORIDE 5 MILLIGRAM(S): 10 TABLET ORAL at 08:39

## 2024-11-05 RX ADMIN — ACETAMINOPHEN 500MG 650 MILLIGRAM(S): 500 TABLET, COATED ORAL at 04:25

## 2024-11-05 RX ADMIN — AMLODIPINE BESYLATE 5 MILLIGRAM(S): 10 TABLET ORAL at 08:39

## 2024-11-05 RX ADMIN — ACETAMINOPHEN, DIPHENHYDRAMINE HCL, PHENYLEPHRINE HCL 3 MILLIGRAM(S): 325; 25; 5 TABLET ORAL at 20:50

## 2024-11-05 RX ADMIN — Medication 1 MILLIGRAM(S): at 20:50

## 2024-11-05 RX ADMIN — Medication 1 MILLIGRAM(S): at 08:40

## 2024-11-05 RX ADMIN — Medication 400 MILLIGRAM(S): at 05:52

## 2024-11-05 RX ADMIN — Medication 2.5 MILLIGRAM(S): at 20:50

## 2024-11-05 NOTE — BH INPATIENT PSYCHIATRY PROGRESS NOTE - NSBHFUPINTERVALHXFT_PSY_A_CORE
Patient seen for follow up of psychosis.  I discussed patient's case with the interdisciplinary team.  There were no notable overnight events.  Patient slept well.  Patient was adherent to medication regimen.  Patient states he is feeling worse today.  Feels he has a hard time moving around but denies that this is because of back pain.  Feels more down.  Denies paranoid ideation.  Denies AH.  Endorses passive death wish ambivalently but denies active SI.  Is amenable to resuming Prolixin.

## 2024-11-05 NOTE — ED BEHAVIORAL HEALTH ASSESSMENT NOTE - RISK ASSESSMENT
Risk factors: long hx of schizophrenia, multiple past psych admissions, poor compliance to meds, worsening command AH, current depressed mood, past hx of OD on pills, male, not  and no children, unemployed, unable to safety plan     Protective factors: no current objective finding of suicidality or homicidality, no access to weapons, no active substance abuse, no hx of violence or legal issues, no hx of chronic pains, domiciled, good social supports, engaged in treatment, no family hx of mental illness or SA, is not intoxicated, not manic      At this time given all risks taken into consideration, the Pt is at low acute suicide risk but is at chronically elevated risk of harming self and would not be deemed dischargeable back to the community.  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

## 2024-11-06 LAB
GLUCOSE BLDC GLUCOMTR-MCNC: 111 MG/DL — HIGH (ref 70–99)
GLUCOSE BLDC GLUCOMTR-MCNC: 134 MG/DL — HIGH (ref 70–99)
GLUCOSE BLDC GLUCOMTR-MCNC: 98 MG/DL — SIGNIFICANT CHANGE UP (ref 70–99)

## 2024-11-06 PROCEDURE — 99231 SBSQ HOSP IP/OBS SF/LOW 25: CPT

## 2024-11-06 RX ADMIN — LIDOCAINE 1 PATCH: 40 CREAM TOPICAL at 08:11

## 2024-11-06 RX ADMIN — NICOTINE 1 PATCH: 21 PATCH, EXTENDED RELEASE TRANSDERMAL at 09:00

## 2024-11-06 RX ADMIN — AMLODIPINE BESYLATE 5 MILLIGRAM(S): 10 TABLET ORAL at 08:11

## 2024-11-06 RX ADMIN — ACETAMINOPHEN, DIPHENHYDRAMINE HCL, PHENYLEPHRINE HCL 3 MILLIGRAM(S): 325; 25; 5 TABLET ORAL at 20:53

## 2024-11-06 RX ADMIN — Medication 25 MILLIGRAM(S): at 20:53

## 2024-11-06 RX ADMIN — NICOTINE 1 PATCH: 21 PATCH, EXTENDED RELEASE TRANSDERMAL at 08:10

## 2024-11-06 RX ADMIN — Medication 2.5 MILLIGRAM(S): at 20:53

## 2024-11-06 RX ADMIN — NEBIVOLOL HYDROCHLORIDE 5 MILLIGRAM(S): 10 TABLET ORAL at 08:11

## 2024-11-06 RX ADMIN — Medication 1 MILLIGRAM(S): at 20:53

## 2024-11-06 RX ADMIN — ACETAMINOPHEN 500MG 650 MILLIGRAM(S): 500 TABLET, COATED ORAL at 05:41

## 2024-11-06 RX ADMIN — VENLAFAXINE HYDROCHLORIDE 150 MILLIGRAM(S): 75 CAPSULE, EXTENDED RELEASE ORAL at 08:11

## 2024-11-06 RX ADMIN — Medication 1 MILLIGRAM(S): at 08:11

## 2024-11-06 NOTE — BH INPATIENT PSYCHIATRY PROGRESS NOTE - NSBHFUPINTERVALHXFT_PSY_A_CORE
Patient seen for follow up of psychosis.  I discussed patient's case with the interdisciplinary team.  There were no notable overnight events.  Interviewed with  ID [id number].  Patient slept well.  Patient was adherent to medication regimen.  Patient states he is "low" today.  Denies back pain.  Denies paranoid ideation.  Denies AH.  Endorses passive death wish ambivalently but denies active SI save fleeting active SI when he could not sleep in the middle of the night but without intent or plan.  Endorses AH today for the first time on the unit.  Is amenable to increasing Prolixin.

## 2024-11-07 LAB
GLUCOSE BLDC GLUCOMTR-MCNC: 106 MG/DL — HIGH (ref 70–99)
GLUCOSE BLDC GLUCOMTR-MCNC: 145 MG/DL — HIGH (ref 70–99)
GLUCOSE BLDC GLUCOMTR-MCNC: 83 MG/DL — SIGNIFICANT CHANGE UP (ref 70–99)
GLUCOSE BLDC GLUCOMTR-MCNC: 92 MG/DL — SIGNIFICANT CHANGE UP (ref 70–99)

## 2024-11-07 PROCEDURE — 99232 SBSQ HOSP IP/OBS MODERATE 35: CPT

## 2024-11-07 RX ORDER — FLUPHENAZINE HCL 2.5 MG
5 TABLET ORAL AT BEDTIME
Refills: 0 | Status: DISCONTINUED | OUTPATIENT
Start: 2024-11-07 | End: 2024-11-08

## 2024-11-07 RX ADMIN — Medication 400 MILLIGRAM(S): at 14:08

## 2024-11-07 RX ADMIN — Medication 1 MILLIGRAM(S): at 20:40

## 2024-11-07 RX ADMIN — NICOTINE 1 PATCH: 21 PATCH, EXTENDED RELEASE TRANSDERMAL at 08:39

## 2024-11-07 RX ADMIN — NICOTINE 1 PATCH: 21 PATCH, EXTENDED RELEASE TRANSDERMAL at 08:50

## 2024-11-07 RX ADMIN — Medication 1 MILLIGRAM(S): at 08:40

## 2024-11-07 RX ADMIN — LIDOCAINE 1 PATCH: 40 CREAM TOPICAL at 08:39

## 2024-11-07 RX ADMIN — Medication 25 MILLIGRAM(S): at 20:40

## 2024-11-07 RX ADMIN — Medication 400 MILLIGRAM(S): at 16:31

## 2024-11-07 RX ADMIN — Medication 2.5 MILLIGRAM(S): at 16:28

## 2024-11-07 RX ADMIN — Medication 5 MILLIGRAM(S): at 20:40

## 2024-11-07 RX ADMIN — NICOTINE 1 PATCH: 21 PATCH, EXTENDED RELEASE TRANSDERMAL at 08:51

## 2024-11-07 RX ADMIN — AMLODIPINE BESYLATE 5 MILLIGRAM(S): 10 TABLET ORAL at 08:40

## 2024-11-07 RX ADMIN — VENLAFAXINE HYDROCHLORIDE 150 MILLIGRAM(S): 75 CAPSULE, EXTENDED RELEASE ORAL at 08:40

## 2024-11-07 RX ADMIN — NEBIVOLOL HYDROCHLORIDE 5 MILLIGRAM(S): 10 TABLET ORAL at 08:40

## 2024-11-07 NOTE — BH INPATIENT PSYCHIATRY PROGRESS NOTE - NSBHFUPINTERVALHXFT_PSY_A_CORE
Patient seen for follow up of psychosis.  I discussed patient's case with the interdisciplinary team.  There were no notable overnight events.  Interviewed with  ID [id number].  Patient was adherent to medication regimen.  Patient states he was feeling badly earlier today but then took a nap and then felt better.  He was observed talking to himself on approach, and says he does this from time to time.  Denies responding to AH.  Denies back pain.  Denies paranoid ideation.  Denies AH.  Endorses passive death wish ambivalently.  Is amenable to increasing Prolixin.  Asks about CARTER.

## 2024-11-08 LAB — GLUCOSE BLDC GLUCOMTR-MCNC: 140 MG/DL — HIGH (ref 70–99)

## 2024-11-08 PROCEDURE — 99232 SBSQ HOSP IP/OBS MODERATE 35: CPT

## 2024-11-08 RX ORDER — FLUPHENAZINE HCL 2.5 MG
5 TABLET ORAL AT BEDTIME
Refills: 0 | Status: DISCONTINUED | OUTPATIENT
Start: 2024-11-08 | End: 2024-11-27

## 2024-11-08 RX ADMIN — ACETAMINOPHEN, DIPHENHYDRAMINE HCL, PHENYLEPHRINE HCL 3 MILLIGRAM(S): 325; 25; 5 TABLET ORAL at 20:37

## 2024-11-08 RX ADMIN — Medication 400 MILLIGRAM(S): at 14:51

## 2024-11-08 RX ADMIN — AMLODIPINE BESYLATE 5 MILLIGRAM(S): 10 TABLET ORAL at 08:37

## 2024-11-08 RX ADMIN — Medication 1 MILLIGRAM(S): at 08:37

## 2024-11-08 RX ADMIN — NEBIVOLOL HYDROCHLORIDE 5 MILLIGRAM(S): 10 TABLET ORAL at 08:37

## 2024-11-08 RX ADMIN — Medication 1 MILLIGRAM(S): at 20:36

## 2024-11-08 RX ADMIN — Medication 25 MILLIGRAM(S): at 20:37

## 2024-11-08 RX ADMIN — Medication 400 MILLIGRAM(S): at 13:49

## 2024-11-08 RX ADMIN — NICOTINE 1 PATCH: 21 PATCH, EXTENDED RELEASE TRANSDERMAL at 08:37

## 2024-11-08 RX ADMIN — Medication 5 MILLIGRAM(S): at 20:36

## 2024-11-08 RX ADMIN — LIDOCAINE 1 PATCH: 40 CREAM TOPICAL at 08:38

## 2024-11-08 RX ADMIN — VENLAFAXINE HYDROCHLORIDE 150 MILLIGRAM(S): 75 CAPSULE, EXTENDED RELEASE ORAL at 08:37

## 2024-11-08 NOTE — BH INPATIENT PSYCHIATRY PROGRESS NOTE - NSBHFUPINTERVALHXFT_PSY_A_CORE
Patient seen for follow up of psychosis.  I discussed patient's case with the interdisciplinary team.  There were no notable overnight events.  Patient says he slept much better.  Interviewed with  ID [id number].  Patient was adherent to medication regimen.  Patient states he feels much better overall.  Notes AH are lessening.  Does not discuss their content.  Denies SI and reports a good mood.  Denies pain.  Denies side effects of meds.  Declines further increase in Prolixin explaining he had abnormal movements of his face on higher doses.

## 2024-11-09 LAB — GLUCOSE BLDC GLUCOMTR-MCNC: 135 MG/DL — HIGH (ref 70–99)

## 2024-11-09 RX ADMIN — ACETAMINOPHEN, DIPHENHYDRAMINE HCL, PHENYLEPHRINE HCL 3 MILLIGRAM(S): 325; 25; 5 TABLET ORAL at 20:44

## 2024-11-09 RX ADMIN — Medication 5 MILLIGRAM(S): at 20:43

## 2024-11-09 RX ADMIN — Medication 1 MILLIGRAM(S): at 08:35

## 2024-11-09 RX ADMIN — Medication 400 MILLIGRAM(S): at 06:05

## 2024-11-09 RX ADMIN — Medication 2.5 MILLIGRAM(S): at 12:25

## 2024-11-09 RX ADMIN — NEBIVOLOL HYDROCHLORIDE 5 MILLIGRAM(S): 10 TABLET ORAL at 08:35

## 2024-11-09 RX ADMIN — VENLAFAXINE HYDROCHLORIDE 150 MILLIGRAM(S): 75 CAPSULE, EXTENDED RELEASE ORAL at 08:35

## 2024-11-09 RX ADMIN — ACETAMINOPHEN 500MG 650 MILLIGRAM(S): 500 TABLET, COATED ORAL at 09:07

## 2024-11-09 RX ADMIN — Medication 400 MILLIGRAM(S): at 05:05

## 2024-11-09 RX ADMIN — Medication 25 MILLIGRAM(S): at 20:43

## 2024-11-09 RX ADMIN — Medication 400 MILLIGRAM(S): at 15:30

## 2024-11-09 RX ADMIN — Medication 1 MILLIGRAM(S): at 20:44

## 2024-11-09 RX ADMIN — LIDOCAINE 1 PATCH: 40 CREAM TOPICAL at 08:36

## 2024-11-09 RX ADMIN — NICOTINE 1 PATCH: 21 PATCH, EXTENDED RELEASE TRANSDERMAL at 08:35

## 2024-11-09 RX ADMIN — AMLODIPINE BESYLATE 5 MILLIGRAM(S): 10 TABLET ORAL at 08:35

## 2024-11-09 RX ADMIN — ACETAMINOPHEN 500MG 650 MILLIGRAM(S): 500 TABLET, COATED ORAL at 12:34

## 2024-11-10 LAB
GLUCOSE BLDC GLUCOMTR-MCNC: 108 MG/DL — HIGH (ref 70–99)
GLUCOSE BLDC GLUCOMTR-MCNC: 133 MG/DL — HIGH (ref 70–99)
GLUCOSE BLDC GLUCOMTR-MCNC: 156 MG/DL — HIGH (ref 70–99)
GLUCOSE BLDC GLUCOMTR-MCNC: 87 MG/DL — SIGNIFICANT CHANGE UP (ref 70–99)

## 2024-11-10 RX ADMIN — Medication 5 MILLIGRAM(S): at 21:14

## 2024-11-10 RX ADMIN — Medication 400 MILLIGRAM(S): at 21:13

## 2024-11-10 RX ADMIN — Medication 25 MILLIGRAM(S): at 21:14

## 2024-11-10 RX ADMIN — VENLAFAXINE HYDROCHLORIDE 150 MILLIGRAM(S): 75 CAPSULE, EXTENDED RELEASE ORAL at 08:30

## 2024-11-10 RX ADMIN — AMLODIPINE BESYLATE 5 MILLIGRAM(S): 10 TABLET ORAL at 08:30

## 2024-11-10 RX ADMIN — ACETAMINOPHEN, DIPHENHYDRAMINE HCL, PHENYLEPHRINE HCL 3 MILLIGRAM(S): 325; 25; 5 TABLET ORAL at 21:14

## 2024-11-10 RX ADMIN — NICOTINE 1 PATCH: 21 PATCH, EXTENDED RELEASE TRANSDERMAL at 13:04

## 2024-11-10 RX ADMIN — LIDOCAINE 1 PATCH: 40 CREAM TOPICAL at 08:30

## 2024-11-10 RX ADMIN — NEBIVOLOL HYDROCHLORIDE 5 MILLIGRAM(S): 10 TABLET ORAL at 08:30

## 2024-11-10 RX ADMIN — Medication 1 MILLIGRAM(S): at 21:14

## 2024-11-10 RX ADMIN — Medication 400 MILLIGRAM(S): at 13:00

## 2024-11-10 RX ADMIN — Medication 400 MILLIGRAM(S): at 23:48

## 2024-11-11 LAB — GLUCOSE BLDC GLUCOMTR-MCNC: 84 MG/DL — SIGNIFICANT CHANGE UP (ref 70–99)

## 2024-11-11 PROCEDURE — 99232 SBSQ HOSP IP/OBS MODERATE 35: CPT

## 2024-11-11 RX ORDER — CLONAZEPAM 0.12 MG/1
0.5 TABLET, ORALLY DISINTEGRATING ORAL
Refills: 0 | Status: DISCONTINUED | OUTPATIENT
Start: 2024-11-11 | End: 2024-11-18

## 2024-11-11 RX ADMIN — CLONAZEPAM 0.5 MILLIGRAM(S): 0.12 TABLET, ORALLY DISINTEGRATING ORAL at 16:34

## 2024-11-11 RX ADMIN — Medication 1 MILLIGRAM(S): at 20:47

## 2024-11-11 RX ADMIN — AMLODIPINE BESYLATE 5 MILLIGRAM(S): 10 TABLET ORAL at 08:19

## 2024-11-11 RX ADMIN — NEBIVOLOL HYDROCHLORIDE 5 MILLIGRAM(S): 10 TABLET ORAL at 08:19

## 2024-11-11 RX ADMIN — CLONAZEPAM 0.5 MILLIGRAM(S): 0.12 TABLET, ORALLY DISINTEGRATING ORAL at 20:46

## 2024-11-11 RX ADMIN — Medication 1 MILLIGRAM(S): at 08:19

## 2024-11-11 RX ADMIN — VENLAFAXINE HYDROCHLORIDE 150 MILLIGRAM(S): 75 CAPSULE, EXTENDED RELEASE ORAL at 08:19

## 2024-11-11 RX ADMIN — Medication 5 MILLIGRAM(S): at 20:46

## 2024-11-11 RX ADMIN — LIDOCAINE 1 PATCH: 40 CREAM TOPICAL at 08:18

## 2024-11-11 RX ADMIN — Medication 2.5 MILLIGRAM(S): at 14:41

## 2024-11-11 RX ADMIN — NICOTINE 1 PATCH: 21 PATCH, EXTENDED RELEASE TRANSDERMAL at 08:18

## 2024-11-11 RX ADMIN — Medication 25 MILLIGRAM(S): at 21:02

## 2024-11-11 RX ADMIN — ACETAMINOPHEN, DIPHENHYDRAMINE HCL, PHENYLEPHRINE HCL 3 MILLIGRAM(S): 325; 25; 5 TABLET ORAL at 21:02

## 2024-11-11 NOTE — BH INPATIENT PSYCHIATRY PROGRESS NOTE - NSBHFUPINTERVALHXFT_PSY_A_CORE
Patient seen for follow up of psychosis.  I discussed patient's case with the interdisciplinary team.  There were no notable overnight events.  Patient says he slept much better.  Interviewed with  ID [id number].  Patient was adherent to medication regimen.  Patient states he had a hard time getting out of bed today and denies it was because of either feeling tired or pain.  Denies side effects of meds.  States AH have been "teasing" him, saying he speaks impolitely and taunting him that everyone else is better spoken and behaved.  States his mood is lower because of this but overall okay.  Again he declines further increase in Prolixin explaining he had abnormal movements of his face on higher doses.  Requests Klonopin which I confirm with his outpatient psychiatrist he was on at home.  Received message with collateral from outpatient psychiatrist.

## 2024-11-12 LAB
GLUCOSE BLDC GLUCOMTR-MCNC: 103 MG/DL — HIGH (ref 70–99)
GLUCOSE BLDC GLUCOMTR-MCNC: 121 MG/DL — HIGH (ref 70–99)

## 2024-11-12 PROCEDURE — 99232 SBSQ HOSP IP/OBS MODERATE 35: CPT

## 2024-11-12 RX ORDER — MIRTAZAPINE 15 MG/1
7.5 TABLET, FILM COATED ORAL AT BEDTIME
Refills: 0 | Status: DISCONTINUED | OUTPATIENT
Start: 2024-11-12 | End: 2024-11-27

## 2024-11-12 RX ORDER — FLUPHENAZINE HCL 2.5 MG
2.5 TABLET ORAL DAILY
Refills: 0 | Status: DISCONTINUED | OUTPATIENT
Start: 2024-11-12 | End: 2024-11-27

## 2024-11-12 RX ADMIN — Medication 400 MILLIGRAM(S): at 23:19

## 2024-11-12 RX ADMIN — NEBIVOLOL HYDROCHLORIDE 5 MILLIGRAM(S): 10 TABLET ORAL at 09:45

## 2024-11-12 RX ADMIN — Medication 1 MILLIGRAM(S): at 09:45

## 2024-11-12 RX ADMIN — Medication 400 MILLIGRAM(S): at 23:38

## 2024-11-12 RX ADMIN — CLONAZEPAM 0.5 MILLIGRAM(S): 0.12 TABLET, ORALLY DISINTEGRATING ORAL at 09:45

## 2024-11-12 RX ADMIN — VENLAFAXINE HYDROCHLORIDE 150 MILLIGRAM(S): 75 CAPSULE, EXTENDED RELEASE ORAL at 09:45

## 2024-11-12 RX ADMIN — LIDOCAINE 1 PATCH: 40 CREAM TOPICAL at 09:46

## 2024-11-12 RX ADMIN — Medication 1 MILLIGRAM(S): at 20:35

## 2024-11-12 RX ADMIN — NICOTINE 1 PATCH: 21 PATCH, EXTENDED RELEASE TRANSDERMAL at 09:45

## 2024-11-12 RX ADMIN — NICOTINE 1 PATCH: 21 PATCH, EXTENDED RELEASE TRANSDERMAL at 08:00

## 2024-11-12 RX ADMIN — CLONAZEPAM 0.5 MILLIGRAM(S): 0.12 TABLET, ORALLY DISINTEGRATING ORAL at 20:36

## 2024-11-12 RX ADMIN — ACETAMINOPHEN, DIPHENHYDRAMINE HCL, PHENYLEPHRINE HCL 3 MILLIGRAM(S): 325; 25; 5 TABLET ORAL at 20:36

## 2024-11-12 RX ADMIN — MIRTAZAPINE 7.5 MILLIGRAM(S): 15 TABLET, FILM COATED ORAL at 20:36

## 2024-11-12 RX ADMIN — Medication 25 MILLIGRAM(S): at 20:36

## 2024-11-12 RX ADMIN — AMLODIPINE BESYLATE 5 MILLIGRAM(S): 10 TABLET ORAL at 09:45

## 2024-11-12 RX ADMIN — Medication 5 MILLIGRAM(S): at 20:36

## 2024-11-12 NOTE — BH INPATIENT PSYCHIATRY PROGRESS NOTE - NSBHFUPINTERVALHXFT_PSY_A_CORE
Patient seen for follow up of psychosis.  I discussed patient's case with the interdisciplinary team.  There were no notable overnight events.  Patient says he slept much better.  Interviewed with  ID [id number].  Patient was adherent to medication regimen.  Patient states he felt "broken" this morning after having taken PRN medication for sleep last night due to anxiety and AH troubling him.  We discuss medication alternatives.  Was agreeable to adding lower daytime dose of Prolixin and Remeron at night for sleep.  Denies other complaints today.

## 2024-11-13 LAB — GLUCOSE BLDC GLUCOMTR-MCNC: 152 MG/DL — HIGH (ref 70–99)

## 2024-11-13 PROCEDURE — 99232 SBSQ HOSP IP/OBS MODERATE 35: CPT

## 2024-11-13 RX ADMIN — CLONAZEPAM 0.5 MILLIGRAM(S): 0.12 TABLET, ORALLY DISINTEGRATING ORAL at 09:31

## 2024-11-13 RX ADMIN — NEBIVOLOL HYDROCHLORIDE 5 MILLIGRAM(S): 10 TABLET ORAL at 09:31

## 2024-11-13 RX ADMIN — Medication 1 MILLIGRAM(S): at 09:31

## 2024-11-13 RX ADMIN — Medication 1 MILLIGRAM(S): at 20:46

## 2024-11-13 RX ADMIN — Medication 2.5 MILLIGRAM(S): at 09:31

## 2024-11-13 RX ADMIN — NICOTINE 1 PATCH: 21 PATCH, EXTENDED RELEASE TRANSDERMAL at 09:31

## 2024-11-13 RX ADMIN — LIDOCAINE 1 PATCH: 40 CREAM TOPICAL at 09:30

## 2024-11-13 RX ADMIN — Medication 400 MILLIGRAM(S): at 06:32

## 2024-11-13 RX ADMIN — NICOTINE 1 PATCH: 21 PATCH, EXTENDED RELEASE TRANSDERMAL at 19:54

## 2024-11-13 RX ADMIN — AMLODIPINE BESYLATE 5 MILLIGRAM(S): 10 TABLET ORAL at 09:31

## 2024-11-13 RX ADMIN — MIRTAZAPINE 7.5 MILLIGRAM(S): 15 TABLET, FILM COATED ORAL at 21:47

## 2024-11-13 RX ADMIN — Medication 400 MILLIGRAM(S): at 06:53

## 2024-11-13 RX ADMIN — VENLAFAXINE HYDROCHLORIDE 150 MILLIGRAM(S): 75 CAPSULE, EXTENDED RELEASE ORAL at 09:31

## 2024-11-13 RX ADMIN — CLONAZEPAM 0.5 MILLIGRAM(S): 0.12 TABLET, ORALLY DISINTEGRATING ORAL at 20:46

## 2024-11-13 RX ADMIN — LIDOCAINE 1 PATCH: 40 CREAM TOPICAL at 21:54

## 2024-11-13 RX ADMIN — Medication 5 MILLIGRAM(S): at 20:46

## 2024-11-13 RX ADMIN — LIDOCAINE 1 PATCH: 40 CREAM TOPICAL at 19:53

## 2024-11-13 NOTE — BH INPATIENT PSYCHIATRY PROGRESS NOTE - NSBHFUPINTERVALHXFT_PSY_A_CORE
Patient seen for follow up of psychosis.  I discussed patient's case with the interdisciplinary team. Patient was adherent to medication regimen. There were no notable overnight events.  Interviewed with  ID [id number].   Patient says he slept okay but per staff and sleep log, he was awake for several >1hr long stretches throughout the night and cumulatively only got about 4 hours of sleep.  He says this is just how things are for him but that he feels much better overall. States AH are significantly decreased and are still teasing in nature but not bothering him.  Denies CAH and denies SI.  Declines further med changes.  States he would like to be able to make his dentist appt on Sunday.

## 2024-11-14 LAB — GLUCOSE BLDC GLUCOMTR-MCNC: 88 MG/DL — SIGNIFICANT CHANGE UP (ref 70–99)

## 2024-11-14 PROCEDURE — 99232 SBSQ HOSP IP/OBS MODERATE 35: CPT

## 2024-11-14 RX ORDER — ARIPIPRAZOLE 15 MG/1
2 TABLET ORAL
Refills: 0 | Status: DISCONTINUED | OUTPATIENT
Start: 2024-11-14 | End: 2024-11-15

## 2024-11-14 RX ADMIN — Medication 2.5 MILLIGRAM(S): at 12:30

## 2024-11-14 RX ADMIN — Medication 1 MILLIGRAM(S): at 12:30

## 2024-11-14 RX ADMIN — VENLAFAXINE HYDROCHLORIDE 150 MILLIGRAM(S): 75 CAPSULE, EXTENDED RELEASE ORAL at 12:30

## 2024-11-14 RX ADMIN — Medication 2.5 MILLIGRAM(S): at 22:25

## 2024-11-14 RX ADMIN — ACETAMINOPHEN 500MG 650 MILLIGRAM(S): 500 TABLET, COATED ORAL at 18:11

## 2024-11-14 RX ADMIN — Medication 5 MILLIGRAM(S): at 20:29

## 2024-11-14 RX ADMIN — LIDOCAINE 1 PATCH: 40 CREAM TOPICAL at 12:28

## 2024-11-14 RX ADMIN — NICOTINE 1 PATCH: 21 PATCH, EXTENDED RELEASE TRANSDERMAL at 12:28

## 2024-11-14 RX ADMIN — Medication 1 MILLIGRAM(S): at 20:29

## 2024-11-14 RX ADMIN — CLONAZEPAM 0.5 MILLIGRAM(S): 0.12 TABLET, ORALLY DISINTEGRATING ORAL at 20:30

## 2024-11-14 RX ADMIN — ARIPIPRAZOLE 2 MILLIGRAM(S): 15 TABLET ORAL at 18:09

## 2024-11-14 RX ADMIN — Medication 400 MILLIGRAM(S): at 22:52

## 2024-11-14 RX ADMIN — Medication 25 MILLIGRAM(S): at 22:25

## 2024-11-14 RX ADMIN — Medication 400 MILLIGRAM(S): at 15:03

## 2024-11-14 RX ADMIN — NEBIVOLOL HYDROCHLORIDE 5 MILLIGRAM(S): 10 TABLET ORAL at 12:30

## 2024-11-14 RX ADMIN — MIRTAZAPINE 7.5 MILLIGRAM(S): 15 TABLET, FILM COATED ORAL at 20:29

## 2024-11-14 RX ADMIN — CLONAZEPAM 0.5 MILLIGRAM(S): 0.12 TABLET, ORALLY DISINTEGRATING ORAL at 12:29

## 2024-11-14 RX ADMIN — Medication 400 MILLIGRAM(S): at 14:03

## 2024-11-14 RX ADMIN — AMLODIPINE BESYLATE 5 MILLIGRAM(S): 10 TABLET ORAL at 12:30

## 2024-11-14 NOTE — BH INPATIENT PSYCHIATRY PROGRESS NOTE - NSBHFUPINTERVALHXFT_PSY_A_CORE
Patient seen for follow up of psychosis.  I discussed patient's case with the interdisciplinary team. Patient was adherent to medication regimen. There were no notable overnight events.  Interviewed with  ID [id number].   Patient states he slept through the night and the morning again but sleep log notes patient was up throughout the night and did not sleep 1 hours solidly.  States that he felt sleepy after taking AM dose of Prolixin.  States AH have improved and he feels good.  I spoke with patient's nephew who notes that the patient seems more excited and to be speaking faster.  He confirms he has no known park history.  In addition, outpatient psychiatrist again responds to my message confirming no history of park.  Denies CAH and denies SI.  Patient says he is amenable to Abilify which family say he did better on but was noncompliant with.

## 2024-11-15 PROCEDURE — 99233 SBSQ HOSP IP/OBS HIGH 50: CPT

## 2024-11-15 RX ORDER — CLONAZEPAM 0.12 MG/1
1 TABLET, ORALLY DISINTEGRATING ORAL AT BEDTIME
Refills: 0 | Status: DISCONTINUED | OUTPATIENT
Start: 2024-11-15 | End: 2024-11-21

## 2024-11-15 RX ORDER — ARIPIPRAZOLE 15 MG/1
5 TABLET ORAL
Refills: 0 | Status: DISCONTINUED | OUTPATIENT
Start: 2024-11-15 | End: 2024-11-19

## 2024-11-15 RX ADMIN — NEBIVOLOL HYDROCHLORIDE 5 MILLIGRAM(S): 10 TABLET ORAL at 09:00

## 2024-11-15 RX ADMIN — Medication 400 MILLIGRAM(S): at 16:45

## 2024-11-15 RX ADMIN — VENLAFAXINE HYDROCHLORIDE 150 MILLIGRAM(S): 75 CAPSULE, EXTENDED RELEASE ORAL at 09:00

## 2024-11-15 RX ADMIN — ARIPIPRAZOLE 5 MILLIGRAM(S): 15 TABLET ORAL at 18:07

## 2024-11-15 RX ADMIN — MIRTAZAPINE 7.5 MILLIGRAM(S): 15 TABLET, FILM COATED ORAL at 20:02

## 2024-11-15 RX ADMIN — Medication 400 MILLIGRAM(S): at 05:10

## 2024-11-15 RX ADMIN — NICOTINE 1 PATCH: 21 PATCH, EXTENDED RELEASE TRANSDERMAL at 09:42

## 2024-11-15 RX ADMIN — CLONAZEPAM 0.5 MILLIGRAM(S): 0.12 TABLET, ORALLY DISINTEGRATING ORAL at 20:02

## 2024-11-15 RX ADMIN — Medication 2.5 MILLIGRAM(S): at 09:00

## 2024-11-15 RX ADMIN — ACETAMINOPHEN 500MG 650 MILLIGRAM(S): 500 TABLET, COATED ORAL at 00:57

## 2024-11-15 RX ADMIN — ACETAMINOPHEN, DIPHENHYDRAMINE HCL, PHENYLEPHRINE HCL 3 MILLIGRAM(S): 325; 25; 5 TABLET ORAL at 00:58

## 2024-11-15 RX ADMIN — NICOTINE 1 PATCH: 21 PATCH, EXTENDED RELEASE TRANSDERMAL at 09:01

## 2024-11-15 RX ADMIN — CLONAZEPAM 0.5 MILLIGRAM(S): 0.12 TABLET, ORALLY DISINTEGRATING ORAL at 09:00

## 2024-11-15 RX ADMIN — AMLODIPINE BESYLATE 5 MILLIGRAM(S): 10 TABLET ORAL at 09:00

## 2024-11-15 RX ADMIN — Medication 5 MILLIGRAM(S): at 20:02

## 2024-11-15 RX ADMIN — Medication 1 MILLIGRAM(S): at 09:00

## 2024-11-15 RX ADMIN — Medication 400 MILLIGRAM(S): at 15:54

## 2024-11-15 RX ADMIN — Medication 1 MILLIGRAM(S): at 20:02

## 2024-11-15 RX ADMIN — NICOTINE 1 PATCH: 21 PATCH, EXTENDED RELEASE TRANSDERMAL at 12:33

## 2024-11-15 RX ADMIN — LIDOCAINE 1 PATCH: 40 CREAM TOPICAL at 09:01

## 2024-11-15 NOTE — BH INPATIENT PSYCHIATRY PROGRESS NOTE - NSBHFUPINTERVALHXFT_PSY_A_CORE
Patient seen for follow up of psychosis.  I discussed patient's case with the interdisciplinary team. Patient was adherent to medication regimen. There were no notable overnight events.  Interviewed with  ID recorded on patient's 3DL.   Patient states he did not sleep last night and feels frustrated about not being able to communicate with others as he does not speak English.  He is quite animated in demanding discharge today via .  He was counseled about process of 3DL and was assisted in submitting one.  He denied SI, HI, and reported improved AH with no CAH.  He asked for reassurance he would be discharged on Monday on the letter.  When arrangements were made with SW for his transport home Monday by family, family reportedly convinced him to retract letter later in the afternoon.

## 2024-11-16 LAB — GLUCOSE BLDC GLUCOMTR-MCNC: 169 MG/DL — HIGH (ref 70–99)

## 2024-11-16 RX ORDER — MECLIZINE HCL 12.5 MG
12.5 TABLET ORAL EVERY 6 HOURS
Refills: 0 | Status: DISCONTINUED | OUTPATIENT
Start: 2024-11-16 | End: 2024-11-27

## 2024-11-16 RX ADMIN — Medication 5 MILLIGRAM(S): at 20:07

## 2024-11-16 RX ADMIN — CLONAZEPAM 0.5 MILLIGRAM(S): 0.12 TABLET, ORALLY DISINTEGRATING ORAL at 20:07

## 2024-11-16 RX ADMIN — ACETAMINOPHEN 500MG 650 MILLIGRAM(S): 500 TABLET, COATED ORAL at 15:07

## 2024-11-16 RX ADMIN — ACETAMINOPHEN 500MG 650 MILLIGRAM(S): 500 TABLET, COATED ORAL at 16:53

## 2024-11-16 RX ADMIN — Medication 2.5 MILLIGRAM(S): at 23:31

## 2024-11-16 RX ADMIN — NICOTINE 1 PATCH: 21 PATCH, EXTENDED RELEASE TRANSDERMAL at 08:39

## 2024-11-16 RX ADMIN — Medication 400 MILLIGRAM(S): at 13:40

## 2024-11-16 RX ADMIN — Medication 12.5 MILLIGRAM(S): at 17:54

## 2024-11-16 RX ADMIN — LIDOCAINE 1 PATCH: 40 CREAM TOPICAL at 08:38

## 2024-11-16 RX ADMIN — Medication 400 MILLIGRAM(S): at 20:30

## 2024-11-16 RX ADMIN — Medication 1: at 16:52

## 2024-11-16 RX ADMIN — VENLAFAXINE HYDROCHLORIDE 150 MILLIGRAM(S): 75 CAPSULE, EXTENDED RELEASE ORAL at 08:38

## 2024-11-16 RX ADMIN — Medication 400 MILLIGRAM(S): at 21:40

## 2024-11-16 RX ADMIN — NICOTINE 1 PATCH: 21 PATCH, EXTENDED RELEASE TRANSDERMAL at 13:39

## 2024-11-16 RX ADMIN — MIRTAZAPINE 7.5 MILLIGRAM(S): 15 TABLET, FILM COATED ORAL at 20:07

## 2024-11-16 RX ADMIN — NEBIVOLOL HYDROCHLORIDE 5 MILLIGRAM(S): 10 TABLET ORAL at 08:38

## 2024-11-16 RX ADMIN — Medication 2.5 MILLIGRAM(S): at 08:39

## 2024-11-16 RX ADMIN — Medication 1 MILLIGRAM(S): at 08:38

## 2024-11-16 RX ADMIN — Medication 400 MILLIGRAM(S): at 14:10

## 2024-11-16 RX ADMIN — Medication 1 MILLIGRAM(S): at 20:07

## 2024-11-16 RX ADMIN — ARIPIPRAZOLE 5 MILLIGRAM(S): 15 TABLET ORAL at 20:55

## 2024-11-16 RX ADMIN — AMLODIPINE BESYLATE 5 MILLIGRAM(S): 10 TABLET ORAL at 08:39

## 2024-11-16 RX ADMIN — CLONAZEPAM 0.5 MILLIGRAM(S): 0.12 TABLET, ORALLY DISINTEGRATING ORAL at 08:38

## 2024-11-16 RX ADMIN — NICOTINE 1 PATCH: 21 PATCH, EXTENDED RELEASE TRANSDERMAL at 09:00

## 2024-11-16 NOTE — BH CHART NOTE - NSEVENTNOTEFT_PSY_ALL_CORE
[facility] called for strange eye movements thought to be 2/2 EPS. On exam, eye movements have ceased. Pt interviewed in private room with RN and patient's sister. Pt Marshallese speaking, indicated he preferred sister translate for him. On interview, pt states "I am feeling weak, I am not myself", states that this has happened since he woke up this morning. Remains vague about nature of weakness, states "my head is not right, it is cloudy" and states "I cannot walk well." Sister states she believes pt may have had these complaints in the past.     Extensive chart review done, with evidence of complaints of dizziness and gait unsteadiness extending back to 2022, per chart review, pt was told to follow up with outpatient neurologist but had not done so. Prior neurogenic and cardiogenic workup was negative. Additionally, PT evaluation done earlier this admission shows pt with gait unsteadiness, recommended home PT. Pt previously improved on meclizine.     Patient denies chest pain, palpitations, shortness of breath. Denies headache, visual changes, confusion, nausea, or vomiting. /82, patient refused standing vitals.     T(C): 36.7 (11-16-24 @ 17:13), Max: 36.7 (11-16-24 @ 17:13)  HR: --  BP: --  RR: --  SpO2: --    Physical Exam:  Gen: Patient sitting on chair, not in acute distress   HEENT: Atraumatic, normocephalic.  Resp: No increased work of breathing noted  CV: Radial pulses 2+ b/l  Abd: soft, non-tender, non-distended.  Ext: RoM intact  Neuro: awake, alert, grossly oriented. Slightly slowed gait, at times grabs onto wall for support. Refused to walk on toes, unable to complete tandem gait due to gait instability.    Assessment:  [facility] called to evaluate pt for possible odd eye movements and lightheadedness. On assessment, pt no longer displaying strange eye movements, and did not appear to be experiencing other forms of EPS. Pt refused orthostatic vital signs on evaluation, was orthostatic this AM but note that pt's blood pressure remained in normal range. Dizziness and lightheadedness per chart review seem to be chronic as of 2022, and pt with some baseline gait instability that may warrant outpatient neurological workup. Per chart, pt has had some symptomatic improvement on meclizine. Will trial meclizine 12.5 mg PRN dizziness.     Plan:  1. trial meclizine PRN for dizziness/lightheadedness.   2. will continue to monitor routinely.   3. d/w RN staff

## 2024-11-17 LAB — GLUCOSE BLDC GLUCOMTR-MCNC: 112 MG/DL — HIGH (ref 70–99)

## 2024-11-17 RX ADMIN — CLONAZEPAM 1 MILLIGRAM(S): 0.12 TABLET, ORALLY DISINTEGRATING ORAL at 02:54

## 2024-11-17 RX ADMIN — Medication 5 MILLIGRAM(S): at 20:52

## 2024-11-17 RX ADMIN — Medication 400 MILLIGRAM(S): at 04:56

## 2024-11-17 RX ADMIN — Medication 2.5 MILLIGRAM(S): at 08:44

## 2024-11-17 RX ADMIN — NEBIVOLOL HYDROCHLORIDE 5 MILLIGRAM(S): 10 TABLET ORAL at 08:43

## 2024-11-17 RX ADMIN — Medication 12.5 MILLIGRAM(S): at 07:29

## 2024-11-17 RX ADMIN — Medication 400 MILLIGRAM(S): at 04:02

## 2024-11-17 RX ADMIN — MIRTAZAPINE 7.5 MILLIGRAM(S): 15 TABLET, FILM COATED ORAL at 20:53

## 2024-11-17 RX ADMIN — AMLODIPINE BESYLATE 5 MILLIGRAM(S): 10 TABLET ORAL at 08:44

## 2024-11-17 RX ADMIN — CLONAZEPAM 0.5 MILLIGRAM(S): 0.12 TABLET, ORALLY DISINTEGRATING ORAL at 20:52

## 2024-11-17 RX ADMIN — CLONAZEPAM 0.5 MILLIGRAM(S): 0.12 TABLET, ORALLY DISINTEGRATING ORAL at 08:43

## 2024-11-17 RX ADMIN — Medication 400 MILLIGRAM(S): at 21:20

## 2024-11-17 RX ADMIN — Medication 1 MILLIGRAM(S): at 08:43

## 2024-11-17 RX ADMIN — Medication 1 MILLIGRAM(S): at 20:52

## 2024-11-17 RX ADMIN — ARIPIPRAZOLE 5 MILLIGRAM(S): 15 TABLET ORAL at 20:53

## 2024-11-17 RX ADMIN — LIDOCAINE 1 PATCH: 40 CREAM TOPICAL at 08:44

## 2024-11-17 RX ADMIN — VENLAFAXINE HYDROCHLORIDE 150 MILLIGRAM(S): 75 CAPSULE, EXTENDED RELEASE ORAL at 08:44

## 2024-11-18 LAB
GLUCOSE BLDC GLUCOMTR-MCNC: 142 MG/DL — HIGH (ref 70–99)
GLUCOSE BLDC GLUCOMTR-MCNC: 188 MG/DL — HIGH (ref 70–99)

## 2024-11-18 PROCEDURE — 99233 SBSQ HOSP IP/OBS HIGH 50: CPT

## 2024-11-18 RX ORDER — SULINDAC 150 MG/1
150 TABLET ORAL EVERY 12 HOURS
Refills: 0 | Status: DISCONTINUED | OUTPATIENT
Start: 2024-11-18 | End: 2024-11-19

## 2024-11-18 RX ORDER — LITHIUM CARBONATE 300 MG/1
600 CAPSULE ORAL ONCE
Refills: 0 | Status: COMPLETED | OUTPATIENT
Start: 2024-11-18 | End: 2024-11-18

## 2024-11-18 RX ORDER — SODIUM CHLORIDE 0.65 %
1 AEROSOL, SPRAY (ML) NASAL THREE TIMES A DAY
Refills: 0 | Status: DISCONTINUED | OUTPATIENT
Start: 2024-11-18 | End: 2024-11-27

## 2024-11-18 RX ADMIN — ARIPIPRAZOLE 5 MILLIGRAM(S): 15 TABLET ORAL at 18:05

## 2024-11-18 RX ADMIN — Medication 1 MILLIGRAM(S): at 09:29

## 2024-11-18 RX ADMIN — Medication 400 MILLIGRAM(S): at 00:08

## 2024-11-18 RX ADMIN — Medication 400 MILLIGRAM(S): at 14:26

## 2024-11-18 RX ADMIN — AMLODIPINE BESYLATE 5 MILLIGRAM(S): 10 TABLET ORAL at 09:29

## 2024-11-18 RX ADMIN — NEBIVOLOL HYDROCHLORIDE 5 MILLIGRAM(S): 10 TABLET ORAL at 09:28

## 2024-11-18 RX ADMIN — MIRTAZAPINE 7.5 MILLIGRAM(S): 15 TABLET, FILM COATED ORAL at 20:55

## 2024-11-18 RX ADMIN — Medication 5 MILLIGRAM(S): at 20:55

## 2024-11-18 RX ADMIN — CLONAZEPAM 0.5 MILLIGRAM(S): 0.12 TABLET, ORALLY DISINTEGRATING ORAL at 20:55

## 2024-11-18 RX ADMIN — LIDOCAINE 1 PATCH: 40 CREAM TOPICAL at 09:30

## 2024-11-18 RX ADMIN — Medication 1 MILLIGRAM(S): at 20:56

## 2024-11-18 RX ADMIN — ACETAMINOPHEN 500MG 650 MILLIGRAM(S): 500 TABLET, COATED ORAL at 22:35

## 2024-11-18 RX ADMIN — ACETAMINOPHEN 500MG 650 MILLIGRAM(S): 500 TABLET, COATED ORAL at 23:30

## 2024-11-18 RX ADMIN — Medication 2.5 MILLIGRAM(S): at 09:29

## 2024-11-18 RX ADMIN — VENLAFAXINE HYDROCHLORIDE 150 MILLIGRAM(S): 75 CAPSULE, EXTENDED RELEASE ORAL at 09:29

## 2024-11-18 RX ADMIN — SULINDAC 150 MILLIGRAM(S): 150 TABLET ORAL at 18:47

## 2024-11-18 RX ADMIN — LITHIUM CARBONATE 600 MILLIGRAM(S): 300 CAPSULE ORAL at 16:20

## 2024-11-18 RX ADMIN — CLONAZEPAM 0.5 MILLIGRAM(S): 0.12 TABLET, ORALLY DISINTEGRATING ORAL at 09:29

## 2024-11-18 RX ADMIN — Medication 1: at 16:19

## 2024-11-18 NOTE — BH INPATIENT PSYCHIATRY PROGRESS NOTE - NSBHFUPINTERVALHXFT_PSY_A_CORE
Patient seen for follow up of psychosis.  I discussed patient's case with the interdisciplinary team. Patient was adherent to medication regimen. There were no notable overnight events.  Over the weekend, patient complained of episode of eyes rolling into his head.  When assessed by RN and then by resident on call, no such movements were appreciated.  None were appreciated today and patient did not complain of such.  Patient slept well for the first time overnight.  He awoke for VS and spoke to RN this morning but then went back to sleep until 2pm.  When assessed this afternoon he says he feels poorly and his head feels "swollen".  He endorses having a cough and to having head congestion which his voice also sounds like.  He is agreeable to saline nasal spray and NSAID.  He says he also feels restless but states this just started today and he does not want to change Abilify as he believes this is a good medication for him.  AH are status quo, mostly well controlled.  He is agreeable to starting Li for better maintenance treatment given suspicion of mood component of his current presentation.   Patient seen for follow up of psychosis.  I discussed patient's case with the interdisciplinary team. Patient was adherent to medication regimen. There were no notable overnight events.  Over the weekend, patient complained of episode of eyes rolling into his head.  When assessed by RN and then by resident on call, no such movements were appreciated.  None were appreciated today and patient did not complain of such.  Assessed with Macanese  ID # [id number].  Patient slept well for the first time overnight.  He awoke for VS and spoke to RN this morning but then went back to sleep until 2pm.  When assessed this afternoon he says he feels poorly and his head feels "swollen".  He endorses having a cough and to having head congestion which his voice also sounds like.  He is agreeable to saline nasal spray and NSAID.  He says he also feels restless but states this just started today and he does not want to change Abilify as he believes this is a good medication for him.  AH are status quo, mostly well controlled.  He is agreeable to starting Li for better maintenance treatment given suspicion of mood component of his current presentation.

## 2024-11-19 LAB
GLUCOSE BLDC GLUCOMTR-MCNC: 144 MG/DL — HIGH (ref 70–99)
LITHIUM SERPL-MCNC: 0.1 MMOL/L — LOW (ref 0.6–1.2)

## 2024-11-19 PROCEDURE — 99233 SBSQ HOSP IP/OBS HIGH 50: CPT

## 2024-11-19 RX ORDER — SULINDAC 150 MG/1
200 TABLET ORAL EVERY 12 HOURS
Refills: 0 | Status: DISCONTINUED | OUTPATIENT
Start: 2024-11-19 | End: 2024-11-27

## 2024-11-19 RX ORDER — AMLODIPINE BESYLATE 10 MG/1
10 TABLET ORAL DAILY
Refills: 0 | Status: DISCONTINUED | OUTPATIENT
Start: 2024-11-20 | End: 2024-11-27

## 2024-11-19 RX ORDER — LITHIUM CARBONATE 300 MG/1
900 CAPSULE ORAL AT BEDTIME
Refills: 0 | Status: DISCONTINUED | OUTPATIENT
Start: 2024-11-19 | End: 2024-11-27

## 2024-11-19 RX ORDER — ARIPIPRAZOLE 15 MG/1
7 TABLET ORAL
Refills: 0 | Status: DISCONTINUED | OUTPATIENT
Start: 2024-11-19 | End: 2024-11-21

## 2024-11-19 RX ADMIN — VENLAFAXINE HYDROCHLORIDE 150 MILLIGRAM(S): 75 CAPSULE, EXTENDED RELEASE ORAL at 08:23

## 2024-11-19 RX ADMIN — Medication 25 MILLIGRAM(S): at 22:23

## 2024-11-19 RX ADMIN — Medication 1 MILLIGRAM(S): at 08:23

## 2024-11-19 RX ADMIN — Medication 5 MILLIGRAM(S): at 20:53

## 2024-11-19 RX ADMIN — SULINDAC 200 MILLIGRAM(S): 150 TABLET ORAL at 16:13

## 2024-11-19 RX ADMIN — MIRTAZAPINE 7.5 MILLIGRAM(S): 15 TABLET, FILM COATED ORAL at 20:52

## 2024-11-19 RX ADMIN — ARIPIPRAZOLE 7 MILLIGRAM(S): 15 TABLET ORAL at 17:51

## 2024-11-19 RX ADMIN — Medication 1 MILLIGRAM(S): at 20:53

## 2024-11-19 RX ADMIN — NEBIVOLOL HYDROCHLORIDE 5 MILLIGRAM(S): 10 TABLET ORAL at 08:24

## 2024-11-19 RX ADMIN — NICOTINE 1 PATCH: 21 PATCH, EXTENDED RELEASE TRANSDERMAL at 08:24

## 2024-11-19 RX ADMIN — AMLODIPINE BESYLATE 5 MILLIGRAM(S): 10 TABLET ORAL at 08:23

## 2024-11-19 RX ADMIN — ACETAMINOPHEN, DIPHENHYDRAMINE HCL, PHENYLEPHRINE HCL 3 MILLIGRAM(S): 325; 25; 5 TABLET ORAL at 01:32

## 2024-11-19 RX ADMIN — Medication 2.5 MILLIGRAM(S): at 08:23

## 2024-11-19 RX ADMIN — LIDOCAINE 1 PATCH: 40 CREAM TOPICAL at 08:24

## 2024-11-19 RX ADMIN — LITHIUM CARBONATE 900 MILLIGRAM(S): 300 CAPSULE ORAL at 20:52

## 2024-11-19 RX ADMIN — SULINDAC 150 MILLIGRAM(S): 150 TABLET ORAL at 07:50

## 2024-11-19 RX ADMIN — SULINDAC 150 MILLIGRAM(S): 150 TABLET ORAL at 06:58

## 2024-11-19 RX ADMIN — Medication 1 SPRAY(S): at 08:25

## 2024-11-19 NOTE — BH INPATIENT PSYCHIATRY PROGRESS NOTE - NSBHFUPINTERVALHXFT_PSY_A_CORE
Patient seen for follow up of psychosis.  I discussed patient's case with the interdisciplinary team. Patient was adherent to medication regimen. There were no notable overnight events.  Assessed with Turkmen  ID # [id number].  Patient reports sleeping 3hrs overnight.  States he still feels better and sees this as enough sleep.  Continues to have URI sx but says they are not severe.  States AH are still present but less frequent and are not bothering him.  Denies restlessness.  Continues to want cross titration to Abilify and is amenable to dose increase.

## 2024-11-20 LAB
CHOLEST SERPL-MCNC: 215 MG/DL — HIGH
GLUCOSE BLDC GLUCOMTR-MCNC: 142 MG/DL — HIGH (ref 70–99)
HDLC SERPL-MCNC: 39 MG/DL — LOW
LIPID PNL WITH DIRECT LDL SERPL: 99 MG/DL — SIGNIFICANT CHANGE UP
NON HDL CHOLESTEROL: 176 MG/DL — HIGH
TRIGL SERPL-MCNC: 387 MG/DL — HIGH

## 2024-11-20 PROCEDURE — 99232 SBSQ HOSP IP/OBS MODERATE 35: CPT

## 2024-11-20 PROCEDURE — 99223 1ST HOSP IP/OBS HIGH 75: CPT

## 2024-11-20 RX ORDER — TRAZODONE HYDROCHLORIDE 150 MG/1
50 TABLET ORAL AT BEDTIME
Refills: 0 | Status: DISCONTINUED | OUTPATIENT
Start: 2024-11-20 | End: 2024-11-27

## 2024-11-20 RX ADMIN — CLONAZEPAM 1 MILLIGRAM(S): 0.12 TABLET, ORALLY DISINTEGRATING ORAL at 00:07

## 2024-11-20 RX ADMIN — NICOTINE 1 PATCH: 21 PATCH, EXTENDED RELEASE TRANSDERMAL at 08:23

## 2024-11-20 RX ADMIN — AMLODIPINE BESYLATE 10 MILLIGRAM(S): 10 TABLET ORAL at 08:22

## 2024-11-20 RX ADMIN — Medication 2.5 MILLIGRAM(S): at 08:22

## 2024-11-20 RX ADMIN — NEBIVOLOL HYDROCHLORIDE 5 MILLIGRAM(S): 10 TABLET ORAL at 08:23

## 2024-11-20 RX ADMIN — ARIPIPRAZOLE 7 MILLIGRAM(S): 15 TABLET ORAL at 18:19

## 2024-11-20 RX ADMIN — Medication 5 MILLIGRAM(S): at 20:04

## 2024-11-20 RX ADMIN — LITHIUM CARBONATE 900 MILLIGRAM(S): 300 CAPSULE ORAL at 20:03

## 2024-11-20 RX ADMIN — VENLAFAXINE HYDROCHLORIDE 150 MILLIGRAM(S): 75 CAPSULE, EXTENDED RELEASE ORAL at 08:23

## 2024-11-20 RX ADMIN — ACETAMINOPHEN, DIPHENHYDRAMINE HCL, PHENYLEPHRINE HCL 3 MILLIGRAM(S): 325; 25; 5 TABLET ORAL at 20:03

## 2024-11-20 RX ADMIN — SULINDAC 200 MILLIGRAM(S): 150 TABLET ORAL at 05:00

## 2024-11-20 RX ADMIN — SULINDAC 200 MILLIGRAM(S): 150 TABLET ORAL at 04:09

## 2024-11-20 RX ADMIN — SULINDAC 200 MILLIGRAM(S): 150 TABLET ORAL at 16:04

## 2024-11-20 RX ADMIN — Medication 1 MILLIGRAM(S): at 20:04

## 2024-11-20 RX ADMIN — Medication 1 MILLIGRAM(S): at 08:23

## 2024-11-20 RX ADMIN — MIRTAZAPINE 7.5 MILLIGRAM(S): 15 TABLET, FILM COATED ORAL at 20:03

## 2024-11-20 RX ADMIN — ACETAMINOPHEN 500MG 650 MILLIGRAM(S): 500 TABLET, COATED ORAL at 20:21

## 2024-11-20 RX ADMIN — LIDOCAINE 1 PATCH: 40 CREAM TOPICAL at 08:23

## 2024-11-20 NOTE — CONSULT NOTE ADULT - ASSESSMENT
64M admitted for psychosis    Plan:  HTN: BP not at goal on amlodipine 10 and nebivolol 5.  Will add lisinopril 5 and monitor BP.    HLD: 10 year ASCVD risk is 19%, will benefit from high-intensity statin.  Will start atorvastatin 40mg qhs.    Back pain: Using lidocaine patch and sulindac prn.    Psychosis: Management per primary team

## 2024-11-20 NOTE — BH INPATIENT PSYCHIATRY PROGRESS NOTE - NSBHFUPINTERVALHXFT_PSY_A_CORE
Patient seen for follow up of psychosis.  I discussed patient's case with the interdisciplinary team. Patient was adherent to medication regimen. There were no notable overnight events.  Assessed with Greenlandic  ID # [id number].  Patient reports sleeping 3hrs overnight.  States he felt much better this morning and that intranasal saline helped him greatly.  However, says he started to feel weak in the last few hours.  Encouraged to rest and take Tylenol or sulindac.  States AH are now very rare.

## 2024-11-20 NOTE — CONSULT NOTE ADULT - SUBJECTIVE AND OBJECTIVE BOX
HPI:     PAST MEDICAL & SURGICAL HISTORY:  HTN (hypertension)      HLD (hyperlipidemia)      Prediabetes      No significant past surgical history          Review of Systems:   CONSTITUTIONAL: No fever, weight loss, or fatigue  EYES: No eye pain, visual disturbances, or discharge  ENMT:  No difficulty hearing, tinnitus, vertigo; No sinus or throat pain  NECK: No pain or stiffness  RESPIRATORY: No cough, wheezing, chills or hemoptysis; No shortness of breath  CARDIOVASCULAR: No chest pain, palpitations, dizziness, or leg swelling  GASTROINTESTINAL: No abdominal or epigastric pain. No nausea, vomiting, or hematemesis; No diarrhea or constipation. No melena or hematochezia.  GENITOURINARY: No dysuria, frequency, hematuria, or incontinence  NEUROLOGICAL: No headaches, memory loss, loss of strength, numbness, or tremors  SKIN: No itching, burning, rashes, or lesions   LYMPH NODES: No enlarged glands  ENDOCRINE: No heat or cold intolerance; No hair loss  MUSCULOSKELETAL: No joint pain or swelling; No muscle, back, or extremity pain  HEME/LYMPH: No easy bruising, or bleeding gums  ALLERY AND IMMUNOLOGIC: No hives or eczema    Allergies    No Known Allergies    Intolerances        Social History:     FAMILY HISTORY:      MEDICATIONS  (STANDING):  amLODIPine   Tablet 10 milliGRAM(s) Oral daily  ARIPiprazole 7 milliGRAM(s) Oral <User Schedule>  benztropine 1 milliGRAM(s) Oral two times a day  fluPHENAZine 2.5 milliGRAM(s) Oral daily  fluPHENAZine 5 milliGRAM(s) Oral at bedtime  glucagon  Injectable 1 milliGRAM(s) IntraMuscular once  insulin lispro (ADMELOG) corrective regimen sliding scale   SubCutaneous before dinner  lidocaine   4% Patch 1 Patch Transdermal daily  lithium CR (ESKALITH-CR) 900 milliGRAM(s) Oral at bedtime  mirtazapine 7.5 milliGRAM(s) Oral at bedtime  nebivolol 5 milliGRAM(s) Oral daily  nicotine -  14 mG/24Hr(s) Patch 1 Patch Transdermal daily  venlafaxine XR. 150 milliGRAM(s) Oral daily    MEDICATIONS  (PRN):  acetaminophen     Tablet .. 650 milliGRAM(s) Oral every 6 hours PRN Mild Pain (1 - 3), Moderate Pain (4 - 6), Severe Pain (7 - 10)  clonazePAM  Tablet 1 milliGRAM(s) Oral at bedtime PRN insomnia  dextrose Oral Gel 15 Gram(s) Oral once PRN Blood Glucose LESS THAN 70 milliGRAM(s)/deciliter  fluPHENAZine 2.5 milliGRAM(s) Oral every 6 hours PRN agitation secondary to psychosis  fluPHENAZine  Injectable 2.5 milliGRAM(s) IntraMuscular Once PRN severe agitation secondary to psychosis  meclizine 12.5 milliGRAM(s) Oral every 6 hours PRN dizziness  melatonin 3 milliGRAM(s) Oral at bedtime PRN Insomnia  nicotine  Polacrilex Gum 4 milliGRAM(s) Oral every 3 hours PRN Smoking Cessation  polyethylene glycol 3350 17 Gram(s) Oral daily PRN Constipation  QUEtiapine 25 milliGRAM(s) Oral at bedtime PRN insomnia  sodium chloride 0.65% Nasal 1 Spray(s) Both Nostrils three times a day PRN Nasal Congestion  sulindac 200 milliGRAM(s) Oral every 12 hours PRN pain  traZODone 50 milliGRAM(s) Oral at bedtime PRN insomnia      Vital Signs Last 24 Hrs  T(C): 36.3 (20 Nov 2024 06:46), Max: 36.3 (20 Nov 2024 06:46)  T(F): 97.3 (20 Nov 2024 06:46), Max: 97.3 (20 Nov 2024 06:46)  HR: --  BP: --  BP(mean): --  RR: --  SpO2: --      CAPILLARY BLOOD GLUCOSE      POCT Blood Glucose.: 142 mg/dL (20 Nov 2024 16:03)        PHYSICAL EXAM:  GENERAL: NAD  HEAD:  Atraumatic, Normocephalic  EYES: EOMI, conjunctiva and sclera clear  NECK: Supple, No JVD  CHEST/LUNG: Clear to auscultation bilaterally; No wheeze  HEART: Regular rate and rhythm; No murmurs, rubs, or gallops  ABDOMEN: Soft, Nontender, Nondistended; Bowel sounds present  EXTREMITIES:  2+ Peripheral Pulses, No clubbing, cyanosis, or edema  NEUROLOGY: non-focal  SKIN: No rashes or lesions    LABS:                    EKG(personally reviewed):    RADIOLOGY & ADDITIONAL TESTS:    Imaging Personally Reviewed:    Consultant(s) Notes Reviewed:      Care Discussed with Consultants/Other Providers:   HPI: Pt is 64M admitted for psychosis.  BPs have been elevated, Amlodipine was increased to 10mg daily today. Pt denies c/o.    PAST MEDICAL & SURGICAL HISTORY:  HTN (hypertension)      HLD (hyperlipidemia)      Prediabetes      No significant past surgical history          Review of Systems:   CONSTITUTIONAL: No fever, weight loss, or fatigue  EYES: No eye pain, visual disturbances, or discharge  ENMT:  No difficulty hearing, tinnitus, vertigo; No sinus or throat pain  NECK: No pain or stiffness  RESPIRATORY: No cough, wheezing, chills or hemoptysis; No shortness of breath  CARDIOVASCULAR: No chest pain, palpitations, dizziness, or leg swelling  GASTROINTESTINAL: No abdominal or epigastric pain. No nausea, vomiting, or hematemesis; No diarrhea or constipation. No melena or hematochezia.  GENITOURINARY: No dysuria, frequency, hematuria, or incontinence  NEUROLOGICAL: No headaches, memory loss, loss of strength, numbness, or tremors  SKIN: bump on right upper back not painful   LYMPH NODES: No enlarged glands  ENDOCRINE: No heat or cold intolerance; No hair loss  MUSCULOSKELETAL: No joint pain or swelling; No muscle, back, or extremity pain  HEME/LYMPH: No easy bruising, or bleeding gums  ALLERY AND IMMUNOLOGIC: No hives or eczema    Allergies    No Known Allergies    Intolerances        Social History: no etoh tod idu    FAMILY HISTORY: noncontributory      MEDICATIONS  (STANDING):  amLODIPine   Tablet 10 milliGRAM(s) Oral daily  ARIPiprazole 7 milliGRAM(s) Oral <User Schedule>  benztropine 1 milliGRAM(s) Oral two times a day  fluPHENAZine 2.5 milliGRAM(s) Oral daily  fluPHENAZine 5 milliGRAM(s) Oral at bedtime  glucagon  Injectable 1 milliGRAM(s) IntraMuscular once  insulin lispro (ADMELOG) corrective regimen sliding scale   SubCutaneous before dinner  lidocaine   4% Patch 1 Patch Transdermal daily  lithium CR (ESKALITH-CR) 900 milliGRAM(s) Oral at bedtime  mirtazapine 7.5 milliGRAM(s) Oral at bedtime  nebivolol 5 milliGRAM(s) Oral daily  nicotine -  14 mG/24Hr(s) Patch 1 Patch Transdermal daily  venlafaxine XR. 150 milliGRAM(s) Oral daily    MEDICATIONS  (PRN):  acetaminophen     Tablet .. 650 milliGRAM(s) Oral every 6 hours PRN Mild Pain (1 - 3), Moderate Pain (4 - 6), Severe Pain (7 - 10)  clonazePAM  Tablet 1 milliGRAM(s) Oral at bedtime PRN insomnia  dextrose Oral Gel 15 Gram(s) Oral once PRN Blood Glucose LESS THAN 70 milliGRAM(s)/deciliter  fluPHENAZine 2.5 milliGRAM(s) Oral every 6 hours PRN agitation secondary to psychosis  fluPHENAZine  Injectable 2.5 milliGRAM(s) IntraMuscular Once PRN severe agitation secondary to psychosis  meclizine 12.5 milliGRAM(s) Oral every 6 hours PRN dizziness  melatonin 3 milliGRAM(s) Oral at bedtime PRN Insomnia  nicotine  Polacrilex Gum 4 milliGRAM(s) Oral every 3 hours PRN Smoking Cessation  polyethylene glycol 3350 17 Gram(s) Oral daily PRN Constipation  QUEtiapine 25 milliGRAM(s) Oral at bedtime PRN insomnia  sodium chloride 0.65% Nasal 1 Spray(s) Both Nostrils three times a day PRN Nasal Congestion  sulindac 200 milliGRAM(s) Oral every 12 hours PRN pain  traZODone 50 milliGRAM(s) Oral at bedtime PRN insomnia      Vital Signs Last 24 Hrs  T(C): 36.3 (20 Nov 2024 06:46), Max: 36.3 (20 Nov 2024 06:46)  T(F): 97.3 (20 Nov 2024 06:46), Max: 97.3 (20 Nov 2024 06:46)  HR: 81  BP: 144/78  BP(mean): --  RR: 14  SpO2: --      CAPILLARY BLOOD GLUCOSE      POCT Blood Glucose.: 142 mg/dL (20 Nov 2024 16:03)        PHYSICAL EXAM:  GENERAL: NAD  HEAD:  Atraumatic, Normocephalic  EYES: EOMI, conjunctiva and sclera clear  NECK: Supple, No JVD  CHEST/LUNG: Clear to auscultation bilaterally; No wheeze  HEART: Regular rate and rhythm; No murmurs, rubs, or gallops  ABDOMEN: Soft, Nontender, Nondistended; Bowel sounds present  EXTREMITIES:  2+ Peripheral Pulses, No clubbing, cyanosis, or edema  NEUROLOGY: non-focal  SKIN: soft nontender nonerythmatous subcutaneous lump 2x3cm on right upper back, no fluctucance    LABS:        reviewed in sunrise          Care Discussed with Consultants/Other Providers: Dr Sylvester

## 2024-11-21 LAB — GLUCOSE BLDC GLUCOMTR-MCNC: 107 MG/DL — HIGH (ref 70–99)

## 2024-11-21 PROCEDURE — 99232 SBSQ HOSP IP/OBS MODERATE 35: CPT

## 2024-11-21 RX ORDER — SUVOREXANT 20 MG/1
1 TABLET, FILM COATED ORAL
Qty: 30 | Refills: 0
Start: 2024-11-21 | End: 2024-12-20

## 2024-11-21 RX ORDER — LISINOPRIL 20 MG/1
5 TABLET ORAL DAILY
Refills: 0 | Status: DISCONTINUED | OUTPATIENT
Start: 2024-11-21 | End: 2024-11-27

## 2024-11-21 RX ORDER — ARIPIPRAZOLE 15 MG/1
7 TABLET ORAL
Refills: 0 | Status: DISCONTINUED | OUTPATIENT
Start: 2024-11-21 | End: 2024-11-21

## 2024-11-21 RX ORDER — ARIPIPRAZOLE 15 MG/1
10 TABLET ORAL
Refills: 0 | Status: DISCONTINUED | OUTPATIENT
Start: 2024-11-21 | End: 2024-11-26

## 2024-11-21 RX ORDER — CLONAZEPAM 0.12 MG/1
1 TABLET, ORALLY DISINTEGRATING ORAL AT BEDTIME
Refills: 0 | Status: DISCONTINUED | OUTPATIENT
Start: 2024-11-21 | End: 2024-11-27

## 2024-11-21 RX ADMIN — VENLAFAXINE HYDROCHLORIDE 150 MILLIGRAM(S): 75 CAPSULE, EXTENDED RELEASE ORAL at 10:07

## 2024-11-21 RX ADMIN — ACETAMINOPHEN 500MG 650 MILLIGRAM(S): 500 TABLET, COATED ORAL at 05:54

## 2024-11-21 RX ADMIN — Medication 2.5 MILLIGRAM(S): at 10:06

## 2024-11-21 RX ADMIN — AMLODIPINE BESYLATE 10 MILLIGRAM(S): 10 TABLET ORAL at 10:07

## 2024-11-21 RX ADMIN — SULINDAC 200 MILLIGRAM(S): 150 TABLET ORAL at 12:24

## 2024-11-21 RX ADMIN — ARIPIPRAZOLE 10 MILLIGRAM(S): 15 TABLET ORAL at 17:52

## 2024-11-21 RX ADMIN — LISINOPRIL 5 MILLIGRAM(S): 20 TABLET ORAL at 14:57

## 2024-11-21 RX ADMIN — Medication 40 MILLIGRAM(S): at 20:30

## 2024-11-21 RX ADMIN — MIRTAZAPINE 7.5 MILLIGRAM(S): 15 TABLET, FILM COATED ORAL at 20:31

## 2024-11-21 RX ADMIN — Medication 5 MILLIGRAM(S): at 20:31

## 2024-11-21 RX ADMIN — CLONAZEPAM 1 MILLIGRAM(S): 0.12 TABLET, ORALLY DISINTEGRATING ORAL at 22:05

## 2024-11-21 RX ADMIN — LIDOCAINE 1 PATCH: 40 CREAM TOPICAL at 08:57

## 2024-11-21 RX ADMIN — NEBIVOLOL HYDROCHLORIDE 5 MILLIGRAM(S): 10 TABLET ORAL at 10:06

## 2024-11-21 RX ADMIN — ACETAMINOPHEN, DIPHENHYDRAMINE HCL, PHENYLEPHRINE HCL 3 MILLIGRAM(S): 325; 25; 5 TABLET ORAL at 22:05

## 2024-11-21 RX ADMIN — LITHIUM CARBONATE 900 MILLIGRAM(S): 300 CAPSULE ORAL at 20:30

## 2024-11-21 RX ADMIN — NICOTINE 1 PATCH: 21 PATCH, EXTENDED RELEASE TRANSDERMAL at 08:53

## 2024-11-21 RX ADMIN — TRAZODONE HYDROCHLORIDE 50 MILLIGRAM(S): 150 TABLET ORAL at 02:23

## 2024-11-21 RX ADMIN — Medication 1 MILLIGRAM(S): at 10:07

## 2024-11-21 RX ADMIN — Medication 1 MILLIGRAM(S): at 20:31

## 2024-11-21 RX ADMIN — Medication 2.5 MILLIGRAM(S): at 02:22

## 2024-11-21 NOTE — BH INPATIENT PSYCHIATRY PROGRESS NOTE - NSBHFUPINTERVALHXFT_PSY_A_CORE
Patient seen for follow up of psychosis.  I discussed patient's case with the interdisciplinary team. Patient was adherent to medication regimen. There were no notable overnight events.  Assessed with Vincentian  ID # [id number].  Patient reports sleeping 3hrs overnight again and says "everything is upside down for me, that's just how it is".  However also states he is unsatisfied with this.  Reports feeling weakness in his legs this morning which was transient and has since improved.   States AH are now very rare. Agreeable to continue cross titration to Abilify.

## 2024-11-22 LAB — GLUCOSE BLDC GLUCOMTR-MCNC: 138 MG/DL — HIGH (ref 70–99)

## 2024-11-22 PROCEDURE — 99232 SBSQ HOSP IP/OBS MODERATE 35: CPT

## 2024-11-22 RX ADMIN — LITHIUM CARBONATE 900 MILLIGRAM(S): 300 CAPSULE ORAL at 20:08

## 2024-11-22 RX ADMIN — AMLODIPINE BESYLATE 10 MILLIGRAM(S): 10 TABLET ORAL at 08:26

## 2024-11-22 RX ADMIN — Medication 5 MILLIGRAM(S): at 20:09

## 2024-11-22 RX ADMIN — ARIPIPRAZOLE 10 MILLIGRAM(S): 15 TABLET ORAL at 18:26

## 2024-11-22 RX ADMIN — ACETAMINOPHEN, DIPHENHYDRAMINE HCL, PHENYLEPHRINE HCL 3 MILLIGRAM(S): 325; 25; 5 TABLET ORAL at 22:16

## 2024-11-22 RX ADMIN — Medication 40 MILLIGRAM(S): at 20:09

## 2024-11-22 RX ADMIN — LISINOPRIL 5 MILLIGRAM(S): 20 TABLET ORAL at 08:26

## 2024-11-22 RX ADMIN — Medication 1 MILLIGRAM(S): at 20:08

## 2024-11-22 RX ADMIN — SULINDAC 200 MILLIGRAM(S): 150 TABLET ORAL at 06:37

## 2024-11-22 RX ADMIN — SULINDAC 200 MILLIGRAM(S): 150 TABLET ORAL at 18:43

## 2024-11-22 RX ADMIN — VENLAFAXINE HYDROCHLORIDE 150 MILLIGRAM(S): 75 CAPSULE, EXTENDED RELEASE ORAL at 08:26

## 2024-11-22 RX ADMIN — LIDOCAINE 1 PATCH: 40 CREAM TOPICAL at 08:27

## 2024-11-22 RX ADMIN — Medication 2.5 MILLIGRAM(S): at 08:26

## 2024-11-22 RX ADMIN — CLONAZEPAM 1 MILLIGRAM(S): 0.12 TABLET, ORALLY DISINTEGRATING ORAL at 22:16

## 2024-11-22 RX ADMIN — MIRTAZAPINE 7.5 MILLIGRAM(S): 15 TABLET, FILM COATED ORAL at 20:09

## 2024-11-22 RX ADMIN — NEBIVOLOL HYDROCHLORIDE 5 MILLIGRAM(S): 10 TABLET ORAL at 08:26

## 2024-11-22 RX ADMIN — NICOTINE 1 PATCH: 21 PATCH, EXTENDED RELEASE TRANSDERMAL at 08:26

## 2024-11-22 RX ADMIN — Medication 1 MILLIGRAM(S): at 08:26

## 2024-11-22 NOTE — BH INPATIENT PSYCHIATRY PROGRESS NOTE - NSBHFUPINTERVALHXFT_PSY_A_CORE
Patient seen for follow up of psychosis.  I discussed patient's case with the interdisciplinary team. Patient was adherent to medication regimen. There were no notable overnight events.  Assessed with Malagasy  ID # [id number].  Patient reports full night of sleep last night and feels much better as a result.  Denies any physical complaints.  We discuss how Belsomra PA is pending.   States AH are now very rare. Agreeable to continue cross titration to Abilify.

## 2024-11-23 LAB — GLUCOSE BLDC GLUCOMTR-MCNC: 153 MG/DL — HIGH (ref 70–99)

## 2024-11-23 RX ORDER — DIPHENHYDRAMINE HCL 25 MG
25 CAPSULE ORAL ONCE
Refills: 0 | Status: COMPLETED | OUTPATIENT
Start: 2024-11-23 | End: 2024-11-23

## 2024-11-23 RX ADMIN — Medication 1 MILLIGRAM(S): at 20:23

## 2024-11-23 RX ADMIN — Medication 40 MILLIGRAM(S): at 20:23

## 2024-11-23 RX ADMIN — LISINOPRIL 5 MILLIGRAM(S): 20 TABLET ORAL at 09:13

## 2024-11-23 RX ADMIN — Medication 25 MILLIGRAM(S): at 17:45

## 2024-11-23 RX ADMIN — POLYETHYLENE GLYCOL 3350 17 GRAM(S): 17 POWDER, FOR SOLUTION ORAL at 09:15

## 2024-11-23 RX ADMIN — SULINDAC 200 MILLIGRAM(S): 150 TABLET ORAL at 11:51

## 2024-11-23 RX ADMIN — LIDOCAINE 1 PATCH: 40 CREAM TOPICAL at 09:12

## 2024-11-23 RX ADMIN — SULINDAC 200 MILLIGRAM(S): 150 TABLET ORAL at 22:12

## 2024-11-23 RX ADMIN — SULINDAC 200 MILLIGRAM(S): 150 TABLET ORAL at 10:51

## 2024-11-23 RX ADMIN — SULINDAC 200 MILLIGRAM(S): 150 TABLET ORAL at 23:00

## 2024-11-23 RX ADMIN — MIRTAZAPINE 7.5 MILLIGRAM(S): 15 TABLET, FILM COATED ORAL at 20:23

## 2024-11-23 RX ADMIN — Medication 2.5 MILLIGRAM(S): at 09:13

## 2024-11-23 RX ADMIN — NEBIVOLOL HYDROCHLORIDE 5 MILLIGRAM(S): 10 TABLET ORAL at 09:13

## 2024-11-23 RX ADMIN — LITHIUM CARBONATE 900 MILLIGRAM(S): 300 CAPSULE ORAL at 20:24

## 2024-11-23 RX ADMIN — Medication 1 MILLIGRAM(S): at 09:13

## 2024-11-23 RX ADMIN — NICOTINE 1 PATCH: 21 PATCH, EXTENDED RELEASE TRANSDERMAL at 10:11

## 2024-11-23 RX ADMIN — AMLODIPINE BESYLATE 10 MILLIGRAM(S): 10 TABLET ORAL at 09:13

## 2024-11-23 RX ADMIN — ARIPIPRAZOLE 10 MILLIGRAM(S): 15 TABLET ORAL at 17:44

## 2024-11-23 RX ADMIN — VENLAFAXINE HYDROCHLORIDE 150 MILLIGRAM(S): 75 CAPSULE, EXTENDED RELEASE ORAL at 09:13

## 2024-11-23 RX ADMIN — Medication 5 MILLIGRAM(S): at 20:23

## 2024-11-24 LAB
ALBUMIN SERPL ELPH-MCNC: 4.4 G/DL — SIGNIFICANT CHANGE UP (ref 3.3–5)
ALP SERPL-CCNC: 103 U/L — SIGNIFICANT CHANGE UP (ref 40–120)
ALT FLD-CCNC: 27 U/L — SIGNIFICANT CHANGE UP (ref 4–41)
ANION GAP SERPL CALC-SCNC: 12 MMOL/L — SIGNIFICANT CHANGE UP (ref 7–14)
AST SERPL-CCNC: 20 U/L — SIGNIFICANT CHANGE UP (ref 4–40)
BASOPHILS # BLD AUTO: 0.06 K/UL — SIGNIFICANT CHANGE UP (ref 0–0.2)
BASOPHILS NFR BLD AUTO: 0.5 % — SIGNIFICANT CHANGE UP (ref 0–2)
BILIRUB SERPL-MCNC: 0.2 MG/DL — SIGNIFICANT CHANGE UP (ref 0.2–1.2)
BUN SERPL-MCNC: 18 MG/DL — SIGNIFICANT CHANGE UP (ref 7–23)
CALCIUM SERPL-MCNC: 10.3 MG/DL — SIGNIFICANT CHANGE UP (ref 8.4–10.5)
CHLORIDE SERPL-SCNC: 100 MMOL/L — SIGNIFICANT CHANGE UP (ref 98–107)
CO2 SERPL-SCNC: 27 MMOL/L — SIGNIFICANT CHANGE UP (ref 22–31)
CREAT SERPL-MCNC: 0.89 MG/DL — SIGNIFICANT CHANGE UP (ref 0.5–1.3)
EGFR: 96 ML/MIN/1.73M2 — SIGNIFICANT CHANGE UP
EOSINOPHIL # BLD AUTO: 0.5 K/UL — SIGNIFICANT CHANGE UP (ref 0–0.5)
EOSINOPHIL NFR BLD AUTO: 4.3 % — SIGNIFICANT CHANGE UP (ref 0–6)
GLUCOSE BLDC GLUCOMTR-MCNC: 144 MG/DL — HIGH (ref 70–99)
GLUCOSE SERPL-MCNC: 126 MG/DL — HIGH (ref 70–99)
HCT VFR BLD CALC: 44.4 % — SIGNIFICANT CHANGE UP (ref 39–50)
HGB BLD-MCNC: 14.2 G/DL — SIGNIFICANT CHANGE UP (ref 13–17)
IANC: 7.87 K/UL — HIGH (ref 1.8–7.4)
IMM GRANULOCYTES NFR BLD AUTO: 0.8 % — SIGNIFICANT CHANGE UP (ref 0–0.9)
LYMPHOCYTES # BLD AUTO: 2.41 K/UL — SIGNIFICANT CHANGE UP (ref 1–3.3)
LYMPHOCYTES # BLD AUTO: 20.6 % — SIGNIFICANT CHANGE UP (ref 13–44)
MCHC RBC-ENTMCNC: 27.7 PG — SIGNIFICANT CHANGE UP (ref 27–34)
MCHC RBC-ENTMCNC: 32 G/DL — SIGNIFICANT CHANGE UP (ref 32–36)
MCV RBC AUTO: 86.7 FL — SIGNIFICANT CHANGE UP (ref 80–100)
MONOCYTES # BLD AUTO: 0.79 K/UL — SIGNIFICANT CHANGE UP (ref 0–0.9)
MONOCYTES NFR BLD AUTO: 6.7 % — SIGNIFICANT CHANGE UP (ref 2–14)
NEUTROPHILS # BLD AUTO: 7.87 K/UL — HIGH (ref 1.8–7.4)
NEUTROPHILS NFR BLD AUTO: 67.1 % — SIGNIFICANT CHANGE UP (ref 43–77)
NRBC # BLD: 0 /100 WBCS — SIGNIFICANT CHANGE UP (ref 0–0)
NRBC # FLD: 0 K/UL — SIGNIFICANT CHANGE UP (ref 0–0)
PLATELET # BLD AUTO: 263 K/UL — SIGNIFICANT CHANGE UP (ref 150–400)
POTASSIUM SERPL-MCNC: 4.2 MMOL/L — SIGNIFICANT CHANGE UP (ref 3.5–5.3)
POTASSIUM SERPL-SCNC: 4.2 MMOL/L — SIGNIFICANT CHANGE UP (ref 3.5–5.3)
PROT SERPL-MCNC: 7.6 G/DL — SIGNIFICANT CHANGE UP (ref 6–8.3)
RBC # BLD: 5.12 M/UL — SIGNIFICANT CHANGE UP (ref 4.2–5.8)
RBC # FLD: 13.8 % — SIGNIFICANT CHANGE UP (ref 10.3–14.5)
SODIUM SERPL-SCNC: 139 MMOL/L — SIGNIFICANT CHANGE UP (ref 135–145)
WBC # BLD: 11.72 K/UL — HIGH (ref 3.8–10.5)
WBC # FLD AUTO: 11.72 K/UL — HIGH (ref 3.8–10.5)

## 2024-11-24 RX ORDER — DIPHENHYDRAMINE HCL 25 MG
25 CAPSULE ORAL EVERY 4 HOURS
Refills: 0 | Status: DISCONTINUED | OUTPATIENT
Start: 2024-11-24 | End: 2024-11-27

## 2024-11-24 RX ADMIN — SULINDAC 200 MILLIGRAM(S): 150 TABLET ORAL at 11:05

## 2024-11-24 RX ADMIN — Medication 2.5 MILLIGRAM(S): at 08:29

## 2024-11-24 RX ADMIN — SULINDAC 200 MILLIGRAM(S): 150 TABLET ORAL at 23:07

## 2024-11-24 RX ADMIN — SULINDAC 200 MILLIGRAM(S): 150 TABLET ORAL at 12:26

## 2024-11-24 RX ADMIN — AMLODIPINE BESYLATE 10 MILLIGRAM(S): 10 TABLET ORAL at 08:29

## 2024-11-24 RX ADMIN — NICOTINE 1 PATCH: 21 PATCH, EXTENDED RELEASE TRANSDERMAL at 08:27

## 2024-11-24 RX ADMIN — LISINOPRIL 5 MILLIGRAM(S): 20 TABLET ORAL at 08:29

## 2024-11-24 RX ADMIN — Medication 40 MILLIGRAM(S): at 21:02

## 2024-11-24 RX ADMIN — Medication 1 MILLIGRAM(S): at 21:03

## 2024-11-24 RX ADMIN — NEBIVOLOL HYDROCHLORIDE 5 MILLIGRAM(S): 10 TABLET ORAL at 08:29

## 2024-11-24 RX ADMIN — Medication 5 MILLIGRAM(S): at 21:02

## 2024-11-24 RX ADMIN — Medication 1 MILLIGRAM(S): at 08:29

## 2024-11-24 RX ADMIN — VENLAFAXINE HYDROCHLORIDE 150 MILLIGRAM(S): 75 CAPSULE, EXTENDED RELEASE ORAL at 08:29

## 2024-11-24 RX ADMIN — CLONAZEPAM 1 MILLIGRAM(S): 0.12 TABLET, ORALLY DISINTEGRATING ORAL at 00:52

## 2024-11-24 RX ADMIN — LITHIUM CARBONATE 900 MILLIGRAM(S): 300 CAPSULE ORAL at 21:02

## 2024-11-24 RX ADMIN — LIDOCAINE 1 PATCH: 40 CREAM TOPICAL at 08:28

## 2024-11-24 RX ADMIN — SULINDAC 200 MILLIGRAM(S): 150 TABLET ORAL at 23:05

## 2024-11-24 RX ADMIN — ARIPIPRAZOLE 10 MILLIGRAM(S): 15 TABLET ORAL at 18:09

## 2024-11-24 RX ADMIN — MIRTAZAPINE 7.5 MILLIGRAM(S): 15 TABLET, FILM COATED ORAL at 21:02

## 2024-11-25 LAB — GLUCOSE BLDC GLUCOMTR-MCNC: 86 MG/DL — SIGNIFICANT CHANGE UP (ref 70–99)

## 2024-11-25 PROCEDURE — 99232 SBSQ HOSP IP/OBS MODERATE 35: CPT

## 2024-11-25 RX ORDER — ARIPIPRAZOLE 15 MG/1
441 TABLET ORAL
Qty: 1 | Refills: 0
Start: 2024-11-25 | End: 2024-12-22

## 2024-11-25 RX ADMIN — Medication 12.5 MILLIGRAM(S): at 16:01

## 2024-11-25 RX ADMIN — Medication 40 MILLIGRAM(S): at 20:50

## 2024-11-25 RX ADMIN — NEBIVOLOL HYDROCHLORIDE 5 MILLIGRAM(S): 10 TABLET ORAL at 09:51

## 2024-11-25 RX ADMIN — SULINDAC 200 MILLIGRAM(S): 150 TABLET ORAL at 11:10

## 2024-11-25 RX ADMIN — ACETAMINOPHEN 500MG 650 MILLIGRAM(S): 500 TABLET, COATED ORAL at 06:16

## 2024-11-25 RX ADMIN — CLONAZEPAM 1 MILLIGRAM(S): 0.12 TABLET, ORALLY DISINTEGRATING ORAL at 00:06

## 2024-11-25 RX ADMIN — AMLODIPINE BESYLATE 10 MILLIGRAM(S): 10 TABLET ORAL at 09:50

## 2024-11-25 RX ADMIN — Medication 2.5 MILLIGRAM(S): at 09:51

## 2024-11-25 RX ADMIN — ARIPIPRAZOLE 10 MILLIGRAM(S): 15 TABLET ORAL at 18:28

## 2024-11-25 RX ADMIN — NICOTINE 1 PATCH: 21 PATCH, EXTENDED RELEASE TRANSDERMAL at 09:49

## 2024-11-25 RX ADMIN — LITHIUM CARBONATE 900 MILLIGRAM(S): 300 CAPSULE ORAL at 20:50

## 2024-11-25 RX ADMIN — Medication 1 MILLIGRAM(S): at 09:50

## 2024-11-25 RX ADMIN — Medication 5 MILLIGRAM(S): at 20:50

## 2024-11-25 RX ADMIN — SULINDAC 200 MILLIGRAM(S): 150 TABLET ORAL at 12:10

## 2024-11-25 RX ADMIN — ACETAMINOPHEN 500MG 650 MILLIGRAM(S): 500 TABLET, COATED ORAL at 02:49

## 2024-11-25 RX ADMIN — LISINOPRIL 5 MILLIGRAM(S): 20 TABLET ORAL at 09:51

## 2024-11-25 RX ADMIN — VENLAFAXINE HYDROCHLORIDE 150 MILLIGRAM(S): 75 CAPSULE, EXTENDED RELEASE ORAL at 09:51

## 2024-11-25 RX ADMIN — MIRTAZAPINE 7.5 MILLIGRAM(S): 15 TABLET, FILM COATED ORAL at 20:50

## 2024-11-25 RX ADMIN — TRAZODONE HYDROCHLORIDE 50 MILLIGRAM(S): 150 TABLET ORAL at 02:50

## 2024-11-25 RX ADMIN — LIDOCAINE 1 PATCH: 40 CREAM TOPICAL at 09:50

## 2024-11-25 RX ADMIN — Medication 1 MILLIGRAM(S): at 20:50

## 2024-11-25 NOTE — BH INPATIENT PSYCHIATRY PROGRESS NOTE - NSBHFUPINTERVALHXFT_PSY_A_CORE
Patient seen for follow up of psychosis.  I discussed patient's case with the interdisciplinary team. Patient was adherent to medication regimen. There were no notable overnight events.  Assessed with English  ID # [id number].  Patient reports AH are now very rare. Feels good on this regimen.  However, he continues to sleep poorly, marginally better than before.  he does doze off during the day.  Reports swelling of his fingers.  No coldness, erythema detected and cap refill is brisk on exam.  See hospitalist note for further details.  Spoke with patient's nephew about current condition, plan, and discharge.

## 2024-11-25 NOTE — BH SAFETY PLAN - DISTRACTION PHONE 2
History of Present Illness: I have reviewed and appreciated the technician's history and test results as outlined above.      CC: Pt is here for visual health exam and dilated retinal exam. Pt feels that the left eye is smeary and tearing.     Refraction waiver was SIGNED by the patient.     Blood sugars are a little high per pt.     Slit Lamp exam:  LIDS, LASHES, LACRIMAL: OD (RIGHT EYE) // normal  OS (LEFT EYE): // normal    CONJUCTIVA OD (RIGHT EYE):// clear OS (LEFT EYE) :// clear    CORNEA:  OD (RIGHT EYE):// clear OS (LEFT EYE):// clear   ANTERIOR CHAMBER:  OD (RIGHT EYE):// deep and quiet OS (LEFT EYE): // deep and quiet    IRIS:  OD (RIGHT EYE)://round and regular without neovascularization OS (LEFT EYE): // round and regular without neovascularization   LENS:  OD (RIGHT EYE): // PC IOL, well centered, stable with trace posterior capsular opacity OS (LEFT EYE): // PC IOL, well centered, stable with 1+ posterior capsular opacity    Posterior Segment Exam:    Vitreous:  OD (Right eye): posterior vitreous detachment // OS (left eye): posterior vitreous detachment  Optic Nerve:  OD: flat, pink, healthy with + SVP and C/D 0.40 // OS: flat, pink, healthy with + SVP and C/D 0.40  Macula:  OD: 1+ fine drusen // OS: 1+ fine drusen  Vessels:  OD: healthy with normal A-V ratio // OS: healthy with normal A-V ratio  Periphery:  OD: flat, healthy without tears or retinal detachment // OS: flat, healthy without tears or retinal detachment      Assessment:  DIABETES    Plan:  No diabetic eye disease and Discussed tighter control of diabetes, and importance of hemoglobin A1c control ,in decreasing risk of blindness due to diabetes      DX (DIAGNOSIS):   After Cataract    TX (TREATMENT):  Copy of MR  Patient is generally getting along okay and will be monitored         In my phone

## 2024-11-25 NOTE — PROGRESS NOTE ADULT - ASSESSMENT
64M admitted for psychosis    Plan:  HTN: BP not at goal on amlodipine 10 and nebivolol 5. Improved with addition of Lisinopril 5mg qd.     R finger redness: benign physical exam, will follow.     HLD: 10 year ASCVD risk is 19%, will benefit from high-intensity statin. Will start atorvastatin 40mg qhs.    Back pain: Using lidocaine patch and sulindac prn.    Psychosis: Management per primary team

## 2024-11-25 NOTE — PROGRESS NOTE ADULT - SUBJECTIVE AND OBJECTIVE BOX
[facility] Division of Hospital Medicine  Mirtha Miranda MD   Available via MS Teams  In house pager [phone]    CC/Reason for Consult: HTN and R finger redness     SUBJECTIVE / OVERNIGHT EVENTS:  - Used Palestinian  [id number]. Pt endorsing redness of fingers of R hand distally.     MEDICATIONS  (STANDING):  amLODIPine   Tablet 10 milliGRAM(s) Oral daily  ARIPiprazole 10 milliGRAM(s) Oral <User Schedule>  atorvastatin 40 milliGRAM(s) Oral at bedtime  benztropine 1 milliGRAM(s) Oral two times a day  fluPHENAZine 2.5 milliGRAM(s) Oral daily  fluPHENAZine 5 milliGRAM(s) Oral at bedtime  glucagon  Injectable 1 milliGRAM(s) IntraMuscular once  insulin lispro (ADMELOG) corrective regimen sliding scale   SubCutaneous before dinner  lidocaine   4% Patch 1 Patch Transdermal daily  lisinopril 5 milliGRAM(s) Oral daily  lithium CR (ESKALITH-CR) 900 milliGRAM(s) Oral at bedtime  mirtazapine 7.5 milliGRAM(s) Oral at bedtime  nebivolol 5 milliGRAM(s) Oral daily  nicotine -  14 mG/24Hr(s) Patch 1 Patch Transdermal daily  venlafaxine XR. 150 milliGRAM(s) Oral daily    MEDICATIONS  (PRN):  acetaminophen     Tablet .. 650 milliGRAM(s) Oral every 6 hours PRN Mild Pain (1 - 3), Moderate Pain (4 - 6), Severe Pain (7 - 10)  clonazePAM  Tablet 1 milliGRAM(s) Oral at bedtime PRN insomnia  dextrose Oral Gel 15 Gram(s) Oral once PRN Blood Glucose LESS THAN 70 milliGRAM(s)/deciliter  diphenhydrAMINE 25 milliGRAM(s) Oral every 4 hours PRN Rash and/or Itching  fluPHENAZine 2.5 milliGRAM(s) Oral every 6 hours PRN agitation secondary to psychosis  fluPHENAZine  Injectable 2.5 milliGRAM(s) IntraMuscular Once PRN severe agitation secondary to psychosis  meclizine 12.5 milliGRAM(s) Oral every 6 hours PRN dizziness  melatonin 3 milliGRAM(s) Oral at bedtime PRN Insomnia  nicotine  Polacrilex Gum 4 milliGRAM(s) Oral every 3 hours PRN Smoking Cessation  polyethylene glycol 3350 17 Gram(s) Oral daily PRN Constipation  QUEtiapine 25 milliGRAM(s) Oral at bedtime PRN insomnia  sodium chloride 0.65% Nasal 1 Spray(s) Both Nostrils three times a day PRN Nasal Congestion  sulindac 200 milliGRAM(s) Oral every 12 hours PRN pain  traZODone 50 milliGRAM(s) Oral at bedtime PRN insomnia      Vital Signs Last 24 Hrs  T(C): 37 (25 Nov 2024 06:47), Max: 37 (25 Nov 2024 06:47)  T(F): 98.6 (25 Nov 2024 06:47), Max: 98.6 (25 Nov 2024 06:47)  HR: --  BP: --  BP(mean): --  RR: --  SpO2: --      CAPILLARY BLOOD GLUCOSE      POCT Blood Glucose.: 86 mg/dL (25 Nov 2024 15:55)  POCT Blood Glucose.: 144 mg/dL (24 Nov 2024 16:13)        PHYSICAL EXAM:  GENERAL: NAD  HEAD:  Atraumatic, Normocephalic  EYES: EOMI, conjunctiva and sclera clear  NECK: Supple, No JVD  CHEST/LUNG: Clear to auscultation bilaterally; No wheeze  HEART: Regular rate and rhythm; No murmurs, rubs, or gallops  ABDOMEN: Soft, Nontender, Nondistended; Bowel sounds present  EXTREMITIES:  2+ Peripheral Pulses, No clubbing, cyanosis, or edema. R hand finger redness barely visible, good pulses, good cap refill, sensation intact, good strength, no nail discoloration, normal temperature   NEUROLOGY: non-focal  SKIN: No rashes or lesions    LABS:                        14.2   11.72 )-----------( 263      ( 24 Nov 2024 11:34 )             44.4     11-24    139  |  100  |  18  ----------------------------<  126[H]  4.2   |  27  |  0.89    Ca    10.3      24 Nov 2024 11:34    TPro  7.6  /  Alb  4.4  /  TBili  0.2  /  DBili  x   /  AST  20  /  ALT  27  /  AlkPhos  103  11-24          Urinalysis Basic - ( 24 Nov 2024 11:34 )    Color: x / Appearance: x / SG: x / pH: x  Gluc: 126 mg/dL / Ketone: x  / Bili: x / Urobili: x   Blood: x / Protein: x / Nitrite: x   Leuk Esterase: x / RBC: x / WBC x   Sq Epi: x / Non Sq Epi: x / Bacteria: x        RADIOLOGY & ADDITIONAL TESTS:    Imaging Personally Reviewed:    Consultant(s) Notes Reviewed:      Care Discussed with Consultants/Other Providers:

## 2024-11-26 LAB — GLUCOSE BLDC GLUCOMTR-MCNC: 127 MG/DL — HIGH (ref 70–99)

## 2024-11-26 PROCEDURE — 99232 SBSQ HOSP IP/OBS MODERATE 35: CPT

## 2024-11-26 RX ORDER — FLUPHENAZINE HCL 2.5 MG
1 TABLET ORAL
Qty: 30 | Refills: 0
Start: 2024-11-26 | End: 2024-12-25

## 2024-11-26 RX ORDER — POLYETHYLENE GLYCOL 3350 17 G/17G
17 POWDER, FOR SOLUTION ORAL
Qty: 510 | Refills: 0
Start: 2024-11-26 | End: 2024-12-25

## 2024-11-26 RX ORDER — CLONAZEPAM 0.12 MG/1
1 TABLET, ORALLY DISINTEGRATING ORAL
Qty: 60 | Refills: 0
Start: 2024-11-26 | End: 2024-12-25

## 2024-11-26 RX ORDER — LIDOCAINE 40 MG/G
1 CREAM TOPICAL
Qty: 14 | Refills: 0
Start: 2024-11-26 | End: 2024-12-09

## 2024-11-26 RX ORDER — BENZTROPINE MESYLATE 2 MG
1 TABLET ORAL
Qty: 60 | Refills: 0
Start: 2024-11-26 | End: 2024-12-25

## 2024-11-26 RX ORDER — ARIPIPRAZOLE 15 MG/1
441 TABLET ORAL
Qty: 0 | Refills: 0 | DISCHARGE
Start: 2024-11-26

## 2024-11-26 RX ORDER — TRAZODONE HYDROCHLORIDE 150 MG/1
1 TABLET ORAL
Qty: 14 | Refills: 0
Start: 2024-11-26 | End: 2024-12-09

## 2024-11-26 RX ORDER — ARIPIPRAZOLE 15 MG/1
441 TABLET ORAL ONCE
Refills: 0 | Status: COMPLETED | OUTPATIENT
Start: 2024-11-27 | End: 2024-11-27

## 2024-11-26 RX ORDER — MIRTAZAPINE 15 MG/1
1 TABLET, FILM COATED ORAL
Qty: 30 | Refills: 0
Start: 2024-11-26 | End: 2024-12-25

## 2024-11-26 RX ORDER — VENLAFAXINE HYDROCHLORIDE 75 MG/1
1 CAPSULE, EXTENDED RELEASE ORAL
Qty: 30 | Refills: 0
Start: 2024-11-26 | End: 2024-12-25

## 2024-11-26 RX ORDER — ARIPIPRAZOLE 15 MG/1
675 TABLET ORAL ONCE
Refills: 0 | Status: COMPLETED | OUTPATIENT
Start: 2024-11-26 | End: 2024-11-26

## 2024-11-26 RX ORDER — NEBIVOLOL HYDROCHLORIDE 10 MG/1
1 TABLET ORAL
Qty: 30 | Refills: 0
Start: 2024-11-26 | End: 2024-12-25

## 2024-11-26 RX ORDER — AMLODIPINE BESYLATE 10 MG/1
1 TABLET ORAL
Qty: 30 | Refills: 0
Start: 2024-11-26 | End: 2024-12-25

## 2024-11-26 RX ORDER — SUVOREXANT 20 MG/1
1 TABLET, FILM COATED ORAL
Qty: 0 | Refills: 0 | DISCHARGE

## 2024-11-26 RX ORDER — ARIPIPRAZOLE 15 MG/1
30 TABLET ORAL ONCE
Refills: 0 | Status: DISCONTINUED | OUTPATIENT
Start: 2024-11-26 | End: 2024-11-27

## 2024-11-26 RX ORDER — ARIPIPRAZOLE 15 MG/1
30 TABLET ORAL AT BEDTIME
Refills: 0 | Status: DISCONTINUED | OUTPATIENT
Start: 2024-11-26 | End: 2024-11-26

## 2024-11-26 RX ORDER — NICOTINE 21 MG/24H
1 PATCH, EXTENDED RELEASE TRANSDERMAL
Qty: 1 | Refills: 0
Start: 2024-11-26 | End: 2024-12-02

## 2024-11-26 RX ORDER — LITHIUM CARBONATE 300 MG/1
2 CAPSULE ORAL
Qty: 60 | Refills: 0
Start: 2024-11-26 | End: 2024-12-25

## 2024-11-26 RX ORDER — LISINOPRIL 20 MG/1
1 TABLET ORAL
Qty: 30 | Refills: 0
Start: 2024-11-26 | End: 2024-12-25

## 2024-11-26 RX ORDER — SULINDAC 150 MG/1
1 TABLET ORAL
Qty: 60 | Refills: 0
Start: 2024-11-26 | End: 2024-12-25

## 2024-11-26 RX ADMIN — SULINDAC 200 MILLIGRAM(S): 150 TABLET ORAL at 23:10

## 2024-11-26 RX ADMIN — SULINDAC 200 MILLIGRAM(S): 150 TABLET ORAL at 11:20

## 2024-11-26 RX ADMIN — Medication 5 MILLIGRAM(S): at 21:39

## 2024-11-26 RX ADMIN — LISINOPRIL 5 MILLIGRAM(S): 20 TABLET ORAL at 09:51

## 2024-11-26 RX ADMIN — Medication 1 MILLIGRAM(S): at 21:39

## 2024-11-26 RX ADMIN — ACETAMINOPHEN 500MG 650 MILLIGRAM(S): 500 TABLET, COATED ORAL at 14:34

## 2024-11-26 RX ADMIN — NICOTINE 1 PATCH: 21 PATCH, EXTENDED RELEASE TRANSDERMAL at 09:51

## 2024-11-26 RX ADMIN — LIDOCAINE 1 PATCH: 40 CREAM TOPICAL at 09:51

## 2024-11-26 RX ADMIN — MIRTAZAPINE 7.5 MILLIGRAM(S): 15 TABLET, FILM COATED ORAL at 21:39

## 2024-11-26 RX ADMIN — Medication 2.5 MILLIGRAM(S): at 09:52

## 2024-11-26 RX ADMIN — LITHIUM CARBONATE 900 MILLIGRAM(S): 300 CAPSULE ORAL at 21:39

## 2024-11-26 RX ADMIN — SULINDAC 200 MILLIGRAM(S): 150 TABLET ORAL at 10:20

## 2024-11-26 RX ADMIN — ACETAMINOPHEN 500MG 650 MILLIGRAM(S): 500 TABLET, COATED ORAL at 15:34

## 2024-11-26 RX ADMIN — ACETAMINOPHEN 500MG 650 MILLIGRAM(S): 500 TABLET, COATED ORAL at 08:21

## 2024-11-26 RX ADMIN — Medication 1 MILLIGRAM(S): at 09:51

## 2024-11-26 RX ADMIN — AMLODIPINE BESYLATE 10 MILLIGRAM(S): 10 TABLET ORAL at 09:52

## 2024-11-26 RX ADMIN — SULINDAC 200 MILLIGRAM(S): 150 TABLET ORAL at 22:16

## 2024-11-26 RX ADMIN — ARIPIPRAZOLE 675 MILLIGRAM(S): 15 TABLET ORAL at 17:55

## 2024-11-26 RX ADMIN — NEBIVOLOL HYDROCHLORIDE 5 MILLIGRAM(S): 10 TABLET ORAL at 09:51

## 2024-11-26 RX ADMIN — VENLAFAXINE HYDROCHLORIDE 150 MILLIGRAM(S): 75 CAPSULE, EXTENDED RELEASE ORAL at 09:51

## 2024-11-26 RX ADMIN — Medication 40 MILLIGRAM(S): at 21:39

## 2024-11-26 NOTE — BH INPATIENT PSYCHIATRY PROGRESS NOTE - NSBHCHARTREVIEWVS_PSY_A_CORE FT
Vital Signs Last 24 Hrs  T(C): 36.3 (11-17-24 @ 19:57), Max: 36.3 (11-17-24 @ 19:57)  T(F): 97.4 (11-17-24 @ 19:57), Max: 97.4 (11-17-24 @ 19:57)  HR: --  BP: --  BP(mean): --  RR: --  SpO2: --    Orthostatic VS  11-18-24 @ 15:39  Lying BP: --/-- HR: --  Sitting BP: 130/80 HR: 96  Standing BP: --/-- HR: --  Site: --  Mode: --  Orthostatic VS  11-17-24 @ 19:57  Lying BP: --/-- HR: --  Sitting BP: 125/102 HR: 81  Standing BP: 141/103 HR: 115  Site: --  Mode: --  Orthostatic VS  11-17-24 @ 06:40  Lying BP: --/-- HR: --  Sitting BP: 150/94 HR: 81  Standing BP: 164/101 HR: 83  Site: --  Mode: --  Orthostatic VS  11-16-24 @ 20:00  Lying BP: --/-- HR: --  Sitting BP: 148/89 HR: 82  Standing BP: 142/88 HR: 85  Site: --  Mode: --  Orthostatic VS  11-16-24 @ 17:13  Lying BP: --/-- HR: --  Sitting BP: 156/82 HR: 87  Standing BP: --/-- HR: --  Site: --  Mode: --  
Vital Signs Last 24 Hrs  T(C): 36.7 (11-08-24 @ 08:30), Max: 36.7 (11-08-24 @ 08:30)  T(F): 98 (11-08-24 @ 08:30), Max: 98 (11-08-24 @ 08:30)  HR: --  BP: --  BP(mean): --  RR: --  SpO2: --    Orthostatic VS  11-08-24 @ 08:30  Lying BP: --/-- HR: --  Sitting BP: 158/95 HR: 95  Standing BP: 148/90 HR: 91  Site: --  Mode: --  Orthostatic VS  11-07-24 @ 06:44  Lying BP: --/-- HR: --  Sitting BP: 159/86 HR: 64  Standing BP: 155/86 HR: 67  Site: --  Mode: --  
Vital Signs Last 24 Hrs  T(C): --  T(F): --  HR: --  BP: --  BP(mean): --  RR: --  SpO2: --    Orthostatic VS  11-13-24 @ 08:51  Lying BP: --/-- HR: --  Sitting BP: 153/90 HR: 80  Standing BP: 146/85 HR: 85  Site: --  Mode: --  
Vital Signs Last 24 Hrs  T(C): 36.2 (11-12-24 @ 09:37), Max: 36.2 (11-12-24 @ 09:37)  T(F): 97.2 (11-12-24 @ 09:37), Max: 97.2 (11-12-24 @ 09:37)  HR: --  BP: 135/70 (11-12-24 @ 09:37) (135/70 - 135/70)  BP(mean): --  RR: 16 (11-12-24 @ 09:37) (16 - 16)  SpO2: --    
Vital Signs Last 24 Hrs  T(C): 37 (11-26-24 @ 07:30), Max: 37 (11-26-24 @ 07:30)  T(F): 98.6 (11-26-24 @ 07:30), Max: 98.6 (11-26-24 @ 07:30)  HR: --  BP: --  BP(mean): --  RR: --  SpO2: --    Orthostatic VS  11-26-24 @ 07:30  Lying BP: --/-- HR: --  Sitting BP: 139/92 HR: 72  Standing BP: --/-- HR: --  Site: --  Mode: --  Orthostatic VS  11-25-24 @ 06:47  Lying BP: --/-- HR: --  Sitting BP: 137/86 HR: 70  Standing BP: 142/80 HR: 70  Site: upper left arm  Mode: electronic  
Vital Signs Last 24 Hrs  T(C): --  T(F): --  HR: --  BP: --  BP(mean): --  RR: --  SpO2: --    Orthostatic VS  11-10-24 @ 08:20  Lying BP: --/-- HR: --  Sitting BP: 180/67 HR: 60  Standing BP: 175/78 HR: 67  Site: --  Mode: --  
Vital Signs Last 24 Hrs  T(C): 36.2 (11-06-24 @ 06:42), Max: 36.2 (11-06-24 @ 06:42)  T(F): 97.1 (11-06-24 @ 06:42), Max: 97.1 (11-06-24 @ 06:42)  HR: --  BP: --  BP(mean): --  RR: --  SpO2: --    Orthostatic VS  11-06-24 @ 06:42  Lying BP: --/-- HR: --  Sitting BP: 149/87 HR: 68  Standing BP: 146/90 HR: 72  Site: --  Mode: --  Orthostatic VS  11-05-24 @ 06:46  Lying BP: --/-- HR: --  Sitting BP: 164/89 HR: 67  Standing BP: 158/99 HR: 70  Site: --  Mode: --  
Vital Signs Last 24 Hrs  T(C): 36.4 (11-15-24 @ 06:37), Max: 36.4 (11-15-24 @ 06:37)  T(F): 97.6 (11-15-24 @ 06:37), Max: 97.6 (11-15-24 @ 06:37)  HR: --  BP: --  BP(mean): --  RR: --  SpO2: --    Orthostatic VS  11-15-24 @ 06:37  Lying BP: --/-- HR: --  Sitting BP: 154/94 HR: 71  Standing BP: 156/97 HR: 74  Site: --  Mode: --  
Vital Signs Last 24 Hrs  T(C): --  T(F): --  HR: --  BP: --  BP(mean): --  RR: --  SpO2: --    Orthostatic VS  11-13-24 @ 08:51  Lying BP: --/-- HR: --  Sitting BP: 153/90 HR: 80  Standing BP: 146/85 HR: 85  Site: --  Mode: --  
Vital Signs Last 24 Hrs  T(C): 36.1 (11-07-24 @ 06:44), Max: 36.1 (11-07-24 @ 06:44)  T(F): 97 (11-07-24 @ 06:44), Max: 97 (11-07-24 @ 06:44)  HR: --  BP: --  BP(mean): --  RR: --  SpO2: --    Orthostatic VS  11-07-24 @ 06:44  Lying BP: --/-- HR: --  Sitting BP: 159/86 HR: 64  Standing BP: 155/86 HR: 67  Site: --  Mode: --  Orthostatic VS  11-06-24 @ 06:42  Lying BP: --/-- HR: --  Sitting BP: 149/87 HR: 68  Standing BP: 146/90 HR: 72  Site: --  Mode: --  
Vital Signs Last 24 Hrs  T(C): 36.1 (11-21-24 @ 06:42), Max: 36.1 (11-21-24 @ 06:42)  T(F): 97 (11-21-24 @ 06:42), Max: 97 (11-21-24 @ 06:42)  HR: --  BP: --  BP(mean): --  RR: --  SpO2: --    Orthostatic VS  11-21-24 @ 10:17  Lying BP: --/-- HR: --  Sitting BP: 145/80 HR: 88  Standing BP: --/-- HR: --  Site: --  Mode: --  Orthostatic VS  11-21-24 @ 06:42  Lying BP: --/-- HR: --  Sitting BP: 161/95 HR: 68  Standing BP: 148/95 HR: 73  Site: --  Mode: --  Orthostatic VS  11-20-24 @ 06:46  Lying BP: --/-- HR: --  Sitting BP: 165/95 HR: 69  Standing BP: 163/91 HR: 71  Site: --  Mode: --  
Vital Signs Last 24 Hrs  T(C): 36.1 (11-22-24 @ 06:53), Max: 36.1 (11-22-24 @ 06:53)  T(F): 97 (11-22-24 @ 06:53), Max: 97 (11-22-24 @ 06:53)  HR: --  BP: --  BP(mean): --  RR: --  SpO2: --    Orthostatic VS  11-22-24 @ 06:53  Lying BP: --/-- HR: --  Sitting BP: 155/93 HR: 69  Standing BP: 150/92 HR: 73  Site: --  Mode: --  Orthostatic VS  11-21-24 @ 14:56  Lying BP: --/-- HR: --  Sitting BP: 152/87 HR: 97  Standing BP: --/-- HR: --  Site: --  Mode: --  Orthostatic VS  11-21-24 @ 10:17  Lying BP: --/-- HR: --  Sitting BP: 145/80 HR: 88  Standing BP: --/-- HR: --  Site: --  Mode: --  Orthostatic VS  11-21-24 @ 06:42  Lying BP: --/-- HR: --  Sitting BP: 161/95 HR: 68  Standing BP: 148/95 HR: 73  Site: --  Mode: --  
Vital Signs Last 24 Hrs  T(C): 36.1 (11-05-24 @ 06:46), Max: 36.1 (11-05-24 @ 06:46)  T(F): 97 (11-05-24 @ 06:46), Max: 97 (11-05-24 @ 06:46)  HR: --  BP: --  BP(mean): --  RR: --  SpO2: --    Orthostatic VS  11-05-24 @ 06:46  Lying BP: --/-- HR: --  Sitting BP: 164/89 HR: 67  Standing BP: 158/99 HR: 70  Site: --  Mode: --  Orthostatic VS  11-03-24 @ 16:56  Lying BP: --/-- HR: --  Sitting BP: 178/106 HR: 73  Standing BP: 153/102 HR: 76  Site: --  Mode: --  
Vital Signs Last 24 Hrs  T(C): 36.3 (11-19-24 @ 08:31), Max: 36.8 (11-18-24 @ 19:55)  T(F): 97.3 (11-19-24 @ 08:31), Max: 98.2 (11-18-24 @ 19:55)  HR: --  BP: --  BP(mean): --  RR: --  SpO2: --    Orthostatic VS  11-19-24 @ 10:50  Lying BP: --/-- HR: --  Sitting BP: 144/78 HR: 81  Standing BP: 134/87 HR: 81  Site: --  Mode: --  Orthostatic VS  11-19-24 @ 08:31  Lying BP: --/-- HR: --  Sitting BP: 177/98 HR: 62  Standing BP: 168/95 HR: 66  Site: --  Mode: --  Orthostatic VS  11-18-24 @ 19:55  Lying BP: --/-- HR: --  Sitting BP: 154/89 HR: 76  Standing BP: --/-- HR: --  Site: --  Mode: --  Orthostatic VS  11-18-24 @ 15:39  Lying BP: --/-- HR: --  Sitting BP: 130/80 HR: 96  Standing BP: --/-- HR: --  Site: --  Mode: --  Orthostatic VS  11-17-24 @ 19:57  Lying BP: --/-- HR: --  Sitting BP: 125/102 HR: 81  Standing BP: 141/103 HR: 115  Site: --  Mode: --  
Vital Signs Last 24 Hrs  T(C): 36.3 (11-20-24 @ 06:46), Max: 36.3 (11-20-24 @ 06:46)  T(F): 97.3 (11-20-24 @ 06:46), Max: 97.3 (11-20-24 @ 06:46)  HR: --  BP: --  BP(mean): --  RR: --  SpO2: --    Orthostatic VS  11-20-24 @ 06:46  Lying BP: --/-- HR: --  Sitting BP: 165/95 HR: 69  Standing BP: 163/91 HR: 71  Site: --  Mode: --  Orthostatic VS  11-19-24 @ 10:50  Lying BP: --/-- HR: --  Sitting BP: 144/78 HR: 81  Standing BP: 134/87 HR: 81  Site: --  Mode: --  Orthostatic VS  11-19-24 @ 08:31  Lying BP: --/-- HR: --  Sitting BP: 177/98 HR: 62  Standing BP: 168/95 HR: 66  Site: --  Mode: --  Orthostatic VS  11-18-24 @ 19:55  Lying BP: --/-- HR: --  Sitting BP: 154/89 HR: 76  Standing BP: --/-- HR: --  Site: --  Mode: --  
Vital Signs Last 24 Hrs  T(C): 37 (11-25-24 @ 06:47), Max: 37 (11-25-24 @ 06:47)  T(F): 98.6 (11-25-24 @ 06:47), Max: 98.6 (11-25-24 @ 06:47)  HR: --  BP: --  BP(mean): --  RR: --  SpO2: --    Orthostatic VS  11-25-24 @ 06:47  Lying BP: --/-- HR: --  Sitting BP: 137/86 HR: 70  Standing BP: 142/80 HR: 70  Site: upper left arm  Mode: electronic  Orthostatic VS  11-24-24 @ 10:38  Lying BP: --/-- HR: --  Sitting BP: 145/86 HR: 74  Standing BP: 130/81 HR: 76  Site: --  Mode: --  Orthostatic VS  11-24-24 @ 06:26  Lying BP: --/-- HR: --  Sitting BP: 160/98 HR: 74  Standing BP: 160/94 HR: 75  Site: --  Mode: --

## 2024-11-26 NOTE — BH INPATIENT PSYCHIATRY PROGRESS NOTE - NSTXHYPERDATETRGT_PSY_ALL_CORE
13-Nov-2024

## 2024-11-26 NOTE — BH INPATIENT PSYCHIATRY PROGRESS NOTE - PRN MEDS
MEDICATIONS  (PRN):  acetaminophen     Tablet .. 650 milliGRAM(s) Oral every 6 hours PRN Mild Pain (1 - 3), Moderate Pain (4 - 6), Severe Pain (7 - 10)  dextrose Oral Gel 15 Gram(s) Oral once PRN Blood Glucose LESS THAN 70 milliGRAM(s)/deciliter  fluPHENAZine 2.5 milliGRAM(s) Oral every 6 hours PRN agitation secondary to psychosis  fluPHENAZine  Injectable 2.5 milliGRAM(s) IntraMuscular Once PRN severe agitation secondary to psychosis  ibuprofen  Tablet. 400 milliGRAM(s) Oral every 6 hours PRN Mild Pain (1 - 3), Moderate Pain (4 - 6)  melatonin 3 milliGRAM(s) Oral at bedtime PRN Insomnia  nicotine  Polacrilex Gum 4 milliGRAM(s) Oral every 3 hours PRN Smoking Cessation  polyethylene glycol 3350 17 Gram(s) Oral daily PRN Constipation  QUEtiapine 25 milliGRAM(s) Oral at bedtime PRN insomnia  
MEDICATIONS  (PRN):  acetaminophen     Tablet .. 650 milliGRAM(s) Oral every 6 hours PRN Mild Pain (1 - 3), Moderate Pain (4 - 6), Severe Pain (7 - 10)  clonazePAM  Tablet 1 milliGRAM(s) Oral at bedtime PRN insomnia  dextrose Oral Gel 15 Gram(s) Oral once PRN Blood Glucose LESS THAN 70 milliGRAM(s)/deciliter  diphenhydrAMINE 25 milliGRAM(s) Oral every 4 hours PRN Rash and/or Itching  fluPHENAZine 2.5 milliGRAM(s) Oral every 6 hours PRN agitation secondary to psychosis  fluPHENAZine  Injectable 2.5 milliGRAM(s) IntraMuscular Once PRN severe agitation secondary to psychosis  meclizine 12.5 milliGRAM(s) Oral every 6 hours PRN dizziness  melatonin 3 milliGRAM(s) Oral at bedtime PRN Insomnia  nicotine  Polacrilex Gum 4 milliGRAM(s) Oral every 3 hours PRN Smoking Cessation  polyethylene glycol 3350 17 Gram(s) Oral daily PRN Constipation  QUEtiapine 25 milliGRAM(s) Oral at bedtime PRN insomnia  sodium chloride 0.65% Nasal 1 Spray(s) Both Nostrils three times a day PRN Nasal Congestion  sulindac 200 milliGRAM(s) Oral every 12 hours PRN pain  traZODone 50 milliGRAM(s) Oral at bedtime PRN insomnia  
MEDICATIONS  (PRN):  acetaminophen     Tablet .. 650 milliGRAM(s) Oral every 6 hours PRN Mild Pain (1 - 3), Moderate Pain (4 - 6), Severe Pain (7 - 10)  clonazePAM  Tablet 1 milliGRAM(s) Oral at bedtime PRN insomnia  dextrose Oral Gel 15 Gram(s) Oral once PRN Blood Glucose LESS THAN 70 milliGRAM(s)/deciliter  fluPHENAZine 2.5 milliGRAM(s) Oral every 6 hours PRN agitation secondary to psychosis  fluPHENAZine  Injectable 2.5 milliGRAM(s) IntraMuscular Once PRN severe agitation secondary to psychosis  meclizine 12.5 milliGRAM(s) Oral every 6 hours PRN dizziness  melatonin 3 milliGRAM(s) Oral at bedtime PRN Insomnia  nicotine  Polacrilex Gum 4 milliGRAM(s) Oral every 3 hours PRN Smoking Cessation  polyethylene glycol 3350 17 Gram(s) Oral daily PRN Constipation  QUEtiapine 25 milliGRAM(s) Oral at bedtime PRN insomnia  sodium chloride 0.65% Nasal 1 Spray(s) Both Nostrils three times a day PRN Nasal Congestion  sulindac 200 milliGRAM(s) Oral every 12 hours PRN pain  
MEDICATIONS  (PRN):  acetaminophen     Tablet .. 650 milliGRAM(s) Oral every 6 hours PRN Mild Pain (1 - 3), Moderate Pain (4 - 6), Severe Pain (7 - 10)  dextrose Oral Gel 15 Gram(s) Oral once PRN Blood Glucose LESS THAN 70 milliGRAM(s)/deciliter  fluPHENAZine 2.5 milliGRAM(s) Oral every 6 hours PRN agitation secondary to psychosis  fluPHENAZine  Injectable 2.5 milliGRAM(s) IntraMuscular Once PRN severe agitation secondary to psychosis  ibuprofen  Tablet. 400 milliGRAM(s) Oral every 6 hours PRN Mild Pain (1 - 3), Moderate Pain (4 - 6)  melatonin 3 milliGRAM(s) Oral at bedtime PRN Insomnia  nicotine  Polacrilex Gum 4 milliGRAM(s) Oral every 3 hours PRN Smoking Cessation  polyethylene glycol 3350 17 Gram(s) Oral daily PRN Constipation  
MEDICATIONS  (PRN):  acetaminophen     Tablet .. 650 milliGRAM(s) Oral every 6 hours PRN Mild Pain (1 - 3), Moderate Pain (4 - 6), Severe Pain (7 - 10)  dextrose Oral Gel 15 Gram(s) Oral once PRN Blood Glucose LESS THAN 70 milliGRAM(s)/deciliter  fluPHENAZine 2.5 milliGRAM(s) Oral every 6 hours PRN agitation secondary to psychosis  fluPHENAZine  Injectable 2.5 milliGRAM(s) IntraMuscular Once PRN severe agitation secondary to psychosis  ibuprofen  Tablet. 400 milliGRAM(s) Oral every 6 hours PRN Mild Pain (1 - 3), Moderate Pain (4 - 6)  melatonin 3 milliGRAM(s) Oral at bedtime PRN Insomnia  nicotine  Polacrilex Gum 4 milliGRAM(s) Oral every 3 hours PRN Smoking Cessation  polyethylene glycol 3350 17 Gram(s) Oral daily PRN Constipation  QUEtiapine 25 milliGRAM(s) Oral at bedtime PRN insomnia  
MEDICATIONS  (PRN):  acetaminophen     Tablet .. 650 milliGRAM(s) Oral every 6 hours PRN Mild Pain (1 - 3), Moderate Pain (4 - 6), Severe Pain (7 - 10)  clonazePAM  Tablet 1 milliGRAM(s) Oral at bedtime PRN insomnia  dextrose Oral Gel 15 Gram(s) Oral once PRN Blood Glucose LESS THAN 70 milliGRAM(s)/deciliter  fluPHENAZine 2.5 milliGRAM(s) Oral every 6 hours PRN agitation secondary to psychosis  fluPHENAZine  Injectable 2.5 milliGRAM(s) IntraMuscular Once PRN severe agitation secondary to psychosis  meclizine 12.5 milliGRAM(s) Oral every 6 hours PRN dizziness  melatonin 3 milliGRAM(s) Oral at bedtime PRN Insomnia  nicotine  Polacrilex Gum 4 milliGRAM(s) Oral every 3 hours PRN Smoking Cessation  polyethylene glycol 3350 17 Gram(s) Oral daily PRN Constipation  QUEtiapine 25 milliGRAM(s) Oral at bedtime PRN insomnia  sodium chloride 0.65% Nasal 1 Spray(s) Both Nostrils three times a day PRN Nasal Congestion  sulindac 150 milliGRAM(s) Oral every 12 hours PRN pain  
MEDICATIONS  (PRN):  acetaminophen     Tablet .. 650 milliGRAM(s) Oral every 6 hours PRN Mild Pain (1 - 3), Moderate Pain (4 - 6), Severe Pain (7 - 10)  clonazePAM  Tablet 1 milliGRAM(s) Oral at bedtime PRN insomnia  dextrose Oral Gel 15 Gram(s) Oral once PRN Blood Glucose LESS THAN 70 milliGRAM(s)/deciliter  fluPHENAZine 2.5 milliGRAM(s) Oral every 6 hours PRN agitation secondary to psychosis  fluPHENAZine  Injectable 2.5 milliGRAM(s) IntraMuscular Once PRN severe agitation secondary to psychosis  meclizine 12.5 milliGRAM(s) Oral every 6 hours PRN dizziness  melatonin 3 milliGRAM(s) Oral at bedtime PRN Insomnia  nicotine  Polacrilex Gum 4 milliGRAM(s) Oral every 3 hours PRN Smoking Cessation  polyethylene glycol 3350 17 Gram(s) Oral daily PRN Constipation  QUEtiapine 25 milliGRAM(s) Oral at bedtime PRN insomnia  sodium chloride 0.65% Nasal 1 Spray(s) Both Nostrils three times a day PRN Nasal Congestion  sulindac 200 milliGRAM(s) Oral every 12 hours PRN pain  traZODone 50 milliGRAM(s) Oral at bedtime PRN insomnia  
MEDICATIONS  (PRN):  acetaminophen     Tablet .. 650 milliGRAM(s) Oral every 6 hours PRN Mild Pain (1 - 3), Moderate Pain (4 - 6), Severe Pain (7 - 10)  clonazePAM  Tablet 1 milliGRAM(s) Oral at bedtime PRN insomnia  dextrose Oral Gel 15 Gram(s) Oral once PRN Blood Glucose LESS THAN 70 milliGRAM(s)/deciliter  diphenhydrAMINE 25 milliGRAM(s) Oral every 4 hours PRN Rash and/or Itching  fluPHENAZine 2.5 milliGRAM(s) Oral every 6 hours PRN agitation secondary to psychosis  fluPHENAZine  Injectable 2.5 milliGRAM(s) IntraMuscular Once PRN severe agitation secondary to psychosis  meclizine 12.5 milliGRAM(s) Oral every 6 hours PRN dizziness  melatonin 3 milliGRAM(s) Oral at bedtime PRN Insomnia  nicotine  Polacrilex Gum 4 milliGRAM(s) Oral every 3 hours PRN Smoking Cessation  polyethylene glycol 3350 17 Gram(s) Oral daily PRN Constipation  QUEtiapine 25 milliGRAM(s) Oral at bedtime PRN insomnia  sodium chloride 0.65% Nasal 1 Spray(s) Both Nostrils three times a day PRN Nasal Congestion  sulindac 200 milliGRAM(s) Oral every 12 hours PRN pain  traZODone 50 milliGRAM(s) Oral at bedtime PRN insomnia  
MEDICATIONS  (PRN):  acetaminophen     Tablet .. 650 milliGRAM(s) Oral every 6 hours PRN Mild Pain (1 - 3), Moderate Pain (4 - 6), Severe Pain (7 - 10)  dextrose Oral Gel 15 Gram(s) Oral once PRN Blood Glucose LESS THAN 70 milliGRAM(s)/deciliter  fluPHENAZine 2.5 milliGRAM(s) Oral every 6 hours PRN agitation secondary to psychosis  fluPHENAZine  Injectable 2.5 milliGRAM(s) IntraMuscular Once PRN severe agitation secondary to psychosis  ibuprofen  Tablet. 400 milliGRAM(s) Oral every 6 hours PRN Mild Pain (1 - 3), Moderate Pain (4 - 6)  melatonin 3 milliGRAM(s) Oral at bedtime PRN Insomnia  nicotine  Polacrilex Gum 4 milliGRAM(s) Oral every 3 hours PRN Smoking Cessation  polyethylene glycol 3350 17 Gram(s) Oral daily PRN Constipation  QUEtiapine 25 milliGRAM(s) Oral at bedtime PRN insomnia  
MEDICATIONS  (PRN):  acetaminophen     Tablet .. 650 milliGRAM(s) Oral every 6 hours PRN Mild Pain (1 - 3), Moderate Pain (4 - 6), Severe Pain (7 - 10)  clonazePAM  Tablet 1 milliGRAM(s) Oral at bedtime PRN insomnia  dextrose Oral Gel 15 Gram(s) Oral once PRN Blood Glucose LESS THAN 70 milliGRAM(s)/deciliter  fluPHENAZine 2.5 milliGRAM(s) Oral every 6 hours PRN agitation secondary to psychosis  fluPHENAZine  Injectable 2.5 milliGRAM(s) IntraMuscular Once PRN severe agitation secondary to psychosis  meclizine 12.5 milliGRAM(s) Oral every 6 hours PRN dizziness  melatonin 3 milliGRAM(s) Oral at bedtime PRN Insomnia  nicotine  Polacrilex Gum 4 milliGRAM(s) Oral every 3 hours PRN Smoking Cessation  polyethylene glycol 3350 17 Gram(s) Oral daily PRN Constipation  QUEtiapine 25 milliGRAM(s) Oral at bedtime PRN insomnia  sodium chloride 0.65% Nasal 1 Spray(s) Both Nostrils three times a day PRN Nasal Congestion  sulindac 200 milliGRAM(s) Oral every 12 hours PRN pain  traZODone 50 milliGRAM(s) Oral at bedtime PRN insomnia  
MEDICATIONS  (PRN):  acetaminophen     Tablet .. 650 milliGRAM(s) Oral every 6 hours PRN Mild Pain (1 - 3), Moderate Pain (4 - 6), Severe Pain (7 - 10)  dextrose Oral Gel 15 Gram(s) Oral once PRN Blood Glucose LESS THAN 70 milliGRAM(s)/deciliter  fluPHENAZine 2.5 milliGRAM(s) Oral every 6 hours PRN agitation secondary to psychosis  fluPHENAZine  Injectable 2.5 milliGRAM(s) IntraMuscular Once PRN severe agitation secondary to psychosis  ibuprofen  Tablet. 400 milliGRAM(s) Oral every 6 hours PRN Mild Pain (1 - 3), Moderate Pain (4 - 6)  melatonin 3 milliGRAM(s) Oral at bedtime PRN Insomnia  nicotine  Polacrilex Gum 4 milliGRAM(s) Oral every 3 hours PRN Smoking Cessation  polyethylene glycol 3350 17 Gram(s) Oral daily PRN Constipation  QUEtiapine 25 milliGRAM(s) Oral at bedtime PRN insomnia  
MEDICATIONS  (PRN):  acetaminophen     Tablet .. 650 milliGRAM(s) Oral every 6 hours PRN Mild Pain (1 - 3), Moderate Pain (4 - 6), Severe Pain (7 - 10)  dextrose Oral Gel 15 Gram(s) Oral once PRN Blood Glucose LESS THAN 70 milliGRAM(s)/deciliter  fluPHENAZine 2.5 milliGRAM(s) Oral every 6 hours PRN agitation secondary to psychosis  fluPHENAZine  Injectable 2.5 milliGRAM(s) IntraMuscular Once PRN severe agitation secondary to psychosis  ibuprofen  Tablet. 400 milliGRAM(s) Oral every 6 hours PRN Mild Pain (1 - 3), Moderate Pain (4 - 6)  melatonin 3 milliGRAM(s) Oral at bedtime PRN Insomnia  nicotine  Polacrilex Gum 4 milliGRAM(s) Oral every 3 hours PRN Smoking Cessation  polyethylene glycol 3350 17 Gram(s) Oral daily PRN Constipation  QUEtiapine 25 milliGRAM(s) Oral at bedtime PRN insomnia  
MEDICATIONS  (PRN):  acetaminophen     Tablet .. 650 milliGRAM(s) Oral every 6 hours PRN Mild Pain (1 - 3), Moderate Pain (4 - 6), Severe Pain (7 - 10)  clonazePAM  Tablet 1 milliGRAM(s) Oral at bedtime PRN insomnia  dextrose Oral Gel 15 Gram(s) Oral once PRN Blood Glucose LESS THAN 70 milliGRAM(s)/deciliter  fluPHENAZine 2.5 milliGRAM(s) Oral every 6 hours PRN agitation secondary to psychosis  fluPHENAZine  Injectable 2.5 milliGRAM(s) IntraMuscular Once PRN severe agitation secondary to psychosis  meclizine 12.5 milliGRAM(s) Oral every 6 hours PRN dizziness  melatonin 3 milliGRAM(s) Oral at bedtime PRN Insomnia  nicotine  Polacrilex Gum 4 milliGRAM(s) Oral every 3 hours PRN Smoking Cessation  polyethylene glycol 3350 17 Gram(s) Oral daily PRN Constipation  QUEtiapine 25 milliGRAM(s) Oral at bedtime PRN insomnia  sodium chloride 0.65% Nasal 1 Spray(s) Both Nostrils three times a day PRN Nasal Congestion  sulindac 200 milliGRAM(s) Oral every 12 hours PRN pain  traZODone 50 milliGRAM(s) Oral at bedtime PRN insomnia  
MEDICATIONS  (PRN):  acetaminophen     Tablet .. 650 milliGRAM(s) Oral every 6 hours PRN Mild Pain (1 - 3), Moderate Pain (4 - 6), Severe Pain (7 - 10)  dextrose Oral Gel 15 Gram(s) Oral once PRN Blood Glucose LESS THAN 70 milliGRAM(s)/deciliter  fluPHENAZine 2.5 milliGRAM(s) Oral every 6 hours PRN agitation secondary to psychosis  fluPHENAZine  Injectable 2.5 milliGRAM(s) IntraMuscular Once PRN severe agitation secondary to psychosis  ibuprofen  Tablet. 400 milliGRAM(s) Oral every 6 hours PRN Mild Pain (1 - 3), Moderate Pain (4 - 6)  melatonin 3 milliGRAM(s) Oral at bedtime PRN Insomnia  nicotine  Polacrilex Gum 4 milliGRAM(s) Oral every 3 hours PRN Smoking Cessation  polyethylene glycol 3350 17 Gram(s) Oral daily PRN Constipation  QUEtiapine 25 milliGRAM(s) Oral at bedtime PRN insomnia

## 2024-11-26 NOTE — CHART NOTE - NSCHARTNOTEFT_GEN_A_CORE
Used  [id number] for [employer] services.   Followed up on redness of R fingers distally. Resolved today. Good pulse, good ROM of hand, no warmth.   No further intervention needed.

## 2024-11-26 NOTE — BH INPATIENT PSYCHIATRY PROGRESS NOTE - NSBHMETABOLIC_PSY_ALL_CORE_FT
BMI: BMI (kg/m2): 29.4 (11-03-24 @ 16:56)  HbA1c: A1C with Estimated Average Glucose Result: 6.2 % (11-04-24 @ 08:22)    Glucose: POCT Blood Glucose.: 152 mg/dL (11-13-24 @ 16:14)    BP: 135/70 (11-12-24 @ 09:37) (135/70 - 135/70)Vital Signs Last 24 Hrs  T(C): --  T(F): --  HR: --  BP: --  BP(mean): --  RR: --  SpO2: --    Orthostatic VS  11-13-24 @ 08:51  Lying BP: --/-- HR: --  Sitting BP: 153/90 HR: 80  Standing BP: 146/85 HR: 85  Site: --  Mode: --    Lipid Panel: 
BMI: BMI (kg/m2): 29.4 (11-03-24 @ 16:56)  HbA1c: A1C with Estimated Average Glucose Result: 6.2 % (11-04-24 @ 08:22)    Glucose: POCT Blood Glucose.: 144 mg/dL (11-24-24 @ 16:13)    BP: 142/79 (11-23-24 @ 08:37) (142/79 - 142/79)Vital Signs Last 24 Hrs  T(C): 37 (11-25-24 @ 06:47), Max: 37 (11-25-24 @ 06:47)  T(F): 98.6 (11-25-24 @ 06:47), Max: 98.6 (11-25-24 @ 06:47)  HR: --  BP: --  BP(mean): --  RR: --  SpO2: --    Orthostatic VS  11-25-24 @ 06:47  Lying BP: --/-- HR: --  Sitting BP: 137/86 HR: 70  Standing BP: 142/80 HR: 70  Site: upper left arm  Mode: electronic  Orthostatic VS  11-24-24 @ 10:38  Lying BP: --/-- HR: --  Sitting BP: 145/86 HR: 74  Standing BP: 130/81 HR: 76  Site: --  Mode: --  Orthostatic VS  11-24-24 @ 06:26  Lying BP: --/-- HR: --  Sitting BP: 160/98 HR: 74  Standing BP: 160/94 HR: 75  Site: --  Mode: --    Lipid Panel: Date/Time: 11-20-24 @ 08:42  Cholesterol, Serum: 215  LDL Cholesterol Calculated: 99  HDL Cholesterol, Serum: 39  Total Cholesterol/HDL Ration Measurement: --  Triglycerides, Serum: 387  
BMI: BMI (kg/m2): 29.4 (11-03-24 @ 16:56)  HbA1c: A1C with Estimated Average Glucose Result: 6.2 % (11-04-24 @ 08:22)    Glucose: POCT Blood Glucose.: 112 mg/dL (11-05-24 @ 16:30)    BP: 152/87 (11-04-24 @ 09:00) (152/87 - 158/93)Vital Signs Last 24 Hrs  T(C): 36.1 (11-05-24 @ 06:46), Max: 36.1 (11-05-24 @ 06:46)  T(F): 97 (11-05-24 @ 06:46), Max: 97 (11-05-24 @ 06:46)  HR: --  BP: --  BP(mean): --  RR: --  SpO2: --    Orthostatic VS  11-05-24 @ 06:46  Lying BP: --/-- HR: --  Sitting BP: 164/89 HR: 67  Standing BP: 158/99 HR: 70  Site: --  Mode: --  Orthostatic VS  11-03-24 @ 16:56  Lying BP: --/-- HR: --  Sitting BP: 178/106 HR: 73  Standing BP: 153/102 HR: 76  Site: --  Mode: --    Lipid Panel: Date/Time: 11-07-23 @ 08:23  Cholesterol, Serum: 196  LDL Cholesterol Calculated: 116  HDL Cholesterol, Serum: 38  Total Cholesterol/HDL Ration Measurement: --  Triglycerides, Serum: 210  
BMI: BMI (kg/m2): 29.4 (11-03-24 @ 16:56)  HbA1c: A1C with Estimated Average Glucose Result: 6.2 % (11-04-24 @ 08:22)    Glucose: POCT Blood Glucose.: 84 mg/dL (11-11-24 @ 15:49)    BP: 154/90 (11-10-24 @ 11:10) (154/90 - 154/90)Vital Signs Last 24 Hrs  T(C): --  T(F): --  HR: --  BP: --  BP(mean): --  RR: --  SpO2: --    Orthostatic VS  11-10-24 @ 08:20  Lying BP: --/-- HR: --  Sitting BP: 180/67 HR: 60  Standing BP: 175/78 HR: 67  Site: --  Mode: --    Lipid Panel: 
BMI: BMI (kg/m2): 29.4 (11-03-24 @ 16:56)  HbA1c: A1C with Estimated Average Glucose Result: 6.2 % (11-04-24 @ 08:22)    Glucose: POCT Blood Glucose.: 138 mg/dL (11-22-24 @ 16:01)    BP: --Vital Signs Last 24 Hrs  T(C): 36.1 (11-22-24 @ 06:53), Max: 36.1 (11-22-24 @ 06:53)  T(F): 97 (11-22-24 @ 06:53), Max: 97 (11-22-24 @ 06:53)  HR: --  BP: --  BP(mean): --  RR: --  SpO2: --    Orthostatic VS  11-22-24 @ 06:53  Lying BP: --/-- HR: --  Sitting BP: 155/93 HR: 69  Standing BP: 150/92 HR: 73  Site: --  Mode: --  Orthostatic VS  11-21-24 @ 14:56  Lying BP: --/-- HR: --  Sitting BP: 152/87 HR: 97  Standing BP: --/-- HR: --  Site: --  Mode: --  Orthostatic VS  11-21-24 @ 10:17  Lying BP: --/-- HR: --  Sitting BP: 145/80 HR: 88  Standing BP: --/-- HR: --  Site: --  Mode: --  Orthostatic VS  11-21-24 @ 06:42  Lying BP: --/-- HR: --  Sitting BP: 161/95 HR: 68  Standing BP: 148/95 HR: 73  Site: --  Mode: --    Lipid Panel: Date/Time: 11-20-24 @ 08:42  Cholesterol, Serum: 215  LDL Cholesterol Calculated: 99  HDL Cholesterol, Serum: 39  Total Cholesterol/HDL Ration Measurement: --  Triglycerides, Serum: 387  
BMI: BMI (kg/m2): 29.4 (11-03-24 @ 16:56)  HbA1c: A1C with Estimated Average Glucose Result: 6.2 % (11-04-24 @ 08:22)    Glucose: POCT Blood Glucose.: 140 mg/dL (11-08-24 @ 11:42)    BP: --Vital Signs Last 24 Hrs  T(C): 36.7 (11-08-24 @ 08:30), Max: 36.7 (11-08-24 @ 08:30)  T(F): 98 (11-08-24 @ 08:30), Max: 98 (11-08-24 @ 08:30)  HR: --  BP: --  BP(mean): --  RR: --  SpO2: --    Orthostatic VS  11-08-24 @ 08:30  Lying BP: --/-- HR: --  Sitting BP: 158/95 HR: 95  Standing BP: 148/90 HR: 91  Site: --  Mode: --  Orthostatic VS  11-07-24 @ 06:44  Lying BP: --/-- HR: --  Sitting BP: 159/86 HR: 64  Standing BP: 155/86 HR: 67  Site: --  Mode: --    Lipid Panel: 
BMI: BMI (kg/m2): 29.4 (11-03-24 @ 16:56)  HbA1c: A1C with Estimated Average Glucose Result: 6.2 % (11-04-24 @ 08:22)    Glucose: POCT Blood Glucose.: 107 mg/dL (11-21-24 @ 16:16)    BP: --Vital Signs Last 24 Hrs  T(C): 36.1 (11-21-24 @ 06:42), Max: 36.1 (11-21-24 @ 06:42)  T(F): 97 (11-21-24 @ 06:42), Max: 97 (11-21-24 @ 06:42)  HR: --  BP: --  BP(mean): --  RR: --  SpO2: --    Orthostatic VS  11-21-24 @ 10:17  Lying BP: --/-- HR: --  Sitting BP: 145/80 HR: 88  Standing BP: --/-- HR: --  Site: --  Mode: --  Orthostatic VS  11-21-24 @ 06:42  Lying BP: --/-- HR: --  Sitting BP: 161/95 HR: 68  Standing BP: 148/95 HR: 73  Site: --  Mode: --  Orthostatic VS  11-20-24 @ 06:46  Lying BP: --/-- HR: --  Sitting BP: 165/95 HR: 69  Standing BP: 163/91 HR: 71  Site: --  Mode: --    Lipid Panel: Date/Time: 11-20-24 @ 08:42  Cholesterol, Serum: 215  LDL Cholesterol Calculated: 99  HDL Cholesterol, Serum: 39  Total Cholesterol/HDL Ration Measurement: --  Triglycerides, Serum: 387  
BMI: BMI (kg/m2): 29.4 (11-03-24 @ 16:56)  HbA1c: A1C with Estimated Average Glucose Result: 6.2 % (11-04-24 @ 08:22)    Glucose: POCT Blood Glucose.: 134 mg/dL (11-06-24 @ 16:38)    BP: 152/87 (11-04-24 @ 09:00) (152/87 - 152/87)Vital Signs Last 24 Hrs  T(C): 36.2 (11-06-24 @ 06:42), Max: 36.2 (11-06-24 @ 06:42)  T(F): 97.1 (11-06-24 @ 06:42), Max: 97.1 (11-06-24 @ 06:42)  HR: --  BP: --  BP(mean): --  RR: --  SpO2: --    Orthostatic VS  11-06-24 @ 06:42  Lying BP: --/-- HR: --  Sitting BP: 149/87 HR: 68  Standing BP: 146/90 HR: 72  Site: --  Mode: --  Orthostatic VS  11-05-24 @ 06:46  Lying BP: --/-- HR: --  Sitting BP: 164/89 HR: 67  Standing BP: 158/99 HR: 70  Site: --  Mode: --    Lipid Panel: 
BMI: BMI (kg/m2): 29.4 (11-03-24 @ 16:56)  HbA1c: A1C with Estimated Average Glucose Result: 6.2 % (11-04-24 @ 08:22)    Glucose: POCT Blood Glucose.: 103 mg/dL (11-12-24 @ 16:12)    BP: 135/70 (11-12-24 @ 09:37) (135/70 - 154/90)Vital Signs Last 24 Hrs  T(C): 36.2 (11-12-24 @ 09:37), Max: 36.2 (11-12-24 @ 09:37)  T(F): 97.2 (11-12-24 @ 09:37), Max: 97.2 (11-12-24 @ 09:37)  HR: --  BP: 135/70 (11-12-24 @ 09:37) (135/70 - 135/70)  BP(mean): --  RR: 16 (11-12-24 @ 09:37) (16 - 16)  SpO2: --      Lipid Panel: 
BMI: BMI (kg/m2): 29.4 (11-03-24 @ 16:56)  HbA1c: A1C with Estimated Average Glucose Result: 6.2 % (11-04-24 @ 08:22)    Glucose: POCT Blood Glucose.: 88 mg/dL (11-14-24 @ 16:00)    BP: --Vital Signs Last 24 Hrs  T(C): 36.4 (11-15-24 @ 06:37), Max: 36.4 (11-15-24 @ 06:37)  T(F): 97.6 (11-15-24 @ 06:37), Max: 97.6 (11-15-24 @ 06:37)  HR: --  BP: --  BP(mean): --  RR: --  SpO2: --    Orthostatic VS  11-15-24 @ 06:37  Lying BP: --/-- HR: --  Sitting BP: 154/94 HR: 71  Standing BP: 156/97 HR: 74  Site: --  Mode: --    Lipid Panel: 
BMI: BMI (kg/m2): 29.4 (11-03-24 @ 16:56)  HbA1c: A1C with Estimated Average Glucose Result: 6.2 % (11-04-24 @ 08:22)    Glucose: POCT Blood Glucose.: 83 mg/dL (11-07-24 @ 11:32)    BP: --Vital Signs Last 24 Hrs  T(C): 36.1 (11-07-24 @ 06:44), Max: 36.1 (11-07-24 @ 06:44)  T(F): 97 (11-07-24 @ 06:44), Max: 97 (11-07-24 @ 06:44)  HR: --  BP: --  BP(mean): --  RR: --  SpO2: --    Orthostatic VS  11-07-24 @ 06:44  Lying BP: --/-- HR: --  Sitting BP: 159/86 HR: 64  Standing BP: 155/86 HR: 67  Site: --  Mode: --  Orthostatic VS  11-06-24 @ 06:42  Lying BP: --/-- HR: --  Sitting BP: 149/87 HR: 68  Standing BP: 146/90 HR: 72  Site: --  Mode: --    Lipid Panel: 
BMI: BMI (kg/m2): 29.4 (11-03-24 @ 16:56)  HbA1c: A1C with Estimated Average Glucose Result: 6.2 % (11-04-24 @ 08:22)    Glucose: POCT Blood Glucose.: 152 mg/dL (11-13-24 @ 16:14)    BP: 135/70 (11-12-24 @ 09:37) (135/70 - 135/70)Vital Signs Last 24 Hrs  T(C): --  T(F): --  HR: --  BP: --  BP(mean): --  RR: --  SpO2: --    Orthostatic VS  11-13-24 @ 08:51  Lying BP: --/-- HR: --  Sitting BP: 153/90 HR: 80  Standing BP: 146/85 HR: 85  Site: --  Mode: --    Lipid Panel: 
BMI: BMI (kg/m2): 29.4 (11-03-24 @ 16:56)  HbA1c: A1C with Estimated Average Glucose Result: 6.2 % (11-04-24 @ 08:22)    Glucose: POCT Blood Glucose.: 88 mg/dL (11-14-24 @ 16:00)    BP: --Vital Signs Last 24 Hrs  T(C): 36.4 (11-15-24 @ 06:37), Max: 36.4 (11-15-24 @ 06:37)  T(F): 97.6 (11-15-24 @ 06:37), Max: 97.6 (11-15-24 @ 06:37)  HR: --  BP: --  BP(mean): --  RR: --  SpO2: --    Orthostatic VS  11-15-24 @ 06:37  Lying BP: --/-- HR: --  Sitting BP: 154/94 HR: 71  Standing BP: 156/97 HR: 74  Site: --  Mode: --    Lipid Panel: 
BMI: BMI (kg/m2): 29.4 (11-03-24 @ 16:56)  HbA1c: A1C with Estimated Average Glucose Result: 6.2 % (11-04-24 @ 08:22)    Glucose: POCT Blood Glucose.: 142 mg/dL (11-20-24 @ 16:03)    BP: --Vital Signs Last 24 Hrs  T(C): 36.3 (11-20-24 @ 06:46), Max: 36.3 (11-20-24 @ 06:46)  T(F): 97.3 (11-20-24 @ 06:46), Max: 97.3 (11-20-24 @ 06:46)  HR: --  BP: --  BP(mean): --  RR: --  SpO2: --    Orthostatic VS  11-20-24 @ 06:46  Lying BP: --/-- HR: --  Sitting BP: 165/95 HR: 69  Standing BP: 163/91 HR: 71  Site: --  Mode: --  Orthostatic VS  11-19-24 @ 10:50  Lying BP: --/-- HR: --  Sitting BP: 144/78 HR: 81  Standing BP: 134/87 HR: 81  Site: --  Mode: --  Orthostatic VS  11-19-24 @ 08:31  Lying BP: --/-- HR: --  Sitting BP: 177/98 HR: 62  Standing BP: 168/95 HR: 66  Site: --  Mode: --  Orthostatic VS  11-18-24 @ 19:55  Lying BP: --/-- HR: --  Sitting BP: 154/89 HR: 76  Standing BP: --/-- HR: --  Site: --  Mode: --    Lipid Panel: Date/Time: 11-20-24 @ 08:42  Cholesterol, Serum: 215  LDL Cholesterol Calculated: 99  HDL Cholesterol, Serum: 39  Total Cholesterol/HDL Ration Measurement: --  Triglycerides, Serum: 387  
BMI: BMI (kg/m2): 29.4 (11-03-24 @ 16:56)  HbA1c: A1C with Estimated Average Glucose Result: 6.2 % (11-04-24 @ 08:22)    Glucose: POCT Blood Glucose.: 127 mg/dL (11-26-24 @ 15:47)    BP: --Vital Signs Last 24 Hrs  T(C): 37 (11-26-24 @ 07:30), Max: 37 (11-26-24 @ 07:30)  T(F): 98.6 (11-26-24 @ 07:30), Max: 98.6 (11-26-24 @ 07:30)  HR: --  BP: --  BP(mean): --  RR: --  SpO2: --    Orthostatic VS  11-26-24 @ 07:30  Lying BP: --/-- HR: --  Sitting BP: 139/92 HR: 72  Standing BP: --/-- HR: --  Site: --  Mode: --  Orthostatic VS  11-25-24 @ 06:47  Lying BP: --/-- HR: --  Sitting BP: 137/86 HR: 70  Standing BP: 142/80 HR: 70  Site: upper left arm  Mode: electronic    Lipid Panel: Date/Time: 11-20-24 @ 08:42  Cholesterol, Serum: 215  LDL Cholesterol Calculated: 99  HDL Cholesterol, Serum: 39  Total Cholesterol/HDL Ration Measurement: --  Triglycerides, Serum: 387  
BMI: BMI (kg/m2): 29.4 (11-03-24 @ 16:56)  HbA1c: A1C with Estimated Average Glucose Result: 6.2 % (11-04-24 @ 08:22)    Glucose: POCT Blood Glucose.: 88 mg/dL (11-14-24 @ 16:00)    BP: --Vital Signs Last 24 Hrs  T(C): 36.4 (11-15-24 @ 06:37), Max: 36.4 (11-15-24 @ 06:37)  T(F): 97.6 (11-15-24 @ 06:37), Max: 97.6 (11-15-24 @ 06:37)  HR: --  BP: --  BP(mean): --  RR: --  SpO2: --    Orthostatic VS  11-15-24 @ 06:37  Lying BP: --/-- HR: --  Sitting BP: 154/94 HR: 71  Standing BP: 156/97 HR: 74  Site: --  Mode: --    Lipid Panel:

## 2024-11-26 NOTE — BH INPATIENT PSYCHIATRY PROGRESS NOTE - NSBHATTESTBILLING_PSY_A_CORE
[id number]-Mbnrfgnrzl OBS or IP - moderate complexity OR 35-49 mins
[id number]-Pgizkyvutc OBS or IP - moderate complexity OR 35-49 mins
[id number]-Kbzxqckudp OBS or IP - moderate complexity OR 35-49 mins
[id number]-Qgcrghpvnn OBS or IP - moderate complexity OR 35-49 mins
[id number]-Srcfqtujfo OBS or IP - moderate complexity OR 35-49 mins
[id number]-Rmdlehjden OBS or IP - moderate complexity OR 35-49 mins
[id number]-Pjczdrboyi OBS or IP - low complexity OR 25-34 mins
[id number]-Kdyzupwswa OBS or IP - moderate complexity OR 35-49 mins
[id number]-Okfjqbyroe OBS or IP - moderate complexity OR 35-49 mins
[id number]-Gjamqbtxuc OBS or IP - high complexity OR 50-79 mins
[id number]-Upandgluvg OBS or IP - moderate complexity OR 35-49 mins
[id number]-Cupgozqebp OBS or IP - low complexity OR 25-34 mins
[id number]-Hrxgtzytmq OBS or IP - high complexity OR 50-79 mins
[id number]-Uvmeertakx OBS or IP - high complexity OR 50-79 mins
[id number]-Ecbwdyqepj OBS or IP - moderate complexity OR 35-49 mins
[id number]-Zxvjfpjfub OBS or IP - moderate complexity OR 35-49 mins

## 2024-11-26 NOTE — BH INPATIENT PSYCHIATRY PROGRESS NOTE - NSTXFALLDATEEST_PSY_ALL_CORE
03-Nov-2024
02-Nov-2024
02-Nov-2024
03-Nov-2024
02-Nov-2024
02-Nov-2024
03-Nov-2024
02-Nov-2024
03-Nov-2024
03-Nov-2024

## 2024-11-26 NOTE — BH DISCHARGE NOTE NURSING/SOCIAL WORK/PSYCH REHAB - NSDCPRGOAL_PSY_ALL_CORE
Pt met specified psych rehab goal of identifying benefits of medication compliance, as pt reported improvement in AH as benefit of compliance. Pt completed a safety plan prior discharge.

## 2024-11-26 NOTE — BH INPATIENT PSYCHIATRY PROGRESS NOTE - NSBHASSESSSUMMFT_PSY_ALL_CORE
64M with history of schizophrenia, reporting on admission interview depression in the setting of social isolation in contrast to chief complaint of worsening AH in the ED.  Worsening AH are still suspected.  Collateral from outpatient psychiatrist needed.      Patient reporting feeling much better but still with extremely poor sleep.  Family have reported improvement in the past with Abilify and patient has dyskinesias vs EPS on higher doses of Prolixin.  He is amenable to trial of cross titration to Abilify.  Li level today for dosing.     Admitted 9.13 status.  No CO needed, denies SI  Lidocaine patch, tylenol, ibuprofen for back pain  continue home effexor 150mg  Remeron 7.5mg for sleep and depressive sx  Patient on Cogentin 1mg BID  continue with Prolixin 5mg qHS and add 2.5mg daily  Abilify to 7mg qHS
64M with history of schizophrenia, reporting on admission interview depression in the setting of social isolation in contrast to chief complaint of worsening AH in the ED.  Worsening AH are still suspected.  Collateral from outpatient psychiatrist needed.      Patient reporting feeling much better but still with extremely poor sleep.  Family have reported improvement in the past with Abilify and patient has dyskinesias vs EPS on higher doses of Prolixin.  He is amenable to trial of cross titration to Abilify.  Li level today for dosing.     Admitted 9.13 status.  No CO needed, denies SI  Lidocaine patch, tylenol, ibuprofen for back pain  continue home effexor 150mg  Remeron 7.5mg for sleep and depressive sx  Lithium dosed at 900mg Eskalith  Patient on Cogentin 1mg BID  continue with Prolixin 5mg qHS and add 2.5mg daily  Abilify to 7mg qHS
64M with history of schizophrenia, reporting on admission interview depression in the setting of social isolation in contrast to chief complaint of worsening AH in the ED.  Worsening AH are still suspected.  Collateral from outpatient psychiatrist needed.      Reports ongoing troubling AH.  Does not want to further increase Prolixin for fear of EPS/dystonic reaction.  Will discuss alternatives after receiving further collateral from outpatient psychiatrist (received VM today returning my call from last week).    Admitted 9.13 status.  No CO needed, denies SI  Lidocaine patch, tylenol, ibuprofen for back pain  continue home effexor 150mg  Remeron 7.5mg for sleep and depressive sx  Patient on Cogentin 1mg BID  continue with Prolixin 5mg qHS and add 2.5mg daily
64M with history of schizophrenia, reporting on admission interview depression in the setting of social isolation in contrast to chief complaint of worsening AH in the ED.  Worsening AH are still suspected.  Collateral from outpatient psychiatrist needed.      Patient reporting feeling much better but still with extremely poor sleep.  Family have reported improvement in the past with Abilify and patient has dyskinesias vs EPS on higher doses of Prolixin.  He is amenable to trial of cross titration to Abilify.  Submitted and then retracted 3DL.  As such, Plan had been to give Prolixin CARTER but we will instead continue cross titration to Abilify.     Admitted 9.13 status.  No CO needed, denies SI  Lidocaine patch, tylenol, ibuprofen for back pain  continue home effexor 150mg  Remeron 7.5mg for sleep and depressive sx  Patient on Cogentin 1mg BID  continue with Prolixin 5mg qHS and add 2.5mg daily  Abilify 2mg qHS
64M with history of schizophrenia, reporting on admission interview depression in the setting of social isolation in contrast to chief complaint of worsening AH in the ED.  Worsening AH are still suspected.  Collateral from outpatient psychiatrist needed.      Admitted 9.13 status.  No CO needed, denies SI  Lidocaine patch, tylenol, ibuprofen for back pain  continue home effexor 150mg  Patient on Cogentin 1mg BID  Start Prolixin 2.5mg, will further titrate
64M with history of schizophrenia, reporting on admission interview depression in the setting of social isolation in contrast to chief complaint of worsening AH in the ED.  Worsening AH are still suspected.  Collateral from outpatient psychiatrist needed.      Patient reporting feeling much better but still with extremely poor sleep.  Family have reported improvement in the past with Abilify and patient has dyskinesias vs EPS on higher doses of Prolixin.  He is amenable to trial of cross titration to Abilify.  Starting 2mg tonight and will make HS meds earlier.    Admitted 9.13 status.  No CO needed, denies SI  Lidocaine patch, tylenol, ibuprofen for back pain  continue home effexor 150mg  Remeron 7.5mg for sleep and depressive sx  Patient on Cogentin 1mg BID  continue with Prolixin 5mg qHS and add 2.5mg daily  Abilify 2mg qHS
64M with history of schizophrenia, reporting on admission interview depression in the setting of social isolation in contrast to chief complaint of worsening AH in the ED.  Worsening AH are still suspected.  Collateral from outpatient psychiatrist needed.      Admitted 9.13 status.  No CO needed, denies SI  Lidocaine patch, tylenol, ibuprofen for back pain  continue home effexor 150mg  Patient on Cogentin 1mg BID  Start Prolixin 2.5mg, will further titrate
64M with history of schizophrenia, reporting on admission interview depression in the setting of social isolation in contrast to chief complaint of worsening AH in the ED.  Worsening AH are still suspected.  Collateral from outpatient psychiatrist needed.      Patient reporting feeling much better but still with extremely poor sleep.  Family have reported improvement in the past with Abilify and patient has dyskinesias vs EPS on higher doses of Prolixin.  He is amenable to trial of cross titration to Abilify.  Tolerating Eskalith well.     Admitted 9.13 status.  No CO needed, denies SI  Lidocaine patch, tylenol, ibuprofen for back pain  continue home effexor 150mg  Remeron 7.5mg for sleep and depressive sx  Lithium 900mg as Eskalith QHS  Patient on Cogentin 1mg BID  continue with Prolixin 5mg qHS and add 2.5mg daily  Abilify to 10mg qHS, cross titrating from Prolxin with goal of CARTER
64M with history of schizophrenia, reporting on admission interview depression in the setting of social isolation in contrast to chief complaint of worsening AH in the ED.  Worsening AH are still suspected.  Collateral from outpatient psychiatrist needed.      Patient reporting feeling much better but still with extremely poor sleep.  Family have reported improvement in the past with Abilify and patient has dyskinesias vs EPS on higher doses of Prolixin.  He is amenable to trial of cross titration to Abilify.  Will add Li with 600mg test dose today, discontinued ibuprofen and added sulindac for URI sx.      Admitted 9.13 status.  No CO needed, denies SI  Lidocaine patch, tylenol, ibuprofen for back pain  continue home effexor 150mg  Remeron 7.5mg for sleep and depressive sx  Patient on Cogentin 1mg BID  continue with Prolixin 5mg qHS and add 2.5mg daily  Abilify 5mg qHS
64M with history of schizophrenia, reporting on admission interview depression in the setting of social isolation in contrast to chief complaint of worsening AH in the ED.  Worsening AH are still suspected.  Collateral from outpatient psychiatrist needed.      Reports ongoing troubling AH.  Does not want to further increase Prolixin for fear of EPS/dystonic reaction.  Will discuss alternatives after receiving further collateral from outpatient psychiatrist (received VM today returning my call from last week).    Admitted 9.13 status.  No CO needed, denies SI  Lidocaine patch, tylenol, ibuprofen for back pain  continue home effexor 150mg  Patient on Cogentin 1mg BID  continue with Prolixin 5mg daily, patient does not want to further titrate
64M with history of schizophrenia, reporting on admission interview depression in the setting of social isolation in contrast to chief complaint of worsening AH in the ED.  Worsening AH are still suspected.  Collateral from outpatient psychiatrist needed.      Reports ongoing troubling AH.  Improved with AM Prolixin dose and Remeron.    Admitted 9.13 status.  No CO needed, denies SI  Lidocaine patch, tylenol, ibuprofen for back pain  continue home effexor 150mg  Remeron 7.5mg for sleep and depressive sx  Patient on Cogentin 1mg BID  continue with Prolixin 5mg qHS and add 2.5mg daily
64M with history of schizophrenia, reporting on admission interview depression in the setting of social isolation in contrast to chief complaint of worsening AH in the ED.  Worsening AH are still suspected.  Collateral from outpatient psychiatrist needed.      Prolixin titration ongoing.  Patient remains in distress from psychotic symptoms he is overall vague about.    Admitted 9.13 status.  No CO needed, denies SI  Lidocaine patch, tylenol, ibuprofen for back pain  continue home effexor 150mg  Patient on Cogentin 1mg BID  Prolixin to 5mg, will further titrate
64M with history of schizophrenia, reporting on admission interview depression in the setting of social isolation in contrast to chief complaint of worsening AH in the ED.  Worsening AH are still suspected.  Collateral from outpatient psychiatrist needed.      Patient reporting feeling much better but still with extremely poor sleep.  Family have reported improvement in the past with Abilify and patient has dyskinesias vs EPS on higher doses of Prolixin.  He is amenable to trial of cross titration to Abilify.  Tolerating Eskalith well.     Admitted 9.13 status.  No CO needed, denies SI  Lidocaine patch, tylenol, ibuprofen for back pain  continue home effexor 150mg  Remeron 7.5mg for sleep and depressive sx  Lithium dosed at 900mg Eskalith  Patient on Cogentin 1mg BID  continue with Prolixin 5mg qHS and add 2.5mg daily  Abilify to 7mg qHS
64M with history of schizophrenia, reporting on admission interview depression in the setting of social isolation in contrast to chief complaint of worsening AH in the ED.  Worsening AH are still suspected.  Collateral from outpatient psychiatrist needed.      Sleep and AH improved and with it mood improved     Admitted 9.13 status.  No CO needed, denies SI  Lidocaine patch, tylenol, ibuprofen for back pain  continue home effexor 150mg  Patient on Cogentin 1mg BID  continue with Prolixin 5mg daily, patient does not want to further titrate
64M with history of schizophrenia, reporting on admission interview depression in the setting of social isolation in contrast to chief complaint of worsening AH in the ED.  Worsening AH are still suspected.  Collateral from outpatient psychiatrist needed.      Patient reporting feeling much better but still with extremely poor sleep. Ongoing cross titration to Abilify but doing well on combination Abilify and Prolixin.  Tolerating Eskalith well.     Admitted 9.13 status.  No CO needed, denies SI  Lidocaine patch, tylenol, ibuprofen for back pain  continue home effexor 150mg  Remeron 7.5mg for sleep and depressive sx  Lithium 900mg as Eskalith QHS  Patient on Cogentin 1mg BID  continue with Prolixin 5mg qHS and add 2.5mg daily  Abilify to 10mg qHS, cross titrating from Prolxin with goal of CARTER
64M with history of schizophrenia, reporting on admission interview depression in the setting of social isolation in contrast to chief complaint of worsening AH in the ED.  Worsening AH are still suspected.  Collateral from outpatient psychiatrist needed.      Patient reporting feeling okay and slept better overnight. Ongoing cross titration to Abilify but doing well on combination Abilify and Prolixin.  Tolerating Eskalith well. Transitioning to Aristada from PO Abilify.    Admitted 9.13 status.  No CO needed, denies SI  Lidocaine patch, tylenol, ibuprofen for back pain  continue home effexor 150mg  Remeron 7.5mg for sleep and depressive sx  Lithium 900mg as Eskalith QHS  Patient on Cogentin 1mg BID  continue with Prolixin 5mg qHS and add 2.5mg daily  Abilify to 10mg qHS, cross titrating from Prolxin with goal of CARTER

## 2024-11-26 NOTE — BH INPATIENT PSYCHIATRY PROGRESS NOTE - NSDCCRITERIA_PSY_ALL_CORE
When pt is no longer an acute or imminent risk of harm to self or others, and is able to care for self safely, pt may then be discharged. 

## 2024-11-26 NOTE — BH INPATIENT PSYCHIATRY PROGRESS NOTE - NSTXDISORGDATEEST_PSY_ALL_CORE
03-Nov-2024
13-Nov-2024
03-Nov-2024
03-Nov-2024
13-Nov-2024
03-Nov-2024

## 2024-11-26 NOTE — BH INPATIENT PSYCHIATRY PROGRESS NOTE - NSTXDCOPNODATETRGT_PSY_ALL_CORE
11-Nov-2024
18-Nov-2024
25-Nov-2024
04-Dec-2024
11-Nov-2024
25-Nov-2024
02-Dec-2024
11-Nov-2024
04-Dec-2024
18-Nov-2024
11-Nov-2024
18-Nov-2024

## 2024-11-26 NOTE — BH DISCHARGE NOTE NURSING/SOCIAL WORK/PSYCH REHAB - DISCHARGE INSTRUCTIONS AFTERCARE APPOINTMENTS
In order to check the location, date, or time of your aftercare appointment, please refer to your Discharge Instructions Document given to you upon leaving the hospital.  If you have lost the instructions please call [phone]

## 2024-11-26 NOTE — BH INPATIENT PSYCHIATRY PROGRESS NOTE - NSTXMEDICGOAL_PSY_ALL_CORE
Be able to describe the benefit of medication/treatment

## 2024-11-26 NOTE — BH INPATIENT PSYCHIATRY PROGRESS NOTE - NSTXPROBHYPER_PSY_ALL_CORE
HYPERTENSION

## 2024-11-26 NOTE — BH DISCHARGE NOTE NURSING/SOCIAL WORK/PSYCH REHAB - PATIENT PORTAL LINK FT
You can access the FollowMyHealth Patient Portal offered by [facility] by registering at the following website: [email or web address]. By joining [facility]’s FollowMyHealth portal, you will also be able to view your health information using other applications (apps) compatible with our system.

## 2024-11-26 NOTE — BH INPATIENT PSYCHIATRY PROGRESS NOTE - NSTXMEDICDATETRGT_PSY_ALL_CORE
11-Nov-2024
18-Nov-2024
25-Nov-2024
11-Nov-2024
11-Nov-2024
18-Nov-2024
18-Nov-2024
02-Dec-2024
11-Nov-2024
25-Nov-2024
27-Nov-2024
25-Nov-2024

## 2024-11-26 NOTE — BH INPATIENT PSYCHIATRY PROGRESS NOTE - NSBHFUPINTERVALHXFT_PSY_A_CORE
Patient seen for follow up of psychosis.  I discussed patient's case with the interdisciplinary team. Patient was adherent to medication regimen. There were no notable overnight events.  Assessed with [place]  ID # [id number].  Patient reports AH are now very rare. Patient states he did not sleep well.  However, when confronted with sleep log her affirms he slept from 9pm to 7am last night.  States he simply awoke feeling groggy.  We discuss how usually he does not sleep well but states he feels okay.  He still wants to be discharged tomorrow.  States he wants something to help with sleep and is agreeable to Belsomra which was just approved by his insurance.  Noting some facial movements he says these are chronic from taking Prolixin.  He is amenable to Abilify injection.

## 2024-11-26 NOTE — BH INPATIENT PSYCHIATRY PROGRESS NOTE - NSTXHYPERDATEEST_PSY_ALL_CORE
03-Nov-2024

## 2024-11-26 NOTE — BH DISCHARGE NOTE NURSING/SOCIAL WORK/PSYCH REHAB - NSBHDCADDR2FT_A_CORE
Your appointment is in person.  You must be accompanied to your appointments with Dr. Webb.  [id number] [place] [address]

## 2024-11-26 NOTE — BH INPATIENT PSYCHIATRY PROGRESS NOTE - NSTXFALLDATETRGT_PSY_ALL_CORE
13-Nov-2024
04-Dec-2024
04-Dec-2024
13-Nov-2024
04-Dec-2024
04-Dec-2024
13-Nov-2024
04-Dec-2024

## 2024-11-26 NOTE — BH INPATIENT PSYCHIATRY PROGRESS NOTE - NSTXDCOPNOPROGRES_PSY_ALL_CORE
No Change
No Change
Improving
No Change
Improving
No Change
Improving

## 2024-11-26 NOTE — [facility] INPATIENT PSYCHIATRY PROGRESS NOTE - CURRENT MEDICATION
MEDICATIONS  (STANDING):  amLODIPine   Tablet 5 milliGRAM(s) Oral daily  benztropine 1 milliGRAM(s) Oral two times a day  dextrose 5%. 1000 milliLiter(s) (100 mL/Hr) IV Continuous <Continuous>  dextrose 5%. 1000 milliLiter(s) (50 mL/Hr) IV Continuous <Continuous>  dextrose 50% Injectable 25 Gram(s) IV Push once  dextrose 50% Injectable 12.5 Gram(s) IV Push once  dextrose 50% Injectable 25 Gram(s) IV Push once  fluPHENAZine 2.5 milliGRAM(s) Oral at bedtime  glucagon  Injectable 1 milliGRAM(s) IntraMuscular once  insulin lispro (ADMELOG) corrective regimen sliding scale   SubCutaneous three times a day before meals  lidocaine   4% Patch 1 Patch Transdermal daily  nebivolol 5 milliGRAM(s) Oral daily  nicotine -  14 mG/24Hr(s) Patch 1 Patch Transdermal daily  venlafaxine XR. 150 milliGRAM(s) Oral daily    MEDICATIONS  (PRN):  acetaminophen     Tablet .. 650 milliGRAM(s) Oral every 6 hours PRN Mild Pain (1 - 3), Moderate Pain (4 - 6), Severe Pain (7 - 10)  dextrose Oral Gel 15 Gram(s) Oral once PRN Blood Glucose LESS THAN 70 milliGRAM(s)/deciliter  fluPHENAZine 2.5 milliGRAM(s) Oral every 6 hours PRN agitation secondary to psychosis  fluPHENAZine  Injectable 2.5 milliGRAM(s) IntraMuscular Once PRN severe agitation secondary to psychosis  ibuprofen  Tablet. 400 milliGRAM(s) Oral every 6 hours PRN Mild Pain (1 - 3), Moderate Pain (4 - 6)  melatonin 3 milliGRAM(s) Oral at bedtime PRN Insomnia  nicotine  Polacrilex Gum 4 milliGRAM(s) Oral every 3 hours PRN Smoking Cessation  polyethylene glycol 3350 17 Gram(s) Oral daily PRN Constipation  QUEtiapine 25 milliGRAM(s) Oral at bedtime PRN insomnia  
MEDICATIONS  (STANDING):  amLODIPine   Tablet 5 milliGRAM(s) Oral daily  benztropine 1 milliGRAM(s) Oral two times a day  clonazePAM  Tablet 0.5 milliGRAM(s) Oral two times a day  dextrose 5%. 1000 milliLiter(s) (100 mL/Hr) IV Continuous <Continuous>  dextrose 5%. 1000 milliLiter(s) (50 mL/Hr) IV Continuous <Continuous>  dextrose 50% Injectable 25 Gram(s) IV Push once  dextrose 50% Injectable 12.5 Gram(s) IV Push once  dextrose 50% Injectable 25 Gram(s) IV Push once  fluPHENAZine 2.5 milliGRAM(s) Oral daily  fluPHENAZine 5 milliGRAM(s) Oral at bedtime  glucagon  Injectable 1 milliGRAM(s) IntraMuscular once  insulin lispro (ADMELOG) corrective regimen sliding scale   SubCutaneous before dinner  lidocaine   4% Patch 1 Patch Transdermal daily  mirtazapine 7.5 milliGRAM(s) Oral at bedtime  nebivolol 5 milliGRAM(s) Oral daily  nicotine -  14 mG/24Hr(s) Patch 1 Patch Transdermal daily  venlafaxine XR. 150 milliGRAM(s) Oral daily    MEDICATIONS  (PRN):  acetaminophen     Tablet .. 650 milliGRAM(s) Oral every 6 hours PRN Mild Pain (1 - 3), Moderate Pain (4 - 6), Severe Pain (7 - 10)  dextrose Oral Gel 15 Gram(s) Oral once PRN Blood Glucose LESS THAN 70 milliGRAM(s)/deciliter  fluPHENAZine 2.5 milliGRAM(s) Oral every 6 hours PRN agitation secondary to psychosis  fluPHENAZine  Injectable 2.5 milliGRAM(s) IntraMuscular Once PRN severe agitation secondary to psychosis  ibuprofen  Tablet. 400 milliGRAM(s) Oral every 6 hours PRN Mild Pain (1 - 3), Moderate Pain (4 - 6)  melatonin 3 milliGRAM(s) Oral at bedtime PRN Insomnia  nicotine  Polacrilex Gum 4 milliGRAM(s) Oral every 3 hours PRN Smoking Cessation  polyethylene glycol 3350 17 Gram(s) Oral daily PRN Constipation  QUEtiapine 25 milliGRAM(s) Oral at bedtime PRN insomnia  
MEDICATIONS  (STANDING):  amLODIPine   Tablet 5 milliGRAM(s) Oral daily  benztropine 1 milliGRAM(s) Oral two times a day  clonazePAM  Tablet 0.5 milliGRAM(s) Oral two times a day  dextrose 5%. 1000 milliLiter(s) (50 mL/Hr) IV Continuous <Continuous>  dextrose 5%. 1000 milliLiter(s) (100 mL/Hr) IV Continuous <Continuous>  dextrose 50% Injectable 25 Gram(s) IV Push once  dextrose 50% Injectable 25 Gram(s) IV Push once  dextrose 50% Injectable 12.5 Gram(s) IV Push once  fluPHENAZine 5 milliGRAM(s) Oral at bedtime  fluPHENAZine 2.5 milliGRAM(s) Oral daily  glucagon  Injectable 1 milliGRAM(s) IntraMuscular once  insulin lispro (ADMELOG) corrective regimen sliding scale   SubCutaneous before dinner  lidocaine   4% Patch 1 Patch Transdermal daily  mirtazapine 7.5 milliGRAM(s) Oral at bedtime  nebivolol 5 milliGRAM(s) Oral daily  nicotine -  14 mG/24Hr(s) Patch 1 Patch Transdermal daily  venlafaxine XR. 150 milliGRAM(s) Oral daily    MEDICATIONS  (PRN):  acetaminophen     Tablet .. 650 milliGRAM(s) Oral every 6 hours PRN Mild Pain (1 - 3), Moderate Pain (4 - 6), Severe Pain (7 - 10)  dextrose Oral Gel 15 Gram(s) Oral once PRN Blood Glucose LESS THAN 70 milliGRAM(s)/deciliter  fluPHENAZine 2.5 milliGRAM(s) Oral every 6 hours PRN agitation secondary to psychosis  fluPHENAZine  Injectable 2.5 milliGRAM(s) IntraMuscular Once PRN severe agitation secondary to psychosis  ibuprofen  Tablet. 400 milliGRAM(s) Oral every 6 hours PRN Mild Pain (1 - 3), Moderate Pain (4 - 6)  melatonin 3 milliGRAM(s) Oral at bedtime PRN Insomnia  nicotine  Polacrilex Gum 4 milliGRAM(s) Oral every 3 hours PRN Smoking Cessation  polyethylene glycol 3350 17 Gram(s) Oral daily PRN Constipation  QUEtiapine 25 milliGRAM(s) Oral at bedtime PRN insomnia  
MEDICATIONS  (STANDING):  amLODIPine   Tablet 10 milliGRAM(s) Oral daily  ARIPiprazole 7 milliGRAM(s) Oral <User Schedule>  benztropine 1 milliGRAM(s) Oral two times a day  fluPHENAZine 2.5 milliGRAM(s) Oral daily  fluPHENAZine 5 milliGRAM(s) Oral at bedtime  glucagon  Injectable 1 milliGRAM(s) IntraMuscular once  insulin lispro (ADMELOG) corrective regimen sliding scale   SubCutaneous before dinner  lidocaine   4% Patch 1 Patch Transdermal daily  lithium CR (ESKALITH-CR) 900 milliGRAM(s) Oral at bedtime  mirtazapine 7.5 milliGRAM(s) Oral at bedtime  nebivolol 5 milliGRAM(s) Oral daily  nicotine -  14 mG/24Hr(s) Patch 1 Patch Transdermal daily  venlafaxine XR. 150 milliGRAM(s) Oral daily    MEDICATIONS  (PRN):  acetaminophen     Tablet .. 650 milliGRAM(s) Oral every 6 hours PRN Mild Pain (1 - 3), Moderate Pain (4 - 6), Severe Pain (7 - 10)  clonazePAM  Tablet 1 milliGRAM(s) Oral at bedtime PRN insomnia  dextrose Oral Gel 15 Gram(s) Oral once PRN Blood Glucose LESS THAN 70 milliGRAM(s)/deciliter  fluPHENAZine 2.5 milliGRAM(s) Oral every 6 hours PRN agitation secondary to psychosis  fluPHENAZine  Injectable 2.5 milliGRAM(s) IntraMuscular Once PRN severe agitation secondary to psychosis  meclizine 12.5 milliGRAM(s) Oral every 6 hours PRN dizziness  melatonin 3 milliGRAM(s) Oral at bedtime PRN Insomnia  nicotine  Polacrilex Gum 4 milliGRAM(s) Oral every 3 hours PRN Smoking Cessation  polyethylene glycol 3350 17 Gram(s) Oral daily PRN Constipation  QUEtiapine 25 milliGRAM(s) Oral at bedtime PRN insomnia  sodium chloride 0.65% Nasal 1 Spray(s) Both Nostrils three times a day PRN Nasal Congestion  sulindac 200 milliGRAM(s) Oral every 12 hours PRN pain  traZODone 50 milliGRAM(s) Oral at bedtime PRN insomnia  
MEDICATIONS  (STANDING):  amLODIPine   Tablet 5 milliGRAM(s) Oral daily  ARIPiprazole 5 milliGRAM(s) Oral <User Schedule>  benztropine 1 milliGRAM(s) Oral two times a day  clonazePAM  Tablet 0.5 milliGRAM(s) Oral two times a day  dextrose 5%. 1000 milliLiter(s) (50 mL/Hr) IV Continuous <Continuous>  dextrose 5%. 1000 milliLiter(s) (100 mL/Hr) IV Continuous <Continuous>  dextrose 50% Injectable 25 Gram(s) IV Push once  dextrose 50% Injectable 12.5 Gram(s) IV Push once  dextrose 50% Injectable 25 Gram(s) IV Push once  fluPHENAZine 2.5 milliGRAM(s) Oral daily  fluPHENAZine 5 milliGRAM(s) Oral at bedtime  glucagon  Injectable 1 milliGRAM(s) IntraMuscular once  insulin lispro (ADMELOG) corrective regimen sliding scale   SubCutaneous before dinner  lidocaine   4% Patch 1 Patch Transdermal daily  mirtazapine 7.5 milliGRAM(s) Oral at bedtime  nebivolol 5 milliGRAM(s) Oral daily  nicotine -  14 mG/24Hr(s) Patch 1 Patch Transdermal daily  venlafaxine XR. 150 milliGRAM(s) Oral daily    MEDICATIONS  (PRN):  acetaminophen     Tablet .. 650 milliGRAM(s) Oral every 6 hours PRN Mild Pain (1 - 3), Moderate Pain (4 - 6), Severe Pain (7 - 10)  clonazePAM  Tablet 1 milliGRAM(s) Oral at bedtime PRN insomnia  dextrose Oral Gel 15 Gram(s) Oral once PRN Blood Glucose LESS THAN 70 milliGRAM(s)/deciliter  fluPHENAZine 2.5 milliGRAM(s) Oral every 6 hours PRN agitation secondary to psychosis  fluPHENAZine  Injectable 2.5 milliGRAM(s) IntraMuscular Once PRN severe agitation secondary to psychosis  meclizine 12.5 milliGRAM(s) Oral every 6 hours PRN dizziness  melatonin 3 milliGRAM(s) Oral at bedtime PRN Insomnia  nicotine  Polacrilex Gum 4 milliGRAM(s) Oral every 3 hours PRN Smoking Cessation  polyethylene glycol 3350 17 Gram(s) Oral daily PRN Constipation  QUEtiapine 25 milliGRAM(s) Oral at bedtime PRN insomnia  sodium chloride 0.65% Nasal 1 Spray(s) Both Nostrils three times a day PRN Nasal Congestion  sulindac 150 milliGRAM(s) Oral every 12 hours PRN pain  
MEDICATIONS  (STANDING):  amLODIPine   Tablet 5 milliGRAM(s) Oral daily  benztropine 1 milliGRAM(s) Oral two times a day  dextrose 5%. 1000 milliLiter(s) (50 mL/Hr) IV Continuous <Continuous>  dextrose 5%. 1000 milliLiter(s) (100 mL/Hr) IV Continuous <Continuous>  dextrose 50% Injectable 25 Gram(s) IV Push once  dextrose 50% Injectable 25 Gram(s) IV Push once  dextrose 50% Injectable 12.5 Gram(s) IV Push once  fluPHENAZine 2.5 milliGRAM(s) Oral at bedtime  glucagon  Injectable 1 milliGRAM(s) IntraMuscular once  insulin lispro (ADMELOG) corrective regimen sliding scale   SubCutaneous three times a day before meals  lidocaine   4% Patch 1 Patch Transdermal daily  nebivolol 5 milliGRAM(s) Oral daily  nicotine -  14 mG/24Hr(s) Patch 1 Patch Transdermal daily  venlafaxine XR. 150 milliGRAM(s) Oral daily    MEDICATIONS  (PRN):  acetaminophen     Tablet .. 650 milliGRAM(s) Oral every 6 hours PRN Mild Pain (1 - 3), Moderate Pain (4 - 6), Severe Pain (7 - 10)  dextrose Oral Gel 15 Gram(s) Oral once PRN Blood Glucose LESS THAN 70 milliGRAM(s)/deciliter  fluPHENAZine 2.5 milliGRAM(s) Oral every 6 hours PRN agitation secondary to psychosis  fluPHENAZine  Injectable 2.5 milliGRAM(s) IntraMuscular Once PRN severe agitation secondary to psychosis  ibuprofen  Tablet. 400 milliGRAM(s) Oral every 6 hours PRN Mild Pain (1 - 3), Moderate Pain (4 - 6)  melatonin 3 milliGRAM(s) Oral at bedtime PRN Insomnia  nicotine  Polacrilex Gum 4 milliGRAM(s) Oral every 3 hours PRN Smoking Cessation  polyethylene glycol 3350 17 Gram(s) Oral daily PRN Constipation  QUEtiapine 25 milliGRAM(s) Oral at bedtime PRN insomnia  
MEDICATIONS  (STANDING):  ARIPiprazole 7 milliGRAM(s) Oral <User Schedule>  benztropine 1 milliGRAM(s) Oral two times a day  fluPHENAZine 5 milliGRAM(s) Oral at bedtime  fluPHENAZine 2.5 milliGRAM(s) Oral daily  glucagon  Injectable 1 milliGRAM(s) IntraMuscular once  insulin lispro (ADMELOG) corrective regimen sliding scale   SubCutaneous before dinner  lidocaine   4% Patch 1 Patch Transdermal daily  mirtazapine 7.5 milliGRAM(s) Oral at bedtime  nebivolol 5 milliGRAM(s) Oral daily  nicotine -  14 mG/24Hr(s) Patch 1 Patch Transdermal daily  venlafaxine XR. 150 milliGRAM(s) Oral daily    MEDICATIONS  (PRN):  acetaminophen     Tablet .. 650 milliGRAM(s) Oral every 6 hours PRN Mild Pain (1 - 3), Moderate Pain (4 - 6), Severe Pain (7 - 10)  clonazePAM  Tablet 1 milliGRAM(s) Oral at bedtime PRN insomnia  dextrose Oral Gel 15 Gram(s) Oral once PRN Blood Glucose LESS THAN 70 milliGRAM(s)/deciliter  fluPHENAZine 2.5 milliGRAM(s) Oral every 6 hours PRN agitation secondary to psychosis  fluPHENAZine  Injectable 2.5 milliGRAM(s) IntraMuscular Once PRN severe agitation secondary to psychosis  meclizine 12.5 milliGRAM(s) Oral every 6 hours PRN dizziness  melatonin 3 milliGRAM(s) Oral at bedtime PRN Insomnia  nicotine  Polacrilex Gum 4 milliGRAM(s) Oral every 3 hours PRN Smoking Cessation  polyethylene glycol 3350 17 Gram(s) Oral daily PRN Constipation  QUEtiapine 25 milliGRAM(s) Oral at bedtime PRN insomnia  sodium chloride 0.65% Nasal 1 Spray(s) Both Nostrils three times a day PRN Nasal Congestion  sulindac 200 milliGRAM(s) Oral every 12 hours PRN pain  
MEDICATIONS  (STANDING):  amLODIPine   Tablet 5 milliGRAM(s) Oral daily  ARIPiprazole 2 milliGRAM(s) Oral <User Schedule>  benztropine 1 milliGRAM(s) Oral two times a day  clonazePAM  Tablet 0.5 milliGRAM(s) Oral two times a day  dextrose 5%. 1000 milliLiter(s) (50 mL/Hr) IV Continuous <Continuous>  dextrose 5%. 1000 milliLiter(s) (100 mL/Hr) IV Continuous <Continuous>  dextrose 50% Injectable 25 Gram(s) IV Push once  dextrose 50% Injectable 25 Gram(s) IV Push once  dextrose 50% Injectable 12.5 Gram(s) IV Push once  fluPHENAZine 2.5 milliGRAM(s) Oral daily  fluPHENAZine 5 milliGRAM(s) Oral at bedtime  glucagon  Injectable 1 milliGRAM(s) IntraMuscular once  insulin lispro (ADMELOG) corrective regimen sliding scale   SubCutaneous before dinner  lidocaine   4% Patch 1 Patch Transdermal daily  mirtazapine 7.5 milliGRAM(s) Oral at bedtime  nebivolol 5 milliGRAM(s) Oral daily  nicotine -  14 mG/24Hr(s) Patch 1 Patch Transdermal daily  venlafaxine XR. 150 milliGRAM(s) Oral daily    MEDICATIONS  (PRN):  acetaminophen     Tablet .. 650 milliGRAM(s) Oral every 6 hours PRN Mild Pain (1 - 3), Moderate Pain (4 - 6), Severe Pain (7 - 10)  dextrose Oral Gel 15 Gram(s) Oral once PRN Blood Glucose LESS THAN 70 milliGRAM(s)/deciliter  fluPHENAZine 2.5 milliGRAM(s) Oral every 6 hours PRN agitation secondary to psychosis  fluPHENAZine  Injectable 2.5 milliGRAM(s) IntraMuscular Once PRN severe agitation secondary to psychosis  ibuprofen  Tablet. 400 milliGRAM(s) Oral every 6 hours PRN Mild Pain (1 - 3), Moderate Pain (4 - 6)  melatonin 3 milliGRAM(s) Oral at bedtime PRN Insomnia  nicotine  Polacrilex Gum 4 milliGRAM(s) Oral every 3 hours PRN Smoking Cessation  polyethylene glycol 3350 17 Gram(s) Oral daily PRN Constipation  QUEtiapine 25 milliGRAM(s) Oral at bedtime PRN insomnia  
MEDICATIONS  (STANDING):  amLODIPine   Tablet 5 milliGRAM(s) Oral daily  ARIPiprazole 2 milliGRAM(s) Oral <User Schedule>  benztropine 1 milliGRAM(s) Oral two times a day  clonazePAM  Tablet 0.5 milliGRAM(s) Oral two times a day  dextrose 5%. 1000 milliLiter(s) (100 mL/Hr) IV Continuous <Continuous>  dextrose 5%. 1000 milliLiter(s) (50 mL/Hr) IV Continuous <Continuous>  dextrose 50% Injectable 25 Gram(s) IV Push once  dextrose 50% Injectable 12.5 Gram(s) IV Push once  dextrose 50% Injectable 25 Gram(s) IV Push once  fluPHENAZine 5 milliGRAM(s) Oral at bedtime  fluPHENAZine 2.5 milliGRAM(s) Oral daily  glucagon  Injectable 1 milliGRAM(s) IntraMuscular once  insulin lispro (ADMELOG) corrective regimen sliding scale   SubCutaneous before dinner  lidocaine   4% Patch 1 Patch Transdermal daily  mirtazapine 7.5 milliGRAM(s) Oral at bedtime  nebivolol 5 milliGRAM(s) Oral daily  nicotine -  14 mG/24Hr(s) Patch 1 Patch Transdermal daily  venlafaxine XR. 150 milliGRAM(s) Oral daily    MEDICATIONS  (PRN):  acetaminophen     Tablet .. 650 milliGRAM(s) Oral every 6 hours PRN Mild Pain (1 - 3), Moderate Pain (4 - 6), Severe Pain (7 - 10)  dextrose Oral Gel 15 Gram(s) Oral once PRN Blood Glucose LESS THAN 70 milliGRAM(s)/deciliter  fluPHENAZine 2.5 milliGRAM(s) Oral every 6 hours PRN agitation secondary to psychosis  fluPHENAZine  Injectable 2.5 milliGRAM(s) IntraMuscular Once PRN severe agitation secondary to psychosis  ibuprofen  Tablet. 400 milliGRAM(s) Oral every 6 hours PRN Mild Pain (1 - 3), Moderate Pain (4 - 6)  melatonin 3 milliGRAM(s) Oral at bedtime PRN Insomnia  nicotine  Polacrilex Gum 4 milliGRAM(s) Oral every 3 hours PRN Smoking Cessation  polyethylene glycol 3350 17 Gram(s) Oral daily PRN Constipation  QUEtiapine 25 milliGRAM(s) Oral at bedtime PRN insomnia  
MEDICATIONS  (STANDING):  amLODIPine   Tablet 10 milliGRAM(s) Oral daily  ARIPiprazole 10 milliGRAM(s) Oral <User Schedule>  atorvastatin 40 milliGRAM(s) Oral at bedtime  benztropine 1 milliGRAM(s) Oral two times a day  fluPHENAZine 2.5 milliGRAM(s) Oral daily  fluPHENAZine 5 milliGRAM(s) Oral at bedtime  glucagon  Injectable 1 milliGRAM(s) IntraMuscular once  insulin lispro (ADMELOG) corrective regimen sliding scale   SubCutaneous before dinner  lidocaine   4% Patch 1 Patch Transdermal daily  lisinopril 5 milliGRAM(s) Oral daily  lithium CR (ESKALITH-CR) 900 milliGRAM(s) Oral at bedtime  mirtazapine 7.5 milliGRAM(s) Oral at bedtime  nebivolol 5 milliGRAM(s) Oral daily  nicotine -  14 mG/24Hr(s) Patch 1 Patch Transdermal daily  venlafaxine XR. 150 milliGRAM(s) Oral daily    MEDICATIONS  (PRN):  acetaminophen     Tablet .. 650 milliGRAM(s) Oral every 6 hours PRN Mild Pain (1 - 3), Moderate Pain (4 - 6), Severe Pain (7 - 10)  clonazePAM  Tablet 1 milliGRAM(s) Oral at bedtime PRN insomnia  dextrose Oral Gel 15 Gram(s) Oral once PRN Blood Glucose LESS THAN 70 milliGRAM(s)/deciliter  diphenhydrAMINE 25 milliGRAM(s) Oral every 4 hours PRN Rash and/or Itching  fluPHENAZine 2.5 milliGRAM(s) Oral every 6 hours PRN agitation secondary to psychosis  fluPHENAZine  Injectable 2.5 milliGRAM(s) IntraMuscular Once PRN severe agitation secondary to psychosis  meclizine 12.5 milliGRAM(s) Oral every 6 hours PRN dizziness  melatonin 3 milliGRAM(s) Oral at bedtime PRN Insomnia  nicotine  Polacrilex Gum 4 milliGRAM(s) Oral every 3 hours PRN Smoking Cessation  polyethylene glycol 3350 17 Gram(s) Oral daily PRN Constipation  QUEtiapine 25 milliGRAM(s) Oral at bedtime PRN insomnia  sodium chloride 0.65% Nasal 1 Spray(s) Both Nostrils three times a day PRN Nasal Congestion  sulindac 200 milliGRAM(s) Oral every 12 hours PRN pain  traZODone 50 milliGRAM(s) Oral at bedtime PRN insomnia  
MEDICATIONS  (STANDING):  amLODIPine   Tablet 5 milliGRAM(s) Oral daily  benztropine 1 milliGRAM(s) Oral two times a day  dextrose 5%. 1000 milliLiter(s) (100 mL/Hr) IV Continuous <Continuous>  dextrose 5%. 1000 milliLiter(s) (50 mL/Hr) IV Continuous <Continuous>  dextrose 50% Injectable 25 Gram(s) IV Push once  dextrose 50% Injectable 12.5 Gram(s) IV Push once  dextrose 50% Injectable 25 Gram(s) IV Push once  fluPHENAZine 2.5 milliGRAM(s) Oral at bedtime  glucagon  Injectable 1 milliGRAM(s) IntraMuscular once  insulin lispro (ADMELOG) corrective regimen sliding scale   SubCutaneous three times a day before meals  lidocaine   4% Patch 1 Patch Transdermal daily  nebivolol 5 milliGRAM(s) Oral daily  nicotine -  14 mG/24Hr(s) Patch 1 Patch Transdermal daily  venlafaxine XR. 150 milliGRAM(s) Oral daily    MEDICATIONS  (PRN):  acetaminophen     Tablet .. 650 milliGRAM(s) Oral every 6 hours PRN Mild Pain (1 - 3), Moderate Pain (4 - 6), Severe Pain (7 - 10)  dextrose Oral Gel 15 Gram(s) Oral once PRN Blood Glucose LESS THAN 70 milliGRAM(s)/deciliter  fluPHENAZine 2.5 milliGRAM(s) Oral every 6 hours PRN agitation secondary to psychosis  fluPHENAZine  Injectable 2.5 milliGRAM(s) IntraMuscular Once PRN severe agitation secondary to psychosis  ibuprofen  Tablet. 400 milliGRAM(s) Oral every 6 hours PRN Mild Pain (1 - 3), Moderate Pain (4 - 6)  melatonin 3 milliGRAM(s) Oral at bedtime PRN Insomnia  nicotine  Polacrilex Gum 4 milliGRAM(s) Oral every 3 hours PRN Smoking Cessation  polyethylene glycol 3350 17 Gram(s) Oral daily PRN Constipation  
MEDICATIONS  (STANDING):  amLODIPine   Tablet 5 milliGRAM(s) Oral daily  benztropine 1 milliGRAM(s) Oral two times a day  clonazePAM  Tablet 0.5 milliGRAM(s) Oral two times a day  dextrose 5%. 1000 milliLiter(s) (50 mL/Hr) IV Continuous <Continuous>  dextrose 5%. 1000 milliLiter(s) (100 mL/Hr) IV Continuous <Continuous>  dextrose 50% Injectable 25 Gram(s) IV Push once  dextrose 50% Injectable 25 Gram(s) IV Push once  dextrose 50% Injectable 12.5 Gram(s) IV Push once  fluPHENAZine 5 milliGRAM(s) Oral at bedtime  glucagon  Injectable 1 milliGRAM(s) IntraMuscular once  insulin lispro (ADMELOG) corrective regimen sliding scale   SubCutaneous before dinner  lidocaine   4% Patch 1 Patch Transdermal daily  nebivolol 5 milliGRAM(s) Oral daily  nicotine -  14 mG/24Hr(s) Patch 1 Patch Transdermal daily  venlafaxine XR. 150 milliGRAM(s) Oral daily    MEDICATIONS  (PRN):  acetaminophen     Tablet .. 650 milliGRAM(s) Oral every 6 hours PRN Mild Pain (1 - 3), Moderate Pain (4 - 6), Severe Pain (7 - 10)  dextrose Oral Gel 15 Gram(s) Oral once PRN Blood Glucose LESS THAN 70 milliGRAM(s)/deciliter  fluPHENAZine 2.5 milliGRAM(s) Oral every 6 hours PRN agitation secondary to psychosis  fluPHENAZine  Injectable 2.5 milliGRAM(s) IntraMuscular Once PRN severe agitation secondary to psychosis  ibuprofen  Tablet. 400 milliGRAM(s) Oral every 6 hours PRN Mild Pain (1 - 3), Moderate Pain (4 - 6)  melatonin 3 milliGRAM(s) Oral at bedtime PRN Insomnia  nicotine  Polacrilex Gum 4 milliGRAM(s) Oral every 3 hours PRN Smoking Cessation  polyethylene glycol 3350 17 Gram(s) Oral daily PRN Constipation  QUEtiapine 25 milliGRAM(s) Oral at bedtime PRN insomnia  
MEDICATIONS  (STANDING):  amLODIPine   Tablet 10 milliGRAM(s) Oral daily  ARIPiprazole 30 milliGRAM(s) Oral once  ARIPiprazole lauroxil Injectable, Long Acting (ARISTADA INITIO) 675 milliGRAM(s) IntraMuscular once  atorvastatin 40 milliGRAM(s) Oral at bedtime  Belsomra (suvorexant) 10mg oral tablets 1 Tablet(s) 1 Tablet(s) Oral at bedtime  benztropine 1 milliGRAM(s) Oral two times a day  fluPHENAZine 2.5 milliGRAM(s) Oral daily  fluPHENAZine 5 milliGRAM(s) Oral at bedtime  glucagon  Injectable 1 milliGRAM(s) IntraMuscular once  insulin lispro (ADMELOG) corrective regimen sliding scale   SubCutaneous before dinner  lidocaine   4% Patch 1 Patch Transdermal daily  lisinopril 5 milliGRAM(s) Oral daily  lithium CR (ESKALITH-CR) 900 milliGRAM(s) Oral at bedtime  mirtazapine 7.5 milliGRAM(s) Oral at bedtime  nebivolol 5 milliGRAM(s) Oral daily  nicotine -  14 mG/24Hr(s) Patch 1 Patch Transdermal daily  venlafaxine XR. 150 milliGRAM(s) Oral daily    MEDICATIONS  (PRN):  acetaminophen     Tablet .. 650 milliGRAM(s) Oral every 6 hours PRN Mild Pain (1 - 3), Moderate Pain (4 - 6), Severe Pain (7 - 10)  clonazePAM  Tablet 1 milliGRAM(s) Oral at bedtime PRN insomnia  dextrose Oral Gel 15 Gram(s) Oral once PRN Blood Glucose LESS THAN 70 milliGRAM(s)/deciliter  diphenhydrAMINE 25 milliGRAM(s) Oral every 4 hours PRN Rash and/or Itching  fluPHENAZine 2.5 milliGRAM(s) Oral every 6 hours PRN agitation secondary to psychosis  fluPHENAZine  Injectable 2.5 milliGRAM(s) IntraMuscular Once PRN severe agitation secondary to psychosis  meclizine 12.5 milliGRAM(s) Oral every 6 hours PRN dizziness  melatonin 3 milliGRAM(s) Oral at bedtime PRN Insomnia  nicotine  Polacrilex Gum 4 milliGRAM(s) Oral every 3 hours PRN Smoking Cessation  polyethylene glycol 3350 17 Gram(s) Oral daily PRN Constipation  QUEtiapine 25 milliGRAM(s) Oral at bedtime PRN insomnia  sodium chloride 0.65% Nasal 1 Spray(s) Both Nostrils three times a day PRN Nasal Congestion  sulindac 200 milliGRAM(s) Oral every 12 hours PRN pain  traZODone 50 milliGRAM(s) Oral at bedtime PRN insomnia  
MEDICATIONS  (STANDING):  amLODIPine   Tablet 5 milliGRAM(s) Oral daily  benztropine 1 milliGRAM(s) Oral two times a day  dextrose 5%. 1000 milliLiter(s) (50 mL/Hr) IV Continuous <Continuous>  dextrose 5%. 1000 milliLiter(s) (100 mL/Hr) IV Continuous <Continuous>  dextrose 50% Injectable 25 Gram(s) IV Push once  dextrose 50% Injectable 25 Gram(s) IV Push once  dextrose 50% Injectable 12.5 Gram(s) IV Push once  fluPHENAZine 5 milliGRAM(s) Oral at bedtime  glucagon  Injectable 1 milliGRAM(s) IntraMuscular once  insulin lispro (ADMELOG) corrective regimen sliding scale   SubCutaneous three times a day before meals  lidocaine   4% Patch 1 Patch Transdermal daily  nebivolol 5 milliGRAM(s) Oral daily  nicotine -  14 mG/24Hr(s) Patch 1 Patch Transdermal daily  venlafaxine XR. 150 milliGRAM(s) Oral daily    MEDICATIONS  (PRN):  acetaminophen     Tablet .. 650 milliGRAM(s) Oral every 6 hours PRN Mild Pain (1 - 3), Moderate Pain (4 - 6), Severe Pain (7 - 10)  dextrose Oral Gel 15 Gram(s) Oral once PRN Blood Glucose LESS THAN 70 milliGRAM(s)/deciliter  fluPHENAZine 2.5 milliGRAM(s) Oral every 6 hours PRN agitation secondary to psychosis  fluPHENAZine  Injectable 2.5 milliGRAM(s) IntraMuscular Once PRN severe agitation secondary to psychosis  ibuprofen  Tablet. 400 milliGRAM(s) Oral every 6 hours PRN Mild Pain (1 - 3), Moderate Pain (4 - 6)  melatonin 3 milliGRAM(s) Oral at bedtime PRN Insomnia  nicotine  Polacrilex Gum 4 milliGRAM(s) Oral every 3 hours PRN Smoking Cessation  polyethylene glycol 3350 17 Gram(s) Oral daily PRN Constipation  QUEtiapine 25 milliGRAM(s) Oral at bedtime PRN insomnia  
MEDICATIONS  (STANDING):  amLODIPine   Tablet 10 milliGRAM(s) Oral daily  ARIPiprazole 7 milliGRAM(s) Oral <User Schedule>  atorvastatin 40 milliGRAM(s) Oral at bedtime  benztropine 1 milliGRAM(s) Oral two times a day  fluPHENAZine 5 milliGRAM(s) Oral at bedtime  fluPHENAZine 2.5 milliGRAM(s) Oral daily  glucagon  Injectable 1 milliGRAM(s) IntraMuscular once  insulin lispro (ADMELOG) corrective regimen sliding scale   SubCutaneous before dinner  lidocaine   4% Patch 1 Patch Transdermal daily  lisinopril 5 milliGRAM(s) Oral daily  lithium CR (ESKALITH-CR) 900 milliGRAM(s) Oral at bedtime  mirtazapine 7.5 milliGRAM(s) Oral at bedtime  nebivolol 5 milliGRAM(s) Oral daily  nicotine -  14 mG/24Hr(s) Patch 1 Patch Transdermal daily  venlafaxine XR. 150 milliGRAM(s) Oral daily    MEDICATIONS  (PRN):  acetaminophen     Tablet .. 650 milliGRAM(s) Oral every 6 hours PRN Mild Pain (1 - 3), Moderate Pain (4 - 6), Severe Pain (7 - 10)  clonazePAM  Tablet 1 milliGRAM(s) Oral at bedtime PRN insomnia  dextrose Oral Gel 15 Gram(s) Oral once PRN Blood Glucose LESS THAN 70 milliGRAM(s)/deciliter  fluPHENAZine 2.5 milliGRAM(s) Oral every 6 hours PRN agitation secondary to psychosis  fluPHENAZine  Injectable 2.5 milliGRAM(s) IntraMuscular Once PRN severe agitation secondary to psychosis  meclizine 12.5 milliGRAM(s) Oral every 6 hours PRN dizziness  melatonin 3 milliGRAM(s) Oral at bedtime PRN Insomnia  nicotine  Polacrilex Gum 4 milliGRAM(s) Oral every 3 hours PRN Smoking Cessation  polyethylene glycol 3350 17 Gram(s) Oral daily PRN Constipation  QUEtiapine 25 milliGRAM(s) Oral at bedtime PRN insomnia  sodium chloride 0.65% Nasal 1 Spray(s) Both Nostrils three times a day PRN Nasal Congestion  sulindac 200 milliGRAM(s) Oral every 12 hours PRN pain  traZODone 50 milliGRAM(s) Oral at bedtime PRN insomnia  
MEDICATIONS  (STANDING):  amLODIPine   Tablet 10 milliGRAM(s) Oral daily  ARIPiprazole 10 milliGRAM(s) Oral <User Schedule>  atorvastatin 40 milliGRAM(s) Oral at bedtime  benztropine 1 milliGRAM(s) Oral two times a day  fluPHENAZine 5 milliGRAM(s) Oral at bedtime  fluPHENAZine 2.5 milliGRAM(s) Oral daily  glucagon  Injectable 1 milliGRAM(s) IntraMuscular once  insulin lispro (ADMELOG) corrective regimen sliding scale   SubCutaneous before dinner  lidocaine   4% Patch 1 Patch Transdermal daily  lisinopril 5 milliGRAM(s) Oral daily  lithium CR (ESKALITH-CR) 900 milliGRAM(s) Oral at bedtime  mirtazapine 7.5 milliGRAM(s) Oral at bedtime  nebivolol 5 milliGRAM(s) Oral daily  nicotine -  14 mG/24Hr(s) Patch 1 Patch Transdermal daily  venlafaxine XR. 150 milliGRAM(s) Oral daily    MEDICATIONS  (PRN):  acetaminophen     Tablet .. 650 milliGRAM(s) Oral every 6 hours PRN Mild Pain (1 - 3), Moderate Pain (4 - 6), Severe Pain (7 - 10)  clonazePAM  Tablet 1 milliGRAM(s) Oral at bedtime PRN insomnia  dextrose Oral Gel 15 Gram(s) Oral once PRN Blood Glucose LESS THAN 70 milliGRAM(s)/deciliter  fluPHENAZine 2.5 milliGRAM(s) Oral every 6 hours PRN agitation secondary to psychosis  fluPHENAZine  Injectable 2.5 milliGRAM(s) IntraMuscular Once PRN severe agitation secondary to psychosis  meclizine 12.5 milliGRAM(s) Oral every 6 hours PRN dizziness  melatonin 3 milliGRAM(s) Oral at bedtime PRN Insomnia  nicotine  Polacrilex Gum 4 milliGRAM(s) Oral every 3 hours PRN Smoking Cessation  polyethylene glycol 3350 17 Gram(s) Oral daily PRN Constipation  QUEtiapine 25 milliGRAM(s) Oral at bedtime PRN insomnia  sodium chloride 0.65% Nasal 1 Spray(s) Both Nostrils three times a day PRN Nasal Congestion  sulindac 200 milliGRAM(s) Oral every 12 hours PRN pain  traZODone 50 milliGRAM(s) Oral at bedtime PRN insomnia

## 2024-11-26 NOTE — BH INPATIENT PSYCHIATRY PROGRESS NOTE - NSTXDISORGGOAL_PSY_ALL_CORE
Will make at least 3 goal and reality oriented statements during therapy

## 2024-11-26 NOTE — BH INPATIENT PSYCHIATRY PROGRESS NOTE - NSBHFUPINTERVALCCFT_PSY_A_CORE
psychosis, depression

## 2024-11-26 NOTE — BH DISCHARGE NOTE NURSING/SOCIAL WORK/PSYCH REHAB - NSCDUDCCRISIS_PSY_A_CORE
[facility]  [phone] [facility] ([phone])  Text "WELL" to [phone]  Website: [email or web address]/.National Suicide Prevention Lifeline 0 [phone] 781-0430/.  [facility]  [phone] [place], [place] [address] [phone]   Hours:  Monday through Friday from 9 AM to 3 PM/988 Suicide and Crisis Lifeline

## 2024-11-26 NOTE — BH INPATIENT PSYCHIATRY PROGRESS NOTE - NSTXMEDICDATEEST_PSY_ALL_CORE
04-Nov-2024
03-Nov-2024
04-Nov-2024
03-Nov-2024
04-Nov-2024

## 2024-11-26 NOTE — BH INPATIENT PSYCHIATRY PROGRESS NOTE - NSTXDISORGDATETRGT_PSY_ALL_CORE
20-Nov-2024
04-Dec-2024
20-Nov-2024
13-Nov-2024
04-Dec-2024
13-Nov-2024
13-Nov-2024
20-Nov-2024
13-Nov-2024
20-Nov-2024
20-Nov-2024
13-Nov-2024
13-Nov-2024
04-Dec-2024

## 2024-11-26 NOTE — BH INPATIENT PSYCHIATRY PROGRESS NOTE - NSTXFALLGOAL_PSY_ALL_CORE
Ask for assistance when getting out of bed/chair and upon ambulation
Ask for assistance when getting out of bed/chair and upon ambulation
Verbalize understanding of fall prevention interventions
Ask for assistance when getting out of bed/chair and upon ambulation
Verbalize understanding of fall prevention interventions
Ask for assistance when getting out of bed/chair and upon ambulation
Verbalize understanding of fall prevention interventions
Ask for assistance when getting out of bed/chair and upon ambulation
Verbalize understanding of fall prevention interventions
Ask for assistance when getting out of bed/chair and upon ambulation
Verbalize understanding of fall prevention interventions

## 2024-11-26 NOTE — BH INPATIENT PSYCHIATRY PROGRESS NOTE - MSE UNSTRUCTURED FT
Appearance: appears stated age, somewhat formal appearing clothing; good hygiene and grooming  Behavior: cooperative, polite, Well related  Eye contact: fair  Motor: No abnormal movements, no psychomotor slowing or activation.  Speech: fluent in Macanese but dysarthric due to lack of upper dentures, normal rate, volume, prosody  Mood: "much better"   Affect: neutral, full, reactive appropriately  Thought Process: linear  Thought Content: no apparent delusions but with somatic preoccupation   Perceptual disturbance: AH teasing/taunting him significantly decreased, denies CAH  Insight: fair  Judgment: emerging  Impulse control:  appropriate  Cognition grossly intact.   Language: Fluent.  Gait: Intact. 
Appearance: appears stated age, somewhat formal appearing clothing; good hygiene and grooming  Behavior: cooperative, polite, Well related  Eye contact: fair  Motor: No abnormal movements, no psychomotor slowing or activation.  Speech: fluent in East Timorese but dysarthric due to lack of upper dentures, normal rate, volume, prosody  Mood: "worse"   Affect: neutral, full, reactive appropriately  Thought Process: linear  Thought Content: no apparent delusions but with somatic preoccupation   Perceptual disturbance: denies AH  Insight: fair  Judgment: emerging  Impulse control:  appropriate  Cognition grossly intact.   Language: Fluent.  Gait: Intact. 
Appearance: appears stated age, somewhat formal appearing clothing; good hygiene and grooming  Behavior: cooperative, polite, Well related  Eye contact: fair  Motor: No abnormal movements, no psychomotor slowing or activation.  Speech: fluent in Salvadorean but dysarthric due to lack of upper dentures, normal rate, volume, prosody  Mood: "bad earlier...better now"   Affect: neutral, full, reactive appropriately  Thought Process: linear  Thought Content: no apparent delusions but with somatic preoccupation   Perceptual disturbance: +AH, denies CAH  Insight: fair  Judgment: emerging  Impulse control:  appropriate  Cognition grossly intact.   Language: Fluent.  Gait: Intact. 
Appearance: appears stated age, somewhat formal appearing clothing; fair hygiene and grooming  Behavior: cooperative, polite, Well related  Eye contact: fair  Motor: No abnormal movements, no psychomotor slowing or activation.  Speech: fluent in Niuean but dysarthric due to lack of upper dentures, normal rate, volume, prosody  Mood: "better"   Affect: neutral, full, reactive appropriately  Thought Process: linear  Thought Content: no apparent delusions but with somatic preoccupation   Perceptual disturbance: AH remain significantly decreased, denies CAH  Insight: fair  Judgment: emerging  Impulse control:  appropriate  Cognition grossly intact.   Language: Fluent.  Gait: Intact. 
Appearance: appears stated age, somewhat formal appearing clothing; good hygiene and grooming  Behavior: cooperative, polite, Well related  Eye contact: fair  Motor: No abnormal movements, no psychomotor slowing or activation.  Speech: fluent in Afghan but dysarthric due to lack of upper dentures, normal rate, volume, prosody  Mood: "okay"   Affect: neutral, full, reactive appropriately  Thought Process: linear  Thought Content: no apparent delusions but with somatic preoccupation   Perceptual disturbance: AH teasing/taunting him, denies CAH  Insight: fair  Judgment: emerging  Impulse control:  appropriate  Cognition grossly intact.   Language: Fluent.  Gait: Intact. 
Appearance: appears stated age, somewhat formal appearing clothing; good hygiene and grooming  Behavior: cooperative, polite, Well related  Eye contact: fair  Motor: No abnormal movements, no psychomotor slowing or activation.  Speech: fluent in Gabonese but dysarthric due to lack of upper dentures, normal rate, volume, prosody  Mood: "better"   Affect: neutral, full, reactive appropriately  Thought Process: linear  Thought Content: no apparent delusions but with somatic preoccupation   Perceptual disturbance: +AH, denies CAH, AH receding  Insight: fair  Judgment: emerging  Impulse control:  appropriate  Cognition grossly intact.   Language: Fluent.  Gait: Intact. 
Appearance: appears stated age, somewhat formal appearing clothing; good hygiene and grooming  Behavior: cooperative, polite, Well related  Eye contact: fair  Motor: No abnormal movements, no psychomotor slowing or activation.  Speech: fluent in Namibian but dysarthric due to lack of upper dentures, normal rate, volume, prosody  Mood: "much better"   Affect: neutral, full, reactive appropriately  Thought Process: linear  Thought Content: no apparent delusions but with somatic preoccupation   Perceptual disturbance: AH teasing/taunting him significantly decreased, denies CAH  Insight: fair  Judgment: emerging  Impulse control:  appropriate  Cognition grossly intact.   Language: Fluent.  Gait: Intact. 
Appearance: appears stated age, somewhat formal appearing clothing; fair hygiene and grooming  Behavior: cooperative, polite, Well related  Eye contact: fair  Motor: No abnormal movements, no psychomotor slowing or activation.  Speech: fluent in Chilean but dysarthric due to lack of upper dentures, normal rate, volume, prosody  Mood: "okay"   Affect: neutral, full, reactive appropriately  Thought Process: linear  Thought Content: no apparent delusions but with somatic preoccupation   Perceptual disturbance: AH remain significantly decreased, denies CAH  Insight: fair  Judgment: emerging  Impulse control:  appropriate  Cognition grossly intact.   Language: Fluent.  Gait: Intact. 
Appearance: appears stated age, somewhat formal appearing clothing; fair hygiene and grooming  Behavior: cooperative, polite, Well related  Eye contact: fair  Motor: No abnormal movements, no psychomotor slowing or activation.  Speech: fluent in Mauritanian but dysarthric due to lack of upper dentures, normal rate, volume, prosody  Mood: "my head feels swollen"   Affect: neutral, full, reactive appropriately  Thought Process: linear  Thought Content: no apparent delusions but with somatic preoccupation   Perceptual disturbance: AH remain significantly decreased, denies CAH  Insight: fair  Judgment: emerging  Impulse control:  appropriate  Cognition grossly intact.   Language: Fluent.  Gait: Intact. 
Appearance: appears stated age, somewhat formal appearing clothing; good hygiene and grooming  Behavior: cooperative, polite, Well related  Eye contact: fair  Motor: No abnormal movements, no psychomotor slowing or activation.  Speech: fluent in Bolivian but dysarthric due to lack of upper dentures, normal rate, volume, prosody  Mood: "much better"   Affect: neutral, full, reactive appropriately  Thought Process: linear  Thought Content: no apparent delusions but with somatic preoccupation   Perceptual disturbance: AH teasing/taunting him significantly decreased, denies CAH  Insight: fair  Judgment: emerging  Impulse control:  appropriate  Cognition grossly intact.   Language: Fluent.  Gait: Intact. 
Appearance: appears stated age, somewhat formal appearing clothing; fair hygiene and grooming  Behavior: cooperative, polite, Well related  Eye contact: fair  Motor: No abnormal movements, no psychomotor slowing or activation.  Speech: fluent in Norwegian but dysarthric due to lack of upper dentures, normal rate, volume, prosody  Mood: "better but now weak"   Affect: neutral, full, reactive appropriately  Thought Process: linear  Thought Content: no apparent delusions but with somatic preoccupation   Perceptual disturbance: AH remain significantly decreased, denies CAH  Insight: fair  Judgment: emerging  Impulse control:  appropriate  Cognition grossly intact.   Language: Fluent.  Gait: Intact. 
Appearance: appears stated age, somewhat formal appearing clothing; good hygiene and grooming  Behavior: cooperative, polite, Well related  Eye contact: fair  Motor: No abnormal movements, no psychomotor slowing or activation.  Speech: fluent in Thai but dysarthric due to lack of upper dentures, normal rate, volume, prosody  Mood: "low"   Affect: neutral, full, reactive appropriately  Thought Process: linear  Thought Content: no apparent delusions but with somatic preoccupation   Perceptual disturbance: +AH, denies CAH  Insight: fair  Judgment: emerging  Impulse control:  appropriate  Cognition grossly intact.   Language: Fluent.  Gait: Intact. 
Appearance: appears stated age, somewhat formal appearing clothing; fair hygiene and grooming  Behavior: cooperative, polite, Well related  Eye contact: fair  Motor: No abnormal movements, no psychomotor slowing or activation.  Speech: fluent in Pakistani but dysarthric due to lack of upper dentures, normal rate, volume, prosody  Mood: "better"   Affect: neutral, full, reactive appropriately  Thought Process: linear  Thought Content: no apparent delusions but with somatic preoccupation   Perceptual disturbance: AH remain significantly decreased, denies CAH  Insight: fair  Judgment: emerging  Impulse control:  appropriate  Cognition grossly intact.   Language: Fluent.  Gait: Intact. 
Appearance: appears stated age, somewhat formal appearing clothing; good hygiene and grooming  Behavior: cooperative, polite, Well related  Eye contact: fair  Motor: No abnormal movements, no psychomotor slowing or activation.  Speech: fluent in Martiniquais but dysarthric due to lack of upper dentures, normal rate, volume, prosody  Mood: "okay"   Affect: neutral, full, reactive appropriately  Thought Process: linear  Thought Content: no apparent delusions but with somatic preoccupation   Perceptual disturbance: AH teasing/taunting him, denies CAH  Insight: fair  Judgment: emerging  Impulse control:  appropriate  Cognition grossly intact.   Language: Fluent.  Gait: Intact. 
Appearance: appears stated age, somewhat formal appearing clothing; fair hygiene and grooming  Behavior: cooperative, polite, Well related  Eye contact: fair  Motor: No abnormal movements, no psychomotor slowing or activation.  Speech: fluent in Tajik but dysarthric due to lack of upper dentures, normal rate, volume, prosody  Mood: "better"   Affect: neutral, full, reactive appropriately  Thought Process: linear  Thought Content: no apparent delusions but with somatic preoccupation   Perceptual disturbance: AH remain significantly decreased, denies CAH  Insight: fair  Judgment: emerging  Impulse control:  appropriate  Cognition grossly intact.   Language: Fluent.  Gait: Intact. 
Appearance: appears stated age, somewhat formal appearing clothing; fair hygiene and grooming  Behavior: cooperative, polite, Well related  Eye contact: fair  Motor: No abnormal movements, no psychomotor slowing or activation.  Speech: fluent in Danish but dysarthric due to lack of upper dentures, normal rate, volume, prosody  Mood: "okay"   Affect: neutral, full, reactive appropriately  Thought Process: linear  Thought Content: no apparent delusions but with somatic preoccupation   Perceptual disturbance: AH remain significantly decreased, denies CAH  Insight: fair  Judgment: emerging  Impulse control:  appropriate  Cognition grossly intact.   Language: Fluent.  Gait: Intact.

## 2024-11-26 NOTE — BH INPATIENT PSYCHIATRY PROGRESS NOTE - NSTXMEDICPROGRES_PSY_ALL_CORE
Improving
No Change
Improving
No Change
No Change
Improving
No Change
Met - goal discontinued
No Change
Improving

## 2024-11-27 VITALS — TEMPERATURE: 98 F

## 2024-11-27 LAB
ANION GAP SERPL CALC-SCNC: 10 MMOL/L — SIGNIFICANT CHANGE UP (ref 7–14)
BUN SERPL-MCNC: 17 MG/DL — SIGNIFICANT CHANGE UP (ref 7–23)
CALCIUM SERPL-MCNC: 9.7 MG/DL — SIGNIFICANT CHANGE UP (ref 8.4–10.5)
CHLORIDE SERPL-SCNC: 104 MMOL/L — SIGNIFICANT CHANGE UP (ref 98–107)
CO2 SERPL-SCNC: 26 MMOL/L — SIGNIFICANT CHANGE UP (ref 22–31)
CREAT SERPL-MCNC: 0.89 MG/DL — SIGNIFICANT CHANGE UP (ref 0.5–1.3)
EGFR: 96 ML/MIN/1.73M2 — SIGNIFICANT CHANGE UP
GLUCOSE SERPL-MCNC: 128 MG/DL — HIGH (ref 70–99)
LITHIUM SERPL-MCNC: 0.6 MMOL/L — SIGNIFICANT CHANGE UP (ref 0.6–1.2)
POTASSIUM SERPL-MCNC: 4.1 MMOL/L — SIGNIFICANT CHANGE UP (ref 3.5–5.3)
POTASSIUM SERPL-SCNC: 4.1 MMOL/L — SIGNIFICANT CHANGE UP (ref 3.5–5.3)
SODIUM SERPL-SCNC: 140 MMOL/L — SIGNIFICANT CHANGE UP (ref 135–145)

## 2024-11-27 PROCEDURE — 99239 HOSP IP/OBS DSCHRG MGMT >30: CPT

## 2024-11-27 RX ADMIN — LISINOPRIL 5 MILLIGRAM(S): 20 TABLET ORAL at 08:43

## 2024-11-27 RX ADMIN — SULINDAC 200 MILLIGRAM(S): 150 TABLET ORAL at 10:46

## 2024-11-27 RX ADMIN — VENLAFAXINE HYDROCHLORIDE 150 MILLIGRAM(S): 75 CAPSULE, EXTENDED RELEASE ORAL at 08:44

## 2024-11-27 RX ADMIN — NEBIVOLOL HYDROCHLORIDE 5 MILLIGRAM(S): 10 TABLET ORAL at 08:43

## 2024-11-27 RX ADMIN — NICOTINE 1 PATCH: 21 PATCH, EXTENDED RELEASE TRANSDERMAL at 08:44

## 2024-11-27 RX ADMIN — LIDOCAINE 1 PATCH: 40 CREAM TOPICAL at 08:44

## 2024-11-27 RX ADMIN — ACETAMINOPHEN, DIPHENHYDRAMINE HCL, PHENYLEPHRINE HCL 3 MILLIGRAM(S): 325; 25; 5 TABLET ORAL at 03:05

## 2024-11-27 RX ADMIN — AMLODIPINE BESYLATE 10 MILLIGRAM(S): 10 TABLET ORAL at 08:43

## 2024-11-27 RX ADMIN — ACETAMINOPHEN 500MG 650 MILLIGRAM(S): 500 TABLET, COATED ORAL at 08:53

## 2024-11-27 RX ADMIN — ACETAMINOPHEN 500MG 650 MILLIGRAM(S): 500 TABLET, COATED ORAL at 00:10

## 2024-11-27 RX ADMIN — Medication 2.5 MILLIGRAM(S): at 08:44

## 2024-11-27 RX ADMIN — ARIPIPRAZOLE 441 MILLIGRAM(S): 15 TABLET ORAL at 09:07

## 2024-11-27 RX ADMIN — ACETAMINOPHEN 500MG 650 MILLIGRAM(S): 500 TABLET, COATED ORAL at 01:00

## 2024-11-27 RX ADMIN — Medication 1 MILLIGRAM(S): at 08:43

## 2024-11-27 NOTE — BH INPATIENT PSYCHIATRY DISCHARGE NOTE - NSBHFUPINTERVALHXFT_PSY_A_CORE
Discharge Progress Note Date and Time: 11-27-24 @ 16:43    Patient seen individually for discharge day management. Greater than 30 minutes was spent with patient, staff and charting as part of discharge day management. I was physically present during the service to the patient and personally examined the patient and I was directly involved in the management plan and recommendations of the care provided to the patient.      Patient seen for follow up of psychosis.  I discussed patient's case with the interdisciplinary team. Patient was adherent to medication regimen. There were no notable overnight events.  Assessed with [place]  ID # [id number].  Patient reports AH are now very rare. Patient states he did not sleep well.  States he had a "spasm" in his calf after Aristada injection yesterday and that he took Tylenol and this resolved.  Advised in the future that Cogentin is a more appropriate treatment if this is in fact a med side effect.  Denies SI and HI.  States his mood is much better.  Discussed his brother's concerns discussed today over the phone and says that he feels better than admission and has fewer symptoms, still wants to be discharged.

## 2024-11-27 NOTE — BH INPATIENT PSYCHIATRY DISCHARGE NOTE - HOSPITAL COURSE
Patient was admitted to  and seen daily with [place] .  Patient endorsed feeling depressed and like he did not want to do things he usually enjoys.  Also, per patient, family, and outpatient psychiatrist, he hears voices at baseline.  however, voices became more pejorative, telling him that he was rude and others did not like him.  His nephew noted that the patient was speaking more loudly and was more animated than usual.  He reportedly had issues with sleep chronically, sleeping much of the day and not sleeping at night.  However on the unit, he would only doze occasionally but got usually 3hrs of sleep at night despite multiple sleep aids being used including trazodone, Seroquel, Klonopin.  He would complain of drowsiness when doses were increased.  Sleep disturbance with collateral about louder more animated speech raised concern for some mood component to worsening of illness with possibility of mixed episode even in the context of schizophrenia (no reported manic episodes in the past).  He started lithium, dose determined by Peter Shah nomogram, at 900mg and had a 0.6  12hr level on this dose.  He also was transitioned to Abilify as family said he did well on this before.  He was titrated to 10mg while still on Prolixin total daily dose 7.5mg.  AH and mood both improved while sleep remained intermittently a problem.  He was transitioned from PO Abilify to 441mg Aristada IM after receiving 675mg loading dose.  He said that he had a muscle spasm after the dose which quickly resolved without treatment.  Patient continues to have negative symptoms but positive symptoms and depressed mood as well as hypomanic behavior has been well controlled on current regimen.  Patient can continue to taper Prolixin as an outpatient.      Suicide and risk assessment performed prior to discharge. The patient has a low acute risk and low chronic risk of self-harm and aggression towards others. Protective factors include denying SI, no SIB, denying HI, good social supports in their family, no substance abuse, no current mood symptoms, no hopelessness, future-oriented in returning to home, no access to firearms.  Risk factors include presenting illness. Immediate risk was minimized by inpatient admission to a safe environment with appropriate supervision and limited access to lethal means. Future risk was minimized before discharge by treatment of acute episode, maximizing outpatient support, providing relevant patient education, discussing emergency procedures, and ensuring close follow-up. The patient remains at a low risk of self-harm, and such risk cannot be further ameliorated by continued inpatient treatment and the patient is therefore appropriate for discharge.       There were no behavioral problems on the unit.  Patient did not become agitated and did not require emergent intramuscular medications or seclusion / restraints.  Patient did not self-harm on the unit.  Patient remained actively engaged in treatment.  Patient participated in individual, group, and milieu therapy.  Patient got along appropriately with staff and peers.   Patient did not have any medical problems during this hospitalization.  There were no medical consultations.    A full discussion of the factors that predict treatment success and relapse was held including safety planning.  A discussion of the risks and benefits of patient’s medication was held including a discussion of the metabolic risks and risk of EPS and TD was done.  During hospital course, Extensive education about Lithium including risks and benefits, potential for adverse effects of kidneys and thyroid, early and late signs of toxicity, need to get medical attention or go to ED if they experience these s/s, need to maintain good hydration to prevent toxicity, contraindication of NSAIDs, and need to consult with psychiatrist before taking any additional prescribed medications.        The patient has improved significantly and no longer requires inpatient treatment and care. Patient denies all suicidal and aggressive ideation, intent and plan. Patient denies anxiety symptoms and panic attacks. Patient is not judged to be an acute danger to self or others at this time. Patient will be discharged today to home and outpatient follow up.

## 2024-11-27 NOTE — BH INPATIENT PSYCHIATRY DISCHARGE NOTE - NSDCCPCAREPLAN_GEN_ALL_CORE_FT
PRINCIPAL DISCHARGE DIAGNOSIS  Diagnosis: Schizophrenia, unspecified type  Assessment and Plan of Treatment:

## 2024-11-27 NOTE — BH INPATIENT PSYCHIATRY DISCHARGE NOTE - NSDCMRMEDTOKEN_GEN_ALL_CORE_FT
amLODIPine 10 mg oral tablet: 1 tab(s) orally once a day  ARIPiprazole lauroxil 441 mg/1.6 mL intramuscular suspension, extended release: 441 milligram(s) intramuscular every 28 days last dose given 11/27/24, next due 12/24/24  atorvastatin 40 mg oral tablet: 1 tab(s) orally once a day (at bedtime)  benztropine 1 mg oral tablet: 1 tab(s) orally 2 times a day  clonazePAM 0.5 mg oral tablet: 1 tab(s) orally 2 times a day MDD: 1mg  fluPHENAZine 2.5 mg oral tablet: 1 tab(s) orally once a day as needed for psychotic symptoms  fluPHENAZine 5 mg oral tablet: 1 tab(s) orally once a day (at bedtime)  lidocaine 4% topical film: Apply topically to affected area once a day  lisinopril 5 mg oral tablet: 1 tab(s) orally once a day  lithium 450 mg oral tablet, extended release: 2 tab(s) orally once a day (at bedtime)  mirtazapine 7.5 mg oral tablet: 1 tab(s) orally once a day (at bedtime)  nebivolol 5 mg oral tablet: 1 tab(s) orally once a day  Nicoderm C-Q Clear 14 mg/24 hr transdermal film, extended release: 1 patch transdermal once a day  polyethylene glycol 3350 oral powder for reconstitution: 17 gram(s) orally once a day as needed for Constipation  sulindac 200 mg oral tablet: 1 tab(s) orally every 12 hours as needed for pain  suvorexant 10 mg oral tablet: 1 tab(s) orally once a day (at bedtime) as needed for  insomnia  traZODone 50 mg oral tablet: 1 tab(s) orally once a day (at bedtime) as needed for insomnia  venlafaxine 150 mg oral capsule, extended release: 1 cap(s) orally once a day

## 2024-11-27 NOTE — BH INPATIENT PSYCHIATRY DISCHARGE NOTE - MSE UNSTRUCTURED FT
Appearance: appears stated age, somewhat formal appearing clothing; fair hygiene and grooming  Behavior: cooperative, polite, Well related  Eye contact: fair  Motor: No abnormal movements, no psychomotor slowing or activation.  Speech: fluent in Afghan but dysarthric due to lack of upper dentures, normal rate, volume, prosody  Mood: "good"   Affect: neutral, full, reactive appropriately  Thought Process: linear  Thought Content: no apparent delusions but with somatic preoccupation   Perceptual disturbance: AH remain significantly decreased, denies CAH  Insight: fair  Judgment: emerging  Impulse control:  appropriate  Cognition grossly intact.   Language: Fluent.  Gait: Intact.

## 2024-11-27 NOTE — BH INPATIENT PSYCHIATRY DISCHARGE NOTE - HPI (INCLUDE ILLNESS QUALITY, SEVERITY, DURATION, TIMING, CONTEXT, MODIFYING FACTORS, ASSOCIATED SIGNS AND SYMPTOMS)
This is a 65y/o man with a past medical history of hypertension, hyperlipidemia, and diabetes mellitus, and a past psychiatric history of schizophrenia, one prior suicide attempt (via hanging at least 26 years ago), and multiple previous psychiatric admissions (last [id number]) brought in by brother for worsening mood and auditory hallucinations.     Patient seen in conjunction with [facility]  from [place], ID# [id number].  Patient states he has been "ill" since age 27 when he had a suicide attempt, was depressed, and was hearing voices.  He states he has been on this unit before including under the care of Dr. Flores whom he remembers communicating with in [place]ronesian.  Notes being stable on Haldol dec for a time and then switching to Invega Sustenna which he stopped 7 years ago for headaches.  Cannot recall what treatment he is given now.  He denies AH which he had endorsed in the ED but states he has been more depressed.  States he prefers to be around people and so feels better on the unit.  Denies current SI and says he has not had SI since his SA at age 27.   notes he is difficult to understand and the patient states he is missing his upper dentures which make it difficult for him to enunciate.  He complains of back pain chronically and accepts Tylenol and lidocaine patch.    Confirmed the following findings of ED assessment:   <<Patient seen alongside [facility] interpreters Carolyn [id number]; Barbara [id number]; Etelvina [id number]. He says that he has been feeling down over the last two months, reporting difficulty doing activities he enjoys. He reports that he's been having more auditory hallucinations that say negative things about the way he talks. He denies visual hallucinations. States that his difficulties speaking are mainly from how distracted his thoughts are. He also endorses vague paranoia that people are more aware of how his speech sounds. He endorses difficulties falling and staying asleep over this time period. He reports concern that his medications are not working as well and wants to go back on an CARTER. He states that this has been leading to verbal arguments at home about taking his medications. He at first reported consistent adherence but then noted that he has been inconsistently taking his medications. He denies physical aggression and says that he feels safe at home. He reports increased tobacco use over the last two months but denies all other substances. He reports that he has been eating inconsistently and has lost 15-20lbs in the last three months. He denies difficulties showering/bathing. He has a home health aide for 5 hours a day who helps with iADLs.  Requests voluntary inpatient psychiatric admission for safety, stabilization and medication optimization.   Patient is in outpatient psychiatric treatment with Dr. Webb ([phone]). The patient notes a good relationship with his psychiatrist. This writer called but was unable to leave voicemail.  Please see ED Behavioral Health Note for collateral from the patient's brother Deo.>>

## 2024-11-27 NOTE — BH INPATIENT PSYCHIATRY DISCHARGE NOTE - NSDCPROCEDURESFT_PSY_ALL_CORE
Complete Blood Count + Automated Diff in AM (11.24.24 @ 11:34)   IANC: 7.87: IANC (instrument absolute neutrophil count) is based on the instrument   calculation which may differ from ANC (manual absolute neutrophil count)   since it is based on the calculation from a manual differential. K/uL  Nucleated RBC #: 0.00 K/uL  WBC Count: 11.72 K/uL  RBC Count: 5.12 M/uL  Hemoglobin: 14.2 g/dL  Hematocrit: 44.4 %  Mean Cell Volume: 86.7 fL  Mean Cell Hemoglobin: 27.7 pg  Mean Cell Hemoglobin Conc: 32.0 g/dL  Red Cell Distrib Width: 13.8 %  Platelet Count - Automated: 263 K/uL  Auto Neutrophil #: 7.87 K/uL  Auto Lymphocyte #: 2.41 K/uL  Auto Monocyte #: 0.79 K/uL  Auto Eosinophil #: 0.50 K/uL  Auto Basophil #: 0.06 K/uL  Auto Neutrophil %: 67.1: Differential percentages must be correlated with absolute numbers for   clinical significance. %  Auto Lymphocyte %: 20.6 %  Auto Monocyte %: 6.7 %  Auto Eosinophil %: 4.3 %  Auto Basophil %: 0.5 %  Auto Immature Granulocyte %: 0.8: (Includes meta, myelo and promyelocytes). Mild elevations in immature Comprehensive Metabolic Panel (11.24.24 @ 11:34)   Sodium: 139 mmol/L  Potassium: 4.2 mmol/L  Chloride: 100 mmol/L  Carbon Dioxide: 27 mmol/L  Anion Gap: 12 mmol/L  Blood Urea Nitrogen: 18 mg/dL  Creatinine: 0.89 mg/dL  Glucose: 126 mg/dL  Calcium: 10.3 mg/dL  Protein Total: 7.6 g/dL  Albumin: 4.4 g/dL  Bilirubin Total: 0.2 mg/dL  Alkaline Phosphatase: 103 U/L  Aspartate Aminotransferase (AST/SGOT): 20 U/L  Alanine Aminotransferase (ALT/SGPT): 27 U/L  Lipid Profile in AM (11.20.24 @ 08:42)   Cholesterol: 215 mg/dL  Triglycerides, Serum: 387 mg/dL  HDL Cholesterol: 39 mg/dL  Non HDL Cholesterol: 176: Patients Atherosclerotic Cardiovascular Disease (ASCVD) Risk

## 2024-11-27 NOTE — BH INPATIENT PSYCHIATRY DISCHARGE NOTE - OTHER PAST PSYCHIATRIC HISTORY (INCLUDE DETAILS REGARDING ONSET, COURSE OF ILLNESS, INPATIENT/OUTPATIENT TREATMENT)
7 previous inpatient admissions, most recent in 2023 at [facility]. Follows outpatient with Dr. Josh Webb

## 2024-11-27 NOTE — BH INPATIENT PSYCHIATRY DISCHARGE NOTE - NSBHASSESSSUMMFT_PSY_ALL_CORE
64M with history of schizophrenia, reporting on admission interview depression in the setting of social isolation in contrast to chief complaint of worsening AH in the ED.  Worsening AH are still suspected.  Collateral from outpatient psychiatrist needed.      Patient reporting feeling okay and slept better overnight. Ongoing cross titration to Abilify but doing well on combination Abilify and Prolixin.  Tolerating Eskalith well. Transitioned to Aristada from PO Abilify.    Admitted 9.13 status.  No CO needed, denies SI  Lidocaine patch, tylenol, ibuprofen for back pain  continue home effexor 150mg  Remeron 7.5mg for sleep and depressive sx  Lithium 900mg as Eskalith QHS  Patient on Cogentin 1mg BID  continue with Prolixin 5mg qHS and add 2.5mg daily  Aristada 441mg q28 days, next due 12/24

## 2024-11-27 NOTE — BH INPATIENT PSYCHIATRY DISCHARGE NOTE - NSBHMETABOLIC_PSY_ALL_CORE_FT
BMI: BMI (kg/m2): 29.4 (11-03-24 @ 16:56)  HbA1c: A1C with Estimated Average Glucose Result: 6.2 % (11-04-24 @ 08:22)    Glucose: POCT Blood Glucose.: 127 mg/dL (11-26-24 @ 15:47)    BP: --Vital Signs Last 24 Hrs  T(C): 36.4 (11-27-24 @ 06:31), Max: 36.4 (11-27-24 @ 06:31)  T(F): 97.6 (11-27-24 @ 06:31), Max: 97.6 (11-27-24 @ 06:31)  HR: --  BP: --  BP(mean): --  RR: --  SpO2: --    Orthostatic VS  11-27-24 @ 06:31  Lying BP: --/-- HR: --  Sitting BP: 134/90 HR: 115  Standing BP: 132/83 HR: 112  Site: --  Mode: --  Orthostatic VS  11-26-24 @ 07:30  Lying BP: --/-- HR: --  Sitting BP: 139/92 HR: 72  Standing BP: --/-- HR: --  Site: --  Mode: --    Lipid Panel: Date/Time: 11-20-24 @ 08:42  Cholesterol, Serum: 215  LDL Cholesterol Calculated: 99  HDL Cholesterol, Serum: 39  Total Cholesterol/HDL Ration Measurement: --  Triglycerides, Serum: 387

## 2025-01-27 ENCOUNTER — APPOINTMENT (OUTPATIENT)
Dept: SURGERY | Facility: CLINIC | Age: 65
End: 2025-01-27

## 2025-07-17 NOTE — BH INPATIENT PSYCHIATRY DISCHARGE NOTE - NSBHDCMDCOMP_PSY_ALL_CORE
Patient's insurance will only cover the Lidocaine patches if patient has one of the following diagnoses;     Pain associated with postherpetic neuralgia or Diabetic neuropathic pain     This patient does not meet criteria for coverage. The patient can either purchase out of pocket or purchase an OTC product.     This section is complete and the patient is ready for discharge